# Patient Record
Sex: MALE | Race: WHITE | NOT HISPANIC OR LATINO | Employment: FULL TIME | URBAN - METROPOLITAN AREA
[De-identification: names, ages, dates, MRNs, and addresses within clinical notes are randomized per-mention and may not be internally consistent; named-entity substitution may affect disease eponyms.]

---

## 2018-02-23 ENCOUNTER — OFFICE VISIT (OUTPATIENT)
Dept: URGENT CARE | Facility: PHYSICIAN GROUP | Age: 63
End: 2018-02-23
Payer: COMMERCIAL

## 2018-02-23 VITALS
HEIGHT: 72 IN | RESPIRATION RATE: 12 BRPM | BODY MASS INDEX: 26.03 KG/M2 | WEIGHT: 192.2 LBS | DIASTOLIC BLOOD PRESSURE: 68 MMHG | OXYGEN SATURATION: 96 % | TEMPERATURE: 98 F | HEART RATE: 76 BPM | SYSTOLIC BLOOD PRESSURE: 122 MMHG

## 2018-02-23 DIAGNOSIS — I10 ESSENTIAL HYPERTENSION: ICD-10-CM

## 2018-02-23 DIAGNOSIS — J45.20 MILD INTERMITTENT ASTHMA WITHOUT COMPLICATION: ICD-10-CM

## 2018-02-23 DIAGNOSIS — J20.9 ACUTE BRONCHITIS, UNSPECIFIED ORGANISM: ICD-10-CM

## 2018-02-23 DIAGNOSIS — E10.49 TYPE 1 DIABETES MELLITUS WITH OTHER NEUROLOGIC COMPLICATION (HCC): ICD-10-CM

## 2018-02-23 PROCEDURE — 99214 OFFICE O/P EST MOD 30 MIN: CPT | Performed by: FAMILY MEDICINE

## 2018-02-23 RX ORDER — ALBUTEROL SULFATE 0.63 MG/3ML
0.63 SOLUTION RESPIRATORY (INHALATION) EVERY 6 HOURS PRN
Qty: 3 ML | Refills: 0 | Status: SHIPPED | OUTPATIENT
Start: 2018-02-23 | End: 2022-03-03

## 2018-02-23 RX ORDER — DOXYCYCLINE HYCLATE 100 MG
100 TABLET ORAL 2 TIMES DAILY
Qty: 20 TAB | Refills: 0 | Status: SHIPPED | OUTPATIENT
Start: 2018-02-23 | End: 2018-03-05

## 2018-02-23 ASSESSMENT — ENCOUNTER SYMPTOMS
WEAKNESS: 0
DIARRHEA: 0
DIZZINESS: 0
EYE REDNESS: 0
PSYCHIATRIC NEGATIVE: 1
DIAPHORESIS: 0
COUGH: 1
VOMITING: 0
HEADACHES: 1
EYE DISCHARGE: 0
CHILLS: 0
SPUTUM PRODUCTION: 1
MYALGIAS: 1
NAUSEA: 0
WEIGHT LOSS: 0
SHORTNESS OF BREATH: 0
WHEEZING: 1
SORE THROAT: 1
CARDIOVASCULAR NEGATIVE: 1
FEVER: 0

## 2018-02-23 NOTE — PROGRESS NOTES
Subjective:      Keaton Cervantes is a 62 y.o. male who presents with Cough (Productive cough, sinus pressure, headaches, congestion  x1week)    Patient presents to urgent care with one week of cough and chest congestion and he's had some mild sinus pressure and headaches as well. Patient states that he has a history of bronchogenic asthma and also pneumonia. Has had some malaise and fatigue no obvious fevers or chills. No nausea vomiting or diarrhea. He is a type I diabetic who states that his blood sugars have been running well 120s to 130s yesterday and today.      HPI  Review of Systems   Constitutional: Positive for malaise/fatigue. Negative for chills, diaphoresis, fever and weight loss.   HENT: Positive for sore throat. Negative for ear pain.    Eyes: Negative for discharge and redness.   Respiratory: Positive for cough, sputum production and wheezing. Negative for shortness of breath.    Cardiovascular: Negative.    Gastrointestinal: Negative for diarrhea, nausea and vomiting.   Genitourinary: Negative.    Musculoskeletal: Positive for myalgias.   Skin: Negative.    Neurological: Positive for headaches. Negative for dizziness and weakness.   Endo/Heme/Allergies: Negative.    Psychiatric/Behavioral: Negative.      PMH:  has a past medical history of Allergy; ASTHMA; Back pain; Bronchitis; CATARACT; Dental disorder; Diabetes; Hypertension; MEDICAL HOME; Neck pain; ELLEN (obstructive sleep apnea); and Sleep apnea.  MEDS:   Current Outpatient Prescriptions:   •  doxycycline (VIBRAMYCIN) 100 MG Tab, Take 1 Tab by mouth 2 times a day for 10 days. With food, Disp: 20 Tab, Rfl: 0  •  albuterol (ACCUNEB) 0.63 MG/3ML nebulizer solution, 3 mL by Nebulization route every 6 hours as needed for Shortness of Breath for up to 30 doses., Disp: 3 mL, Rfl: 0  •  Hydrocod Polst-CPM Polst ER (TUSSIONEX) 10-8 MG/5ML Suspension Extended Release, Take 5 mL by mouth 2 times a day as needed for Cough or Rhinitis for up to 6  days. Will cause sedation, avoid driving, operating heavy machinery, and drinking alcohol, Disp: 60 mL, Rfl: 0  •  metformin (GLUCOPHAGE) 500 MG TABS, TAKE 1 TABLET BY MOUTH TWICE DAILY, Disp: 60 Tab, Rfl: 2  •  nabumetone (RELAFEN) 750 MG Tab, Take 1 Tab by mouth 2 times a day., Disp: 60 Tab, Rfl: 3  •  ibuprofen (MOTRIN) 400 MG Tab, Take 400 mg by mouth every 6 hours as needed., Disp: , Rfl:   •  hydrocodone-acetaminophen (NORCO) 5-325 MG Tab per tablet, Take 1-2 Tabs by mouth every 6 hours as needed. No driving while taking, do not take while at work. No heavy machinery with use., Disp: 15 Tab, Rfl: 0  •  ONE TOUCH ULTRA TEST strip, USE TO TEST BLOOD 6 TIMES A DAY, Disp: 150 Strip, Rfl: 2  •  insulin glargine (LANTUS) 100 UNIT/ML SOLN, Inject 15 Units as instructed every bedtime. AS DIRECTED, Disp: 100 mL, Rfl: 6  •  albuterol-ipratropium (DUO-NEB) 0.5-2.5 (3) MG/3ML nebulizer solution, 3 mL by Nebulization route 4 times a day., Disp: 100 mL, Rfl: 3  •  FLUTICASONE-SALMETEROL 115-21 MCG/ACT INH AERO, Inhale 1 Puff by mouth 2 times a day., Disp: , Rfl:   ALLERGIES:   Allergies   Allergen Reactions   • Sulfa Drugs Unspecified     Pt hasn't had meds in so long he doesn't remember what his reaction is, he just remembers there is a reaction and that he shouldn't have them    • Flu Virus Vaccine Shortness of Breath   • Other Food Anaphylaxis     coconut   • Pneumococcal Vaccines Shortness of Breath   • Nutrasweet Aspartame [Aspartame]      Causes migraines   SURGHX:   Past Surgical History:   Procedure Laterality Date   • LUMBAR FUSION ANTERIOR  8/4/2009    Performed by JOSEE BLANTON at SURGERY [facility] ORS   • LUMBAR FUSION POSTERIOR  8/4/2009    Performed by JOSEE BLANTON at SURGERY [facility] ORS   • LUMBAR LAMINECTOMY DISKECTOMY  8/4/2009    Performed by JOSEE BLANTON at SURGERY [facility] ORS   • LAMINOTOMY  8/4/2009    Performed by JOSEE BLANTON at SURGERY [facility]   • SOMNOPLASTY   6/10/2009    Performed by ELÍAS RILEY at SURGERY [facility]   • SEPTOPLASTY  6/10/2009    Performed by ELÍAS RILEY at SURGERY [facility]   • ANTROSTOMY  6/10/2009    Performed by ELÍAS RILEY at SURGERY [facility]   • ETHMOIDECTOMY  6/10/2009    Performed by ELÍAS RILEY at SURGERY [facility]   • CATARACT PHACO WITH IOL  7/7/08    Performed by OCTAVIO HARDWICK at SURGERY SAME DAY [facility]   • CATARACT PHACO WITH IOL  6/16/08    Performed by OCTAVIO HARDWICK at SURGERY SAME DAY [facility]   • ANKLE ORIF     • APPENDECTOMY     • VASECTOMY     SOCHX:  reports that he has quit smoking. He has quit using smokeless tobacco. His smokeless tobacco use included Chew. He reports that he does not drink alcohol or use drugs.  FH: Family history was reviewed, no pertinent findings to report   Objective:     /68   Pulse 76   Temp 36.7 °C (98 °F)   Resp 12   Ht 1.829 m (6')   Wt 87.2 kg (192 lb 3.2 oz)   SpO2 96%   BMI 26.07 kg/m²      Physical Exam   Constitutional: He is oriented to person, place, and time. He appears well-developed and well-nourished. No distress.   HENT:   Mild pharyngeal erythema   Eyes: Conjunctivae are normal.   Neck: Normal range of motion. Neck supple.   Cardiovascular: Normal rate, regular rhythm, normal heart sounds and intact distal pulses.  Exam reveals no gallop and no friction rub.    No murmur heard.  Pulmonary/Chest: Effort normal. He has no wheezes.   Mild ronchi , loose deep cough on exam   Abdominal: Soft.   Musculoskeletal: Normal range of motion. He exhibits no edema or tenderness.   Lymphadenopathy:     He has no cervical adenopathy.   Neurological: He is alert and oriented to person, place, and time.   Skin: Skin is warm and dry. He is not diaphoretic. No erythema.   Psychiatric: He has a normal mood and affect. His behavior is normal.              Assessment/Plan:     1. 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 Essential hypertension stable on meds cont as directed      3. Acute bronchitis, unspecified organism    - doxycycline (VIBRAMYCIN) 100 MG Tab; Take 1 Tab by mouth 2 times a day for 10 days. With food  Dispense: 20 Tab; Refill: 0  - albuterol (ACCUNEB) 0.63 MG/3ML nebulizer solution; 3 mL by Nebulization route every 6 hours as needed for Shortness of Breath for up to 30 doses.  Dispense: 3 mL; Refill: 0  - Hydrocod Polst-CPM Polst ER (TUSSIONEX) 10-8 MG/5ML Suspension Extended Release; Take 5 mL by mouth 2 times a day as needed for Cough or Rhinitis for up to 6 days. Will cause sedation, avoid driving, operating heavy machinery, and drinking alcohol  Dispense: 60 mL; Refill: 0    4. Mild intermittent asthma without complication    - doxycycline (VIBRAMYCIN) 100 MG Tab; Take 1 Tab by mouth 2 times a day for 10 days. With food  Dispense: 20 Tab; Refill: 0  - albuterol (ACCUNEB) 0.63 MG/3ML nebulizer solution; 3 mL by Nebulization route every 6 hours as needed for Shortness of Breath for up to 30 doses.  Dispense: 3 mL; Refill: 0  - Hydrocod Polst-CPM Polst ER (TUSSIONEX) 10-8 MG/5ML Suspension Extended Release; Take 5 mL by mouth 2 times a day as needed for Cough or Rhinitis for up to 6 days. Will cause sedation, avoid driving, operating heavy machinery, and drinking alcohol  Dispense: 60 mL; Refill: 0    Chronic conditions of diabetes and hypertension that may potentially impact the patient's ability to recover from this infection appear stable. The patient will f/u with their pcp and continue with current chronic medications as directed.

## 2019-02-16 ENCOUNTER — OFFICE VISIT (OUTPATIENT)
Dept: URGENT CARE | Facility: PHYSICIAN GROUP | Age: 64
End: 2019-02-16
Payer: COMMERCIAL

## 2019-02-16 VITALS
WEIGHT: 195 LBS | SYSTOLIC BLOOD PRESSURE: 124 MMHG | BODY MASS INDEX: 26.41 KG/M2 | HEART RATE: 88 BPM | TEMPERATURE: 97.6 F | RESPIRATION RATE: 18 BRPM | HEIGHT: 72 IN | DIASTOLIC BLOOD PRESSURE: 72 MMHG | OXYGEN SATURATION: 97 %

## 2019-02-16 DIAGNOSIS — J45.31 ASTHMA IN ADULT, MILD PERSISTENT, WITH ACUTE EXACERBATION: ICD-10-CM

## 2019-02-16 DIAGNOSIS — J01.90 ACUTE NON-RECURRENT SINUSITIS, UNSPECIFIED LOCATION: ICD-10-CM

## 2019-02-16 DIAGNOSIS — R05.9 COUGH: Primary | ICD-10-CM

## 2019-02-16 PROCEDURE — 99214 OFFICE O/P EST MOD 30 MIN: CPT | Performed by: PHYSICIAN ASSISTANT

## 2019-02-16 RX ORDER — AZITHROMYCIN 250 MG/1
TABLET, FILM COATED ORAL
COMMUNITY
Start: 2019-02-09 | End: 2022-03-03

## 2019-02-16 RX ORDER — DOXYCYCLINE HYCLATE 100 MG
100 TABLET ORAL 2 TIMES DAILY
Qty: 20 TAB | Refills: 0 | Status: SHIPPED | OUTPATIENT
Start: 2019-02-16 | End: 2019-02-26

## 2019-02-16 RX ORDER — PREDNISONE 20 MG/1
20 TABLET ORAL DAILY
Qty: 7 TAB | Refills: 0 | Status: SHIPPED | OUTPATIENT
Start: 2019-02-16 | End: 2019-02-23

## 2019-02-16 RX ORDER — CODEINE PHOSPHATE/GUAIFENESIN 10-100MG/5
10 LIQUID (ML) ORAL 4 TIMES DAILY PRN
Qty: 200 ML | Refills: 0 | Status: SHIPPED | OUTPATIENT
Start: 2019-02-16 | End: 2019-02-21

## 2019-02-16 RX ORDER — GLIPIZIDE 5 MG/1
TABLET, EXTENDED RELEASE ORAL 2 TIMES DAILY
COMMUNITY
Start: 2019-01-10 | End: 2022-03-03

## 2019-02-16 NOTE — PROGRESS NOTES
Subjective:      Keaton Cervantes is a 63 y.o. male who presents with Cough (with congestion x 1 week, Zpak was finished today )    PMH:  has a past medical history of Allergy; ASTHMA; Back pain; Bronchitis; CATARACT; Dental disorder; Diabetes; Hypertension; MEDICAL HOME; Neck pain; ELLEN (obstructive sleep apnea); and Sleep apnea.  MEDS:   Current Outpatient Prescriptions:   •  azithromycin (ZITHROMAX) 250 MG Tab, , Disp: , Rfl:   •  GLIPIZIDE XL 5 MG TABLET SR 24 HR, , Disp: , Rfl:   •  doxycycline (VIBRAMYCIN) 100 MG Tab, Take 1 Tab by mouth 2 times a day for 10 days., Disp: 20 Tab, Rfl: 0  •  predniSONE (DELTASONE) 20 MG Tab, Take 1 Tab by mouth every day for 7 days., Disp: 7 Tab, Rfl: 0  •  guaifenesin-codeine (TUSSI-ORGANIDIN NR) 100-10 MG/5ML syrup, Take 10 mL by mouth 4 times a day as needed for up to 5 days., Disp: 200 mL, Rfl: 0  •  albuterol (ACCUNEB) 0.63 MG/3ML nebulizer solution, 3 mL by Nebulization route every 6 hours as needed for Shortness of Breath for up to 30 doses., Disp: 3 mL, Rfl: 0  •  metformin (GLUCOPHAGE) 500 MG TABS, TAKE 1 TABLET BY MOUTH TWICE DAILY, Disp: 60 Tab, Rfl: 2  •  insulin glargine (LANTUS) 100 UNIT/ML SOLN, Inject 15 Units as instructed every bedtime. AS DIRECTED, Disp: 100 mL, Rfl: 6  •  albuterol-ipratropium (DUO-NEB) 0.5-2.5 (3) MG/3ML nebulizer solution, 3 mL by Nebulization route 4 times a day., Disp: 100 mL, Rfl: 3  •  nabumetone (RELAFEN) 750 MG Tab, Take 1 Tab by mouth 2 times a day. (Patient not taking: Reported on 2/16/2019), Disp: 60 Tab, Rfl: 3  •  ibuprofen (MOTRIN) 400 MG Tab, Take 400 mg by mouth every 6 hours as needed., Disp: , Rfl:   •  hydrocodone-acetaminophen (NORCO) 5-325 MG Tab per tablet, Take 1-2 Tabs by mouth every 6 hours as needed. No driving while taking, do not take while at work. No heavy machinery with use., Disp: 15 Tab, Rfl: 0  •  ONE TOUCH ULTRA TEST strip, USE TO TEST BLOOD 6 TIMES A DAY, Disp: 150 Strip, Rfl: 2  •   FLUTICASONE-SALMETEROL 115-21 MCG/ACT INH AERO, Inhale 1 Puff by mouth 2 times a day., Disp: , Rfl:   ALLERGIES:   Allergies   Allergen Reactions   • Sulfa Drugs Unspecified     Pt hasn't had meds in so long he doesn't remember what his reaction is, he just remembers there is a reaction and that he shouldn't have them    • Flu Virus Vaccine Shortness of Breath   • Other Food Anaphylaxis     coconut   • Pneumococcal Vaccines Shortness of Breath   • Nutrasweet Aspartame [Aspartame]      Causes migraines     SURGHX:   Past Surgical History:   Procedure Laterality Date   • LUMBAR FUSION ANTERIOR  8/4/2009    Performed by JOSEE BLANTON at SURGERY [facility] ORS   • LUMBAR FUSION POSTERIOR  8/4/2009    Performed by JOSEE BLANTON at SURGERY [facility] ORS   • LUMBAR LAMINECTOMY DISKECTOMY  8/4/2009    Performed by JOSEE BLANTON at SURGERY [facility] ORS   • LAMINOTOMY  8/4/2009    Performed by JOSEE BLANTON at SURGERY [facility] ORS   • SOMNOPLASTY  6/10/2009    Performed by ELÍAS RILEY at SURGERY [facility] ORS   • SEPTOPLASTY  6/10/2009    Performed by ELÍAS RILEY at SURGERY [facility] ORS   • ANTROSTOMY  6/10/2009    Performed by ELÍAS RILEY at SURGERY [facility] ORS   • ETHMOIDECTOMY  6/10/2009    Performed by ELÍAS RILEY at SURGERY [facility] ORS   • CATARACT PHACO WITH IOL  7/7/08    Performed by OCTAVIO HARDWICK at SURGERY SAME DAY [facility] ORS   • CATARACT PHACO WITH IOL  6/16/08    Performed by OCTAVIO HARDWICK at SURGERY SAME DAY [facility] ORS   • ANKLE ORIF     • APPENDECTOMY     • VASECTOMY       SOCHX:  reports that he has quit smoking. He has quit using smokeless tobacco. His smokeless tobacco use included Chew. He reports that he does not drink alcohol or use drugs.  FH: Reviewed with patient, not pertinent to this visit.           Patient presents with:  Cough: with congestion x 1 week, Zpak was finished today           Sinusitis   This is a new problem. The  current episode started 1 to 4 weeks ago. The problem is unchanged. There has been no fever. His pain is at a severity of 7/10. The pain is moderate. Associated symptoms include congestion, coughing, headaches and sinus pressure. Pertinent negatives include no shortness of breath, sore throat or swollen glands. Past treatments include antibiotics. The treatment provided mild relief.       Review of Systems   Constitutional: Negative for fever.   HENT: Positive for congestion, sinus pain and sinus pressure. Negative for sore throat.    Respiratory: Positive for cough and sputum production. Negative for shortness of breath, wheezing and stridor.    Neurological: Positive for headaches.   All other systems reviewed and are negative.         Objective:     /72 (BP Location: Left arm, Patient Position: Sitting, BP Cuff Size: Adult)   Pulse 88   Temp 36.4 °C (97.6 °F) (Temporal)   Resp 18   Ht 1.829 m (6')   Wt 88.5 kg (195 lb)   SpO2 97%   BMI 26.45 kg/m²      Physical Exam   Constitutional: He is oriented to person, place, and time. He appears well-developed and well-nourished. No distress.   HENT:   Head: Normocephalic and atraumatic.   Right Ear: Tympanic membrane normal.   Left Ear: Tympanic membrane normal.   Nose: Right sinus exhibits maxillary sinus tenderness. Left sinus exhibits maxillary sinus tenderness.   Mouth/Throat: Uvula is midline, oropharynx is clear and moist and mucous membranes are normal.   Eyes: Pupils are equal, round, and reactive to light. EOM are normal.   Neck: Normal range of motion. Neck supple.   Cardiovascular: Normal rate, regular rhythm and normal heart sounds.    Pulmonary/Chest: Effort normal and breath sounds normal. He has no wheezes. He has no rales.   Abdominal: Soft.   Musculoskeletal: Normal range of motion.   Neurological: He is alert and oriented to person, place, and time. Gait normal.   Skin: Skin is warm. Capillary refill takes less than 2 seconds.    Psychiatric: He has a normal mood and affect.   Nursing note and vitals reviewed.       Assessment/Plan:     1. Cough  doxycycline (VIBRAMYCIN) 100 MG Tab    predniSONE (DELTASONE) 20 MG Tab    guaifenesin-codeine (TUSSI-ORGANIDIN NR) 100-10 MG/5ML syrup   2. Acute non-recurrent sinusitis, unspecified location  doxycycline (VIBRAMYCIN) 100 MG Tab    predniSONE (DELTASONE) 20 MG Tab    guaifenesin-codeine (TUSSI-ORGANIDIN NR) 100-10 MG/5ML syrup   3. Asthma in adult, mild persistent, with acute exacerbation  doxycycline (VIBRAMYCIN) 100 MG Tab    predniSONE (DELTASONE) 20 MG Tab    guaifenesin-codeine (TUSSI-ORGANIDIN NR) 100-10 MG/5ML syrup       PT can continue OTC medications, increase fluids and rest until symptoms improve.     PT instructed not to drive or operate heavy machinery or drink alcohol while taking this medication because it contains either a narcotic or benzodiazepines which causes drowsiness. PT verbalized understanding of these instructions.     [facility] Aware web site evaluation: I have obtained and reviewed patient utilization report from [facility] pharmacy database prior to writing prescription for controlled substance.  No history of abuse.    PT should follow up with PCP in 1-2 days for re-evaluation if symptoms have not improved.  Discussed red flags and reasons to return to UC or ED.  Pt and/or family verbalized understanding of diagnosis and follow up instructions and was offered informational handout on diagnosis.  PT discharged.

## 2019-02-17 ASSESSMENT — ENCOUNTER SYMPTOMS
SINUS PRESSURE: 1
COUGH: 1
STRIDOR: 0
SPUTUM PRODUCTION: 1
FEVER: 0
WHEEZING: 0
SWOLLEN GLANDS: 0
SHORTNESS OF BREATH: 0
SORE THROAT: 0
SINUS PAIN: 1
HEADACHES: 1

## 2021-04-02 ENCOUNTER — APPOINTMENT (OUTPATIENT)
Dept: URGENT CARE | Facility: PHYSICIAN GROUP | Age: 66
End: 2021-04-02

## 2021-05-27 ENCOUNTER — HOSPITAL ENCOUNTER (EMERGENCY)
Facility: MEDICAL CENTER | Age: 66
End: 2021-05-27
Attending: EMERGENCY MEDICINE
Payer: COMMERCIAL

## 2021-05-27 ENCOUNTER — APPOINTMENT (OUTPATIENT)
Dept: RADIOLOGY | Facility: MEDICAL CENTER | Age: 66
End: 2021-05-27
Attending: EMERGENCY MEDICINE
Payer: COMMERCIAL

## 2021-05-27 VITALS
BODY MASS INDEX: 26.41 KG/M2 | OXYGEN SATURATION: 95 % | DIASTOLIC BLOOD PRESSURE: 79 MMHG | HEIGHT: 72 IN | TEMPERATURE: 96.6 F | RESPIRATION RATE: 14 BRPM | HEART RATE: 96 BPM | SYSTOLIC BLOOD PRESSURE: 146 MMHG | WEIGHT: 195 LBS
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
## 2021-05-27 DIAGNOSIS — M25.512 ACUTE PAIN OF LEFT SHOULDER: ICD-10-CM

## 2021-05-27 DIAGNOSIS — S22.42XA CLOSED FRACTURE OF MULTIPLE RIBS OF LEFT SIDE, INITIAL ENCOUNTER: ICD-10-CM

## 2021-05-27 PROCEDURE — 71101 X-RAY EXAM UNILAT RIBS/CHEST: CPT | Mod: LT

## 2021-05-27 PROCEDURE — 99284 EMERGENCY DEPT VISIT MOD MDM: CPT

## 2021-05-27 PROCEDURE — A9270 NON-COVERED ITEM OR SERVICE: HCPCS | Performed by: EMERGENCY MEDICINE

## 2021-05-27 PROCEDURE — 73030 X-RAY EXAM OF SHOULDER: CPT | Mod: LT

## 2021-05-27 PROCEDURE — 700102 HCHG RX REV CODE 250 W/ 637 OVERRIDE(OP): Performed by: EMERGENCY MEDICINE

## 2021-05-27 RX ORDER — IBUPROFEN 600 MG/1
600 TABLET ORAL ONCE
Status: COMPLETED | OUTPATIENT
Start: 2021-05-27 | End: 2021-05-27

## 2021-05-27 RX ORDER — HYDROCODONE BITARTRATE AND ACETAMINOPHEN 5; 325 MG/1; MG/1
1 TABLET ORAL ONCE
Status: COMPLETED | OUTPATIENT
Start: 2021-05-27 | End: 2021-05-27

## 2021-05-27 RX ORDER — HYDROCODONE BITARTRATE AND ACETAMINOPHEN 5; 325 MG/1; MG/1
1-2 TABLET ORAL EVERY 4 HOURS PRN
Qty: 15 TABLET | Refills: 0 | Status: SHIPPED | OUTPATIENT
Start: 2021-05-27 | End: 2021-06-01

## 2021-05-27 RX ADMIN — HYDROCODONE BITARTRATE AND ACETAMINOPHEN 1 TABLET: 5; 325 TABLET ORAL at 07:00

## 2021-05-27 RX ADMIN — IBUPROFEN 600 MG: 600 TABLET, FILM COATED ORAL at 05:26

## 2021-05-27 ASSESSMENT — PAIN DESCRIPTION - PAIN TYPE: TYPE: ACUTE PAIN

## 2021-05-27 NOTE — LETTER
FORM C-4:  EMPLOYEE’S CLAIM FOR COMPENSATION/ REPORT OF INITIAL TREATMENT  EMPLOYEE’S CLAIM - PROVIDE ALL INFORMATION REQUESTED   First Name Keaton Last Name Helen Birthdate 1955  Sex male Claim Number   Home Address [address]             [place] [address]                                   Age  66 y.o. Height  1.829 m (6') Weight  88.5 kg (195 lb) [id number]   Mailing Address [address]              Zip [address] Telephone  [phone] (home)  Primary Language Spoken English   Insurer Third Party   TONY CLAIMS [employer] Employee's Occupation (Job Title) When Injury or Occupational Disease Occurred  [place]   Employer's Name [employer] [address] Telephone [phone]    Employer Address [address] [address] [facility] [[address]] Zip [address]   Date of Injury  5/23/2021       Hour of Injury  6:55 AM Date Employer Notified  6/23/2021 Last Day of Work after Injury or Occupational Disease  5/27/2021 Supervisor to Whom Injury Reported  [place]   Address or Location of Accident (if applicable) Work [1]   What were you doing at the time of accident? (if applicable) [employer]    How did this injury or occupational disease occur? Be specific and answer in detail. 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  Witnesses to the Accident  video   Nature of Injury or Occupational Disease  Workers' Compensation Part(s) of Body Injured or Affected  Chest, Shoulder (L),     I CERTIFY THAT THE ABOVE IS TRUE AND CORRECT TO THE BEST OF MY KNOWLEDGE AND THAT I HAVE PROVIDED THIS  INFORMATION IN ORDER TO OBTAIN THE BENEFITS OF [place]).  I HEREBY AUTHORIZE ANY PHYSICIAN, CHIROPRACTOR, SURGEON, PRACTITIONER, OR OTHER PERSON, ANY HOSPITAL, INCLUDING [facility] OR [facility], ANY MEDICAL SERVICE ORGANIZATION, ANY INSURANCE COMPANY, OR OTHER INSTITUTION OR ORGANIZATION TO RELEASE TO EACH OTHER, ANY MEDICAL OR OTHER INFORMATION, INCLUDING BENEFITS PAID OR PAYABLE, PERTINENT TO THIS INJURY OR DISEASE, EXCEPT INFORMATION RELATIVE TO DIAGNOSIS, TREATMENT AND/OR COUNSELING FOR AIDS, PSYCHOLOGICAL CONDITIONS, ALCOHOL OR CONTROLLED SUBSTANCES, FOR WHICH I MUST GIVE SPECIFIC AUTHORIZATION.  A PHOTOSTAT OF THIS AUTHORIZATION SHALL BE AS VALID AS THE ORIGINAL.  Date   05/27/2021                    Place    [facility]                                                                        Employee’s Signature   THIS REPORT MUST BE COMPLETED AND MAILED WITHIN 3 WORKING DAYS OF TREATMENT   Place [facility], EMERGENCY DEPT                       Name of Facility [facility]   Date  5/27/2021 Diagnosis  (W19.XXXA) Fall, initial encounter  (M25.512) Acute pain of left shoulder  (S22.42XA) Closed fracture of multiple ribs of left side, initial encounter Is there evidence the injured employee was under the influence of alcohol and/or another controlled substance at the time of accident?   Hour  7:01 AM Description of Injury or Disease  Fall, initial encounter  Acute pain of left shoulder  Closed fracture of multiple ribs of left side, initial encounter No   Treatment  Exam, sling, pain control  Have you advised the patient to remain off work five days or more?         No   X-Ray Findings  Positive If Yes   From Date    To Date      From information given by the employee, together with medical evidence, can you directly connect this injury or occupational  "disease as job incurred? Yes If No, is employee capable of: Full Duty  No Modified Duty  No   Is additional medical care by a physician indicated? Yes If Modified Duty, Specify any Limitations / Restrictions   Light duty, no lifting, wear sling   Do you know of any previous injury or disease contributing to this condition or occupational disease? No    Date 5/27/2021 Print Doctor’s Name Cornelio Hinkle certify the employer’s copy of this form was mailed on:   Address [address] [place] [id number] [phone] INSURER’S USE ONLY   Provider’s Tax ID Number   Telephone Dept: [phone]    Doctor’s Signature e-CORNELIO Mathis M.D. Degree  MD      Form C-4 (rev.10/07)                                                                         BRIEF DESCRIPTION OF RIGHTS AND BENEFITS  (Pursuant to [place] 616C.050)    Notice of Injury or Occupational Disease (Incident Report Form C-1): If an injury or occupational disease (OD) arises out of and in the course of employment, you must provide written notice to your employer as soon as practicable, but no later than 7 days after the accident or OD. Your employer shall maintain a sufficient supply of the required forms.    Claim for Compensation (Form C-4): If medical treatment is sought, the form C-4 is available at the place of initial treatment. A completed \"Claim for Compensation\" (Form C-4) must be filed within 90 days after an accident or OD. The treating physician or chiropractor must, within 3 working days after treatment, complete and mail to the employer, the employer's insurer and third-party , the Claim for Compensation.    Medical Treatment: If you require medical treatment for your on-the-job injury or OD, you may be required to select a physician or chiropractor from a list provided by your workers’ compensation insurer, if it has contracted with an Organization for Managed Care (MCO) or Preferred Provider Organization (PPO) or providers " of health care. If your employer has not entered into a contract with an MCO or PPO, you may select a physician or chiropractor from the Panel of Physicians and Chiropractors. Any medical costs related to your industrial injury or OD will be paid by your insurer.    Temporary Total Disability (TTD): If your doctor has certified that you are unable to work for a period of at least 5 consecutive days, or 5 cumulative days in a 20-day period, or places restrictions on you that your employer does not accommodate, you may be entitled to TTD compensation.    Temporary Partial Disability (TPD): If the wage you receive upon reemployment is less than the compensation for TTD to which you are entitled, the insurer may be required to pay you TPD compensation to make up the difference. TPD can only be paid for a maximum of 24 months.    Permanent Partial Disability (PPD): When your medical condition is stable and there is an indication of a PPD as a result of your injury or OD, within 30 days, your insurer must arrange for an evaluation by a rating physician or chiropractor to determine the degree of your PPD. The amount of your PPD award depends on the date of injury, the results of the PPD evaluation, your age and wage.    Permanent Total Disability (PTD): If you are medically certified by a treating physician or chiropractor as permanently and totally disabled and have been granted a PTD status by your insurer, you are entitled to receive monthly benefits not to exceed 66 2/3% of your average monthly wage. The amount of your PTD payments is subject to reduction if you previously received a lump-sum PPD award.    Vocational Rehabilitation Services: You may be eligible for vocational rehabilitation services if you are unable to return to the job due to a permanent physical impairment or permanent restrictions as a result of your injury or occupational disease.    Transportation and Per Mattie Reimbursement: You may be eligible  for travel expenses and per karis associated with medical treatment.    Reopening: You may be able to reopen your claim if your condition worsens after claim closure.     Appeal Process: If you disagree with a written determination issued by the insurer or the insurer does not respond to your request, you may appeal to the Department of Administration, , by following the instructions contained in your determination letter. You must appeal the determination within 70 days from the date of the determination letter at [address], [address], [place], [place] [address], or [address], [place], [place] [address]. If you disagree with the  decision, you may appeal to the Department of Administration, . You must file your appeal within 30 days from the date of the  decision letter at [address], [address], [place], [place] [address], or [address], [place], [place] [address]. If you disagree with a decision of an , you may file a petition for judicial review with the District Court. You must do so within 30 days of the Appeal Officer’s decision. You may be represented by an  at your own expense or you may contact the [facility] for possible representation.    [place]  for Injured Workers (NAIW): If you disagree with a  decision, you may request that NAIW represent you without charge at an  Hearing. For information regarding denial of benefits, you may contact the [facility] at: [address], [address] [address], [phone], or [address] [facility] [address], [place] [address], [phone]    To File a Complaint with the Division: If you wish to file a complaint with the  of the Division of Industrial Relations (DIR),  please contact the Workers’ Compensation Section, [address], [place],  [place] [address], telephone [phone], or [address], [address], [place], [place] [address], telephone [phone].    For assistance with Workers’ Compensation Issues: You may contact the [facility] Office for Consumer Health Assistance, [address], [address], [place], [place] [address], Toll Free 1-237.613.5755, Web site: http://[facility].[place].gov/Programs/INNA E-mail: inna@[facility].nv.gov  D-2 (rev. 10/20)              __________________________________________________________________                                    ____05/27/2021_____________            Employee Name / Signature                                                                                                                            Date

## 2021-05-27 NOTE — ED TRIAGE NOTES
Chief Complaint   Patient presents with   • T-5000 FALL   • Rib Pain   • Shoulder Pain     Pt walked into triage with a steady gait c/o increase L side rib pain and L shoulder pain after falling off a step ladder on Sunday, pt able to raise and extend arm, resp even and unlabored, cms intact, denies hitting his head or LOC    Pt & staff masked and in appropriate PPE during encounter.  Pt denies fever/travel or being in contact with anyone testing positive for Covid.  Explained pt the triage process, made pt aware to tell the RN/staff of any changes/concerns, pt verbalized understanding of process and instructions given.  Pt to ER lobby.

## 2021-05-27 NOTE — ED NOTES
Discharge instructions discussed with pt, verbalizes understanding. Pt placed in sling, educated. Pt supplied IS and education provided. Pt amb with steady gait to lobby. All belongings with pt upon discharge.

## 2021-05-27 NOTE — ED PROVIDER NOTES
ED Provider Note    Scribed for Cornelio Hinkle M.D. by Roseanne Torres. 5/27/2021  5:10 AM    Primary care provider: DAVID Dinero  Means of arrival: Walk in  History obtained from: patient  History limited by: none    CHIEF COMPLAINT  Chief Complaint   Patient presents with    T-5000 FALL    Rib Pain    Shoulder Pain       HPI  Keaton Cervantes is a 66 y.o. male who presents to the Emergency Department for evaluation after a fall onset 4-5 days ago. Patient describes he fell off a small step stool and dell onto his left side. He has associated left chest wall pain and left shoulder pain. Patient's chest wall pain is exacerbated with a deep breath and he endorses popping and clicking. Additionally he states he only has pain in his shoulder when he lifts it over his head. Denies shortness of breath.    REVIEW OF SYSTEMS  Pertinent positives include fall, left sided chest wall pain and left shoulder pain.   Pertinent negatives include no shortness of breath.    See HPI for further details.     PAST MEDICAL HISTORY   has a past medical history of Allergy, ASTHMA, Back pain, Bronchitis, CATARACT, Dental disorder, Diabetes, Hypertension, MEDICAL HOME, Neck pain, ELLEN (obstructive sleep apnea), and Sleep apnea.    SURGICAL HISTORY   has a past surgical history that includes cataract phaco with iol (6/16/08); cataract phaco with iol (7/7/08); vasectomy; somnoplasty (6/10/2009); septoplasty (6/10/2009); antrostomy (6/10/2009); ethmoidectomy (6/10/2009); ankle orif; appendectomy; lumbar fusion anterior (8/4/2009); lumbar fusion posterior (8/4/2009); lumbar laminectomy diskectomy (8/4/2009); and laminotomy (8/4/2009).    SOCIAL HISTORY  Social History     Tobacco Use    Smoking status: Former Smoker    Smokeless tobacco: Former User     Types: Chew    Tobacco comment: 1992   Substance Use Topics    Alcohol use: No    Drug use: No      Social History     Substance and Sexual Activity   Drug Use No       FAMILY  HISTORY  Family History   Problem Relation Age of Onset    Hypertension Father     Diabetes Father        CURRENT MEDICATIONS  Home Medications    **Home medications have not yet been reviewed for this encounter**         ALLERGIES  Allergies   Allergen Reactions    Sulfa Drugs Unspecified     Pt hasn't had meds in so long he doesn't remember what his reaction is, he just remembers there is a reaction and that he shouldn't have them     Flu Virus Vaccine Shortness of Breath    Other Food Anaphylaxis     coconut    Pneumococcal Vaccines Shortness of Breath    Nutrasweet Aspartame [Aspartame]      Causes migraines       PHYSICAL EXAM  VITAL SIGNS: /79   Pulse 96   Temp 35.9 °C (96.6 °F) (Temporal)   Resp 14   Ht 1.829 m (6')   Wt 88.5 kg (195 lb)   SpO2 95%   BMI 26.45 kg/m²     Nursing note and vitals reviewed.  Constitutional: Well-developed and well-nourished. No distress.   HENT: Head is normocephalic and atraumatic. Oropharynx is clear and moist without exudate or erythema.   Eyes: Pupils are equal, round, and reactive to light. Conjunctiva are normal.   Cardiovascular: Normal rate and regular rhythm. No murmur heard. Normal radial pulses.  Pulmonary/Chest: Breath sounds normal. No wheezes or rales.   Abdominal: Soft and non-tender. No distention    Musculoskeletal: No bony tenderness to palpation of the left shoulder, pain at the extremes of range of motion.  Neurological: Awake, alert and oriented to person, place, and time. No focal deficits noted.  Skin: Skin is warm and dry. No rash.   Psychiatric: Normal mood and affect. Appropriate for clinical situation.    DIAGNOSTIC STUDIES / PROCEDURES    RADIOLOGY  DX-SHOULDER 2+ LEFT   Final Result      No acute osseous abnormality.      [id number] (WITH 1-VIEW CXR) LEFT   Final Result      Left lateral seventh and ninth acute rib fractures.      No pneumothorax or acute cardiopulmonary abnormality.        The radiologist's interpretation of all  radiological studies have been reviewed by me.    COURSE & MEDICAL DECISION MAKING  Nursing notes, VS, PMSFHx reviewed in chart.     5:10 AM - Patient seen and examined at bedside. I discussed that we will obtain imaging of his ribs and shoulder. Patient verbalizes understanding and agreement to this plan of care. Patient will be treated with hou 600 mg.  Ordered left shoulder x-ray and left rib x-ray to evaluate his symptoms. The differential diagnoses include but are not limited to: patient will undergo trauma evaluation    6:32 AM - Patient was reevaluated at bedside. Discussed lab and radiology results with the patient and informed them that he has fractures of his left 7th and 9th ribs. Advised him to follow up with occupational health. Prescribed Norco and given an incentive spirometer.  Discussed return precautions. Patient will be discharged at this time. He verbalizes agreement with discharge and plan of care.    I reviewed prescription monitoring program for patient's narcotic use before prescribing a scheduled drug.The patient will not drink alcohol nor drive with prescribed medications. The patient will return for new or worsening symptoms and is stable at the time of discharge.    In prescribing controlled substances to this patient, I certify that I have obtained and reviewed the medical history of Keaton Cervantes. I have also made a good haider effort to obtain applicable records from other providers who have treated the patient and records did not demonstrate any increased risk of substance abuse that would prevent me from prescribing controlled substances.     I have conducted a physical exam and documented it. I have reviewed Mr. Cervantes’s prescription history as maintained by the [place] Prescription Monitoring Program.     I have assessed the patient’s risk for abuse, dependency, and addiction using the validated Opioid Risk Tool available at  https://www.mdcalc.com/nekevb-ukwu-fmdq-ort-narcotic-abuse.     Given the above, I believe the benefits of controlled substance therapy outweigh the risks. The reasons for prescribing controlled substances include non-narcotic, oral analgesic alternatives have been inadequate for pain control. Accordingly, I have discussed the risk and benefits, treatment plan, and alternative therapies with the patient.     DISPOSITION:  Patient will be discharged home in stable condition.    FOLLOW UP:  [facility], Emergency Dept  [address] [id number] [phone]    If symptoms worsen    [facility]  [address]  [address]  [place] [id number] [phone]  Schedule an appointment as soon as possible for a visit         OUTPATIENT MEDICATIONS:  New Prescriptions    HYDROCODONE-ACETAMINOPHEN (NORCO) 5-325 MG TAB PER TABLET    Take 1-2 Tablets by mouth every four hours as needed for up to 5 days.       FINAL IMPRESSION  1. Fall, initial encounter    2. Acute pain of left shoulder    3. Closed fracture of multiple ribs of left side, initial encounter          Roseanne LAY (Gustavo), am scribing for, and in the presence of, Cornelio Hinkle M.D..    Electronically signed by: Roseanne Torres (Gustavo), 5/27/2021    ICornelio M.D. personally performed the services described in this documentation, as scribed by Roseanne Torres in my presence, and it is both accurate and complete. E    The note accurately reflects work and decisions made by me.  Cornelio Hinkle M.D.  5/27/2021  11:12 AM

## 2021-06-03 ENCOUNTER — OCCUPATIONAL MEDICINE (OUTPATIENT)
Dept: OCCUPATIONAL MEDICINE | Facility: CLINIC | Age: 66
End: 2021-06-03
Payer: COMMERCIAL

## 2021-06-03 VITALS
TEMPERATURE: 97.8 F | BODY MASS INDEX: 26.58 KG/M2 | OXYGEN SATURATION: 97 % | HEART RATE: 72 BPM | DIASTOLIC BLOOD PRESSURE: 64 MMHG | WEIGHT: 196 LBS | SYSTOLIC BLOOD PRESSURE: 126 MMHG

## 2021-06-03 DIAGNOSIS — S46.912D STRAIN OF LEFT SHOULDER, SUBSEQUENT ENCOUNTER: ICD-10-CM

## 2021-06-03 DIAGNOSIS — S22.42XD CLOSED FRACTURE OF MULTIPLE RIBS OF LEFT SIDE WITH ROUTINE HEALING, SUBSEQUENT ENCOUNTER: ICD-10-CM

## 2021-06-03 PROCEDURE — 99203 OFFICE O/P NEW LOW 30 MIN: CPT | Performed by: PREVENTIVE MEDICINE

## 2021-06-03 RX ORDER — CYCLOBENZAPRINE HCL 10 MG
10 TABLET ORAL PRN
COMMUNITY

## 2021-06-03 RX ORDER — CEFDINIR 300 MG/1
CAPSULE ORAL
COMMUNITY
Start: 2021-05-07 | End: 2022-03-03

## 2021-06-03 RX ORDER — DOXYCYCLINE 100 MG/1
CAPSULE ORAL
COMMUNITY
Start: 2021-04-28 | End: 2022-03-03

## 2021-06-03 RX ORDER — DULOXETIN HYDROCHLORIDE 60 MG/1
60 CAPSULE, DELAYED RELEASE ORAL
COMMUNITY
Start: 2021-05-24 | End: 2022-03-03

## 2021-06-03 RX ORDER — GLIPIZIDE 10 MG/1
10 TABLET, FILM COATED, EXTENDED RELEASE ORAL
COMMUNITY
Start: 2021-05-15 | End: 2022-03-03

## 2021-06-03 ASSESSMENT — PAIN SCALES - GENERAL: PAINLEVEL: 9=SEVERE PAIN

## 2021-06-03 NOTE — PROGRESS NOTES
Subjective:     Keaton Cervantes is a 66 y.o. 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  ROLANDO: He fell off a small step stool and fell onto his left side. He noted immediate left chest wall pain and left shoulder pain.  He was seen in the ER, x-rays of left shoulder were negative.  X-rays of the left ribs showed fractures of the seventh and ninth ribs.  He was given work restrictions, Norco and incentive spirometry.  Patient states that he continues a significant left rib/chest wall pain.  The pain extends to the thoracic spine.  Pain is worse with deep breathing, coughing or laughing.  He has not filled the [place] prescription from the ER but for chronic musculoskeletal pain he is on Norco twice daily currently.  He states when he does take his regular Norco prescription pain is improved from a 8 to about a 4.  He notes continued left shoulder pain but that has improved somewhat since the injury.  This morning top of the shoulder.  Pain is worse with movements above shoulder level.  Denies numbness or tingling.  Denies prior left shoulder injuries.    ROS: All systems were reviewed on intake form, form was reviewed and signed. See scanned documents in media. Pertinent positives and negatives included in HPI.    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 Appears well-developed and well-nourished.   HENT: Normocephalic and atraumatic. EOM are normal. No scleral icterus.   Cardiovascular: Normal rate.    Pulmonary/Chest: Effort normal. No respiratory distress.   Neurological: Patient is alert and oriented to person, place, and time.   Skin: Skin is warm and dry.   Psychiatric: Normal mood and affect. Behavior is normal.     Chest wall: Good chest wall movement.  Area of pain indicated over the left lateral ribs 7 through 9, extending along the ribs to the thoracic area.    Left shoulder: No gross deformity range of motion somewhat diminished with flexion to about 120 degrees and abduction to about 90 degrees.  Strength grossly intact.  Empty can test negative.  Speeds test negative.  Silverio test negative.    Assessment/Plan:       1. Closed fracture of multiple ribs of left side with routine healing, subsequent encounter    2. Strain of left shoulder, subsequent encounter    Other orders  - cyclobenzaprine (FLEXERIL) 10 mg Tab; Take 10 mg by mouth 3 times a day as needed.  - doxycycline (MONODOX) 100 MG capsule  - DULoxetine (CYMBALTA) 60 MG Cap DR Particles delayed-release capsule; Take 60 mg by mouth.  - glipiZIDE SR (GLUCOTROL) 10 MG TABLET SR 24 HR; Take 10 mg by mouth.  - cefdinir (OMNICEF) 300 MG Cap    Released to Restricted Duty FROM 6/3/2021 TO 6/18/2021  Limit to less than 5 pounds lift/push/pull.  Continue regular Norco prescription, recommend [place] prescription for ER for breakthrough pain only  Gentle range of motion exercises for the left shoulder as demonstrated  Okay to use OTC muscle cream/ointments as needed  Okay to use heat/ice as nee  ded  Restricted duty  Follow-up 2 weeks    Differential diagnosis, natural history, supportive care, and indications  for immediate follow-up discussed.    Approximately 35 minutes were spent in reviewing notes, preparing for visit, obtaining history, exam and evaluation, patient counseling/education and post visit documentation/orders.

## 2021-06-03 NOTE — LETTER
[address],   [address] [place] [address]  Phone:  [phone] - Fax:  [phone]   Occupational Health [facility] Progress Report and Disability Certification  Date of Service: 6/3/2021   No Show:  No  Date / Time of Next Visit: 6/18/2021 @ 8:45 AM    Claim Information   Patient Name: Keaton Cervantes  Claim Number:     Employer: [employer] [id number]  Date of Injury: 5/23/2021     Insurer / TPA: [employer]  ID / SSN:     Occupation: Mook  Diagnosis: The encounter diagnosis was Closed fracture of multiple ribs of left side with routine healing, subsequent encounter.    Medical Information   Related to Industrial Injury? 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  ROLANDO: He fell off a small step stool and fell onto his left side. He noted immediate left chest wall pain and left shoulder pain.  He was seen in the ER, x-rays of left shoulder were negative.  X-rays of the left ribs showed fractures of the seventh and ninth ribs.  He was given work restrictions, Norco and incentive spirometry.  Patient states that he continues a significant left rib/chest wall pain.  The pain extends to the thoracic spine.  Pain is worse with deep breathing, coughing or laughing.  He has not filled the [place] prescription from the ER but for chronic musculoskeletal pain he is on Norco twice daily currently.  He states when he does take his regular Norco prescription pain is improved from a 8 to about a 4.  He notes continued left shoulder pain but that has improved somewhat since the injury.  This morning top of the shoulder.  Pain is worse with movements above shoulder level.  Denies numbness or tingling.  Denies prior left shoulder injuries.   Objective Findings: Chest wall: Good chest wall movement.  Area of pain indicated over the left lateral ribs 7 through 9,  extending along the ribs to the thoracic area.    Left shoulder: No gross deformity range of motion somewhat diminished with flexion to about 120 degrees and abduction to about 90 degrees.  Strength grossly intact.  Empty can test negative.  Speeds test negative.  Silverio test negative.   Pre-Existing Condition(s):     Assessment:   Condition Same    Status: Additional Care Required  Permanent Disability:No    Plan:      Diagnostics:      Comments:  Continue regular Norco prescription, recommend [place] prescription for ER for breakthrough pain only  Gentle range of motion exercises for the left shoulder as demonstrated  Okay to use OTC muscle cream/ointments as needed  Okay to use heat/ice as nee  ded  Restricted duty  Follow-up 2 weeks    Disability Information   Status: Released to Restricted Duty    From:  6/3/2021  Through: 2021 Restrictions are: Temporary   Physical Restrictions   Sitting:    Standing:    Stooping:  < or = to 1 hr/day Bending:  < or = to 1 hr/day   Squatting:    Walking:    Climbin hrs/day Pushing:  < or = to 1 hr/day   Pulling:  < or = to 1 hr/day Other:    Reaching Above Shoulder (L): 0 hrs/day Reaching Above Shoulder (R):       Reaching Below Shoulder (L):    Reaching Below Shoulder (R):      Not to exceed Weight Limits   Carrying(hrs):   Weight Limit(lb):   Lifting(hrs):   Weight  Limit(lb):     Comments: Limit to less than 5 pounds lift/push/pull.    Repetitive Actions   Hands: i.e. Fine Manipulations from Grasping:     Feet: i.e. Operating Foot Controls:     Driving / Operate Machinery:     Provider Name:   Omar Gaston D.O. Physician Signature:  Physician Name:     Clinic Name / Location: [address],   [address] [place] [address] Clinic Phone Number: Dept: [phone]   Appointment Time: 11:00 Am Visit Start Time: 10:13 AM   Check-In Time:  10:07 Am Visit Discharge Time: 11 AM    Original-Treating Physician or Chiropractor    Page  2-Insurer/TPA    Page 3-Employer    Page 4-Employee

## 2021-06-23 ENCOUNTER — OCCUPATIONAL MEDICINE (OUTPATIENT)
Dept: OCCUPATIONAL MEDICINE | Facility: CLINIC | Age: 66
End: 2021-06-23
Payer: COMMERCIAL

## 2021-06-23 VITALS
TEMPERATURE: 98.6 F | HEART RATE: 87 BPM | WEIGHT: 196 LBS | HEIGHT: 72 IN | SYSTOLIC BLOOD PRESSURE: 122 MMHG | OXYGEN SATURATION: 96 % | RESPIRATION RATE: 14 BRPM | BODY MASS INDEX: 26.55 KG/M2 | DIASTOLIC BLOOD PRESSURE: 78 MMHG

## 2021-06-23 DIAGNOSIS — S46.912D STRAIN OF LEFT SHOULDER, SUBSEQUENT ENCOUNTER: ICD-10-CM

## 2021-06-23 DIAGNOSIS — S22.42XD CLOSED FRACTURE OF MULTIPLE RIBS OF LEFT SIDE WITH ROUTINE HEALING, SUBSEQUENT ENCOUNTER: ICD-10-CM

## 2021-06-23 PROCEDURE — 99213 OFFICE O/P EST LOW 20 MIN: CPT | Performed by: PREVENTIVE MEDICINE

## 2021-06-23 ASSESSMENT — ENCOUNTER SYMPTOMS
SENSORY CHANGE: 0
TINGLING: 0

## 2021-06-23 ASSESSMENT — PAIN SCALES - GENERAL: PAINLEVEL: 4=SLIGHT-MODERATE PAIN

## 2021-06-23 NOTE — LETTER
[address],   [address] [place] [address]  Phone:  [phone] - Fax:  [phone]   Occupational Health [facility] Progress Report and Disability Certification  Date of Service: 6/23/2021   No Show:  No  Date / Time of Next Visit: 7/7/2021 8:30 AM   Claim Information   Patient Name: Keaton Cervantes  Claim Number:     Employer: [employer] [address] Date of Injury: 5/23/2021     Insurer / TPA: [employer]  ID / SSN:     Occupation: Mook  Diagnosis: Diagnoses of Closed fracture of multiple ribs of left side with routine healing, subsequent encounter and Strain of left shoulder, subsequent encounter were pertinent to this visit.    Medical Information   Related to Industrial Injury? 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  ROLANDO: He fell off a small step stool and fell onto his left side. He noted immediate left chest wall pain and left shoulder pain.  X-rays of the left ribs showed fractures of the seventh and ninth ribs.  Patient states he had some very minimal improvement from before.  He still has significant pain in the ribs mostly but does have continued pain in his left shoulder as well has a little bit better range of motion the shoulder but gets pain in the ribs and in the shoulder with movements above shoulder level.  Taking his twice daily Norco as prescribed.  He is also using muscle relaxer at night.   Objective Findings: Chest wall: Good chest wall movement.  Area of tenderness over the left lateral ribs 7 through 9, extending along the ribs to the thoracic area.    Left shoulder: No gross deformity.  Range of motion somewhat diminished with flexion to about 120 degrees and abduction to about 90 degrees.    Pre-Existing Condition(s):     Assessment:   Condition Same    Status: Additional Care Required  Permanent Disability:No    Plan:       Diagnostics:      Comments:  Continue regular Norco prescription  Continue cyclobenzaprine at night as needed  Given continued shoulder symptoms will refer to physical therapy  Gentle range of motion exercises for the left shoulder as demonstrated  Okay to use OTC muscle cream/o  intments as needed  Okay to use heat/ice as needed  Restricted duty  Follow-up 2 weeks    Disability Information   Status: Released to Restricted Duty    From:  6/23/2021  Through: 7/7/2021 Restrictions are: Temporary   Physical Restrictions   Sitting:    Standing:    Stooping:  < or = to 1 hr/day Bending:  < or = to 1 hr/day   Squatting:    Walking:    Climbing:    Pushing:  < or = to 1 hr/day   Pulling:  < or = to 1 hr/day Other:    Reaching Above Shoulder (L): < or = to 1 hr/day Reaching Above Shoulder (R):       Reaching Below Shoulder (L):    Reaching Below Shoulder (R):      Not to exceed Weight Limits   Carrying(hrs):   Weight Limit(lb):   Lifting(hrs):   Weight  Limit(lb):     Comments: Limit to less than 5 pounds lift/push/pull.     Repetitive Actions   Hands: i.e. Fine Manipulations from Grasping:     Feet: i.e. Operating Foot Controls:     Driving / Operate Machinery:     Provider Name:   Omar Gaston D.O. Physician Signature:  Physician Name:     Clinic Name / Location: [address] [address] Clinic Phone Number: Dept: [phone]   Appointment Time: 1:00 Pm Visit Start Time: 12:27 PM   Check-In Time:  12:17 Pm Visit Discharge Time:  12:46 PM   Original-Treating Physician or Chiropractor    Page 2-Insurer/TPA    Page 3-Employer    Page 4-Employee

## 2021-06-23 NOTE — PROGRESS NOTES
Subjective:     Keaton Cervantes is a 66 y.o. 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  ROLANDO: He fell off a small step stool and fell onto his left side. He noted immediate left chest wall pain and left shoulder pain.  X-rays of the left ribs showed fractures of the seventh and ninth ribs.  Patient states he had some very minimal improvement from before.  He still has significant pain in the ribs mostly but does have continued pain in his left shoulder as well has a little bit better range of motion the shoulder but gets pain in the ribs and in the shoulder with movements above shoulder level.  Taking his twice daily Norco as prescribed.  He is also using muscle relaxer at night.    Review of Systems   Neurological: Negative for tingling and sensory change.       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       Objective:     /78   Pulse 87   Temp 37 °C (98.6 °F) (Temporal)   Resp 14   Ht 1.829 m (6')   Wt 88.9 kg (196 lb)   SpO2 96%   BMI 26.58 kg/m²     Constitutional: Patient is in no acute distress. Appears well-developed and well-nourished.   Cardiovascular: Normal rate.    Pulmonary/Chest: Effort normal. No respiratory distress.   Neurological: Patient is alert and oriented to person, place, and time.   Skin: Skin is warm and dry.   Psychiatric: Normal mood and affect. Behavior is normal.     Chest wall: Good chest wall movement.  Area of tenderness over the left lateral ribs 7 through 9, extending along the ribs to the thoracic area.    Left shoulder: No gross deformity.  Range of motion somewhat diminished with flexion to about 120 degrees and abduction to about 90 degrees.     Assessment/Plan:       1. Closed fracture of multiple ribs of left side with routine healing, subsequent encounter    2. Strain of  left shoulder, subsequent encounter  - REFERRAL TO PHYSICAL THERAPY    Released to Restricted Duty FROM 6/23/2021 TO 7/7/2021  Limit to less than 5 pounds lift/push/pull.     Continue regular Norco prescription  Continue cyclobenzaprine at night as needed  Given continued shoulder symptoms will refer to physical therapy  Gentle range of motion exercises for the left shoulder as demonstrated  Okay to use OTC muscle cream/o  intments as needed  Okay to use heat/ice as needed  Restricted duty  Follow-up 2 weeks    Differential diagnosis, natural history, supportive care, and indications for immediate follow-up discussed.    Approximately 25 minutes was spent in preparing for visit, obtaining history, exam and evaluation, patient counseling/education and post visit documentation/orders.

## 2021-07-07 ENCOUNTER — OCCUPATIONAL MEDICINE (OUTPATIENT)
Dept: OCCUPATIONAL MEDICINE | Facility: CLINIC | Age: 66
End: 2021-07-07
Payer: COMMERCIAL

## 2021-07-07 VITALS
SYSTOLIC BLOOD PRESSURE: 120 MMHG | WEIGHT: 195 LBS | BODY MASS INDEX: 26.41 KG/M2 | DIASTOLIC BLOOD PRESSURE: 76 MMHG | RESPIRATION RATE: 14 BRPM | TEMPERATURE: 97.8 F | HEIGHT: 72 IN | OXYGEN SATURATION: 97 % | HEART RATE: 90 BPM

## 2021-07-07 DIAGNOSIS — S22.42XD CLOSED FRACTURE OF MULTIPLE RIBS OF LEFT SIDE WITH ROUTINE HEALING, SUBSEQUENT ENCOUNTER: ICD-10-CM

## 2021-07-07 DIAGNOSIS — S46.912D STRAIN OF LEFT SHOULDER, SUBSEQUENT ENCOUNTER: ICD-10-CM

## 2021-07-07 PROCEDURE — 99213 OFFICE O/P EST LOW 20 MIN: CPT | Performed by: PREVENTIVE MEDICINE

## 2021-07-07 ASSESSMENT — ENCOUNTER SYMPTOMS
TINGLING: 0
SENSORY CHANGE: 0

## 2021-07-07 NOTE — LETTER
[address],   [address] [place] [address]  Phone:  [phone] - Fax:  [phone]   Occupational Health [facility] Progress Report and Disability Certification  Date of Service: 7/7/2021   No Show:  No  Date / Time of Next Visit: 8/4/2021 @ 8:30am   Claim Information   Patient Name: Keaton Cervantes  Claim Number:     Employer: [employer] [address]  Date of Injury: 5/23/2021     Insurer / TPA: [employer]  ID / SSN:     Occupation: Mook  Diagnosis: Diagnoses of Closed fracture of multiple ribs of left side with routine healing, subsequent encounter and Strain of left shoulder, subsequent encounter were pertinent to this visit.    Medical Information   Related to Industrial Injury? 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  ROLANDO: He fell off a small step stool and fell onto his left side. He noted immediate left chest wall pain and left shoulder pain.  X-rays of the left ribs showed fractures of the seventh and ninth ribs.  Patient states overall symptoms are the same.  Continues to have lateral to posterior chest wall pain at the site of the rib fractures.  Continues to have left shoulder pain not much tenderness but pain with any movement above shoulder level.  Denies numbness or tingling.   Objective Findings: Chest wall: Good chest wall movement.  Tenderness the left posterior lateral ribs 7 through 9, extending along the ribs to the thoracic area.    Left shoulder: No gross deformity.  Minimal anterior GH tenderness.  Range of motion somewhat diminished with flexion to about 120 degrees and abduction to about 90 degrees.    Pre-Existing Condition(s):     Assessment:   Condition Same    Status: Additional Care Required  Permanent Disability:No    Plan:      Diagnostics:      Comments:  Continue regular Norco prescription  Continue  cyclobenzaprine at night as needed  Referral to physical therapy pending  Given duration of symptoms will refer for MRI left shoulder  Okay to use OTC muscle cream/ointments as needed  Okay to use heat/ic  e as needed  Restricted duty  Follow-up 4 weeks    Disability Information   Status: Released to Restricted Duty    From:  7/7/2021  Through: 8/4/2021 Restrictions are: Temporary   Physical Restrictions   Sitting:    Standing:    Stooping:  < or = to 1 hr/day Bending:  < or = to 1 hr/day   Squatting:    Walking:    Climbing:    Pushing:  < or = to 1 hr/day   Pulling:  < or = to 1 hr/day Other:    Reaching Above Shoulder (L): < or = to 1 hr/day Reaching Above Shoulder (R):       Reaching Below Shoulder (L):    Reaching Below Shoulder (R):      Not to exceed Weight Limits   Carrying(hrs):   Weight Limit(lb):   Lifting(hrs):   Weight  Limit(lb):     Comments:      Repetitive Actions   Hands: i.e. Fine Manipulations from Grasping:     Feet: i.e. Operating Foot Controls:     Driving / Operate Machinery:     Provider Name:   Omar Gaston D.O. Physician Signature:  Physician Name:     Clinic Name / Location: [address],   [address] [place] [address] Clinic Phone Number: Dept: [phone]   Appointment Time: 8:30 Am Visit Start Time: 8:23 AM   Check-In Time:  8:05 Am Visit Discharge Time:  8:57am   Original-Treating Physician or Chiropractor    Page 2-Insurer/TPA    Page 3-Employer    Page 4-Employee

## 2021-07-07 NOTE — PROGRESS NOTES
Subjective:     Keaton Cervantes is a 66 y.o. 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  ROLANDO: He fell off a small step stool and fell onto his left side. He noted immediate left chest wall pain and left shoulder pain.  X-rays of the left ribs showed fractures of the seventh and ninth ribs.  Patient states overall symptoms are the same.  Continues to have lateral to posterior chest wall pain at the site of the rib fractures.  Continues to have left shoulder pain not much tenderness but pain with any movement above shoulder level.  Denies numbness or tingling.    Review of Systems   Neurological: Negative for tingling and sensory change.       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       Objective:     /76   Pulse 90   Temp 36.6 °C (97.8 °F)   Resp 14   Ht 1.829 m (6')   Wt 88.5 kg (195 lb)   SpO2 97%   BMI 26.45 kg/m²     Constitutional: Patient is in no acute distress. Appears well-developed and well-nourished.   Cardiovascular: Normal rate.    Pulmonary/Chest: Effort normal. No respiratory distress.   Neurological: Patient is alert and oriented to person, place, and time.   Skin: Skin is warm and dry.   Psychiatric: Normal mood and affect. Behavior is normal.     Chest wall: Good chest wall movement.  Tenderness the left posterior lateral ribs 7 through 9, extending along the ribs to the thoracic area.    Left shoulder: No gross deformity.  Minimal anterior GH tenderness.  Range of motion somewhat diminished with flexion to about 120 degrees and abduction to about 90 degrees.     Assessment/Plan:       1. Closed fracture of multiple ribs of left side with routine healing, subsequent encounter    2. Strain of left shoulder, subsequent encounter  - REFERRAL TO RADIOLOGY  - MR-SHOULDER-W/O LEFT;  Future    Released to Restricted Duty FROM 7/7/2021 TO 8/4/2021       Continue regular Norco prescription  Continue cyclobenzaprine at night as needed  Referral to physical therapy pending  Given duration of symptoms will refer for MRI left shoulder  Okay to use OTC muscle cream/ointments as needed  Okay to use heat/ic  e as needed  Restricted duty  Follow-up 4 weeks    Differential diagnosis, natural history, supportive care, and indications for immediate follow-up discussed.    Approximately 25 minutes was spent in preparing for visit, obtaining history, exam and evaluation, patient counseling/education and post visit documentation/orders.

## 2021-08-04 ENCOUNTER — OCCUPATIONAL MEDICINE (OUTPATIENT)
Dept: OCCUPATIONAL MEDICINE | Facility: CLINIC | Age: 66
End: 2021-08-04
Payer: COMMERCIAL

## 2021-08-04 VITALS
RESPIRATION RATE: 16 BRPM | OXYGEN SATURATION: 97 % | SYSTOLIC BLOOD PRESSURE: 122 MMHG | HEIGHT: 72 IN | DIASTOLIC BLOOD PRESSURE: 70 MMHG | BODY MASS INDEX: 26.41 KG/M2 | HEART RATE: 76 BPM | WEIGHT: 195 LBS

## 2021-08-04 DIAGNOSIS — S22.42XD CLOSED FRACTURE OF MULTIPLE RIBS OF LEFT SIDE WITH ROUTINE HEALING, SUBSEQUENT ENCOUNTER: ICD-10-CM

## 2021-08-04 DIAGNOSIS — S46.912D STRAIN OF LEFT SHOULDER, SUBSEQUENT ENCOUNTER: ICD-10-CM

## 2021-08-04 PROCEDURE — 99213 OFFICE O/P EST LOW 20 MIN: CPT | Performed by: PREVENTIVE MEDICINE

## 2021-08-04 ASSESSMENT — ENCOUNTER SYMPTOMS
TINGLING: 0
SENSORY CHANGE: 0

## 2021-08-04 NOTE — LETTER
[address],   [address] [place] [address]  Phone:  [phone] - Fax:  [phone]   Occupational Health [facility] Progress Report and Disability Certification  Date of Service: 8/4/2021   No Show:  No  Date / Time of Next Visit: 9/1/2021 @ 7:45 AM    Claim Information   Patient Name: Keaton Cervantes  Claim Number:     Employer: [employer] [address]  Date of Injury: 5/23/2021     Insurer / TPA: [employer]  ID / SSN:     Occupation: Mook  Diagnosis: Diagnoses of Closed fracture of multiple ribs of left side with routine healing, subsequent encounter and Strain of left shoulder, subsequent encounter were pertinent to this visit.    Medical Information   Related to Industrial Injury? 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  ROLANDO: He fell off a small step stool and fell onto his left side. He noted immediate left chest wall pain and left shoulder pain.  X-rays of the left ribs showed fractures of the seventh and ninth ribs.  Patient states he has had some improvement in the rib pain.  Is not quite as painful as was before.  He also notes a little bit better range of motion in the left shoulder but that remains painful at the anterior shoulder as well.  He states he believes he received a claim number and will provide us that later today.   Objective Findings: Chest wall: Good chest wall movement.  Area of pain over the left posterior lateral mid ribs.  Left shoulder: No gross deformity.  Minimal anterior GH tenderness.  Range of motion diminished about 120 degrees flexion/abduction   Pre-Existing Condition(s):     Assessment:   Condition Same    Status: Additional Care Required  Permanent Disability:No    Plan:      Diagnostics:      Comments:  Referral for MRI Left Shoulder, pending approval  Referral to physical therapy, pending  approval  Continue regular Norco prescription  Continue cyclobenzaprine at night as needed  Okay to use OTC muscle cream/ointments as needed  Okay to use heat/ice   as needed  Restricted duty  Follow-up 4 weeks    Disability Information   Status: Released to Restricted Duty    From:  8/4/2021  Through: 9/1/2021 Restrictions are: Temporary   Physical Restrictions   Sitting:    Standing:    Stooping:  < or = to 1 hr/day Bending:  < or = to 1 hr/day   Squatting:    Walking:    Climbing:    Pushing:  < or = to 1 hr/day   Pulling:  < or = to 1 hr/day Other:    Reaching Above Shoulder (L): < or = to 1 hr/day Reaching Above Shoulder (R):       Reaching Below Shoulder (L):    Reaching Below Shoulder (R):      Not to exceed Weight Limits   Carrying(hrs):   Weight Limit(lb):   Lifting(hrs):   Weight  Limit(lb):     Comments: Limit to less than 10 pounds lift/push/pull.     Repetitive Actions   Hands: i.e. Fine Manipulations from Grasping:     Feet: i.e. Operating Foot Controls:     Driving / Operate Machinery:     Provider Name:   Omar Gaston D.O. Physician Signature:  Physician Name:     Clinic Name / Location: [address],   [address] [place] [address] [place] [address] Clinic Phone Number: Dept: [phone]   Appointment Time: 8:30 Am Visit Start Time: 8:35 AM   Check-In Time:  8:00 Am Visit Discharge Time: 9:05 AM    Original-Treating Physician or Chiropractor    Page 2-Insurer/TPA    Page 3-Employer    Page 4-Employee

## 2021-08-04 NOTE — PROGRESS NOTES
Subjective:     Keaton Cervantes is a 66 y.o. 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  ROLANDO: He fell off a small step stool and fell onto his left side. He noted immediate left chest wall pain and left shoulder pain.  X-rays of the left ribs showed fractures of the seventh and ninth ribs.  Patient states he has had some improvement in the rib pain.  Is not quite as painful as was before.  He also notes a little bit better range of motion in the left shoulder but that remains painful at the anterior shoulder as well.  He states he believes he received a claim number and will provide us that later today.    Review of Systems   Neurological: Negative for tingling and sensory change.       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       Objective:     /70   Pulse 76   Resp 16   Ht 1.829 m (6')   Wt 88.5 kg (195 lb)   SpO2 97%   BMI 26.45 kg/m²     Constitutional: Patient is in no acute distress. Appears well-developed and well-nourished.   Cardiovascular: Normal rate.    Pulmonary/Chest: Effort normal. No respiratory distress.   Neurological: Patient is alert and oriented to person, place, and time.   Skin: Skin is warm and dry.   Psychiatric: Normal mood and affect. Behavior is normal.     Chest wall: Good chest wall movement.  Area of pain over the left posterior lateral mid ribs.  Left shoulder: No gross deformity.  Minimal anterior GH tenderness.  Range of motion diminished about 120 degrees flexion/abduction    Assessment/Plan:       1. Closed fracture of multiple ribs of left side with routine healing, subsequent encounter    2. Strain of left shoulder, subsequent encounter    Released to Restricted Duty FROM 8/4/2021 TO 9/1/2021  Limit to less than 10 pounds lift/push/pull.     Referral for MRI Left  Shoulder, pending approval  Referral to physical therapy, pending approval  Continue regular Norco prescription  Continue cyclobenzaprine at night as needed  Okay to use OTC muscle cream/ointments as needed  Okay to use heat/ice   as needed  Restricted duty  Follow-up 4 weeks    Differential diagnosis, natural history, supportive care, and indications for immediate follow-up discussed.    Approximately 25 minutes was spent in preparing for visit, obtaining history, exam and evaluation, patient counseling/education and post visit documentation/orders.

## 2021-09-01 ENCOUNTER — OCCUPATIONAL MEDICINE (OUTPATIENT)
Dept: OCCUPATIONAL MEDICINE | Facility: CLINIC | Age: 66
End: 2021-09-01
Payer: COMMERCIAL

## 2021-09-01 VITALS
RESPIRATION RATE: 14 BRPM | DIASTOLIC BLOOD PRESSURE: 82 MMHG | HEART RATE: 88 BPM | TEMPERATURE: 97.8 F | OXYGEN SATURATION: 98 % | WEIGHT: 195 LBS | SYSTOLIC BLOOD PRESSURE: 122 MMHG | BODY MASS INDEX: 26.41 KG/M2 | HEIGHT: 72 IN

## 2021-09-01 DIAGNOSIS — S22.42XD CLOSED FRACTURE OF MULTIPLE RIBS OF LEFT SIDE WITH ROUTINE HEALING, SUBSEQUENT ENCOUNTER: ICD-10-CM

## 2021-09-01 DIAGNOSIS — S46.912D STRAIN OF LEFT SHOULDER, SUBSEQUENT ENCOUNTER: ICD-10-CM

## 2021-09-01 PROCEDURE — 99213 OFFICE O/P EST LOW 20 MIN: CPT | Performed by: PREVENTIVE MEDICINE

## 2021-09-01 NOTE — LETTER
[address],   [address] [place] [address]  Phone:  [phone] - Fax:  [phone]   Occupational Health [facility] Progress Report and Disability Certification  Date of Service: 9/1/2021   No Show:  No  Date / Time of Next Visit: 9/21/2021 @ 7:30 AM    Claim Information   Patient Name: Keaton Cervantes  Claim Number:     Employer: [employer] [id number]  Date of Injury:      Insurer / TPA: [employer]  ID / SSN:     Occupation:   Diagnosis: Diagnoses of Closed fracture of multiple ribs of left side with routine healing, subsequent encounter and Strain of left shoulder, subsequent encounter were pertinent to this visit.    Medical Information   Related to Industrial Injury? 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  ROLANDO: He fell off a small step stool and fell onto his left side. He noted immediate left chest wall pain and left shoulder pain.  X-rays of the left ribs showed fractures of the seventh and ninth ribs.  Patient states overall symptoms about the same.  He states he is still does have some pain on the left lateral mid ribs especially with lifting too much with his left arm.  He states his left shoulder continues to be bothersome with difficulty with range of motion above shoulder level.  He states he just got approved for physical therapy which is scheduled for Friday.  Was not aware that MRI had also been approved.   Objective Findings: Chest wall: Good chest wall movement.  Area of pain over the left posterior lateral mid ribs.,  Pain with left arm movement above shoulder level  Left shoulder: No gross deformity.  Area of tenderness over the anterior GH.  Range of motion diminished about 120 degrees flexion/abduction   Pre-Existing Condition(s):     Assessment:   Condition Same    Status: Additional Care Required  Permanent Disability:No     Plan:      Diagnostics:      Comments:  Provide information to schedule MRI  Begin physical therapy as scheduled  Continue regular Norco prescription  Continue cyclobenzaprine at night as needed  Okay to use OTC muscle cream/ointments as needed  Okay to use heat/ice as needed  Restricted d  uty  Follow-up 3 weeks     Disability Information   Status: Released to Restricted Duty    From:  9/1/2021  Through: 9/21/2021 Restrictions are: Temporary   Physical Restrictions   Sitting:    Standing:    Stooping:  < or = to 1 hr/day Bending:  < or = to 1 hr/day   Squatting:    Walking:    Climbing:    Pushing:  < or = to 1 hr/day   Pulling:  < or = to 1 hr/day Other:    Reaching Above Shoulder (L):   Reaching Above Shoulder (R):       Reaching Below Shoulder (L):    Reaching Below Shoulder (R):      Not to exceed Weight Limits   Carrying(hrs):   Weight Limit(lb): < or = to 10 pounds Lifting(hrs):   Weight  Limit(lb): < or = to 10 pounds   Comments: Limit to less than 10 pounds lift/push/pull    Repetitive Actions   Hands: i.e. Fine Manipulations from Grasping:     Feet: i.e. Operating Foot Controls:     Driving / Operate Machinery:     Health Care Provider’s Original or Electronic Signature  Omar Gaston D.O. Health Care Provider’s Original or Electronic Signature    Sal Narvaez MD       Clinic Name / Location: [address]   [address] [place] [address] Clinic Phone Number: Dept: [phone]   Appointment Time: 7:45 Am Visit Start Time: 7:36 AM   Check-In Time:  7:30 Am Visit Discharge Time:  7:55 AM    Original-Treating Physician or Chiropractor    Page 2-Insurer/TPA    Page 3-Employer    Page 4-Employee

## 2021-09-01 NOTE — PROGRESS NOTES
Subjective:     Keaton Cervantes is a 66 y.o. 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  ROLANDO: He fell off a small step stool and fell onto his left side. He noted immediate left chest wall pain and left shoulder pain.  X-rays of the left ribs showed fractures of the seventh and ninth ribs.  Patient states overall symptoms about the same.  He states he is still does have some pain on the left lateral mid ribs especially with lifting too much with his left arm.  He states his left shoulder continues to be bothersome with difficulty with range of motion above shoulder level.  He states he just got approved for physical therapy which is scheduled for Friday.  Was not aware that MRI had also been approved.    ROS         Objective:     /82   Pulse 88   Temp 36.6 °C (97.8 °F) (Temporal)   Resp 14   Ht 1.829 m (6')   Wt 88.5 kg (195 lb)   SpO2 98%   BMI 26.45 kg/m²     Constitutional: Patient is in no acute distress. Appears well-developed and well-nourished.   Cardiovascular: Normal rate.    Pulmonary/Chest: Effort normal. No respiratory distress.   Neurological: Patient is alert and oriented to person, place, and time.   Skin: Skin is warm and dry.   Psychiatric: Normal mood and affect. Behavior is normal.     Chest wall: Good chest wall movement.  Area of pain over the left posterior lateral mid ribs.,  Pain with left arm movement above shoulder level  Left shoulder: No gross deformity.  Area of tenderness over the anterior GH.  Range of motion diminished about 120 degrees flexion/abduction    Assessment/Plan:       1. Closed fracture of multiple ribs of left side with routine healing, subsequent encounter    2. Strain of left shoulder, subsequent encounter    Released to Restricted Duty FROM 9/1/2021 TO 9/21/2021  Limit to less than 10 pounds  lift/push/pull    Provide information to schedule MRI  Begin physical therapy as scheduled  Continue regular Norco prescription  Continue cyclobenzaprine at night as needed  Okay to use OTC muscle cream/ointments as needed  Okay to use heat/ice as needed  Restricted d  uty  Follow-up 3 weeks     Differential diagnosis, natural history, supportive care, and indications for immediate follow-up discussed.    Approximately 25 minutes was spent in preparing for visit, obtaining history, exam and evaluation, patient counseling/education and post visit documentation/orders.

## 2021-09-03 ENCOUNTER — PHYSICAL THERAPY (OUTPATIENT)
Dept: PHYSICAL THERAPY | Facility: REHABILITATION | Age: 66
End: 2021-09-03
Attending: PREVENTIVE MEDICINE
Payer: COMMERCIAL

## 2021-09-03 DIAGNOSIS — S46.912D STRAIN OF SHOULDER, LEFT, SUBSEQUENT ENCOUNTER: ICD-10-CM

## 2021-09-03 PROCEDURE — 97014 ELECTRIC STIMULATION THERAPY: CPT

## 2021-09-03 PROCEDURE — 97161 PT EVAL LOW COMPLEX 20 MIN: CPT

## 2021-09-03 PROCEDURE — 97140 MANUAL THERAPY 1/> REGIONS: CPT

## 2021-09-03 ASSESSMENT — ENCOUNTER SYMPTOMS
PAIN SCALE AT HIGHEST: 5
PAIN SCALE AT LOWEST: 2
PAIN SCALE: 3

## 2021-09-03 NOTE — OP THERAPY EVALUATION
"  Outpatient Physical Therapy  INITIAL EVALUATION    [facility] Physical Therapy [address].  [address]  [place] [address]  Phone:  [phone]  Fax:  [phone]    Date of Evaluation: 2021    Patient: Keaton Cervantes  YOB: 1955  MRN: [id number]     Referring Provider: Omar Gaston D.O.  [address] [place] [address] [address]  [place] [address]   Referring Diagnosis Strain of left shoulder, subsequent encounter [S46.912D]     Time Calculation   072  900           Chief Complaint: No chief complaint on file.    Visit Diagnoses     ICD-10-CM   1. Strain of shoulder, left, subsequent encounter  S46.912D       Date of onset of impairment: 2021    Subjective   History of Present Illness:     History of chief complaint:  Fell off a step ladder about 2 months ago and landed on his L side with L arm above his head shoulder and suffered 3 rib fractures.  Ribs are still painful but healing and his left shoulder is unchanged over the past month.    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    Aggravating factors:  Place any stock item on to shelf w/o pain  Don shirt w/o pain  Lift > 10 lbs above head w/o pain  Sleep: up 2-3 / night due to shoulder pain  Push a cart full of go-backs w/o pain  DASH 50%           Past Medical History:   Diagnosis Date   • Allergy    • ASTHMA    • Back pain     Chronic   • Bronchitis    • CATARACT    • Dental disorder    • Diabetes     oral rx & insulin   • Hypertension    • MEDICAL HOME    • Neck pain     Chronic   • ELLEN (obstructive sleep apnea)     Bipap   • Sleep apnea      Past Surgical History:   Procedure Laterality Date   • LUMBAR FUSION ANTERIOR  2009    Performed by JOSEE BLANTON at " [facility]   • LUMBAR FUSION POSTERIOR  8/4/2009    Performed by JOSEE BLANTON at [facility]   • LUMBAR LAMINECTOMY DISKECTOMY  8/4/2009    Performed by JOSEE BLANTON at [facility]   • LAMINOTOMY  8/4/2009    Performed by JOSEE BLANTON at [facility]   • SOMNOPLASTY  6/10/2009    Performed by ELÍAS RILEY at [facility]   • SEPTOPLASTY  6/10/2009    Performed by ELÍAS RILEY at [facility]   • ANTROSTOMY  6/10/2009    Performed by ELÍAS RILEY at [facility]   • ETHMOIDECTOMY  6/10/2009    Performed by ELÍAS RILEY at [facility]   • CATARACT PHACO WITH IOL  7/7/08    Performed by OCTAVIO HARDWICK at [facility]   • CATARACT PHACO WITH IOL  6/16/08    Performed by OCTAVIO HARDWICK at [facility]   • ANKLE ORIF     • APPENDECTOMY     • VASECTOMY         Precautions:       Objective   Observation and functional movement:  Bilateral scap tipping L>R    Range of motion and strength:    AROM: L fle 120  abd 100 er 35     Resisted cuff tests:  IR:                 L: strong, moderate  ER:                L:strong, moderate   empty can:                  L:-mild   Full can   L:painful strong  drop arm:             L: no pain    (+) infraspinatus lag test and end range with 90 abd    Palpation and joint mobility:     Severe TTP subscapularis, infraspinatus  (+) apprehension sign            Therapeutic Treatments and Modalities:     Therapeutic Treatment and Modalities Summary: IAStm/stm : L infraspinatus  [place] #1--hep  Russian 5/5 ball roll to infra w/ mh last 5'    Time-based treatments/modalities:           Assessment, Response and Plan:   Assessment details:  Patient presents with RTC strain and L GHJ sprain due to fall injury with out-stretched arm.--resisted ER/IR  and full  can were most painful but strong.  (+) impingement sign and (+) apprehension  /relocation test test with noted anterior capsule laxity.  Patient should progress well for goals if consistent with HEP/POC  Prognosis: good    Goals:   Short Term Goals:   Place any stock item on to shelf w/o pain  Don shirt w/o pain  Lift > 10 lbs above head w/o pain  Sleep through ngiht  Push a cart full of go-backs w/o pain  DASH <50%     Short term goal time span:  2-4 weeks      Long Term Goals:      Don shirt w/o pain  Lift > 20 lbs above head w/o pain  Push a cart full of go-backs w/o pain  DASH <20%     Long term goal time span:  6-8 weeks    Plan:   Therapy options:  Physical therapy treatment to continue  Planned therapy interventions:  Therapeutic Exercise (CPT 41853), E Stim Unattended (CPT 20314) and Manual Therapy (CPT 77938)  Other planned therapy interventions:  Dry needle  Frequency:  2x week  Duration in weeks:  8  Duration in visits:  16  Plan details:  Scap stab, IASTM DN, angels progression, cupping, mobs and e-stim      Functional Assessment Used        Referring provider co-signature:  I have reviewed this plan of care and my co-signature certifies the need for services.    Certification Period: 09/03/2021 to  11/11/21    Physician Signature: ________________________________ Date: ______________

## 2021-09-10 ENCOUNTER — PHYSICAL THERAPY (OUTPATIENT)
Dept: PHYSICAL THERAPY | Facility: REHABILITATION | Age: 66
End: 2021-09-10
Attending: PREVENTIVE MEDICINE
Payer: COMMERCIAL

## 2021-09-10 DIAGNOSIS — S46.912D STRAIN OF SHOULDER, LEFT, SUBSEQUENT ENCOUNTER: ICD-10-CM

## 2021-09-10 PROCEDURE — 97140 MANUAL THERAPY 1/> REGIONS: CPT

## 2021-09-10 PROCEDURE — 97014 ELECTRIC STIMULATION THERAPY: CPT

## 2021-09-10 PROCEDURE — 97110 THERAPEUTIC EXERCISES: CPT

## 2021-09-10 NOTE — OP THERAPY DAILY TREATMENT
Outpatient Physical Therapy  DAILY TREATMENT     [facility] Physical Therapy [address].  [address]  [place] [address]  Phone:  [phone]  Fax:  [phone]    Date: 09/10/2021    Patient: Keaton Cervantes  YOB: 1955  MRN: [id number]     Time Calculation    Start time: 0802  Stop time: 0900 Time Calculation (min): 58 minutes         Chief Complaint: No chief complaint on file.    Visit #: 2    SUBJECTIVE:  About the same, trying to do ex. Regularly--7 day    OBJECTIVE:  Fle: 110  abd 115  Er 55--erp          Therapeutic Treatments and Modalities:     Therapeutic Treatment and Modalities Summary: cuadal mobs and end range fle/ext// increased to 135 deg fle     [place] #1   Box #1  DN: Patient signed informed written release and verbally agreed with informed consent to procedure of dry needling   skin prep with isopropyl  Alcohol  -infraspinatus  -TENS w/ FDN x 10  -sub scap, lats, t jovanny  w/ TENS probe  -MHP x 10'  -No adverse reactions observed post treatment    Belizean 5/5 infra and deltoid mhp  Pulley progression  Supine gh flexion slef assist  Box #1 flasher --hep reviewed    Time-based treatments/modalities:    Physical Therapy Timed Treatment Charges  Manual therapy minutes (CPT 91182): 10 minutes  Therapeutic exercise minutes (CPT 58718): 30 minutes      Pain rating (1-10) before treatment:  3  Pain rating (1-10) after treatment:  3  arom FLE: 130  aarom 160--noted ERP  ASSESSMENT:   Good increased ROM w/ cont. Pain--poor scap stab and limited GH rhythm--reproduced ant. Shoulder complain with dry needle to infra      PLAN/RECOMMENDATIONS:   DN, scap stab, mobs, burke progression Sierra Leonean to infra

## 2021-09-17 ENCOUNTER — PHYSICAL THERAPY (OUTPATIENT)
Dept: PHYSICAL THERAPY | Facility: REHABILITATION | Age: 66
End: 2021-09-17
Attending: PREVENTIVE MEDICINE
Payer: COMMERCIAL

## 2021-09-17 DIAGNOSIS — S46.912D STRAIN OF SHOULDER, LEFT, SUBSEQUENT ENCOUNTER: ICD-10-CM

## 2021-09-17 PROCEDURE — 97140 MANUAL THERAPY 1/> REGIONS: CPT

## 2021-09-17 PROCEDURE — 97110 THERAPEUTIC EXERCISES: CPT

## 2021-09-17 PROCEDURE — 97014 ELECTRIC STIMULATION THERAPY: CPT

## 2021-09-17 NOTE — OP THERAPY DAILY TREATMENT
"  Outpatient Physical Therapy  DAILY TREATMENT     [facility] Physical [address].  [address]  [place] [address]  Phone:  [phone]  Fax:  [phone]    Date: 09/17/2021    Patient: Keaton Cervantes  YOB: 1955  MRN: [id number]     Time Calculation    Start time: 0804  Stop time: 0900 Time Calculation (min): 56 minutes         Chief Complaint: No chief complaint on file.    Visit #: 3    SUBJECTIVE:  About the same, trying to do ex.5/day--patient has yet to get a   OBJECTIVE:  Fle: 115  abd 115  Er 60--erp  Cont. Observed slight ant resting positional fault as compare to R with slight sulcus  Test resisted ir slight,, ER:--most painful empty can          Therapeutic Treatments and Modalities:     Therapeutic Treatment and Modalities Summary: cuadal mobs and end range fle/ext// increased to 155 deg fle  MRE with distraction  deg with focus on end range strength  Reviewed anatomy and importance of psoture   [place] #1   Box #1  DN: Patient signed informed written release and verbally agreed with informed consent to procedure of dry needling   skin prep with chlora prep  -infraspinatus, supra spinatus, lateral deltoid  -TENS w/ FDN x 10  -sub scap, lats, t jovanny  w/ TENS probe  -MHP x 10'  -No adverse reactions observed post treatment    Greek 5/5 infra and deltoid ball roll x 10'    Supine gh flexion self assist  Box #1 flasher --hep   reviewed and instructed patient to build pulley and focus on end  Range AROM     Time-based treatments/modalities:    Physical Therapy Timed Treatment Charges  Manual therapy minutes (CPT 37449): 15 minutes  Therapeutic exercise minutes (CPT 81475): 30 minutes      Pain rating (1-10) before treatment:  3  Pain rating (1-10) after treatment:  3  arom FLE: 130  aarom 160--noted ERP  ASSESSMENT:   Cotn. To present with limited  AROM, patient stating, \" gets stuck\"--patient able to tolerate resisted ex > 120 with manual " distraction--reproduced ant. shoulder complaint with DN.    MRI revealed partial thickness tear of supraspintus and progressive OA of GHJ  Suspect glenoid instability w/ patient possibly reaching a plateau with conservative treatment      PLAN/RECOMMENDATIONS:   drew IBARRA mobs, angel progression Austrian to infra--d/c 2 visits if plateau remains and recommend patient to follow up with an orthopedic surgeon

## 2021-09-21 ENCOUNTER — OCCUPATIONAL MEDICINE (OUTPATIENT)
Dept: OCCUPATIONAL MEDICINE | Facility: CLINIC | Age: 66
End: 2021-09-21
Payer: COMMERCIAL

## 2021-09-21 ENCOUNTER — PHYSICAL THERAPY (OUTPATIENT)
Dept: PHYSICAL THERAPY | Facility: REHABILITATION | Age: 66
End: 2021-09-21
Attending: PREVENTIVE MEDICINE
Payer: COMMERCIAL

## 2021-09-21 VITALS
RESPIRATION RATE: 16 BRPM | TEMPERATURE: 97.7 F | HEART RATE: 71 BPM | SYSTOLIC BLOOD PRESSURE: 130 MMHG | DIASTOLIC BLOOD PRESSURE: 84 MMHG | WEIGHT: 195 LBS | OXYGEN SATURATION: 97 % | BODY MASS INDEX: 26.41 KG/M2 | HEIGHT: 72 IN

## 2021-09-21 DIAGNOSIS — S46.912D STRAIN OF LEFT SHOULDER, SUBSEQUENT ENCOUNTER: ICD-10-CM

## 2021-09-21 DIAGNOSIS — S46.912D STRAIN OF SHOULDER, LEFT, SUBSEQUENT ENCOUNTER: ICD-10-CM

## 2021-09-21 DIAGNOSIS — S22.42XD CLOSED FRACTURE OF MULTIPLE RIBS OF LEFT SIDE WITH ROUTINE HEALING, SUBSEQUENT ENCOUNTER: ICD-10-CM

## 2021-09-21 PROCEDURE — 99213 OFFICE O/P EST LOW 20 MIN: CPT | Performed by: PREVENTIVE MEDICINE

## 2021-09-21 PROCEDURE — 97014 ELECTRIC STIMULATION THERAPY: CPT

## 2021-09-21 PROCEDURE — 97110 THERAPEUTIC EXERCISES: CPT

## 2021-09-21 PROCEDURE — 97140 MANUAL THERAPY 1/> REGIONS: CPT

## 2021-09-21 ASSESSMENT — ENCOUNTER SYMPTOMS
TINGLING: 0
SENSORY CHANGE: 0

## 2021-09-21 NOTE — LETTER
[address],   [address] [place] [address]  Phone:  [phone] - Fax:  [phone]   Occupational Health [facility] Progress Report and Disability Certification  Date of Service: 9/21/2021   No Show:  No  Date / Time of Next Visit: 10/19/2021 @ 7:30 AM    Claim Information   Patient Name: Keaton Cervantes  Claim Number:     Employer: [employer] [address]  Date of Injury: 5/23/2021     Insurer / TPA: [employer]  ID / SSN:     Occupation: Mook  Diagnosis: Diagnoses of Closed fracture of multiple ribs of left side with routine healing, subsequent encounter and Strain of left shoulder, subsequent encounter were pertinent to this visit.    Medical Information   Related to Industrial Injury? 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  ROLANDO: He fell off a small step stool and fell onto his left side. He noted immediate left chest wall pain and left shoulder pain.  X-rays of the left ribs showed fractures of the seventh and ninth ribs.  Patient states overall symptoms about the same as last visit.  He states the rib pain is mild to moderate depending on activity.  However the most painful aspect is the shoulder.  His pain with movements above shoulder level and does not have full range of motion.  He did start physical therapy and has attended 2 sessions thus far.  He also had the MRI of the shoulder performed last week.   Objective Findings: Chest wall: Good chest wall movement.  Area of pain over the left posterior lateral mid ribs. Pain with left arm movement above shoulder level  Left shoulder: No gross deformity.  Mild tenderness over the anterior GH.  Range of motion diminished about 150 degrees flexion and 120 degrees abduction    MRI left shoulder: Partial-thickness articular sided tear of the supraspinatus tendon involving 60% of the width of the  fibers.  Moderate glenohumeral joint degenerative changes.  Labral degeneration without definitive tear.   Pre-Existing Condition(s):     Assessment:   Condition Same    Status: Additional Care Required  Permanent Disability:No    Plan:      Diagnostics:      Comments:  Given prolonged symptoms and MRI findings will refer to orthopedics  Continue [place] prescription as prescribed by PCP  Continue physical therapy  Restricted duty  Follow-up 4 weeks    Disability Information   Status: Released to Restricted Duty    From:  9/21/2021  Through: 10/19/2021 Restrictions are: Temporary   Physical Restrictions   Sitting:    Standing:    Stooping:  < or = to 1 hr/day Bending:  < or = to 1 hr/day   Squatting:    Walking:    Climbing:    Pushing:  < or = to 1 hr/day   Pulling:  < or = to 1 hr/day Other:    Reaching Above Shoulder (L): 0 hrs/day Reaching Above Shoulder (R):       Reaching Below Shoulder (L):    Reaching Below Shoulder (R):      Not to exceed Weight Limits   Carrying(hrs):   Weight Limit(lb): < or = to 10 pounds Lifting(hrs):   Weight  Limit(lb): < or = to 10 pounds   Comments:      Repetitive Actions   Hands: i.e. Fine Manipulations from Grasping:     Feet: i.e. Operating Foot Controls:     Driving / Operate Machinery:     Health Care Provider’s Original or Electronic Signature  Omar Gaston D.O. Health Care Provider’s Original or Electronic Signature    Sal Narvaez MD         Clinic Name / Location: [address],   [address]  [place] [address] Clinic Phone Number: Dept: [phone]   Appointment Time: 7:30 Am Visit Start Time: 7:33 AM   Check-In Time:  7:32 Am Visit Discharge Time:  8 am    Original-Treating Physician or Chiropractor    Page 2-Insurer/TPA    Page 3-Employer    Page 4-Employee

## 2021-09-21 NOTE — OP THERAPY DAILY TREATMENT
"  Outpatient Physical Therapy  DAILY TREATMENT     [facility] Physical Therapy [address].  [address]  [place] [address]  Phone:  [phone]  Fax:  [phone]    Date: 09/21/2021    Patient: Keaton Cervantes  YOB: 1955  MRN: [id number]     Time Calculation    Start time: 1015  Stop time: 1115 Time Calculation (min): 60 minutes         Chief Complaint: No chief complaint on file.    Visit #: 4    SUBJECTIVE:  About the same, good days and bad depending how heavy work is.  Patient reported that his hand started to hurting two days ago while at work.  Patient reports that his pain is worse today after work.  OBJECTIVE:  Fle: 115\" n just stops\"  abd 115 \" just stops\"  Er 65--erp stops due pain  HBB: t-9  Cont. Observed slight ant resting positional fault as compare to R with slight sulcus  Test resisted ir slight,, ER:--most painful empty can          Therapeutic Treatments and Modalities:     Therapeutic Treatment and Modalities Summary: Pulleys--patient is planning to make a pulley today.//  165  aarom--less pain  MRE with distraction  deg with focus on end range strength  Reviewed anatomy and importance of posture  STM to pecs with PROM into ER/abd  [place] #1 reviewed  Box #1-less pain but still \" just stops\"  Rocking: pain at end range when performing ex, not worse when completed  reviewed and instructed patient to build pulley and focus on end  Range AROM     Time-based treatments/modalities:    Physical Therapy Timed Treatment Charges  Manual therapy minutes (CPT 00641): 13 minutes  Therapeutic exercise minutes (CPT 03633): 25 minutes      Pain rating (1-10) before treatment:  3--shoulder t top of L hand  Pain rating (1-10) after treatment:  3  arom FLE: 130  aarom 160--noted ERP  ASSESSMENT:   Cont. To present with limited  AROM, patient stating, \" gets stuck\" around 110 degrees w/ patient reporting lots of popping  As he lowers his arm    PLAN/RECOMMENDATIONS: -d/c " 2 visits if plateau remains and recommend patient to follow up with an orthopedic surgeon

## 2021-09-21 NOTE — PROGRESS NOTES
Subjective:     Keaton Cervantes is a 66 y.o. 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  ROLANDO: He fell off a small step stool and fell onto his left side. He noted immediate left chest wall pain and left shoulder pain.  X-rays of the left ribs showed fractures of the seventh and ninth ribs.  Patient states overall symptoms about the same as last visit.  He states the rib pain is mild to moderate depending on activity.  However the most painful aspect is the shoulder.  His pain with movements above shoulder level and does not have full range of motion.  He did start physical therapy and has attended 2 sessions thus far.  He also had the MRI of the shoulder performed last week.    Review of Systems   Neurological: Negative for tingling and sensory change.       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       Objective:     /84   Pulse 71   Temp 36.5 °C (97.7 °F) (Temporal)   Resp 16   Ht 1.829 m (6')   Wt 88.5 kg (195 lb)   SpO2 97%   BMI 26.45 kg/m²     Constitutional: Patient is in no acute distress. Appears well-developed and well-nourished.   Cardiovascular: Normal rate.    Pulmonary/Chest: Effort normal. No respiratory distress.   Neurological: Patient is alert and oriented to person, place, and time.   Skin: Skin is warm and dry.   Psychiatric: Normal mood and affect. Behavior is normal.     Chest wall: Good chest wall movement.  Area of pain over the left posterior lateral mid ribs. Pain with left arm movement above shoulder level  Left shoulder: No gross deformity.  Mild tenderness over the anterior GH.  Range of motion diminished about 150 degrees flexion and 120 degrees abduction    MRI left shoulder: Partial-thickness articular sided tear of the supraspinatus tendon involving 60% of the width of  the fibers.  Moderate glenohumeral joint degenerative changes.  Labral degeneration without definitive tear.    Assessment/Plan:       1. Closed fracture of multiple ribs of left side with routine healing, subsequent encounter  - REFERRAL TO ORTHOPEDICS    2. Strain of left shoulder, subsequent encounter  - REFERRAL TO ORTHOPEDICS    Released to Restricted Duty FROM 9/21/2021 TO 10/19/2021       Given prolonged symptoms and MRI findings will refer to orthopedics  Continue [place] prescription as prescribed by PCP  Continue physical therapy  Restricted duty  Follow-up 4 weeks    Differential diagnosis, natural history, supportive care, and indications for immediate follow-up discussed.    Approximately 25 minutes was spent in preparing for visit, obtaining history, exam and evaluation, patient counseling/education and post visit documentation/orders.

## 2021-09-24 ENCOUNTER — PHYSICAL THERAPY (OUTPATIENT)
Dept: PHYSICAL THERAPY | Facility: REHABILITATION | Age: 66
End: 2021-09-24
Attending: PREVENTIVE MEDICINE
Payer: COMMERCIAL

## 2021-09-24 DIAGNOSIS — S46.912D STRAIN OF SHOULDER, LEFT, SUBSEQUENT ENCOUNTER: ICD-10-CM

## 2021-09-24 PROCEDURE — 97014 ELECTRIC STIMULATION THERAPY: CPT

## 2021-09-24 PROCEDURE — 97140 MANUAL THERAPY 1/> REGIONS: CPT

## 2021-09-24 PROCEDURE — 97110 THERAPEUTIC EXERCISES: CPT

## 2021-09-24 NOTE — OP THERAPY DAILY TREATMENT
"  Outpatient Physical Therapy  DAILY TREATMENT     [facility] [address].  [address]  [place] [address]  Phone:  [phone]  Fax:  [phone]    Date: 09/24/2021    Patient: Keaton Cervantes  YOB: 1955  MRN: [id number]     Time Calculation    Start time: 0812  Stop time: 0905 Time Calculation (min): 53 minutes         Chief Complaint: No chief complaint on file.    Visit #: 5    SUBJECTIVE:  Working using the pulley more at home  OBJECTIVE:  Fle: 120\" n just stops\"  abd 125 \" just stops\"  Er 65--erp stops due pain  HBB: t-9            Therapeutic Treatments and Modalities:     Therapeutic Treatment and Modalities Summary: Caudal mobs at end range flexion  arom and stm to L sub scap// gh abd 130  Supine scap stab, abd, then gh flex  As possible--add and return #1 band  Reviewed anatomy and importance of posture  Box modified  #1// focus on scap position with motion  aarom with abd challenge assist with active gh flexion > 100 with paitient working through sticking point.--hep  IASTM to infra    Micronesian 5/5/ deltoid and infra with GH abd/flexion band #1 x 10' ;then, 5' mhp  kinesio tape for ant ghj stability      Time-based treatments/modalities:    Physical Therapy Timed Treatment Charges  Manual therapy minutes (CPT 91448): 15 minutes  Therapeutic exercise minutes (CPT 77813): 20 minutes      Pain rating (1-10) before treatment:  3--shoulder t top of L hand  Pain rating (1-10) after treatment:  1-2 \" better\"  arom FLE: 130  Aarom: with min band assist  170--noted ERP  ASSESSMENT:   Tolerated treatment with decreased report of pain and increased arom--patient deomostated improved Arom and AAROM with patient able to move through \"sticking\" point with challenge resistance for abd/er that facilitated patient to progress AROm w/o increasing pain.  Improved posture  Awareness but still struggles with scap stability and gh rhythm and gets \"stuck\" around 130 deg but " needs less assistance to move through that point     PLAN/RECOMMENDATIONS: cont. IASMTM scap stab with progressive strengthening to faciltate increased AROM

## 2021-10-01 ENCOUNTER — PHYSICAL THERAPY (OUTPATIENT)
Dept: PHYSICAL THERAPY | Facility: REHABILITATION | Age: 66
End: 2021-10-01
Attending: PREVENTIVE MEDICINE
Payer: COMMERCIAL

## 2021-10-01 DIAGNOSIS — S46.912D STRAIN OF SHOULDER, LEFT, SUBSEQUENT ENCOUNTER: ICD-10-CM

## 2021-10-01 PROCEDURE — 97014 ELECTRIC STIMULATION THERAPY: CPT

## 2021-10-01 PROCEDURE — 97140 MANUAL THERAPY 1/> REGIONS: CPT

## 2021-10-01 PROCEDURE — 97110 THERAPEUTIC EXERCISES: CPT

## 2021-10-01 NOTE — OP THERAPY DAILY TREATMENT
"  Outpatient Physical Therapy  DAILY TREATMENT     [facility] [address].  [address]  [place] [address]  Phone:  [phone]  Fax:  [phone]    Date: 10/01/2021    Patient: Keaton Cervantes  YOB: 1955  MRN: [id number]     Time Calculation    Start time: 0800  Stop time: 0855 Time Calculation (min): 55 minutes         Chief Complaint: No chief complaint on file.    Visit #: 6    SUBJECTIVE:  Noticeable increased AROM with patient still reporting that he has increased pain at end range and with any weight in his hand... tape seemed to help  OBJECTIVE:  Fle: 160\" n just stops\"  abd 150 pain aarom to 180 \" no worse\"  Er 66  Resisted cuff tests:  Empty can mild, strong  ER: strong no pain  IR strong mild  Full can strong , no pain            Therapeutic Treatments and Modalities:     Therapeutic Treatment and Modalities Summary: Caudal mobs at end range flexion w/ PNF style MRE end range D1 pattern// patient denies pain with resistance but reports pain and end range flexion with PROM or AROM  S/L gh flexion arm in air with cups to infra --to fatigue--added #2 band to fatigue--no increase in pain, just fatigue        Georgian 5/5/ deltoid and infra with box ex.  #1 x 10' ;then, 5' mhp      Time-based treatments/modalities:    Physical Therapy Timed Treatment Charges  Manual therapy minutes (CPT 56801): 15 minutes  Therapeutic exercise minutes (CPT 99575): 25 minutes      Pain rating (1-10) before treatment:  3--shoulder t top of L hand  Pain rating (1-10) after treatment:  1-2 \" better\"  arom FLE: 160    ASSESSMENT:   Increased AROM with increased strength mid-range --resisted Cuff tests were strong and mildly painful--cont to suspect GHJ instability but as patient increases strength, pain is lessnening and AROM is improving  PLAN/RECOMMENDATIONS: cont. IASMTM scap stab with progressive strengthening to faciltate increased AROM     "

## 2021-10-01 NOTE — OP THERAPY DAILY TREATMENT
"  Outpatient Physical Therapy  DAILY TREATMENT     [facility] [address].  [address]  [place] [address]  Phone:  [phone]  Fax:  [phone]    Date: 10/01/2021    Patient: Keaton Cervantes  YOB: 1955  MRN: [id number]     Time Calculation                   Chief Complaint: No chief complaint on file.    Visit #: 6    SUBJECTIVE:  ***    OBJECTIVE:  Current objective measures: ***        Exercises/Treatment  Time-based treatments/modalities:           Pain rating (1-10) before treatment:  {PAIN NUMBERS_1-10:54926}  Pain rating (1-10) after treatment:  {PAIN NUMBERS_1-10:81638}    ASSESSMENT:   Response to treatment: ***    PLAN/RECOMMENDATIONS:   Plan for treatment: {AMB OP THERAPY - THERAPY PLAN:980360119::\"therapy treatment to continue next visit\"}.  Planned interventions for next visit: {PT PLANNED THERAPY INTERVENTIONS:969261524::\"continue with current treatment\"}.       "

## 2021-10-19 ENCOUNTER — OCCUPATIONAL MEDICINE (OUTPATIENT)
Dept: OCCUPATIONAL MEDICINE | Facility: CLINIC | Age: 66
End: 2021-10-19
Payer: COMMERCIAL

## 2021-10-19 VITALS
SYSTOLIC BLOOD PRESSURE: 160 MMHG | HEART RATE: 68 BPM | WEIGHT: 195 LBS | DIASTOLIC BLOOD PRESSURE: 80 MMHG | OXYGEN SATURATION: 96 % | BODY MASS INDEX: 26.41 KG/M2 | HEIGHT: 72 IN

## 2021-10-19 DIAGNOSIS — S46.912D STRAIN OF LEFT SHOULDER, SUBSEQUENT ENCOUNTER: ICD-10-CM

## 2021-10-19 DIAGNOSIS — S22.42XD CLOSED FRACTURE OF MULTIPLE RIBS OF LEFT SIDE WITH ROUTINE HEALING, SUBSEQUENT ENCOUNTER: ICD-10-CM

## 2021-10-19 PROCEDURE — 99212 OFFICE O/P EST SF 10 MIN: CPT | Performed by: PREVENTIVE MEDICINE

## 2021-10-19 NOTE — PROGRESS NOTES
Subjective:     Keaton Cervantes is a 66 y.o. 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  ROLANDO: He fell off a small step stool and fell onto his left side. He noted immediate left chest wall pain and left shoulder pain.  X-rays of the left ribs showed fractures of the seventh and ninth ribs.  Patient states overall pain continues about the same.  He states that the chest wall pain is still there most days but does not really interfere with him doing things.  He states the more concerning pain continues to be the left shoulder.  He does state he has some better range of motion but still painful with activity.  He has an appoint with orthopedics November 18.    ROS       Objective:     /80   Pulse 68   Ht 1.829 m (6')   Wt 88.5 kg (195 lb)   SpO2 96%   BMI 26.45 kg/m²     Constitutional: Patient is in no acute distress. Appears well-developed and well-nourished.   Cardiovascular: Normal rate.    Pulmonary/Chest: Effort normal. No respiratory distress.   Neurological: Patient is alert and oriented to person, place, and time.   Skin: Skin is warm and dry.   Psychiatric: Normal mood and affect. Behavior is normal.     Chest wall: Good chest wall movement.  Area of pain over the left posterior lateral mid ribs. Pain with left arm movement above shoulder level  Left shoulder: No gross deformity.  Mild tenderness over the anterior GH.  Range of motion diminished about 150 degrees flexion and abduction     MRI left shoulder: Partial-thickness articular sided tear of the supraspinatus tendon involving 60% of the width of the fibers.  Moderate glenohumeral joint degenerative changes.  Labral degeneration without definitive tear.    Assessment/Plan:       1. Closed fracture of multiple ribs of left side with routine healing, subsequent encounter    2. Strain of left shoulder,  subsequent encounter    Released to Restricted Duty FROM 10/19/2021 TO    Restrictions in place until cleared by orthopedics  At this point there is not much to do for the ribs.  Pain should gradually resolve on its own over the next few months  Recommend keeping appointment with orthopedics  Restricted duty, restrictions in place until cleared by orthopedics  Follow-up as   needed    Differential diagnosis, natural history, supportive care, and indications for immediate follow-up discussed.    Approximately 15 minutes was spent in preparing for visit, obtaining history, exam and evaluation, patient counseling/education and post visit documentation/orders.

## 2021-10-19 NOTE — LETTER
[address],   [address] [place] [address]  Phone:  [phone] - Fax:  [phone]   [facility] Progress Report and Disability Certification  Date of Service: 10/19/2021   No Show:  No  Date / Time of Next Visit:  SCOTT   Claim Information   Patient Name: Keaton Cervantes  Claim Number:     Employer: [employer] [address]  Date of Injury: 5/23/2021     Insurer / TPA: [employer]  ID / SSN:     Occupation: Mook  Diagnosis: Diagnoses of Closed fracture of multiple ribs of left side with routine healing, subsequent encounter and Strain of left shoulder, subsequent encounter were pertinent to this visit.    Medical Information   Related to Industrial Injury? 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  ROLANDO: He fell off a small step stool and fell onto his left side. He noted immediate left chest wall pain and left shoulder pain.  X-rays of the left ribs showed fractures of the seventh and ninth ribs.  Patient states overall pain continues about the same.  He states that the chest wall pain is still there most days but does not really interfere with him doing things.  He states the more concerning pain continues to be the left shoulder.  He does state he has some better range of motion but still painful with activity.  He has an appoint with orthopedics November 18.   Objective Findings: Chest wall: Good chest wall movement.  Area of pain over the left posterior lateral mid ribs. Pain with left arm movement above shoulder level  Left shoulder: No gross deformity.  Mild tenderness over the anterior GH.  Range of motion diminished about 150 degrees flexion and abduction     MRI left shoulder: Partial-thickness articular sided tear of the supraspinatus tendon involving 60% of the width of the fibers.  Moderate glenohumeral joint degenerative  changes.  Labral degeneration without definitive tear.   Pre-Existing Condition(s):     Assessment:   Condition Same    Status: Discharged / Care Transfer  Permanent Disability:No    Plan:      Diagnostics:      Comments:  At this point there is not much to do for the ribs.  Pain should gradually resolve on its own over the next few months  Recommend keeping appointment with orthopedics  Restricted duty, restrictions in place until cleared by orthopedics  Follow-up as   needed    Disability Information   Status: Released to Restricted Duty    From:  10/19/2021  Through:   Restrictions are: Temporary   Physical Restrictions   Sitting:    Standing:    Stooping:    Bending:      Squatting:    Walking:    Climbing:    Pushing:  < or = to 1 hr/day   Pulling:  < or = to 1 hr/day Other:    Reaching Above Shoulder (L): < or = to 1 hr/day Reaching Above Shoulder (R):       Reaching Below Shoulder (L):    Reaching Below Shoulder (R):      Not to exceed Weight Limits   Carrying(hrs):   Weight Limit(lb): < or = to 10 pounds Lifting(hrs):   Weight  Limit(lb): < or = to 10 pounds   Comments: Restrictions in place until cleared by orthopedics    Repetitive Actions   Hands: i.e. Fine Manipulations from Grasping:     Feet: i.e. Operating Foot Controls:     Driving / Operate Machinery:     Health Care Provider’s Original or Electronic Signature  Omar Gaston D.O. Health Care Provider’s Original or Electronic Signature    Sal Narvaez MD         Clinic Name / Location: [address],   [address]  [place] [address] Clinic Phone Number: Dept: [phone]   Appointment Time: 10:00 Am Visit Start Time: 9:39 AM   Check-In Time:  9:39 Am Visit Discharge Time:  10:07 am    Original-Treating Physician or Chiropractor    Page 2-Insurer/TPA    Page 3-Employer    Page 4-Employee

## 2022-03-03 ENCOUNTER — APPOINTMENT (OUTPATIENT)
Dept: RADIOLOGY | Facility: MEDICAL CENTER | Age: 67
DRG: 854 | End: 2022-03-03
Attending: EMERGENCY MEDICINE
Payer: COMMERCIAL

## 2022-03-03 ENCOUNTER — APPOINTMENT (OUTPATIENT)
Dept: RADIOLOGY | Facility: MEDICAL CENTER | Age: 67
DRG: 854 | End: 2022-03-03
Attending: PHYSICIAN ASSISTANT
Payer: COMMERCIAL

## 2022-03-03 ENCOUNTER — HOSPITAL ENCOUNTER (INPATIENT)
Facility: MEDICAL CENTER | Age: 67
LOS: 4 days | DRG: 854 | End: 2022-03-07
Attending: EMERGENCY MEDICINE | Admitting: INTERNAL MEDICINE
Payer: COMMERCIAL

## 2022-03-03 DIAGNOSIS — L08.9 DIABETIC FOOT INFECTION (HCC): ICD-10-CM

## 2022-03-03 DIAGNOSIS — E87.1 HYPONATREMIA: ICD-10-CM

## 2022-03-03 DIAGNOSIS — E11.628 DIABETIC FOOT INFECTION (HCC): ICD-10-CM

## 2022-03-03 PROBLEM — D72.829 LEUKOCYTOSIS: Status: ACTIVE | Noted: 2022-03-03

## 2022-03-03 PROBLEM — T14.8XXA WOUND INFECTION: Status: ACTIVE | Noted: 2022-03-03

## 2022-03-03 PROBLEM — A41.9 SEPSIS (HCC): Status: ACTIVE | Noted: 2022-03-03

## 2022-03-03 LAB
ALBUMIN SERPL BCP-MCNC: 3.9 G/DL (ref 3.2–4.9)
ALBUMIN/GLOB SERPL: 1 G/DL
ALP SERPL-CCNC: 62 U/L (ref 30–99)
ALT SERPL-CCNC: 38 U/L (ref 2–50)
ANION GAP SERPL CALC-SCNC: 16 MMOL/L (ref 7–16)
AST SERPL-CCNC: 45 U/L (ref 12–45)
BASOPHILS # BLD AUTO: 0.7 % (ref 0–1.8)
BASOPHILS # BLD: 0.09 K/UL (ref 0–0.12)
BILIRUB SERPL-MCNC: 0.6 MG/DL (ref 0.1–1.5)
BUN SERPL-MCNC: 15 MG/DL (ref 8–22)
CALCIUM SERPL-MCNC: 9.1 MG/DL (ref 8.5–10.5)
CHLORIDE SERPL-SCNC: 91 MMOL/L (ref 96–112)
CO2 SERPL-SCNC: 20 MMOL/L (ref 20–33)
CREAT SERPL-MCNC: 0.64 MG/DL (ref 0.5–1.4)
CRP SERPL HS-MCNC: 24.97 MG/DL (ref 0–0.75)
EOSINOPHIL # BLD AUTO: 0.04 K/UL (ref 0–0.51)
EOSINOPHIL NFR BLD: 0.3 % (ref 0–6.9)
ERYTHROCYTE [DISTWIDTH] IN BLOOD BY AUTOMATED COUNT: 40.3 FL (ref 35.9–50)
EST. AVERAGE GLUCOSE BLD GHB EST-MCNC: 229 MG/DL
GLOBULIN SER CALC-MCNC: 4.1 G/DL (ref 1.9–3.5)
GLUCOSE BLD STRIP.AUTO-MCNC: 168 MG/DL (ref 65–99)
GLUCOSE BLD STRIP.AUTO-MCNC: 222 MG/DL (ref 65–99)
GLUCOSE SERPL-MCNC: 255 MG/DL (ref 65–99)
HBA1C MFR BLD: 9.6 % (ref 4–5.6)
HCT VFR BLD AUTO: 39 % (ref 42–52)
HGB BLD-MCNC: 12.7 G/DL (ref 14–18)
IMM GRANULOCYTES # BLD AUTO: 0.07 K/UL (ref 0–0.11)
IMM GRANULOCYTES NFR BLD AUTO: 0.5 % (ref 0–0.9)
LACTATE BLD-SCNC: 1.3 MMOL/L (ref 0.5–2)
LYMPHOCYTES # BLD AUTO: 1.52 K/UL (ref 1–4.8)
LYMPHOCYTES NFR BLD: 11.6 % (ref 22–41)
MCH RBC QN AUTO: 29.6 PG (ref 27–33)
MCHC RBC AUTO-ENTMCNC: 32.6 G/DL (ref 33.7–35.3)
MCV RBC AUTO: 90.9 FL (ref 81.4–97.8)
MONOCYTES # BLD AUTO: 0.86 K/UL (ref 0–0.85)
MONOCYTES NFR BLD AUTO: 6.5 % (ref 0–13.4)
NEUTROPHILS # BLD AUTO: 10.58 K/UL (ref 1.82–7.42)
NEUTROPHILS NFR BLD: 80.4 % (ref 44–72)
NRBC # BLD AUTO: 0 K/UL
NRBC BLD-RTO: 0 /100 WBC
PLATELET # BLD AUTO: 353 K/UL (ref 164–446)
PMV BLD AUTO: 9.2 FL (ref 9–12.9)
POTASSIUM SERPL-SCNC: 4.5 MMOL/L (ref 3.6–5.5)
PROCALCITONIN SERPL-MCNC: 0.26 NG/ML
PROT SERPL-MCNC: 8 G/DL (ref 6–8.2)
RBC # BLD AUTO: 4.29 M/UL (ref 4.7–6.1)
SCCMEC + MECA PNL NOSE NAA+PROBE: NEGATIVE
SODIUM SERPL-SCNC: 127 MMOL/L (ref 135–145)
WBC # BLD AUTO: 13.2 K/UL (ref 4.8–10.8)

## 2022-03-03 PROCEDURE — 87205 SMEAR GRAM STAIN: CPT

## 2022-03-03 PROCEDURE — 99285 EMERGENCY DEPT VISIT HI MDM: CPT

## 2022-03-03 PROCEDURE — 36415 COLL VENOUS BLD VENIPUNCTURE: CPT

## 2022-03-03 PROCEDURE — 73720 MRI LWR EXTREMITY W/O&W/DYE: CPT | Mod: RT

## 2022-03-03 PROCEDURE — 87186 SC STD MICRODIL/AGAR DIL: CPT

## 2022-03-03 PROCEDURE — 96365 THER/PROPH/DIAG IV INF INIT: CPT

## 2022-03-03 PROCEDURE — 73630 X-RAY EXAM OF FOOT: CPT | Mod: RT

## 2022-03-03 PROCEDURE — 86140 C-REACTIVE PROTEIN: CPT

## 2022-03-03 PROCEDURE — A9270 NON-COVERED ITEM OR SERVICE: HCPCS | Performed by: INTERNAL MEDICINE

## 2022-03-03 PROCEDURE — 83036 HEMOGLOBIN GLYCOSYLATED A1C: CPT

## 2022-03-03 PROCEDURE — 82962 GLUCOSE BLOOD TEST: CPT

## 2022-03-03 PROCEDURE — A9576 INJ PROHANCE MULTIPACK: HCPCS | Performed by: PHYSICIAN ASSISTANT

## 2022-03-03 PROCEDURE — 700111 HCHG RX REV CODE 636 W/ 250 OVERRIDE (IP): Performed by: INTERNAL MEDICINE

## 2022-03-03 PROCEDURE — 99223 1ST HOSP IP/OBS HIGH 75: CPT | Performed by: INTERNAL MEDICINE

## 2022-03-03 PROCEDURE — 87070 CULTURE OTHR SPECIMN AEROBIC: CPT

## 2022-03-03 PROCEDURE — 71045 X-RAY EXAM CHEST 1 VIEW: CPT

## 2022-03-03 PROCEDURE — 87147 CULTURE TYPE IMMUNOLOGIC: CPT

## 2022-03-03 PROCEDURE — 85025 COMPLETE CBC W/AUTO DIFF WBC: CPT

## 2022-03-03 PROCEDURE — 93971 EXTREMITY STUDY: CPT | Mod: 26,RT | Performed by: INTERNAL MEDICINE

## 2022-03-03 PROCEDURE — 96366 THER/PROPH/DIAG IV INF ADDON: CPT

## 2022-03-03 PROCEDURE — 84145 PROCALCITONIN (PCT): CPT

## 2022-03-03 PROCEDURE — 93971 EXTREMITY STUDY: CPT | Mod: RT

## 2022-03-03 PROCEDURE — 700105 HCHG RX REV CODE 258: Performed by: INTERNAL MEDICINE

## 2022-03-03 PROCEDURE — 700105 HCHG RX REV CODE 258: Performed by: EMERGENCY MEDICINE

## 2022-03-03 PROCEDURE — 700111 HCHG RX REV CODE 636 W/ 250 OVERRIDE (IP): Performed by: EMERGENCY MEDICINE

## 2022-03-03 PROCEDURE — 96367 TX/PROPH/DG ADDL SEQ IV INF: CPT

## 2022-03-03 PROCEDURE — 96375 TX/PRO/DX INJ NEW DRUG ADDON: CPT

## 2022-03-03 PROCEDURE — 87641 MR-STAPH DNA AMP PROBE: CPT

## 2022-03-03 PROCEDURE — 770001 HCHG ROOM/CARE - MED/SURG/GYN PRIV*

## 2022-03-03 PROCEDURE — 700102 HCHG RX REV CODE 250 W/ 637 OVERRIDE(OP): Performed by: INTERNAL MEDICINE

## 2022-03-03 PROCEDURE — 96372 THER/PROPH/DIAG INJ SC/IM: CPT

## 2022-03-03 PROCEDURE — 87077 CULTURE AEROBIC IDENTIFY: CPT

## 2022-03-03 PROCEDURE — 700117 HCHG RX CONTRAST REV CODE 255: Performed by: PHYSICIAN ASSISTANT

## 2022-03-03 PROCEDURE — 83605 ASSAY OF LACTIC ACID: CPT

## 2022-03-03 PROCEDURE — 80053 COMPREHEN METABOLIC PANEL: CPT

## 2022-03-03 PROCEDURE — 87040 BLOOD CULTURE FOR BACTERIA: CPT | Mod: 91

## 2022-03-03 RX ORDER — MORPHINE SULFATE 4 MG/ML
4 INJECTION INTRAVENOUS ONCE
Status: COMPLETED | OUTPATIENT
Start: 2022-03-03 | End: 2022-03-03

## 2022-03-03 RX ORDER — DULOXETIN HYDROCHLORIDE 60 MG/1
60 CAPSULE, DELAYED RELEASE ORAL DAILY
Status: DISCONTINUED | OUTPATIENT
Start: 2022-03-03 | End: 2022-03-03

## 2022-03-03 RX ORDER — IPRATROPIUM BROMIDE AND ALBUTEROL SULFATE 2.5; .5 MG/3ML; MG/3ML
3 SOLUTION RESPIRATORY (INHALATION)
Status: DISCONTINUED | OUTPATIENT
Start: 2022-03-03 | End: 2022-03-04

## 2022-03-03 RX ORDER — FLUTICASONE PROPIONATE AND SALMETEROL XINAFOATE 115; 21 UG/1; UG/1
1 AEROSOL, METERED RESPIRATORY (INHALATION) 2 TIMES DAILY
Status: DISCONTINUED | OUTPATIENT
Start: 2022-03-03 | End: 2022-03-03

## 2022-03-03 RX ORDER — AMOXICILLIN 250 MG
2 CAPSULE ORAL 2 TIMES DAILY
Status: DISCONTINUED | OUTPATIENT
Start: 2022-03-03 | End: 2022-03-07 | Stop reason: HOSPADM

## 2022-03-03 RX ORDER — DEXTROSE MONOHYDRATE 25 G/50ML
25 INJECTION, SOLUTION INTRAVENOUS
Status: DISCONTINUED | OUTPATIENT
Start: 2022-03-03 | End: 2022-03-07 | Stop reason: HOSPADM

## 2022-03-03 RX ORDER — AMOXICILLIN AND CLAVULANATE POTASSIUM 875; 125 MG/1; MG/1
1 TABLET, FILM COATED ORAL EVERY 12 HOURS
Status: ON HOLD | COMMUNITY
Start: 2022-02-17 | End: 2022-03-07

## 2022-03-03 RX ORDER — NABUMETONE 750 MG/1
750 TABLET, FILM COATED ORAL 2 TIMES DAILY
Status: DISCONTINUED | OUTPATIENT
Start: 2022-03-03 | End: 2022-03-03

## 2022-03-03 RX ORDER — ONDANSETRON 4 MG/1
4 TABLET, ORALLY DISINTEGRATING ORAL EVERY 4 HOURS PRN
Status: DISCONTINUED | OUTPATIENT
Start: 2022-03-03 | End: 2022-03-07 | Stop reason: HOSPADM

## 2022-03-03 RX ORDER — GLIPIZIDE 10 MG/1
10 TABLET, FILM COATED, EXTENDED RELEASE ORAL 2 TIMES DAILY
COMMUNITY

## 2022-03-03 RX ORDER — HYDROCODONE BITARTRATE AND ACETAMINOPHEN 5; 325 MG/1; MG/1
1-2 TABLET ORAL EVERY 6 HOURS PRN
Status: DISCONTINUED | OUTPATIENT
Start: 2022-03-03 | End: 2022-03-07 | Stop reason: HOSPADM

## 2022-03-03 RX ORDER — HYDROCODONE BITARTRATE AND ACETAMINOPHEN 7.5; 325 MG/1; MG/1
1 TABLET ORAL 3 TIMES DAILY PRN
COMMUNITY

## 2022-03-03 RX ORDER — ACETAMINOPHEN 325 MG/1
650 TABLET ORAL EVERY 6 HOURS PRN
Status: DISCONTINUED | OUTPATIENT
Start: 2022-03-03 | End: 2022-03-07 | Stop reason: HOSPADM

## 2022-03-03 RX ORDER — BISACODYL 10 MG
10 SUPPOSITORY, RECTAL RECTAL
Status: DISCONTINUED | OUTPATIENT
Start: 2022-03-03 | End: 2022-03-07 | Stop reason: HOSPADM

## 2022-03-03 RX ORDER — DOXYCYCLINE HYCLATE 100 MG
100 TABLET ORAL 2 TIMES DAILY
Status: ON HOLD | COMMUNITY
Start: 2022-03-02 | End: 2022-03-07

## 2022-03-03 RX ORDER — POLYETHYLENE GLYCOL 3350 17 G/17G
1 POWDER, FOR SOLUTION ORAL
Status: DISCONTINUED | OUTPATIENT
Start: 2022-03-03 | End: 2022-03-07 | Stop reason: HOSPADM

## 2022-03-03 RX ORDER — ONDANSETRON 2 MG/ML
4 INJECTION INTRAMUSCULAR; INTRAVENOUS EVERY 4 HOURS PRN
Status: DISCONTINUED | OUTPATIENT
Start: 2022-03-03 | End: 2022-03-07 | Stop reason: HOSPADM

## 2022-03-03 RX ORDER — SODIUM CHLORIDE 9 MG/ML
INJECTION, SOLUTION INTRAVENOUS CONTINUOUS
Status: DISCONTINUED | OUTPATIENT
Start: 2022-03-03 | End: 2022-03-05

## 2022-03-03 RX ORDER — GABAPENTIN 300 MG/1
300 CAPSULE ORAL 3 TIMES DAILY PRN
COMMUNITY

## 2022-03-03 RX ORDER — ONDANSETRON 2 MG/ML
4 INJECTION INTRAMUSCULAR; INTRAVENOUS ONCE
Status: COMPLETED | OUTPATIENT
Start: 2022-03-03 | End: 2022-03-03

## 2022-03-03 RX ADMIN — ONDANSETRON 4 MG: 2 INJECTION INTRAMUSCULAR; INTRAVENOUS at 15:23

## 2022-03-03 RX ADMIN — MORPHINE SULFATE 4 MG: 4 INJECTION INTRAVENOUS at 15:23

## 2022-03-03 RX ADMIN — INSULIN HUMAN 2 UNITS: 100 INJECTION, SOLUTION PARENTERAL at 21:36

## 2022-03-03 RX ADMIN — GADOTERIDOL 20 ML: 279.3 INJECTION, SOLUTION INTRAVENOUS at 20:38

## 2022-03-03 RX ADMIN — SODIUM CHLORIDE 3 G: 900 INJECTION INTRAVENOUS at 23:57

## 2022-03-03 RX ADMIN — INSULIN HUMAN 3 UNITS: 100 INJECTION, SOLUTION PARENTERAL at 18:53

## 2022-03-03 RX ADMIN — SODIUM CHLORIDE 3 G: 900 INJECTION INTRAVENOUS at 15:33

## 2022-03-03 RX ADMIN — HYDROCODONE BITARTRATE AND ACETAMINOPHEN 2 TABLET: 5; 325 TABLET ORAL at 19:49

## 2022-03-03 RX ADMIN — SODIUM CHLORIDE: 9 INJECTION, SOLUTION INTRAVENOUS at 21:39

## 2022-03-03 RX ADMIN — VANCOMYCIN HYDROCHLORIDE 2250 MG: 500 INJECTION, POWDER, LYOPHILIZED, FOR SOLUTION INTRAVENOUS at 16:04

## 2022-03-03 RX ADMIN — ENOXAPARIN SODIUM 40 MG: 40 INJECTION SUBCUTANEOUS at 17:58

## 2022-03-03 ASSESSMENT — ENCOUNTER SYMPTOMS
EYES NEGATIVE: 1
CONSTITUTIONAL NEGATIVE: 1
RESPIRATORY NEGATIVE: 1
GASTROINTESTINAL NEGATIVE: 1
NEUROLOGICAL NEGATIVE: 1
PSYCHIATRIC NEGATIVE: 1
MUSCULOSKELETAL NEGATIVE: 1

## 2022-03-03 ASSESSMENT — LIFESTYLE VARIABLES: DO YOU DRINK ALCOHOL: NO

## 2022-03-03 NOTE — ED TRIAGE NOTES
Chief Complaint   Patient presents with   • Wound Infection     R foot diabetic ulcer X 1 year, worsening infection X 2 weeks, redness now going up to R calf, open wound only on foot, denies fevers, currently on abx regimen, saw wound care this AM and they told him to come to ER.      Pt ambulatory to triage for above complaint, VSS on RA, GCS 15, NAD.    Denies all s/sx of covid, denies recent travel, denies fevers.    Pt returned to [facility]. Educated on triage process and to inform staff of any changes.     /77   Pulse 99   Temp 37 °C (98.6 °F) (Temporal)   Resp 16   Ht 1.829 m (6')   Wt 90.2 kg (198 lb 13.7 oz)   SpO2 97%   BMI 26.97 kg/m²

## 2022-03-03 NOTE — H&P
Hospital Medicine History & Physical Note    Date of Service  3/3/2022    Primary Care Physician  Pcp Not In Computer    Consultants  orthopedics    Code Status  Full Code    Chief Complaint  Chief Complaint   Patient presents with   • Wound Infection     R foot diabetic ulcer X 1 year, worsening infection X 2 weeks, redness now going up to R calf, open wound only on foot, denies fevers, currently on abx regimen, saw wound care this AM and they told him to come to ER.        History of Presenting Illness    66-year-old male with history of uncontrolled diabetes, hypertension and sleep apnea presented 3/3 with right foot pain, patient has had wound infection and has right foot for more than a year however recently getting worse redness swelling and more pain with fluid coming out, denied any significant fever no chills or shortness of breath, no nausea or vomiting and no urinary or bowel symptoms, patient was evaluated by his doctor who gave him Bactrim however not improving and they sent him to the emergency for IV antibiotics, on admission patient was found to have mild leukocytosis 13 with a blood sugar 255, CRP was elevated 24, simple x-ray did not show significant osteomyelitis however showed possible abscess, ED physician discussed the case with Ortho who recommended MRI, patient will be admitted for sepsis due to wound infection, IV Unasyn and vancomycin were started.    I discussed the plan of care with patient, family and ED physician.    Review of Systems  Review of Systems   Constitutional: Negative.    HENT: Negative.    Eyes: Negative.    Respiratory: Negative.    Gastrointestinal: Negative.    Genitourinary: Negative.    Musculoskeletal: Negative.    Skin: Positive for rash.   Neurological: Negative.    Psychiatric/Behavioral: Negative.        Past Medical History   has a past medical history of Allergy, ASTHMA, Back pain, Bronchitis, CATARACT, Dental disorder, Diabetes, Hypertension, MEDICAL HOME, Neck  pain, ELLEN (obstructive sleep apnea), and Sleep apnea.    Surgical History   has a past surgical history that includes cataract phaco with iol (08); cataract phaco with iol (08); vasectomy; somnoplasty (6/10/2009); septoplasty (6/10/2009); antrostomy (6/10/2009); ethmoidectomy (6/10/2009); orif, ankle; appendectomy; lumbar fusion anterior (2009); fusion, spine, lumbar, plif (2009); lumbar laminectomy diskectomy (2009); and laminotomy (2009).     Family History  family history includes Diabetes in his father; Hypertension in his father.   Family history reviewed with patient. There is no family history that is pertinent to the chief complaint.     Social History   reports that he has quit smoking. He has quit using smokeless tobacco.  His smokeless tobacco use included chew. He reports that he does not drink alcohol and does not use drugs.    Allergies  Allergies   Allergen Reactions   • Sulfa Drugs Unspecified     Pt hasn't had meds in so long he doesn't remember what his reaction is, he just remembers there is a reaction and that he shouldn't have them    • Flu Virus Vaccine Shortness of Breath   • Other Food Anaphylaxis     coconut   • Pneumococcal Vaccines Shortness of Breath   • Nutrasweet Aspartame [Aspartame]      Causes migraines       Medications  Prior to Admission Medications   Prescriptions Last Dose Informant Patient Reported? Taking?   DULoxetine (CYMBALTA) 60 MG Cap DR Particles delayed-release capsule   Yes No   Sig: Take 60 mg by mouth.   FLUTICASONE-SALMETEROL 115-21 MCG/ACT INH AERO   Yes No   Sig: Inhale 1 Puff by mouth 2 times a day.   GLIPIZIDE XL 5 MG TABLET SR 24 HR   Yes No   Si times a day.   ONE TOUCH ULTRA TEST strip   No No   Sig: USE TO TEST BLOOD 6 TIMES A DAY   Patient not taking: Reported on 6/3/2021   albuterol (ACCUNEB) 0.63 MG/3ML nebulizer solution   No No   Sig: 3 mL by Nebulization route every 6 hours as needed for Shortness of Breath for up to 30  doses.   albuterol-ipratropium (DUO-NEB) 0.5-2.5 (3) MG/3ML nebulizer solution   No No   Sig: 3 mL by Nebulization route 4 times a day.   azithromycin (ZITHROMAX) 250 MG Tab   Yes No   Patient not taking: Reported on 6/3/2021   cefdinir (OMNICEF) 300 MG Cap 3/3/2022 at Unknown time  Yes No   cyclobenzaprine (FLEXERIL) 10 mg Tab   Yes No   Sig: Take 10 mg by mouth 3 times a day as needed.   doxycycline (MONODOX) 100 MG capsule   Yes No   glipiZIDE SR (GLUCOTROL) 10 MG TABLET SR 24 HR   Yes No   Sig: Take 10 mg by mouth.   hydrocodone-acetaminophen (NORCO) 5-325 MG Tab per tablet   No No   Sig: Take 1-2 Tabs by mouth every 6 hours as needed. No driving while taking, do not take while at work. No heavy machinery with use.   ibuprofen (MOTRIN) 400 MG Tab   Yes No   Sig: Take 400 mg by mouth every 6 hours as needed.   Patient not taking: Reported on 6/3/2021   insulin glargine (LANTUS) 100 UNIT/ML SOLN   No No   Sig: Inject 15 Units as instructed every bedtime. AS DIRECTED   Patient taking differently: Inject  under the skin at bedtime. AS DIRECTED   metformin (GLUCOPHAGE) 500 MG TABS   No No   Sig: TAKE 1 TABLET BY MOUTH TWICE DAILY   nabumetone (RELAFEN) 750 MG Tab   No No   Sig: Take 1 Tab by mouth 2 times a day.   Patient not taking: Reported on 2/16/2019      Facility-Administered Medications: None       Physical Exam  Temp:  [36.7 °C (98.1 °F)-37 °C (98.6 °F)] 36.7 °C (98.1 °F)  Pulse:  [85-99] 85  Resp:  [16-18] 18  BP: (133-174)/(64-77) 133/64  SpO2:  [94 %-98 %] 98 %  Blood Pressure : (!) 174/77   Temperature: 36.7 °C (98.1 °F)   Pulse: 98   Respiration: 16   Pulse Oximetry: 96 %       Physical Exam  Constitutional:       General: He is not in acute distress.     Appearance: He is not ill-appearing.   HENT:      Head: Normocephalic and atraumatic.   Eyes:      General: No scleral icterus.  Cardiovascular:      Rate and Rhythm: Normal rate.   Pulmonary:      Effort: No respiratory distress.      Breath sounds: No  wheezing or rales.   Abdominal:      General: There is no distension.      Tenderness: There is no abdominal tenderness. There is no guarding.   Musculoskeletal:         General: Swelling and signs of injury present.      Right lower leg: Edema present.      Left lower leg: No edema.   Skin:     Coloration: Skin is pale.      Findings: Bruising, erythema, lesion and rash present.      Comments: Also on his left foot with a drain, redness and swelling   Neurological:      General: No focal deficit present.      Mental Status: He is alert and oriented to person, place, and time. Mental status is at baseline.      Cranial Nerves: No cranial nerve deficit.      Motor: No weakness.         Laboratory:  Recent Labs     03/03/22  1327   WBC 13.2*   RBC 4.29*   HEMOGLOBIN 12.7*   HEMATOCRIT 39.0*   MCV 90.9   MCH 29.6   MCHC 32.6*   RDW 40.3   PLATELETCT 353   MPV 9.2     Recent Labs     03/03/22  1327   SODIUM 127*   POTASSIUM 4.5   CHLORIDE 91*   CO2 20   GLUCOSE 255*   BUN 15   CREATININE 0.64   CALCIUM 9.1     Recent Labs     03/03/22  1327   ALTSGPT 38   ASTSGOT 45   ALKPHOSPHAT 62   TBILIRUBIN 0.6   GLUCOSE 255*         No results for input(s): NTPROBNP in the last 72 hours.      No results for input(s): TROPONINT in the last 72 hours.    Imaging:  US-EXTREMITY VENOUS LOWER UNILAT RIGHT   Final Result      DX-FOOT-COMPLETE 3+ RIGHT   Final Result      1.  Soft tissue edema and gas of the medial right forefoot projecting over the great toe metatarsophalangeal joint. This is concerning for abscess versus necrotic infection.   2.  No radiographic evidence of acute osteomyelitis.. If there is clinical concern for radiographically occult osteomyelitis, MRI can be obtained.      DX-CHEST-PORTABLE (1 VIEW)   Final Result      1.  No acute cardiac or pulmonary abnormalities are identified.      MR-FOOT-WITH & W/O RIGHT    (Results Pending)       X-Ray:  I have personally reviewed the images and compared with prior  images.  EKG:  I have personally reviewed the images and compared with prior images.    Assessment/Plan:  I anticipate this patient will require at least two midnights for appropriate medical management, necessitating inpatient admission.    * Wound infection- (present on admission)  Assessment & Plan  diabetic chronic wound infection  Significant swelling redness and a drain  Came with sepsis  IV Unasyn and vancomycin with IV fluid  MRI to rule out osteomyelitis  Ortho was consulted and LPS  Wound care  Controlling his blood sugar  Patient has a good pulses and no need for CYNTHIA at this time      Sepsis (HCC)  Assessment & Plan  This is Sepsis Present on admission  SIRS criteria identified on my evaluation include: Tachycardia, with heart rate greater than 90 BPM and Leukocytosis, with WBC greater than 12,000  Source is wound infection  Sepsis protocol initiated  Fluid resuscitation ordered per protocol  IV antibiotics as appropriate for source of sepsis  While organ dysfunction may be noted elsewhere in this problem list or in the chart, degree of organ dysfunction does not meet CMS criteria for severe sepsis  At this time continue IV Unasyn and vancomycin  Wound culture and blood culture are pending  MRI to rule out osteo-  Ortho was consulted  Patient might need ID consult          Leukocytosis  Assessment & Plan  13  Due to wound infection  IV antibiotics and labs daily    DM (diabetes mellitus), type 1 with neurological complications (HCC)- (present on admission)  Assessment & Plan  A1c 9.6, uncontrolled  Blood sugar more than 200  Continue glargine 8 units daily with a sliding scales  Holding glipizide and Metformin due to infection at this time  Diabetes education encouraged the patient to monitor his blood sugar for better control  Discussed the complication of diabetes including not limited to worsening wound infection, amputation, kidney and heart failure and death, he understood    ELLEN (obstructive sleep  apnea)- (present on admission)  Assessment & Plan  Continue CPAP at night      Hypertension- (present on admission)  Assessment & Plan  Patient is not on any medications  Close monitor, start with lisinopril if needed      VTE prophylaxis: SCDs/TEDs and enoxaparin ppx

## 2022-03-03 NOTE — ED PROVIDER NOTES
"ED Provider Note    Scribed for Liana Love M.D. by Claudio Dueañs. 3/3/2022  3:02 PM    Primary care provider: Pcp Not Noted.  Means of arrival: Walk-in  History obtained from: Patient  History limited by: None    CHIEF COMPLAINT  Chief Complaint   Patient presents with   • Wound Infection     R foot diabetic ulcer X 1 year, worsening infection X 2 weeks, redness now going up to R calf, open wound only on foot, denies fevers, currently on abx regimen, saw wound care this AM and they told him to come to ER.      HPI  Keaton Cervantes is a 66 y.o. male who presents to the ED for a worsening open right diabetic foot plantar surface wound onset 2 weeks ago. The patient's wound is on the of his right foot and is about the size of a quarter.  This is larger than the wound was a couple weeks ago.  He has been dealing with this wound for a while now and was in wound care.  He thought he was getting a lot better until 2 weeks ago when \"everything just exploded.\"  Per the patient, he is currently being seen by a podiatrist and [facility] wound care for diabetic ulcers on his right foot over the last year. He states his last visit was earlier today and was told to come to the ER for diabetic foot infection with cellulitis of the lower extremity. He reports associated redness, pain, and swelling in his right foot radiating up his right calf. He has neuropathy in his right foot  At baseline.  He denies associated fever or shortness of breath. He denies any history of blood clots.    REVIEW OF SYSTEMS  Pertinent positives include right food wound, redness, pain, swelling, and neuropathy.   Pertinent negatives include no fever or shortness of breath..   See HPI for further details. All other systems are negative.    PAST MEDICAL HISTORY  Past Medical History:   Diagnosis Date   • Allergy    • ASTHMA    • Back pain     Chronic   • Bronchitis    • CATARACT    • Dental disorder    • Diabetes     oral rx & insulin   • " Hypertension    • MEDICAL HOME    • Neck pain     Chronic   • ELLEN (obstructive sleep apnea)     Bipap   • Sleep apnea      FAMILY HISTORY  Family History   Problem Relation Age of Onset   • Hypertension Father    • Diabetes Father      SOCIAL HISTORY  Social History     Tobacco Use   • Smoking status: Former Smoker   • Smokeless tobacco: Former User     Types: Chew   • Tobacco comment: 1992   Substance Use Topics   • Alcohol use: No   • Drug use: No      Social History     Substance and Sexual Activity   Drug Use No     SURGICAL HISTORY  Past Surgical History:   Procedure Laterality Date   • LUMBAR FUSION ANTERIOR  8/4/2009    Performed by JOSEE BLANTON at SURGERY [facility] ORS   • FUSION, SPINE, LUMBAR, PLIF  8/4/2009    Performed by JOSEE BLANTON at SURGERY [facility] ORS   • LUMBAR LAMINECTOMY DISKECTOMY  8/4/2009    Performed by JOSEE BLANTON at SURGERY [facility] ORS   • LAMINOTOMY  8/4/2009    Performed by JOSEE BLANTON at SURGERY [facility] ORS   • SOMNOPLASTY  6/10/2009    Performed by ELÍAS RILEY at SURGERY [facility] ORS   • SEPTOPLASTY  6/10/2009    Performed by ELÍAS RILEY at SURGERY [facility] ORS   • ANTROSTOMY  6/10/2009    Performed by ELÍAS RILEY at SURGERY [facility] ORS   • ETHMOIDECTOMY  6/10/2009    Performed by ELÍAS RILEY at SURGERY [facility] ORS   • CATARACT PHACO WITH IOL  7/7/08    Performed by OCTAVIO HARDWICK at SURGERY SAME DAY [facility] ORS   • CATARACT PHACO WITH IOL  6/16/08    Performed by OCTAVIO HARDWICK at SURGERY SAME DAY [facility] ORS   • APPENDECTOMY     • ORIF, ANKLE     • VASECTOMY         CURRENT MEDICATIONS  Home Medications     Reviewed by Bruna Nix, Pharmacy Intern (Pharmacy Intern) on 03/03/22 at 1603  Med List Status: Complete   Medication Last Dose Status   amoxicillin-clavulanate (AUGMENTIN) 875-125 MG Tab 2/24/2022 Active   cyclobenzaprine (FLEXERIL) 10 mg Tab 3/1/2022 Active   doxycycline (VIBRAMYCIN) 100 MG Tab  3/3/2022 Active   gabapentin (NEURONTIN) 300 MG Cap 3/3/2022 Active   glipiZIDE SR (GLUCOTROL) 10 MG TABLET SR 24 HR 3/3/2022 Active   HYDROcodone-acetaminophen (NORCO) 7.5-325 MG per tablet 3/3/2022 Active   insulin glargine (LANTUS) 100 UNIT/ML SOLN ~3 WEEKS AGO Active   metformin (GLUCOPHAGE) 1000 MG tablet 3/3/2022 Active   ONE TOUCH ULTRA TEST strip  Active              ALLERGIES  Allergies   Allergen Reactions   • Sulfa Drugs Unspecified     Pt hasn't had meds in so long he doesn't remember what his reaction is, he just remembers there is a reaction and that he shouldn't have them    • Flu Virus Vaccine Shortness of Breath   • Other Food Anaphylaxis     coconut   • Pneumococcal Vaccines Shortness of Breath   • Nutrasweet Aspartame [Aspartame]      Causes migraines     PHYSICAL EXAM  VITAL SIGNS: BP (!) 174/77   Pulse 98   Temp 36.7 °C (98.1 °F) (Temporal)   Resp 16   Ht 1.829 m (6')   Wt 90.2 kg (198 lb 13.7 oz)   SpO2 96%   BMI 26.97 kg/m²    Constitutional: Alert in no apparent distress.  HENT: Normocephalic, Bilateral external ears normal. Nose normal.   Eyes: Pupils are equal and reactive. Conjunctiva normal, non-icteric.   Thorax & Lungs: Easy unlabored respirations  Skin: Visualized skin is  Dry, No erythema, No rash.   Extremities: 2 cm diameter ulceration to the first MTP area of the plantar aspect of the right foot with significant swelling to the right foot and right lower extremity.  There is 2+ pitting edema to the right lower extremity.  There is warmth and erythema to the right foot as well as patchy warmth and erythema to the distal aspect of the right leg.  Good pedal pulses.  Decreased sensation to light touch due to chronic peripheral neuropathy.  There is right inguinal adenopathy.  Neurologic: Alert, clear speech  Psychiatric: Affect and Mood normal    LABS/RADIOLOGY/PROCEDURES  Results for orders placed or performed during the hospital encounter of 03/03/22   Lactic acid (lactate)    Result Value Ref Range    Lactic Acid 1.3 0.5 - 2.0 mmol/L   CBC WITH DIFFERENTIAL   Result Value Ref Range    WBC 13.2 (H) 4.8 - 10.8 K/uL    RBC 4.29 (L) 4.70 - 6.10 M/uL    Hemoglobin 12.7 (L) 14.0 - 18.0 g/dL    Hematocrit 39.0 (L) 42.0 - 52.0 %    MCV 90.9 81.4 - 97.8 fL    MCH 29.6 27.0 - 33.0 pg    MCHC 32.6 (L) 33.7 - 35.3 g/dL    RDW 40.3 35.9 - 50.0 fL    Platelet Count 353 164 - 446 K/uL    MPV 9.2 9.0 - 12.9 fL    Neutrophils-Polys 80.40 (H) 44.00 - 72.00 %    Lymphocytes 11.60 (L) 22.00 - 41.00 %    Monocytes 6.50 0.00 - 13.40 %    Eosinophils 0.30 0.00 - 6.90 %    Basophils 0.70 0.00 - 1.80 %    Immature Granulocytes 0.50 0.00 - 0.90 %    Nucleated RBC 0.00 /100 WBC    Neutrophils (Absolute) 10.58 (H) 1.82 - 7.42 K/uL    Lymphs (Absolute) 1.52 1.00 - 4.80 K/uL    Monos (Absolute) 0.86 (H) 0.00 - 0.85 K/uL    Eos (Absolute) 0.04 0.00 - 0.51 K/uL    Baso (Absolute) 0.09 0.00 - 0.12 K/uL    Immature Granulocytes (abs) 0.07 0.00 - 0.11 K/uL    NRBC (Absolute) 0.00 K/uL   COMP METABOLIC PANEL   Result Value Ref Range    Sodium 127 (L) 135 - 145 mmol/L    Potassium 4.5 3.6 - 5.5 mmol/L    Chloride 91 (L) 96 - 112 mmol/L    Co2 20 20 - 33 mmol/L    Anion Gap 16.0 7.0 - 16.0    Glucose 255 (H) 65 - 99 mg/dL    Bun 15 8 - 22 mg/dL    Creatinine 0.64 0.50 - 1.40 mg/dL    Calcium 9.1 8.5 - 10.5 mg/dL    AST(SGOT) 45 12 - 45 U/L    ALT(SGPT) 38 2 - 50 U/L    Alkaline Phosphatase 62 30 - 99 U/L    Total Bilirubin 0.6 0.1 - 1.5 mg/dL    Albumin 3.9 3.2 - 4.9 g/dL    Total Protein 8.0 6.0 - 8.2 g/dL    Globulin 4.1 (H) 1.9 - 3.5 g/dL    A-G Ratio 1.0 g/dL   ESTIMATED GFR   Result Value Ref Range    GFR If African American >60 >60 mL/min/1.73 m 2    GFR If Non African American >60 >60 mL/min/1.73 m 2   PROCALCITONIN   Result Value Ref Range    Procalcitonin 0.26 (H) <0.25 ng/mL   CRP QUANTITIVE (NON-CARDIAC)   Result Value Ref Range    Stat C-Reactive Protein 24.97 (H) 0.00 - 0.75 mg/dL   Hemoglobin A1C   Result  Value Ref Range    Glycohemoglobin 9.6 (H) 4.0 - 5.6 %    Est Avg Glucose 229 mg/dL   MRSA By PCR (Amp)    Specimen: Nares; Respirate   Result Value Ref Range    MRSA by PCR Negative Negative   POCT glucose device results   Result Value Ref Range    POC Glucose, Blood 222 (H) 65 - 99 mg/dL     US-EXTREMITY VENOUS LOWER UNILAT RIGHT   Final Result      DX-FOOT-COMPLETE 3+ RIGHT   Final Result      1.  Soft tissue edema and gas of the medial right forefoot projecting over the great toe metatarsophalangeal joint. This is concerning for abscess versus necrotic infection.   2.  No radiographic evidence of acute osteomyelitis.. If there is clinical concern for radiographically occult osteomyelitis, MRI can be obtained.      DX-CHEST-PORTABLE (1 VIEW)   Final Result      1.  No acute cardiac or pulmonary abnormalities are identified.      MR-FOOT-WITH & W/O RIGHT    (Results Pending)     COURSE & MEDICAL DECISION MAKING  Pertinent Labs & Imaging studies reviewed. (See chart for details)        3:02 PM - Patient seen and examined at bedside. Discussed plan of care. Patient agrees to the plan of care. Ordered for labs and imaging to evaluate his symptoms. I informed the patient of my plan to admit today given the patient's current presentation and diagnostic study results. Patient verbalizes understanding and support with my plan for admission.     3:16 PM - Paged hospitalist.    3:21 PM - I discussed the patient's case and the above findings with Dr. Wright (Hospitalist) who agreed to evaluate the patient for admission.    4:15 PM - Paged ortho.    4:37 PM I discussed the patient's case and the above findings with Dr. Madera (Ortho) who recommends an MRI scan and admission to the hospitalist.  He thinks the gas seen on the x-ray is more likely related to abscess and less likely related to necrotizing fasciitis.      Patient presents to the ER with complaint of redness, swelling, pain and expanding diabetic foot ulcer.   Patient has a significant cellulitis to the right foot and lower leg secondary to a diabetic foot wound.  White count is elevated as is the CRP.  X-ray reveals some soft tissue gas which may be secondary to abscess versus necrotic infection.  I spoke with Dr. Madera, orthopedic surgeon on-call.  He thinks the findings are more likely related to abscess versus necrotizing fasciitis.  Patient was given Unasyn and vancomycin here in the ER.  Blood cultures and wound cultures were ordered.  Patient will need to be hospitalized for diabetic foot infection with cellulitis.  We will need to get MRI scan to rule out osteomyelitis and to further evaluate this area of soft tissue gas on the x-ray per Dr. Madera.  I spoke with Dr. Wright, hospitalist on-call, and he will kindly evaluate the patient hospitalization.    DISPOSITION:  Patient will be hospitalized by Dr. Wright in guarded condition.    FINAL IMPRESSION  1. Diabetic foot infection (HCC) Acute   2. Hyponatremia          Claudio LAY (Scribe), am scribing for, and in the presence of, Liana Love M.D..    Electronically signed by: Claudio Dueñas (Scribe), 3/3/2022    Liana LAY M.D. personally performed the services described in this documentation, as scribed by Claudio Dueñas in my presence, and it is both accurate and complete. C.    This dictation has been created using voice recognition software. The accuracy of the dictation is limited by the abilities of the software. I expect there may be some errors of grammar and possibly content. I made every attempt to manually correct the errors within my dictation. However, errors related to voice recognition software may still exist and should be interpreted within the appropriate context.    The note accurately reflects work and decisions made by me.  Liana Love M.D.  3/3/2022  9:42 PM

## 2022-03-03 NOTE — ED NOTES
Pt ambulated to T1, agree with triage note.  Blood drawn in triage along with 2 sets of blood cultures.  Pt changed into gown and  Placed on monitors.  ERP to see.

## 2022-03-04 ENCOUNTER — ANESTHESIA EVENT (OUTPATIENT)
Dept: SURGERY | Facility: MEDICAL CENTER | Age: 67
DRG: 854 | End: 2022-03-04
Payer: COMMERCIAL

## 2022-03-04 ENCOUNTER — ANESTHESIA (OUTPATIENT)
Dept: SURGERY | Facility: MEDICAL CENTER | Age: 67
DRG: 854 | End: 2022-03-04
Payer: COMMERCIAL

## 2022-03-04 ENCOUNTER — APPOINTMENT (OUTPATIENT)
Dept: RADIOLOGY | Facility: MEDICAL CENTER | Age: 67
DRG: 854 | End: 2022-03-04
Payer: COMMERCIAL

## 2022-03-04 LAB
ANION GAP SERPL CALC-SCNC: 11 MMOL/L (ref 7–16)
BASOPHILS # BLD AUTO: 1 % (ref 0–1.8)
BASOPHILS # BLD: 0.1 K/UL (ref 0–0.12)
BUN SERPL-MCNC: 13 MG/DL (ref 8–22)
CALCIUM SERPL-MCNC: 8.7 MG/DL (ref 8.5–10.5)
CHLORIDE SERPL-SCNC: 93 MMOL/L (ref 96–112)
CO2 SERPL-SCNC: 26 MMOL/L (ref 20–33)
CREAT SERPL-MCNC: 0.7 MG/DL (ref 0.5–1.4)
EOSINOPHIL # BLD AUTO: 0.14 K/UL (ref 0–0.51)
EOSINOPHIL NFR BLD: 1.4 % (ref 0–6.9)
ERYTHROCYTE [DISTWIDTH] IN BLOOD BY AUTOMATED COUNT: 40.7 FL (ref 35.9–50)
EXTERNAL QUALITY CONTROL: NORMAL
FUNGUS SPEC FUNGUS STN: NORMAL
GLUCOSE BLD STRIP.AUTO-MCNC: 135 MG/DL (ref 65–99)
GLUCOSE BLD STRIP.AUTO-MCNC: 151 MG/DL (ref 65–99)
GLUCOSE BLD STRIP.AUTO-MCNC: 173 MG/DL (ref 65–99)
GLUCOSE BLD STRIP.AUTO-MCNC: 184 MG/DL (ref 65–99)
GLUCOSE BLD STRIP.AUTO-MCNC: 240 MG/DL (ref 65–99)
GLUCOSE SERPL-MCNC: 224 MG/DL (ref 65–99)
GRAM STN SPEC: NORMAL
GRAM STN SPEC: NORMAL
HCT VFR BLD AUTO: 38.1 % (ref 42–52)
HGB BLD-MCNC: 12.2 G/DL (ref 14–18)
IMM GRANULOCYTES # BLD AUTO: 0.04 K/UL (ref 0–0.11)
IMM GRANULOCYTES NFR BLD AUTO: 0.4 % (ref 0–0.9)
LYMPHOCYTES # BLD AUTO: 2.12 K/UL (ref 1–4.8)
LYMPHOCYTES NFR BLD: 21.8 % (ref 22–41)
MAGNESIUM SERPL-MCNC: 2.1 MG/DL (ref 1.5–2.5)
MCH RBC QN AUTO: 29.2 PG (ref 27–33)
MCHC RBC AUTO-ENTMCNC: 32 G/DL (ref 33.7–35.3)
MCV RBC AUTO: 91.1 FL (ref 81.4–97.8)
MONOCYTES # BLD AUTO: 0.79 K/UL (ref 0–0.85)
MONOCYTES NFR BLD AUTO: 8.1 % (ref 0–13.4)
NEUTROPHILS # BLD AUTO: 6.55 K/UL (ref 1.82–7.42)
NEUTROPHILS NFR BLD: 67.3 % (ref 44–72)
NRBC # BLD AUTO: 0 K/UL
NRBC BLD-RTO: 0 /100 WBC
PLATELET # BLD AUTO: 324 K/UL (ref 164–446)
PMV BLD AUTO: 9.1 FL (ref 9–12.9)
POTASSIUM SERPL-SCNC: 4 MMOL/L (ref 3.6–5.5)
RBC # BLD AUTO: 4.18 M/UL (ref 4.7–6.1)
SARS-COV+SARS-COV-2 AG RESP QL IA.RAPID: NEGATIVE
SIGNIFICANT IND 70042: NORMAL
SITE SITE: NORMAL
SODIUM SERPL-SCNC: 130 MMOL/L (ref 135–145)
SOURCE SOURCE: NORMAL
WBC # BLD AUTO: 9.7 K/UL (ref 4.8–10.8)

## 2022-03-04 PROCEDURE — 700102 HCHG RX REV CODE 250 W/ 637 OVERRIDE(OP): Performed by: INTERNAL MEDICINE

## 2022-03-04 PROCEDURE — 160048 HCHG OR STATISTICAL LEVEL 1-5: Performed by: ORTHOPAEDIC SURGERY

## 2022-03-04 PROCEDURE — 83735 ASSAY OF MAGNESIUM: CPT

## 2022-03-04 PROCEDURE — 700111 HCHG RX REV CODE 636 W/ 250 OVERRIDE (IP): Performed by: ANESTHESIOLOGY

## 2022-03-04 PROCEDURE — 82962 GLUCOSE BLOOD TEST: CPT | Mod: 91

## 2022-03-04 PROCEDURE — 87075 CULTR BACTERIA EXCEPT BLOOD: CPT

## 2022-03-04 PROCEDURE — 160009 HCHG ANES TIME/MIN: Performed by: ORTHOPAEDIC SURGERY

## 2022-03-04 PROCEDURE — 500881 HCHG PACK, EXTREMITY: Performed by: ORTHOPAEDIC SURGERY

## 2022-03-04 PROCEDURE — 700111 HCHG RX REV CODE 636 W/ 250 OVERRIDE (IP): Performed by: INTERNAL MEDICINE

## 2022-03-04 PROCEDURE — 700105 HCHG RX REV CODE 258: Performed by: ANESTHESIOLOGY

## 2022-03-04 PROCEDURE — 160002 HCHG RECOVERY MINUTES (STAT): Performed by: ORTHOPAEDIC SURGERY

## 2022-03-04 PROCEDURE — 770001 HCHG ROOM/CARE - MED/SURG/GYN PRIV*

## 2022-03-04 PROCEDURE — A9270 NON-COVERED ITEM OR SERVICE: HCPCS | Performed by: INTERNAL MEDICINE

## 2022-03-04 PROCEDURE — 160027 HCHG SURGERY MINUTES - 1ST 30 MINS LEVEL 2: Performed by: ORTHOPAEDIC SURGERY

## 2022-03-04 PROCEDURE — 87102 FUNGUS ISOLATION CULTURE: CPT

## 2022-03-04 PROCEDURE — 85025 COMPLETE CBC W/AUTO DIFF WBC: CPT

## 2022-03-04 PROCEDURE — 28810 AMPUTATION TOE & METATARSAL: CPT | Mod: ASROC,T5 | Performed by: PHYSICIAN ASSISTANT

## 2022-03-04 PROCEDURE — 93922 UPR/L XTREMITY ART 2 LEVELS: CPT | Mod: 26,GZ | Performed by: INTERNAL MEDICINE

## 2022-03-04 PROCEDURE — 700105 HCHG RX REV CODE 258: Performed by: INTERNAL MEDICINE

## 2022-03-04 PROCEDURE — 28810 AMPUTATION TOE & METATARSAL: CPT | Mod: T5 | Performed by: ORTHOPAEDIC SURGERY

## 2022-03-04 PROCEDURE — 96366 THER/PROPH/DIAG IV INF ADDON: CPT

## 2022-03-04 PROCEDURE — 87076 CULTURE ANAEROBE IDENT EACH: CPT | Mod: 91

## 2022-03-04 PROCEDURE — 160038 HCHG SURGERY MINUTES - EA ADDL 1 MIN LEVEL 2: Performed by: ORTHOPAEDIC SURGERY

## 2022-03-04 PROCEDURE — 88311 DECALCIFY TISSUE: CPT

## 2022-03-04 PROCEDURE — 160036 HCHG PACU - EA ADDL 30 MINS PHASE I: Performed by: ORTHOPAEDIC SURGERY

## 2022-03-04 PROCEDURE — 0Y6M0Z9 DETACHMENT AT RIGHT FOOT, PARTIAL 1ST RAY, OPEN APPROACH: ICD-10-PCS | Performed by: ORTHOPAEDIC SURGERY

## 2022-03-04 PROCEDURE — 700102 HCHG RX REV CODE 250 W/ 637 OVERRIDE(OP): Performed by: ANESTHESIOLOGY

## 2022-03-04 PROCEDURE — 99232 SBSQ HOSP IP/OBS MODERATE 35: CPT | Performed by: HOSPITALIST

## 2022-03-04 PROCEDURE — 88305 TISSUE EXAM BY PATHOLOGIST: CPT

## 2022-03-04 PROCEDURE — 99232 SBSQ HOSP IP/OBS MODERATE 35: CPT | Mod: 57 | Performed by: ORTHOPAEDIC SURGERY

## 2022-03-04 PROCEDURE — A9270 NON-COVERED ITEM OR SERVICE: HCPCS | Performed by: ANESTHESIOLOGY

## 2022-03-04 PROCEDURE — 87205 SMEAR GRAM STAIN: CPT | Mod: 91

## 2022-03-04 PROCEDURE — 700101 HCHG RX REV CODE 250: Performed by: ANESTHESIOLOGY

## 2022-03-04 PROCEDURE — 99222 1ST HOSP IP/OBS MODERATE 55: CPT

## 2022-03-04 PROCEDURE — 87070 CULTURE OTHR SPECIMN AEROBIC: CPT

## 2022-03-04 PROCEDURE — 501838 HCHG SUTURE GENERAL: Performed by: ORTHOPAEDIC SURGERY

## 2022-03-04 PROCEDURE — 700111 HCHG RX REV CODE 636 W/ 250 OVERRIDE (IP): Performed by: NURSE PRACTITIONER

## 2022-03-04 PROCEDURE — 87426 SARSCOV CORONAVIRUS AG IA: CPT | Performed by: ORTHOPAEDIC SURGERY

## 2022-03-04 PROCEDURE — 160035 HCHG PACU - 1ST 60 MINS PHASE I: Performed by: ORTHOPAEDIC SURGERY

## 2022-03-04 PROCEDURE — 93922 UPR/L XTREMITY ART 2 LEVELS: CPT

## 2022-03-04 PROCEDURE — 80048 BASIC METABOLIC PNL TOTAL CA: CPT

## 2022-03-04 RX ORDER — ONDANSETRON 2 MG/ML
INJECTION INTRAMUSCULAR; INTRAVENOUS PRN
Status: DISCONTINUED | OUTPATIENT
Start: 2022-03-04 | End: 2022-03-04 | Stop reason: SURG

## 2022-03-04 RX ORDER — LIDOCAINE HYDROCHLORIDE 20 MG/ML
INJECTION, SOLUTION EPIDURAL; INFILTRATION; INTRACAUDAL; PERINEURAL PRN
Status: DISCONTINUED | OUTPATIENT
Start: 2022-03-04 | End: 2022-03-04 | Stop reason: SURG

## 2022-03-04 RX ORDER — DEXTROSE MONOHYDRATE 25 G/50ML
25 INJECTION, SOLUTION INTRAVENOUS
Status: DISCONTINUED | OUTPATIENT
Start: 2022-03-04 | End: 2022-03-04 | Stop reason: HOSPADM

## 2022-03-04 RX ORDER — HALOPERIDOL 5 MG/ML
1 INJECTION INTRAMUSCULAR
Status: DISCONTINUED | OUTPATIENT
Start: 2022-03-04 | End: 2022-03-04 | Stop reason: HOSPADM

## 2022-03-04 RX ORDER — SODIUM CHLORIDE, SODIUM LACTATE, POTASSIUM CHLORIDE, CALCIUM CHLORIDE 600; 310; 30; 20 MG/100ML; MG/100ML; MG/100ML; MG/100ML
INJECTION, SOLUTION INTRAVENOUS
Status: DISCONTINUED | OUTPATIENT
Start: 2022-03-04 | End: 2022-03-04 | Stop reason: SURG

## 2022-03-04 RX ORDER — OXYCODONE HCL 5 MG/5 ML
5 SOLUTION, ORAL ORAL
Status: DISCONTINUED | OUTPATIENT
Start: 2022-03-04 | End: 2022-03-04 | Stop reason: HOSPADM

## 2022-03-04 RX ORDER — ACETAMINOPHEN 500 MG
1000 TABLET ORAL ONCE
Status: COMPLETED | OUTPATIENT
Start: 2022-03-04 | End: 2022-03-04

## 2022-03-04 RX ORDER — MIDAZOLAM HYDROCHLORIDE 1 MG/ML
INJECTION INTRAMUSCULAR; INTRAVENOUS PRN
Status: DISCONTINUED | OUTPATIENT
Start: 2022-03-04 | End: 2022-03-04 | Stop reason: SURG

## 2022-03-04 RX ORDER — OXYCODONE HCL 5 MG/5 ML
10 SOLUTION, ORAL ORAL
Status: DISCONTINUED | OUTPATIENT
Start: 2022-03-04 | End: 2022-03-04 | Stop reason: HOSPADM

## 2022-03-04 RX ORDER — GABAPENTIN 300 MG/1
300 CAPSULE ORAL ONCE
Status: COMPLETED | OUTPATIENT
Start: 2022-03-04 | End: 2022-03-04

## 2022-03-04 RX ORDER — PROPOFOL 10 MG/ML
INJECTION, EMULSION INTRAVENOUS PRN
Status: DISCONTINUED | OUTPATIENT
Start: 2022-03-04 | End: 2022-03-04 | Stop reason: SURG

## 2022-03-04 RX ORDER — MEPERIDINE HYDROCHLORIDE 25 MG/ML
12.5 INJECTION INTRAMUSCULAR; INTRAVENOUS; SUBCUTANEOUS
Status: DISCONTINUED | OUTPATIENT
Start: 2022-03-04 | End: 2022-03-04 | Stop reason: HOSPADM

## 2022-03-04 RX ORDER — ONDANSETRON 2 MG/ML
4 INJECTION INTRAMUSCULAR; INTRAVENOUS
Status: DISCONTINUED | OUTPATIENT
Start: 2022-03-04 | End: 2022-03-04 | Stop reason: HOSPADM

## 2022-03-04 RX ORDER — HYDROMORPHONE HYDROCHLORIDE 1 MG/ML
0.5 INJECTION, SOLUTION INTRAMUSCULAR; INTRAVENOUS; SUBCUTANEOUS ONCE
Status: COMPLETED | OUTPATIENT
Start: 2022-03-04 | End: 2022-03-04

## 2022-03-04 RX ORDER — SODIUM CHLORIDE, SODIUM LACTATE, POTASSIUM CHLORIDE, CALCIUM CHLORIDE 600; 310; 30; 20 MG/100ML; MG/100ML; MG/100ML; MG/100ML
INJECTION, SOLUTION INTRAVENOUS CONTINUOUS
Status: DISCONTINUED | OUTPATIENT
Start: 2022-03-04 | End: 2022-03-04 | Stop reason: HOSPADM

## 2022-03-04 RX ADMIN — ACETAMINOPHEN 1000 MG: 500 TABLET ORAL at 15:36

## 2022-03-04 RX ADMIN — INSULIN HUMAN 3 UNITS: 100 INJECTION, SOLUTION PARENTERAL at 20:32

## 2022-03-04 RX ADMIN — FENTANYL CITRATE 25 MCG: 50 INJECTION, SOLUTION INTRAMUSCULAR; INTRAVENOUS at 16:08

## 2022-03-04 RX ADMIN — SODIUM CHLORIDE: 9 INJECTION, SOLUTION INTRAVENOUS at 07:39

## 2022-03-04 RX ADMIN — ONDANSETRON 4 MG: 2 INJECTION INTRAMUSCULAR; INTRAVENOUS at 16:10

## 2022-03-04 RX ADMIN — HYDROCODONE BITARTRATE AND ACETAMINOPHEN 1 TABLET: 5; 325 TABLET ORAL at 11:59

## 2022-03-04 RX ADMIN — PROPOFOL 160 MG: 10 INJECTION, EMULSION INTRAVENOUS at 15:47

## 2022-03-04 RX ADMIN — HYDROMORPHONE HYDROCHLORIDE 0.5 MG: 1 INJECTION, SOLUTION INTRAMUSCULAR; INTRAVENOUS; SUBCUTANEOUS at 23:20

## 2022-03-04 RX ADMIN — SODIUM CHLORIDE, POTASSIUM CHLORIDE, SODIUM LACTATE AND CALCIUM CHLORIDE: 600; 310; 30; 20 INJECTION, SOLUTION INTRAVENOUS at 15:41

## 2022-03-04 RX ADMIN — FENTANYL CITRATE 50 MCG: 50 INJECTION, SOLUTION INTRAMUSCULAR; INTRAVENOUS at 15:47

## 2022-03-04 RX ADMIN — MIDAZOLAM HYDROCHLORIDE 2 MG: 1 INJECTION, SOLUTION INTRAMUSCULAR; INTRAVENOUS at 15:41

## 2022-03-04 RX ADMIN — SODIUM CHLORIDE 3 G: 900 INJECTION INTRAVENOUS at 11:11

## 2022-03-04 RX ADMIN — INSULIN HUMAN 2 UNITS: 100 INJECTION, SOLUTION PARENTERAL at 16:35

## 2022-03-04 RX ADMIN — LIDOCAINE HYDROCHLORIDE 100 MG: 20 INJECTION, SOLUTION EPIDURAL; INFILTRATION; INTRACAUDAL at 15:47

## 2022-03-04 RX ADMIN — HYDROCODONE BITARTRATE AND ACETAMINOPHEN 1 TABLET: 5; 325 TABLET ORAL at 18:54

## 2022-03-04 RX ADMIN — VANCOMYCIN HYDROCHLORIDE 1500 MG: 500 INJECTION, POWDER, LYOPHILIZED, FOR SOLUTION INTRAVENOUS at 18:45

## 2022-03-04 RX ADMIN — HYDROCODONE BITARTRATE AND ACETAMINOPHEN 2 TABLET: 5; 325 TABLET ORAL at 04:39

## 2022-03-04 RX ADMIN — VANCOMYCIN HYDROCHLORIDE 1500 MG: 500 INJECTION, POWDER, LYOPHILIZED, FOR SOLUTION INTRAVENOUS at 04:23

## 2022-03-04 RX ADMIN — GABAPENTIN 300 MG: 300 CAPSULE ORAL at 15:36

## 2022-03-04 RX ADMIN — SODIUM CHLORIDE 3 G: 900 INJECTION INTRAVENOUS at 17:57

## 2022-03-04 RX ADMIN — INSULIN HUMAN 2 UNITS: 100 INJECTION, SOLUTION PARENTERAL at 11:19

## 2022-03-04 RX ADMIN — FENTANYL CITRATE 25 MCG: 50 INJECTION, SOLUTION INTRAMUSCULAR; INTRAVENOUS at 16:15

## 2022-03-04 RX ADMIN — SODIUM CHLORIDE 3 G: 900 INJECTION INTRAVENOUS at 07:38

## 2022-03-04 RX ADMIN — SODIUM CHLORIDE 3 G: 900 INJECTION INTRAVENOUS at 23:27

## 2022-03-04 ASSESSMENT — COGNITIVE AND FUNCTIONAL STATUS - GENERAL
SUGGESTED CMS G CODE MODIFIER MOBILITY: CH
SUGGESTED CMS G CODE MODIFIER DAILY ACTIVITY: CH
DAILY ACTIVITIY SCORE: 24
MOBILITY SCORE: 24

## 2022-03-04 ASSESSMENT — PAIN SCALES - GENERAL: PAIN_LEVEL: 0

## 2022-03-04 ASSESSMENT — LIFESTYLE VARIABLES
ALCOHOL_USE: NO
EVER HAD A DRINK FIRST THING IN THE MORNING TO STEADY YOUR NERVES TO GET RID OF A HANGOVER: NO
HAVE PEOPLE ANNOYED YOU BY CRITICIZING YOUR DRINKING: NO
TOTAL SCORE: 0
HAVE YOU EVER FELT YOU SHOULD CUT DOWN ON YOUR DRINKING: NO
EVER FELT BAD OR GUILTY ABOUT YOUR DRINKING: NO
TOTAL SCORE: 0
HOW MANY TIMES IN THE PAST YEAR HAVE YOU HAD 5 OR MORE DRINKS IN A DAY: 0
DOES PATIENT WANT TO STOP DRINKING: NO
ON A TYPICAL DAY WHEN YOU DRINK ALCOHOL HOW MANY DRINKS DO YOU HAVE: 0
TOTAL SCORE: 0
AVERAGE NUMBER OF DAYS PER WEEK YOU HAVE A DRINK CONTAINING ALCOHOL: 0
CONSUMPTION TOTAL: NEGATIVE
DOES PATIENT WANT TO TALK TO SOMEONE ABOUT QUITTING: NO

## 2022-03-04 ASSESSMENT — PATIENT HEALTH QUESTIONNAIRE - PHQ9
1. LITTLE INTEREST OR PLEASURE IN DOING THINGS: NOT AT ALL
SUM OF ALL RESPONSES TO PHQ9 QUESTIONS 1 AND 2: 0
2. FEELING DOWN, DEPRESSED, IRRITABLE, OR HOPELESS: NOT AT ALL

## 2022-03-04 ASSESSMENT — PAIN DESCRIPTION - PAIN TYPE
TYPE: ACUTE PAIN
TYPE: ACUTE PAIN

## 2022-03-04 NOTE — PROGRESS NOTES
Pharmacy Vancomycin Kinetics Note for 3/3/2022     66 y.o. male on Vancomycin day # 1     Vancomycin Indication (AUC Dosing): Skin/skin structure infection  Vancomycin Indication (Two level/Trough based Dosing):      Provider specified end date: 03/10/22    Active Antibiotics (From admission, onward)    Ordered     Ordering Provider       Thu Mar 3, 2022  4:56 PM    03/03/22 1656  vancomycin (VANCOCIN) 1,500 mg in  mL IVPB  (vancomycin (VANCOCIN) IV (LD + Maintenance))  EVERY 12 HOURS         Chivo Wright M.D.       Thu Mar 3, 2022  3:27 PM    03/03/22 1527  vancomycin (VANCOCIN) 2,250 mg in  mL IVPB  (vancomycin (VANCOCIN) IV (LD + Maintenance))  ONCE         Chivo Wright M.D.       Thu Mar 3, 2022  3:25 PM    03/03/22 1525  ampicillin/sulbactam (UNASYN) 3 g in  mL IVPB  (Abscess/Cellulitis )  EVERY 6 HOURS         Chivo Wright M.D.    03/03/22 1525  MD Alert...Vancomycin per Pharmacy  (Abscess/Cellulitis )  PHARMACY TO DOSE        Question:  Indication(s) for vancomycin?  Answer:  Skin and soft tissue infection    Chivo Wright M.D.          Dosing Weight: 90.2 kg (198 lb 13.7 oz)      Admission History: Admitted on 3/3/2022 for Wound infection [T14.8XXA, L08.9]  Pertinent history: Presents to the ED on 3/3/22 with a history of a DM foot ulcer for the past 2 weeks and open foot wound on the right foot that has increasing redness and swelling up his right calf. Was referred to the ED by wound care. Right foot xray shows soft tissue edema and gas of the medial right forefoot projecting over the great toe metatarsophalgeal joint. Concerning for abscess vs necrotic infection. No acute osteomyelitis identified.    Allergies:     Sulfa drugs, Flu virus vaccine, Other food, Pneumococcal vaccines, and Nutrasweet aspartame [aspartame]     Pertinent cultures to date:     Results     Procedure Component Value Units Date/Time    MRSA By PCR (Amp) [[id number]] Collected: 03/03/22 1610     "Order Status: Sent Specimen: Respirate from Nares Updated: 22    Culture Wound W/ Gram Stain [[id number]] Collected: 22 1610    Order Status: Sent Specimen: Wound from Left Foot Updated: 22    Blood Culture [[id number]]     Order Status: Canceled Specimen: Blood from Peripheral     Blood Culture [[id number]]     Order Status: Canceled Specimen: Blood from Peripheral     BLOOD CULTURE [[id number]] Collected: 22    Order Status: Sent Specimen: Blood from Peripheral Updated: 22    Narrative:      Per Hospital Policy: Only change Specimen Src: to \"Line\" if  specified by physician order.    BLOOD CULTURE [[id number]] Collected: 22    Order Status: Sent Specimen: Blood from Peripheral Updated: 22    Narrative:      Per Hospital Policy: Only change Specimen Src: to \"Line\" if  specified by physician order.          Labs:     Estimated Creatinine Clearance: 126.3 mL/min (by C-G formula based on SCr of 0.64 mg/dL).  Recent Labs     22  132   WBC 13.2*   NEUTSPOLYS 80.40*     Recent Labs     22   BUN 15   CREATININE 0.64   ALBUMIN 3.9     No intake or output data in the 24 hours ending 22 1709   /64   Pulse 85   Temp 36.7 °C (98.1 °F) (Temporal)   Resp 18   Ht 1.829 m (6')   Wt 90.2 kg (198 lb 13.7 oz)   SpO2 98%  Temp (24hrs), Av.9 °C (98.4 °F), Min:36.7 °C (98.1 °F), Max:37 °C (98.6 °F)      List concerns for Vancomycin clearance:     Age;Other (T1DM w/ proteinuria)    Pharmacokinetics:     AUC kinetics:   Ke (hr ^-1): 0.109 hr^-1  Half life: 6.36 hr  Clearance: 6.391  Estimated TDD: 3195.5  Estimated Dose: 1118  Estimated interval: 8.4    A/P:     -  Vancomycin dose: Vancomycin 2250mg IV loading dose given at 1604 on 3/3/22, will then start Vancomycin 1500 mg IV q12 hours.    -  Next vancomycin level(s): To be ordered by clinical pharmacist once at steady state after 4-5 doses    -  Predicted vancomycin AUC from " initial AUC test calculator: 469 mg·hr/L    -  Comments: Limb preservation service and orthopedic surgery have been consulted. Likely to have surgery tomorrow and MRI tomorrow to further evaluate for osteomyelitis. Follow up for intraoperative cultures.    Saida Cade, JerryD

## 2022-03-04 NOTE — DOCUMENTATION QUERY
[facility]                                                                       Query Response Note      PATIENT:               LUCHO PETERSON  ACCT #:                  [id number]  MRN:                     [id number]  :                      1955  ADMIT DATE:       3/3/2022 2:25 PM  DISCH DATE:          RESPONDING  PROVIDER #:        [id number]           QUERY TEXT:    Cellulitis of right foot and lower leg is documented in the ED Provider Note.   Can you clarify if this is a relevant diagnosis?     NOTE: if an appropriate response is not listed below, please respond with a new note.    The patient's clinical indicators include:  Per ED Provider Note: pt has significant cellulitis to the right foot and lower leg secondary to diabetic foot wound  Per H&P: wound infection,  diabetic chronic wound infection   Risk Factors: diabetes uncontrolled  Treatment: IV unasyn and vancomycin, IVF, MRI , wound care, control blood sugar    Thank you,  Ramiro Bennett RN, BSN  Clinical    Connect via [employer]  Options provided:   -- Cellulitis of RLE ruled in   -- Findings of no clinical significance   -- Other type of infection, -please specify   -- Unable to determine      Query created by: Ramiro Bennett on 3/4/2022 7:50 AM    RESPONSE TEXT:    other cellulitis and ostemyelitis          Electronically signed by:  CYNDIE PILLAI MD 3/4/2022 3:06 PM

## 2022-03-04 NOTE — ASSESSMENT & PLAN NOTE
A1c 9.6, uncontrolled.  Continue glargine 8 units daily with a sliding scales. Hold home glipizide and Metformin due to infection at this time  Diabetes education encouraged the patient to monitor his blood sugar for better control  Discussed the complication of diabetes including not limited to worsening wound infection, amputation, kidney and heart failure and death, he understood

## 2022-03-04 NOTE — ASSESSMENT & PLAN NOTE
This is Sepsis Present on admission  SIRS criteria identified on my evaluation include: Tachycardia, with heart rate greater than 90 BPM and Leukocytosis, with WBC greater than 12,000  Source is wound infection  Sepsis protocol initiated  Fluid resuscitation ordered per protocol  IV antibiotics as appropriate for source of sepsis  While organ dysfunction may be noted elsewhere in this problem list or in the chart, degree of organ dysfunction does not meet CMS criteria for severe sepsis

## 2022-03-04 NOTE — ED NOTES
Med rec complete per pt at bedside.  Allergies reviewed and updated.  Confirmed patient's home pharmacy preference.

## 2022-03-04 NOTE — ANESTHESIA PREPROCEDURE EVALUATION
Case: [id number] Date/Time: 03/04/22 1515    Procedure: AMPUTATION, TOE 1ST RAY (Right )    [place] [address] [place] [facility]    Surgeons: Wilman Madera M.D.        Poorly controlled DM type 2, right diabetic foot infection with osteomyelitis and ELLEN    Denies: MI/CHF/smoking/CVA/CKD        Physical Exam    Airway   Mallampati: II  TM distance: >3 FB  Neck ROM: full       Cardiovascular - normal exam  Rhythm: regular  Rate: normal  (-) murmur     Dental - normal exam           Pulmonary - normal exam  Breath sounds clear to auscultation     Abdominal    Neurological - normal exam                 Anesthesia Plan    ASA 3   ASA physical status 3 criteria: diabetes - poorly controlled    Plan - general       Airway plan will be LMA          Induction: intravenous    Postoperative Plan: Postoperative administration of opioids is intended.    Pertinent diagnostic labs and testing reviewed    Informed Consent:    Anesthetic plan and risks discussed with patient.    Use of blood products discussed with: patient whom consented to blood products.

## 2022-03-04 NOTE — ASSESSMENT & PLAN NOTE
diabetic chronic wound infection  Significant swelling redness and a drain  Came with sepsis  IV Unasyn and vancomycin with IV fluid  MRI to rule out osteomyelitis --positive for osteo first MTP  Ortho was consulted and LPS  Wound care  Controlling his blood sugar  Patient has a good pulses and no need for CYNTHIA at this time

## 2022-03-04 NOTE — CONSULTS
"LIMB PRESERVATION SERVICE CONSULT      REFERRED BY: Dr Wright    DATE OF CONSULTATION: 3/4/2022    REASON FOR CONSULT:  Nonhealing diabetic foot wound to the plantar surface of the right first MTH     HISTORY OF PRESENT ILLNESS: Keaton Cervantes is a 66 y.o.  with a past medical history that includes type 2 diabetes, HTN, ELLEN. Admitted 3/3/2022 for Wound infection [T14.8XXA, L08.9].     LPS has been consulted for non healing diabetic foot wound to the plantar surface of the right first MTH  with infection.  The ulcer started approximately one year ago. He states that it would begin to heal and then open up again repeatedly. He has been seeing the Wound Care service at [facility]. He states that it again appeared to be healing and then approximately two weeks ago it \"erupted\" on the medial side of the firstTHH.   He states that he has had consecutive 10-14 day courses of Bactrim and Augmentin for the past several weeks. He states that he was at his wound care clinic visit and was told to present to the ED for IV abx.  Patient denied fevers, chills, nausea, vomiting.     IV antibiotics were started on this admission.  Infectious diseases has not been consulted yet.  Xray completed and is negative for osteomyelitis but wound probes to bone on the medial and plantar aspect and MRI showed positive for ostemylitis. Ortho has been consulted.    Diagnosed with diabetes \"years ago\". Does have numbness to feet.  Checks feet routinely. Usually wears OTC shoes . Does not have diabetic shoes and inserts but states that he was waiting to have these made after the Ulcer healed. Has not had previous foot surgeries.     Smoking:   reports that he has quit smoking. He has quit using smokeless tobacco.  His smokeless tobacco use included chew.    Alcohol:   reports no history of alcohol use.    Drug:   reports no history of drug use.      PAST MEDICAL HISTORY:   Past Medical History:   Diagnosis Date   • Allergy    • ASTHMA  "   • Back pain     Chronic   • Bronchitis    • CATARACT    • Dental disorder    • Diabetes     oral rx & insulin   • Hypertension    • MEDICAL HOME    • Neck pain     Chronic   • ELLEN (obstructive sleep apnea)     Bipap   • Sleep apnea         PAST SURGICAL HISTORY:   Past Surgical History:   Procedure Laterality Date   • LUMBAR FUSION ANTERIOR  8/4/2009    Performed by JOSEE BLANTON at SURGERY [facility]   • FUSION, SPINE, LUMBAR, PLIF  8/4/2009    Performed by JOSEE BLANTON at SURGERY [facility] ORS   • LUMBAR LAMINECTOMY DISKECTOMY  8/4/2009    Performed by JOSEE BLANTON at SURGERY [facility]   • LAMINOTOMY  8/4/2009    Performed by JOSEE BLANTON at SURGERY [facility]   • SOMNOPLASTY  6/10/2009    Performed by ELÍAS RILEY at SURGERY [facility]   • SEPTOPLASTY  6/10/2009    Performed by ELÍAS RILEY at SURGERY [facility]   • ANTROSTOMY  6/10/2009    Performed by ELÍAS RILEY at SURGERY [facility] ORS   • ETHMOIDECTOMY  6/10/2009    Performed by ELÍAS RILEY at SURGERY [facility]   • CATARACT PHACO WITH IOL  7/7/08    Performed by OCTAVIO HARDWICK at SURGERY SAME DAY [facility]   • CATARACT PHACO WITH IOL  6/16/08    Performed by OCTAVIO HARDWICK at SURGERY SAME DAY [facility] ORS   • APPENDECTOMY     • ORIF, ANKLE     • VASECTOMY         MEDICATIONS:   Current Facility-Administered Medications   Medication Dose   • [MAR Hold] albuterol (PROVENTIL) 2.5mg/0.5ml nebulizer solution 1.25 mg  1.25 mg   • [MAR Hold] HYDROcodone-acetaminophen (NORCO) 5-325 MG per tablet 1-2 Tablet  1-2 Tablet   • [MAR Hold] insulin GLARGINE (Lantus,Semglee) injection  8 Units   • [MAR Hold] senna-docusate (PERICOLACE or SENOKOT S) 8.6-50 MG per tablet 2 Tablet  2 Tablet    And   • [MAR Hold] polyethylene glycol/lytes (MIRALAX) PACKET 1 Packet  1 Packet    And   • [MAR Hold] magnesium hydroxide (MILK OF MAGNESIA) suspension 30 mL  30 mL    And   • [MAR Hold] bisacodyl  (DULCOLAX) suppository 10 mg  10 mg   • NS infusion     • [MAR Hold] enoxaparin (LOVENOX) inj 40 mg  40 mg   • [MAR Hold] acetaminophen (Tylenol) tablet 650 mg  650 mg   • [MAR Hold] ampicillin/sulbactam (UNASYN) 3 g in  mL IVPB  3 g   • [MAR Hold] MD Alert...Vancomycin per Pharmacy     • [MAR Hold] ondansetron (ZOFRAN) syringe/vial injection 4 mg  4 mg   • [MAR Hold] ondansetron (ZOFRAN ODT) dispertab 4 mg  4 mg   • [MAR Hold] insulin regular (HumuLIN R,NovoLIN R) injection  2-9 Units    And   • [MAR Hold] dextrose 50% (D50W) injection 25 g  25 g   • [MAR Hold] vancomycin (VANCOCIN) 1,500 mg in  mL IVPB  1,500 mg       ALLERGIES:    Allergies   Allergen Reactions   • Sulfa Drugs Unspecified     Pt hasn't had meds in so long he doesn't remember what his reaction is, he just remembers there is a reaction and that he shouldn't have them    • Flu Virus Vaccine Shortness of Breath   • Other Food Anaphylaxis     coconut   • Pneumococcal Vaccines Shortness of Breath   • Nutrasweet Aspartame [Aspartame]      Causes migraines        FAMILY HISTORY:   Family History   Problem Relation Age of Onset   • Hypertension Father    • Diabetes Father          REVIEW OF SYSTEMS:   Constitutional: Negative for chills, fever   Respiratory: Negative for cough and shortness of breath.    Cardiovascular:Negative for chest pain, and claudication.   Gastrointestinal: Negative for constipation, diarrhea, nausea and vomiting.   Lower extremities: positive for swelling and redness to right LE  Neurological: positive for numbness to feet and lower legs  All other systems reviewed and are negative     RESULTS:     Recent Labs     03/03/22  1327 03/04/22  0130   WBC 13.2* 9.7   RBC 4.29* 4.18*   HEMOGLOBIN 12.7* 12.2*   HEMATOCRIT 39.0* 38.1*   MCV 90.9 91.1   MCH 29.6 29.2   MCHC 32.6* 32.0*   RDW 40.3 40.7   PLATELETCT 353 324   MPV 9.2 9.1     Recent Labs     03/03/22  1327 03/04/22  0130   SODIUM 127* 130*   POTASSIUM 4.5 4.0    CHLORIDE 91* 93*   CO2 20 26   GLUCOSE 255* 224*   BUN 15 13         ESR:     none    CRP:       Results from last 7 days   Lab Units 03/03/22  1327   C REACTIVE PROTEIN 4596 mg/dL 24.97*         COVID-19: Negative 3/4/22      Imaging:  US-CYNTHIA SINGLE LEVEL BILAT   Final Result      MR-FOOT-WITH & W/O RIGHT   Final Result      1.  Marrow edema and enhancement of the sesamoids of the first MTP joint likely representing osteomyelitis.      2.  First MTP joint effusion possibly representing septic arthritis.      3.  Large soft tissue ulceration involving the medial/plantar aspect of the forefoot in the area of the first MTP joint with surrounding cellulitis and some rim-enhancing subcutaneous fluid consistent with abscess which communicates with that ulceration.      4.  Tenosynovitis of the flexor hallucis longus tendon and the abductor pollicis tendon likely infectious in nature.      US-EXTREMITY VENOUS LOWER UNILAT RIGHT   Final Result      DX-FOOT-COMPLETE 3+ RIGHT   Final Result      1.  Soft tissue edema and gas of the medial right forefoot projecting over the great toe metatarsophalangeal joint. This is concerning for abscess versus necrotic infection.   2.  No radiographic evidence of acute osteomyelitis.. If there is clinical concern for radiographically occult osteomyelitis, MRI can be obtained.      DX-CHEST-PORTABLE (1 VIEW)   Final Result      1.  No acute cardiac or pulmonary abnormalities are identified.            Xray: 3-view right foot 3/3/22    FINDINGS: Decreased osseous mineralization. No acute fracture is noted. There is no dislocation.  No bone erosion is noted.     Soft tissue edema and gas of the medial right foot projecting over the great toe metatarsophalangeal joint.     IMPRESSION:     1.  Soft tissue edema and gas of the medial right forefoot projecting over the great toe metatarsophalangeal joint. This is concerning for abscess versus necrotic infection.  2.  No radiographic evidence  of acute osteomyelitis.. If there is clinical concern for radiographically occult osteomyelitis, MRI can be obtained.    Arterial studies: 3/4/22             RIGHT      Waveform            Systolic BPs (mmHg)                              138           Brachial   Triphasic                                Common Femoral   Triphasic                                Popliteal   Hyperemic                  174           Posterior Tibial   Biphasic                   157           Dorsalis Pedis   Normal                     74            Digit                              1.26          CYNTHIA                              0.54          TBI                           LEFT   Waveform        Systolic BPs (mmHg)                              127           Brachial   Triphasic                                Common Femoral   Triphasic                                Popliteal   Triphasic                  177           Posterior Tibial   Triphasic                  162           Dorsalis Pedis   Normal                     115           Digit                              1.28          CYNTHIA                              0.83          TBI         Findings   Right.    The ankle-brachial index is normal.    Toe-brachial indices are reduced, suggesting microvascular disease.    PPG waveforms of the great toe are normal.    Doppler waveforms of the common femoral and popliteal arteries are of high    amplitude and triphasic.    Doppler waveform of the posterior tibial is hyperemic.   Doppler waveform of the dorsalis pedis is of high amplitude and biphasic.       Left.    The ankle-brachial index is normal.    Toe-brachial index is normal.    Doppler waveforms of the common femoral, popliteal, posterior tibial, and    dorsalis pedis arteries are of high amplitude and triphasic.    PPG waveforms of the great toe are normal.     A1c:  Lab Results   Component Value Date/Time    HBA1C 9.6 (H) 03/03/2022 01:27 PM        Microbiology:  Results     Procedure  "Component Value Units Date/Time    Culture Wound W/ Gram Stain [[id number]]  (Abnormal) Collected: 03/03/22 1610    Order Status: Completed Specimen: Wound from Left Foot Updated: 03/04/22 1438     Significant Indicator POS     Source WND     Site Right Foot     Culture Result -     Gram Stain Result Rare WBCs.  Rare Gram positive cocci.       Culture Result Streptococcus pyogenes (Group A)  Moderate growth      Narrative:      CALL  Wright  MS5 tel. [phone],  CALLED  MS5 tel. [phone] 03/04/2022, 14:37, RB PERF. RESULTS CALLED TO: RN  [id number]    GRAM STAIN [[id number]] Collected: 03/03/22 1610    Order Status: Completed Specimen: Wound Updated: 03/04/22 0903     Significant Indicator .     Source WND     Site Right Foot     Gram Stain Result Rare WBCs.  Rare Gram positive cocci.      BLOOD CULTURE [[id number]] Collected: 03/03/22 1327    Order Status: Completed Specimen: Blood from Peripheral Updated: 03/04/22 0735     Significant Indicator NEG     Source BLD     Site PERIPHERAL     Culture Result No Growth  Note: Blood cultures are incubated for 5 days and  are monitored continuously.Positive blood cultures  are called to the RN and reported as soon as  they are identified.      Narrative:      Per Hospital Policy: Only change Specimen Src: to \"Line\" if  specified by physician order.  Right AC    BLOOD CULTURE [[id number]] Collected: 03/03/22 1327    Order Status: Completed Specimen: Blood from Peripheral Updated: 03/04/22 0735     Significant Indicator NEG     Source BLD     Site PERIPHERAL     Culture Result No Growth  Note: Blood cultures are incubated for 5 days and  are monitored continuously.Positive blood cultures  are called to the RN and reported as soon as  they are identified.      Narrative:      Per Hospital Policy: Only change Specimen Src: to \"Line\" if  specified by physician order.  Left AC    MRSA By PCR (Amp) [[id number]] Collected: 03/03/22 1610    Order Status: Completed Specimen: Respirate from " Lamar Updated: 03/03/22 1741     MRSA by PCR Negative    Blood Culture [[id number]]     Order Status: Canceled Specimen: Blood from Peripheral     Blood Culture [[id number]]     Order Status: Canceled Specimen: Blood from Peripheral            PHYSICAL EXAMINATION:     VITAL SIGNS: /67   Pulse 91   Temp 37.3 °C (99.2 °F) (Temporal)   Resp 16   Ht 1.829 m (6')   Wt 90.2 kg (198 lb 13.7 oz)   SpO2 95%   BMI 26.97 kg/m²       General Appearance:  Well developed, well nourished, in no acute distress    Lower Extremity Assessment:    Edema:   Mild edema  Moderate erythema    ROM dorsi/plantarflexion:   Dorsiflexion limited due to pain     Structural /mechanical changes:   mycotic toenails    Sensory Assessment:  Monofilament testing with a 10 gram force: sensation intact: decreased bilaterally  Visual Inspection: Left foot without maceration, ulcers, fissures. Right foot with infected ulcer to plantar first MTH with devitalized tissue to medial MTH  Pedal pulses: decreased on right    Feet Insensate to light touch    R foot: sensed 2/10 points  L foot: sensed 3/10 points    Pulses:  R foot: unable to palpate PT/DP. With doppler brisk tones noted to PT/DP  L foot: palpable DP, palpable PT      Wound Assessment:    Wound   location: right first MTH plantar and medial aspect   Full thickness, does  probe to bone  Wound characteristics: non viable necrotic tissue with slough  Erythema: moderate  Drainage: moderate purulent  Callus: to plantar periwound   Odor: none       Wound care completed by LPS APRN. [employer]. Secured with 4x4 and roll gauze.     Wound photo:               ASSESSMENT AND PLAN:   66 y.o. admitted for Wound infection [T14.8XXA, L08.9]. Presents with infected wound to right first MTH plantar and medial aspect.   Plantar wound open with slough and non viable tissue, probes 1.2 cm to bone. Medial aspect of wound with black necrotic tissue, purulent drainage, probes 1.2 cm to bone. MRI  positive for osteomyelitis. Discussed need for surgical intervention with likely need for first ray amputation. Patient expressed being unaware that his infection was to the bone. Discussed with Dr Madera, Plan for surgery today, likely first ray amputation with Dr Madera. Patient placed NPO.   Per pt he has not eaten since 20:00 last night.     Wound care:   -Wound care orders to be placed for nursing by LPS after POD#2  -Right first ray amputation       Imaging/Labs:  -COVID-19:  not detected this admission 3/4/22     Vascular status:   - Palpable pedal pulses to left foot  - pedal pulses not palpable to right foot. With doppler, pedal pulses are brisk   - arterial studies ordered with TBI and toe pressures    Surgery:   - consulted Ortho Dr. Kelly. Plan for right first ray amputation     Antibiotics:   -currently on antibiotics managed by hospitalist   - recommend ID consult     Weight Bearing Status:   -Right foot: Heel weight bearing    Offloading:   -Offloading boot  when ambulating; ordered  -Orthotic company: Patient states he is working with [facility] wound care for this     PT/OT:   -PT not involved       Diabetes Education:   - consult placed for CDE and RD 3/4  -   Lab Results   Component Value Date/Time    HBA1C 9.6 (H) 03/03/2022 01:27 PM        - Implications of loss of protective sensation (LOPS) discussed with patient- including increased risk for amputation.  Advised to check feet at least daily, moisturize feet, and to always wear protective foot wear.   -avoid trimming own nails. See podiatrist or certified foot and nail RN  -keep blood sugars <150 for improved wound healing      Smoking Cessation:   Patient quit       Discharge Plan:  TBD, patient utilizes [facility] wound care clinic.     Follow up with Dr Madera   Follow up: no wound care clinic referral placed   Follow up: No LPS rounds at [facility] wound care clinic scheduled       D/W: pt, RN, Dr. Madera, Dr Cleveland     Please  note that this dictation was created using voice recognition software. I have  worked with technical experts from [facility] to optimize the interface.  I have made every reasonable attempt to correct obvious errors, but there may be errors of grammar and possibly content that I did not discover before finalizing the note.    Please contact LPS through [employer].

## 2022-03-04 NOTE — WOUND TEAM
Wound consult received regarding patient leg, per LPS APRN Yaa patient likely going to OR for wound, and no need for wound team follow up at this time. LPS to update and reconsult wound team if our interventions are needed. Thank you.

## 2022-03-04 NOTE — PROGRESS NOTES
"Hospital Medicine Daily Progress Note    Date of Service  3/4/2022    Chief Complaint  Keaton Cervantes is a 66 y.o. male admitted 3/3/2022 with worsening foot wound right side    Hospital Course  Per HPI:  \"66-year-old male with history of uncontrolled diabetes, hypertension and sleep apnea presented 3/3 with right foot pain, patient has had wound infection and has right foot for more than a year however recently getting worse redness swelling and more pain with fluid coming out, denied any significant fever no chills or shortness of breath, no nausea or vomiting and no urinary or bowel symptoms, patient was evaluated by his doctor who gave him Bactrim however not improving and they sent him to the emergency for IV antibiotics, on admission patient was found to have mild leukocytosis 13 with a blood sugar 255, CRP was elevated 24, simple x-ray did not show significant osteomyelitis however showed possible abscess, ED physician discussed the case with Ortho who recommended MRI, patient will be admitted for sepsis due to wound infection, IV Unasyn and vancomycin were started.\"    Interval Problem Update  Seen and examined. Chart reviewed, including labs and any pertinent imaging.  LPS also at bedside  Patient still does have a sensation in feet but acknowledges diminished due to neuropathy  Denies issues overnight no fevers or chills    I have personally seen and examined the patient at bedside. I discussed the plan of care with patient and LPS.    Consultants/Specialty  Orthopedics, LPS    Code Status  Full Code    Disposition  Patient is not medically cleared for discharge.   Anticipate discharge to to skilled nursing facility.  I have placed the appropriate orders for post-discharge needs.    Review of Systems  ROS     Physical Exam  Temp:  [36.2 °C (97.2 °F)-37 °C (98.6 °F)] 36.2 °C (97.2 °F)  Pulse:  [79-99] 82  Resp:  [16-18] 18  BP: (133-174)/(64-77) 143/67  SpO2:  [94 %-98 %] 95 %    Physical " Exam    Fluids    Intake/Output Summary (Last 24 hours) at 3/4/2022 1024  Last data filed at 3/4/2022 0721  Gross per 24 hour   Intake 250 ml   Output --   Net 250 ml       Laboratory  Recent Labs     03/03/22  1327 03/04/22  0130   WBC 13.2* 9.7   RBC 4.29* 4.18*   HEMOGLOBIN 12.7* 12.2*   HEMATOCRIT 39.0* 38.1*   MCV 90.9 91.1   MCH 29.6 29.2   MCHC 32.6* 32.0*   RDW 40.3 40.7   PLATELETCT 353 324   MPV 9.2 9.1     Recent Labs     03/03/22  1327 03/04/22  0130   SODIUM 127* 130*   POTASSIUM 4.5 4.0   CHLORIDE 91* 93*   CO2 20 26   GLUCOSE 255* 224*   BUN 15 13   CREATININE 0.64 0.70   CALCIUM 9.1 8.7                   Imaging  MR-FOOT-WITH & W/O RIGHT   Final Result      1.  Marrow edema and enhancement of the sesamoids of the first MTP joint likely representing osteomyelitis.      2.  First MTP joint effusion possibly representing septic arthritis.      3.  Large soft tissue ulceration involving the medial/plantar aspect of the forefoot in the area of the first MTP joint with surrounding cellulitis and some rim-enhancing subcutaneous fluid consistent with abscess which communicates with that ulceration.      4.  Tenosynovitis of the flexor hallucis longus tendon and the abductor pollicis tendon likely infectious in nature.      US-EXTREMITY VENOUS LOWER UNILAT RIGHT   Final Result      DX-FOOT-COMPLETE 3+ RIGHT   Final Result      1.  Soft tissue edema and gas of the medial right forefoot projecting over the great toe metatarsophalangeal joint. This is concerning for abscess versus necrotic infection.   2.  No radiographic evidence of acute osteomyelitis.. If there is clinical concern for radiographically occult osteomyelitis, MRI can be obtained.      DX-CHEST-PORTABLE (1 VIEW)   Final Result      1.  No acute cardiac or pulmonary abnormalities are identified.           Assessment/Plan  * Wound infection- (present on admission)  Assessment & Plan  diabetic chronic wound infection  Significant swelling redness  and a drain  Came with sepsis  IV Unasyn and vancomycin with IV fluid  MRI to rule out osteomyelitis --positive for osteo first MTP  Ortho was consulted and LPS  Wound care  Controlling his blood sugar  Patient has a good pulses and no need for CYNTHIA at this time      Leukocytosis  Assessment & Plan  13  Due to wound infection  IV antibiotics and labs daily    Sepsis ([facility])  Assessment & Plan  This is Sepsis Present on admission  SIRS criteria identified on my evaluation include: Tachycardia, with heart rate greater than 90 BPM and Leukocytosis, with WBC greater than 12,000  Source is wound infection  Sepsis protocol initiated  Fluid resuscitation ordered per protocol  IV antibiotics as appropriate for source of sepsis  While organ dysfunction may be noted elsewhere in this problem list or in the chart, degree of organ dysfunction does not meet CMS criteria for severe sepsis  At this time continue IV Unasyn and vancomycin  Wound culture and blood culture are pending  MRI to rule out osteo-  Ortho was consulted  Patient might need ID consult          DM (diabetes mellitus), type 1 with neurological complications ([facility])- (present on admission)  Assessment & Plan  A1c 9.6, uncontrolled  Blood sugar more than 200  Continue glargine 8 units daily with a sliding scales  Holding glipizide and Metformin due to infection at this time  Diabetes education encouraged the patient to monitor his blood sugar for better control  Discussed the complication of diabetes including not limited to worsening wound infection, amputation, kidney and heart failure and death, he understood    ELLEN (obstructive sleep apnea)- (present on admission)  Assessment & Plan  Continue CPAP at night      Hypertension- (present on admission)  Assessment & Plan  Patient is not on any medications  Close monitor, start with lisinopril if needed       VTE prophylaxis: enoxaparin ppx    I have performed a physical exam and reviewed and updated ROS and Plan today  (3/4/2022). In review of yesterday's note (3/3/2022), there are no changes except as documented above.

## 2022-03-04 NOTE — PROGRESS NOTES
LPS Service:  Met with pt and wife in ED. MRI results show +ostemylitis. Discussed possible need for right 1st ray amputation. Discussed with Dr Madera and Dr Cleveland. Plan for surgery later today, NPO Orders placed. Pt stated that he has not eaten since 20:00 last night 3/3.   Full consult note to follow.

## 2022-03-04 NOTE — ANESTHESIA PROCEDURE NOTES
Airway    Date/Time: 3/4/2022 3:48 PM  Performed by: Yara Chavira M.D.  Authorized by: Yara Chavira M.D.     Location:  OR  Urgency:  Elective  Indications for Airway Management:  Anesthesia      Spontaneous Ventilation: absent    Sedation Level:  Deep  Preoxygenated: Yes    Mask Difficulty Assessment:  0 - not attempted  Final Airway Type:  Supraglottic airway  Final Supraglottic Airway:  Standard LMA    SGA Size:  5  Number of Attempts at Approach:  1

## 2022-03-04 NOTE — CONSULTS
3/4/2022    The patient was seen at the request of Dr Cleveland    HPI: Keaton Cervantes is a 66 y.o. male who presents with complaints of pain to right foot.  This started weeks ago after unknown trauma.  The pain is 3/10 and is described as sharp.  The pain is made worse by palpation of the area and made better by rest and immobilization.    Past Medical History:   Diagnosis Date   • Allergy    • ASTHMA    • Back pain     Chronic   • Bronchitis    • CATARACT    • Dental disorder    • Diabetes     oral rx & insulin   • Hypertension    • MEDICAL HOME    • Neck pain     Chronic   • ELLEN (obstructive sleep apnea)     Bipap   • Sleep apnea        Past Surgical History:   Procedure Laterality Date   • LUMBAR FUSION ANTERIOR  8/4/2009    Performed by JOSEE BLANTON at SURGERY [facility]   • FUSION, SPINE, LUMBAR, PLIF  8/4/2009    Performed by JOSEE BLANTON at SURGERY [facility]   • LUMBAR LAMINECTOMY DISKECTOMY  8/4/2009    Performed by JOSEE BLANTON at SURGERY [facility]   • LAMINOTOMY  8/4/2009    Performed by JOSEE BLANTON at SURGERY [facility]   • SOMNOPLASTY  6/10/2009    Performed by ELÍAS RILEY at SURGERY [facility]   • SEPTOPLASTY  6/10/2009    Performed by ELÍAS RILEY at SURGERY [facility]   • ANTROSTOMY  6/10/2009    Performed by ELÍAS RILEY at SURGERY [facility]   • ETHMOIDECTOMY  6/10/2009    Performed by ELÍAS RILEY at SURGERY [facility]   • CATARACT PHACO WITH IOL  7/7/08    Performed by OCTAVIO HARDWICK at SURGERY SAME DAY [facility] ORS   • CATARACT PHACO WITH IOL  6/16/08    Performed by OCTAVIO HARDWICK at SURGERY SAME DAY [facility] ORS   • APPENDECTOMY     • ORIF, ANKLE     • VASECTOMY         Medications  No current facility-administered medications on file prior to encounter.     Current Outpatient Medications on File Prior to Encounter   Medication Sig Dispense Refill   • doxycycline (VIBRAMYCIN) 100 MG Tab Take 100 mg by  mouth 2 times a day. 10 DAY COURSE     • amoxicillin-clavulanate (AUGMENTIN) 875-125 MG Tab Take 1 Tablet by mouth every 12 hours. 7 DAY COURSE     • HYDROcodone-acetaminophen (NORCO) 7.5-325 MG per tablet Take 1 Tablet by mouth 3 times a day as needed for Severe Pain.     • gabapentin (NEURONTIN) 300 MG Cap Take 300 mg by mouth 3 times a day.     • glipiZIDE SR (GLUCOTROL) 10 MG TABLET SR 24 HR Take 10 mg by mouth 2 times a day.     • metformin (GLUCOPHAGE) 1000 MG tablet Take 1,000 mg by mouth 2 times a day with meals.     • cyclobenzaprine (FLEXERIL) 10 mg Tab Take 10 mg by mouth at bedtime.     • ONE TOUCH ULTRA TEST strip USE TO TEST BLOOD 6 TIMES A DAY (Patient not taking: Reported on 6/3/2021) 150 Strip 2   • insulin glargine (LANTUS) 100 UNIT/ML SOLN Inject 15 Units as instructed every bedtime. AS DIRECTED (Patient taking differently: Inject 8 Units under the skin every morning. AS DIRECTED) 100 mL 6       Allergies  Sulfa drugs, Flu virus vaccine, Other food, Pneumococcal vaccines, and Nutrasweet aspartame [aspartame]    ROS  Right foot infection. All other systems were reviewed and found to be negative    Family History   Problem Relation Age of Onset   • Hypertension Father    • Diabetes Father        Social History     Socioeconomic History   • Marital status:    Tobacco Use   • Smoking status: Former Smoker   • Smokeless tobacco: Former User     Types: Chew   • Tobacco comment: 1992   Substance and Sexual Activity   • Alcohol use: No   • Drug use: No   • Sexual activity: Yes     Partners: Female       Physical Exam  Vitals  /58   Pulse 89   Temp 36.9 °C (98.4 °F) (Temporal)   Resp 18   Ht 1.829 m (6')   Wt 90.2 kg (198 lb 13.7 oz)   SpO2 95%   General: Well Developed, Well Nourished, Age appropriate appearance  HEENT: Normocephalic, atraumatic  Psych: Normal mood and affect  Neck: Supple, nontender, no masses  Lungs: Breathing unlabored, No audible wheezing  Heart: Regular heart rate  and rhythm  Abdomen: Soft, NT, ND  Neuro: Sensation grossly intact to BUE and BLE, moving all four extremities  Skin: Intact, no open wounds  Vascular: 1+DP/PT, Capillary refill <2 seconds  MSK: Right great toe infection      Radiographs:  US-CYNTHIA SINGLE LEVEL BILAT         MR-FOOT-WITH & W/O RIGHT   Final Result      1.  Marrow edema and enhancement of the sesamoids of the first MTP joint likely representing osteomyelitis.      2.  First MTP joint effusion possibly representing septic arthritis.      3.  Large soft tissue ulceration involving the medial/plantar aspect of the forefoot in the area of the first MTP joint with surrounding cellulitis and some rim-enhancing subcutaneous fluid consistent with abscess which communicates with that ulceration.      4.  Tenosynovitis of the flexor hallucis longus tendon and the abductor pollicis tendon likely infectious in nature.      US-EXTREMITY VENOUS LOWER UNILAT RIGHT   Final Result      DX-FOOT-COMPLETE 3+ RIGHT   Final Result      1.  Soft tissue edema and gas of the medial right forefoot projecting over the great toe metatarsophalangeal joint. This is concerning for abscess versus necrotic infection.   2.  No radiographic evidence of acute osteomyelitis.. If there is clinical concern for radiographically occult osteomyelitis, MRI can be obtained.      DX-CHEST-PORTABLE (1 VIEW)   Final Result      1.  No acute cardiac or pulmonary abnormalities are identified.          Laboratory Values  Recent Labs     03/03/22  1327 03/04/22  0130   WBC 13.2* 9.7   RBC 4.29* 4.18*   HEMOGLOBIN 12.7* 12.2*   HEMATOCRIT 39.0* 38.1*   MCV 90.9 91.1   MCH 29.6 29.2   MCHC 32.6* 32.0*   RDW 40.3 40.7   PLATELETCT 353 324   MPV 9.2 9.1     Recent Labs     03/03/22  1327 03/04/22  0130   SODIUM 127* 130*   POTASSIUM 4.5 4.0   CHLORIDE 91* 93*   CO2 20 26   GLUCOSE 255* 224*   BUN 15 13             Impression: Right great toe abscess and septic arthritis    Plan:We discussed the diagnosis  and findings with the patient at length.  We reviewed possible non operative and operative interventions and the risks and benefits of each of these.  he had a chance to ask questions and all of these were answered to his satisfaction. The patient chose to proceed with  operative intervention. Risks and benefits of surgery were discussed which include but are not limited to bleeding, infection, neurovascular damage, malunion, nonunion, instability, limb length discrepancy, DVT, PE, MI, Stroke and death. They understand these risks and wish to proceed.

## 2022-03-05 PROBLEM — J96.00 ACUTE RESPIRATORY FAILURE (HCC): Status: ACTIVE | Noted: 2022-03-05

## 2022-03-05 PROBLEM — M86.9 OSTEOMYELITIS OF RIGHT FOOT (HCC): Status: ACTIVE | Noted: 2022-03-05

## 2022-03-05 PROBLEM — D72.829 LEUKOCYTOSIS: Status: RESOLVED | Noted: 2022-03-03 | Resolved: 2022-03-05

## 2022-03-05 LAB
GLUCOSE BLD STRIP.AUTO-MCNC: 196 MG/DL (ref 65–99)
GLUCOSE BLD STRIP.AUTO-MCNC: 196 MG/DL (ref 65–99)
GLUCOSE BLD STRIP.AUTO-MCNC: 223 MG/DL (ref 65–99)
GLUCOSE BLD STRIP.AUTO-MCNC: 242 MG/DL (ref 65–99)
PATHOLOGY CONSULT NOTE: NORMAL

## 2022-03-05 PROCEDURE — A9270 NON-COVERED ITEM OR SERVICE: HCPCS | Performed by: INTERNAL MEDICINE

## 2022-03-05 PROCEDURE — 770001 HCHG ROOM/CARE - MED/SURG/GYN PRIV*

## 2022-03-05 PROCEDURE — 82962 GLUCOSE BLOOD TEST: CPT

## 2022-03-05 PROCEDURE — 700105 HCHG RX REV CODE 258: Performed by: INTERNAL MEDICINE

## 2022-03-05 PROCEDURE — 36415 COLL VENOUS BLD VENIPUNCTURE: CPT

## 2022-03-05 PROCEDURE — 700111 HCHG RX REV CODE 636 W/ 250 OVERRIDE (IP): Performed by: INTERNAL MEDICINE

## 2022-03-05 PROCEDURE — 87040 BLOOD CULTURE FOR BACTERIA: CPT | Mod: 91

## 2022-03-05 PROCEDURE — 700102 HCHG RX REV CODE 250 W/ 637 OVERRIDE(OP): Performed by: INTERNAL MEDICINE

## 2022-03-05 PROCEDURE — 99223 1ST HOSP IP/OBS HIGH 75: CPT | Performed by: INTERNAL MEDICINE

## 2022-03-05 PROCEDURE — 99233 SBSQ HOSP IP/OBS HIGH 50: CPT | Performed by: STUDENT IN AN ORGANIZED HEALTH CARE EDUCATION/TRAINING PROGRAM

## 2022-03-05 RX ADMIN — SODIUM CHLORIDE 3 G: 900 INJECTION INTRAVENOUS at 23:58

## 2022-03-05 RX ADMIN — INSULIN HUMAN 2 UNITS: 100 INJECTION, SOLUTION PARENTERAL at 06:21

## 2022-03-05 RX ADMIN — VANCOMYCIN HYDROCHLORIDE 1500 MG: 500 INJECTION, POWDER, LYOPHILIZED, FOR SOLUTION INTRAVENOUS at 04:27

## 2022-03-05 RX ADMIN — INSULIN GLARGINE 8 UNITS: 100 INJECTION, SOLUTION SUBCUTANEOUS at 06:22

## 2022-03-05 RX ADMIN — SODIUM CHLORIDE 3 G: 900 INJECTION INTRAVENOUS at 11:46

## 2022-03-05 RX ADMIN — HYDROCODONE BITARTRATE AND ACETAMINOPHEN 2 TABLET: 5; 325 TABLET ORAL at 10:07

## 2022-03-05 RX ADMIN — INSULIN HUMAN 3 UNITS: 100 INJECTION, SOLUTION PARENTERAL at 16:33

## 2022-03-05 RX ADMIN — SODIUM CHLORIDE 3 G: 900 INJECTION INTRAVENOUS at 06:46

## 2022-03-05 RX ADMIN — INSULIN HUMAN 2 UNITS: 100 INJECTION, SOLUTION PARENTERAL at 21:14

## 2022-03-05 RX ADMIN — HYDROCODONE BITARTRATE AND ACETAMINOPHEN 2 TABLET: 5; 325 TABLET ORAL at 04:30

## 2022-03-05 RX ADMIN — SODIUM CHLORIDE: 9 INJECTION, SOLUTION INTRAVENOUS at 07:31

## 2022-03-05 RX ADMIN — ACETAMINOPHEN 650 MG: 325 TABLET, FILM COATED ORAL at 04:25

## 2022-03-05 RX ADMIN — INSULIN HUMAN 3 UNITS: 100 INJECTION, SOLUTION PARENTERAL at 11:54

## 2022-03-05 RX ADMIN — SODIUM CHLORIDE 3 G: 900 INJECTION INTRAVENOUS at 16:45

## 2022-03-05 RX ADMIN — HYDROCODONE BITARTRATE AND ACETAMINOPHEN 1 TABLET: 5; 325 TABLET ORAL at 21:23

## 2022-03-05 ASSESSMENT — ENCOUNTER SYMPTOMS
NAUSEA: 0
VOMITING: 0
FEVER: 0
PALPITATIONS: 0
FLANK PAIN: 0
CHILLS: 0
SENSORY CHANGE: 1

## 2022-03-05 ASSESSMENT — PATIENT HEALTH QUESTIONNAIRE - PHQ9
2. FEELING DOWN, DEPRESSED, IRRITABLE, OR HOPELESS: NOT AT ALL
1. LITTLE INTEREST OR PLEASURE IN DOING THINGS: NOT AT ALL
SUM OF ALL RESPONSES TO PHQ9 QUESTIONS 1 AND 2: 0

## 2022-03-05 ASSESSMENT — PAIN DESCRIPTION - PAIN TYPE: TYPE: ACUTE PAIN

## 2022-03-05 ASSESSMENT — PAIN SCALES - WONG BAKER: WONGBAKER_NUMERICALRESPONSE: HURTS JUST A LITTLE BIT

## 2022-03-05 NOTE — PROGRESS NOTES
Hospital Medicine Daily Progress Note    Date of Service  3/5/2022    Chief Complaint  Keaton Cervantes is a 66 y.o. male admitted 3/3/2022 with worsening foot wound right side    Hospital Course  66-year-old male with history of uncontrolled diabetes, hypertension and sleep apnea, who presented 3/3 with right foot pain, patient has had wound infection and has right foot for more than a year however recently getting worse redness swelling and more pain with fluid coming out. Noted CRP was elevated 24. MRI showed first MTP joint osteomyelitis, septic arthritis and abscess.   Orthopedics consulted, status post R first toe ray amputation on 3/4. Wound culture Strep. OR culture pending  ID consulted.   LPS/wound care following.   DM2, poorly controlled. A1c 9.6. Diabetes education.     Interval Problem Update  Seen and examined. Status post surgery 3/4  Wound culture Strep. OR culture pending. MRSA swab negative. Dc vancomycin, cont unasyn. Consulted ID.   On 3L O2 now, not on home oxygen, weaning off  Dc IVF    I have personally seen and examined the patient at bedside. I discussed the plan of care with patient, bedside RN, charge RN, , pharmacy and LPS.    Consultants/Specialty  Orthopedics, LPS   ID    Code Status  Full Code    Disposition  Patient is not medically cleared for discharge.   Anticipate discharge to to be determined.  I have placed the appropriate orders for post-discharge needs.    Review of Systems  Review of Systems   Constitutional: Negative for chills and fever.   Cardiovascular: Negative for chest pain and palpitations.   Gastrointestinal: Negative for nausea and vomiting.   Genitourinary: Negative for dysuria, flank pain and urgency.   Neurological: Positive for sensory change.   All other systems reviewed and are negative.       Physical Exam  Temp:  [36.2 °C (97.2 °F)-38.4 °C (101.1 °F)] 36.4 °C (97.5 °F)  Pulse:  [66-95] 66  Resp:  [14-20] 17  BP: (117-176)/(48-92)  122/64  SpO2:  [91 %-98 %] 96 %    Physical Exam  Vitals and nursing note reviewed.   Constitutional:       General: He is not in acute distress.     Appearance: Normal appearance.   HENT:      Head: Normocephalic and atraumatic.      Mouth/Throat:      Mouth: Mucous membranes are dry.      Pharynx: Oropharynx is clear.   Eyes:      Pupils: Pupils are equal, round, and reactive to light.   Neck:      Vascular: No carotid bruit.   Cardiovascular:      Rate and Rhythm: Normal rate and regular rhythm.   Pulmonary:      Effort: Pulmonary effort is normal. No respiratory distress.      Breath sounds: Normal breath sounds. No wheezing.   Abdominal:      General: Abdomen is flat. Bowel sounds are normal. There is no distension.      Palpations: Abdomen is soft. There is no mass.      Tenderness: There is no abdominal tenderness.   Musculoskeletal:      Cervical back: Neck supple.      Comments: Status post R great toe ray amputation, dressing noted  L foot, no ulcers, dorsalis pedis appreciated   Skin:     General: Skin is warm and dry.      Coloration: Skin is not jaundiced.   Neurological:      General: No focal deficit present.      Mental Status: He is alert and oriented to person, place, and time.   Psychiatric:         Mood and Affect: Mood normal.         Behavior: Behavior normal.         Fluids    Intake/Output Summary (Last 24 hours) at 3/5/2022 0949  Last data filed at 3/5/2022 0929  Gross per 24 hour   Intake 500 ml   Output 1630 ml   Net -1130 ml       Laboratory  Recent Labs     03/03/22  1327 03/04/22  0130   WBC 13.2* 9.7   RBC 4.29* 4.18*   HEMOGLOBIN 12.7* 12.2*   HEMATOCRIT 39.0* 38.1*   MCV 90.9 91.1   MCH 29.6 29.2   MCHC 32.6* 32.0*   RDW 40.3 40.7   PLATELETCT 353 324   MPV 9.2 9.1     Recent Labs     03/03/22  1327 03/04/22  0130   SODIUM 127* 130*   POTASSIUM 4.5 4.0   CHLORIDE 91* 93*   CO2 20 26   GLUCOSE 255* 224*   BUN 15 13   CREATININE 0.64 0.70   CALCIUM 9.1 8.7                    Imaging  US-CYNTHIA SINGLE LEVEL BILAT   Final Result      MR-FOOT-WITH & W/O RIGHT   Final Result      1.  Marrow edema and enhancement of the sesamoids of the first MTP joint likely representing osteomyelitis.      2.  First MTP joint effusion possibly representing septic arthritis.      3.  Large soft tissue ulceration involving the medial/plantar aspect of the forefoot in the area of the first MTP joint with surrounding cellulitis and some rim-enhancing subcutaneous fluid consistent with abscess which communicates with that ulceration.      4.  Tenosynovitis of the flexor hallucis longus tendon and the abductor pollicis tendon likely infectious in nature.      US-EXTREMITY VENOUS LOWER UNILAT RIGHT   Final Result      DX-FOOT-COMPLETE 3+ RIGHT   Final Result      1.  Soft tissue edema and gas of the medial right forefoot projecting over the great toe metatarsophalangeal joint. This is concerning for abscess versus necrotic infection.   2.  No radiographic evidence of acute osteomyelitis.. If there is clinical concern for radiographically occult osteomyelitis, MRI can be obtained.      DX-CHEST-PORTABLE (1 VIEW)   Final Result      1.  No acute cardiac or pulmonary abnormalities are identified.           Assessment/Plan  * Osteomyelitis of right foot (HCC)  Assessment & Plan  MRI revealed R MTP joint osteomyelitis, septic arthritis and abscess  S/p R great toe ray amputation 3/4/22  CYNTHIA normal BLE  ID consulted, LPS following  Wound culture pos strep. OR culture pending  On vancomycin and unasyn. Vanco dc'ed due to neg MRSA swab    Acute respiratory failure (HCC)  Assessment & Plan  On 3L O2 currently, not on home oxygen  IS, OOB  Wean O2 as tolerated    Sepsis (HCC)  Assessment & Plan  This is Sepsis Present on admission  SIRS criteria identified on my evaluation include: Tachycardia, with heart rate greater than 90 BPM and Leukocytosis, with WBC greater than 12,000  Source is wound infection  Sepsis protocol  initiated  Fluid resuscitation ordered per protocol  IV antibiotics as appropriate for source of sepsis  While organ dysfunction may be noted elsewhere in this problem list or in the chart, degree of organ dysfunction does not meet CMS criteria for severe sepsis        Diabetes (HCC)  Assessment & Plan  A1c 9.6, uncontrolled.  Continue glargine 8 units daily with a sliding scales. Hold home glipizide and Metformin due to infection at this time  Diabetes education encouraged the patient to monitor his blood sugar for better control  Discussed the complication of diabetes including not limited to worsening wound infection, amputation, kidney and heart failure and death, he understood    ELLEN (obstructive sleep apnea)- (present on admission)  Assessment & Plan  Continue CPAP at night      Hypertension- (present on admission)  Assessment & Plan  Patient is not on any medications  Close monitor, start with lisinopril if needed       VTE prophylaxis: enoxaparin ppx    I have performed a physical exam and reviewed and updated ROS and Plan today (3/5/2022). In review of yesterday's note (3/4/2022), there are no changes except as documented above.

## 2022-03-05 NOTE — PROGRESS NOTES
Assume care of pt at 1900. Report received from day RN. Pt is A/O x4. Pain is 7/10. Pt is restijg in bed. Bed in lowest and locked position, call light in reach, hourly rounding in place. Labs reviewed. Communication board updated. Will continue to monitor.

## 2022-03-05 NOTE — ANESTHESIA TIME REPORT
Anesthesia Start and Stop Event Times     Date Time Event    3/4/2022 1516 Ready for Procedure     1541 Anesthesia Start     1620 Anesthesia Stop        Responsible Staff  03/04/22    Name Role Begin End    Yara Chavira M.D. Anesth 1541 1620        Preop Diagnosis (Free Text):  Pre-op Diagnosis     osteomyleitis of right great toe        Preop Diagnosis (Codes):    Premium Reason  A. 3PM - 7AM    Comments:

## 2022-03-05 NOTE — OP REPORT
DATE: 3/4/2022    PREOPERATIVE DIAGNOSIS: Right great toe abscess and osteomyelitis    POSTOPERATIVE DIAGNOSIS: Same    PROCEDURE PERFORMED: Right great toe ray amputation                                                      SURGEON: Wilman Madera M.D.     ASSISTANT: Shen Washington PA-C    ANESTHESIA: General    ESTIMATED BLOOD LOSS: 0 mL    INDICATIONS: The patient is a 66 y.o. male with a right foot abscess and osteomyelitis after 1 year duration.  I discussed the risks and benefits of the procedure, including the risks of infection, wound healing complication, neurovascular injury, need for repeat irrigation and debridement or more proximal amputation and the medical risks of anesthesia including DVT, PE, MI, stroke, and death.  Benefits include eradication of infection and resolution of sepsis.  Alternatives to surgery were also discussed, including non-operative management.  The patient signed the informed consent and the operative extremity was marked.      PROCEDURE:  The patient underwent anesthesia, and was positioned supine on the operating room table and all bony prominences were well padded.  Preoperative antibiotics were held until cultures could be obtained. Sequential compression devices were employed. The correct operative site was prepped and draped into a sterile field. A procedural pause was conducted to verify correct patient, correct extremity, presence of the surgeons initials on the operative extremity.    The wound was explored and found to have necrotic tissue and bone until the metatarsal head.  As a result a great toe ray amputation was performed through the metatarsal neck.  All necrotic tissue was removed.  The  wound was irrigated with 3 L of saline solution. The wound was closed with 2-0 Nylon in the skin.  Sterile dressings were applied.     The patient tolerated the procedure well. There were no apparent complications. All sponge, needle, and instrument counts were correct on  two separate occasions. She was awakened, extubated, and transferred to the recovery room in satisfactory condition.     The use of Shen Washington PA-C as a surgical assistant was necessary for assistance with exposure, retraction, and closure.    Post-Operative Plan:    1.  The patient should remain full weightbearing through heel  on their operative extremity.  Gait aids (crutch or crutches, cane, walker) may be used as needed, and may be discontinued when no longer required.  2.  IV antibiotics - will be given postoperatively and adjusted by the Infectious Disease service as dictated by culture results.  3.  DVT prophylaxis - SCD's and Lovenox 40 mg SQ daily while inpatient.  The patient may transition to Aspirin 325 mg PO BID as an outpatient  4.  Discharge planning   ____________________________________   Wilman Madera M.D.   DD: 3/4/2022  4:11 PM

## 2022-03-05 NOTE — ANESTHESIA POSTPROCEDURE EVALUATION
Patient: Keaton Cervantes    Procedure Summary     Date: 03/04/22 Room / Location: [place] [address] / SURGERY [facility]    Anesthesia Start: 1541 Anesthesia Stop: 1620    Procedure: AMPUTATION, TOE 1ST RAY (Right Foot) Diagnosis: (osteomyleitis of right great toe)    Surgeons: Wilman Madera M.D. Responsible Provider: Yara Chavira M.D.    Anesthesia Type: general ASA Status: 3          Final Anesthesia Type: general  Last vitals  BP   Blood Pressure : (!) 176/92    Temp   37.2 °C (98.9 °F)    Pulse   85   Resp   14    SpO2   98 %      Anesthesia Post Evaluation    Patient location during evaluation: PACU  Patient participation: complete - patient participated  Level of consciousness: awake  Pain score: 0    Airway patency: patent  Anesthetic complications: no  Cardiovascular status: adequate  Respiratory status: acceptable  Hydration status: acceptable  Comments: Patient slightly confused in PACU    PONV: none          No complications documented.     Nurse Pain Score: 7 (NPRS)

## 2022-03-05 NOTE — PROGRESS NOTES
4 Eyes Skin Assessment Completed by LUCIAN Jean and LUCIAN Soliman.    Head WDL  Ears WDL  Nose WDL  Mouth WDL  Neck WDL  Breast/Chest WDL  Shoulder Blades WDL  Spine surgical scar  (R) Arm/Elbow/Hand Redness  (L) Arm/Elbow/Hand Redness  Abdomen Scar  Groin WDL  Scrotum/Coccyx/Buttocks redness  (R) Leg WDL  (L) Leg WDL  (R) Heel/Foot/Toe Surgical Wound  (L) Heel/Foot/Toe WDL          Devices In Places Pulse Ox      Interventions In Place N/A    Possible Skin Injury No    Pictures Uploaded Into Epic Yes  Wound Consult Placed Yes  RN Wound Prevention Protocol Ordered No

## 2022-03-05 NOTE — CARE PLAN
The patient is Stable - Low risk of patient condition declining or worsening    Shift Goals  Clinical Goals: wound healing  Patient Goals: pain control    Progress made toward(s) clinical / shift goals:  Pt had surgery yesterday. No active drainage noted. Dressing CDI. Pt complaints of pain on his right foot, medicated pt with PRN Norco and one time dose of Dilaudid.     Patient is not progressing towards the following goals:

## 2022-03-05 NOTE — CONSULTS
INFECTIOUS DISEASES INPATIENT CONSULT NOTE     Date of Service: 3/5/2022    Consult Requested By: Alan Ibarra M.D.    Reason for Consultation: Right foot osteomyelitis    History of Present Illness:   Keaton Cervantes is a 66 y.o. man with a history of uncontrolled type 2 diabetes mellitus complicated by a longstanding right diabetic foot ulcer, hypertension and obstructive sleep apnea admitted 3/3/2022 secondary to worsening infection of his right great toe.  Patient has had a diabetic foot ulcer for approximately 1 year but states that he has noted worsening erythema, drainage over the past 2 weeks prior to admission.  Patient follows up at [place] [facility] and was on Bactrim Augmentin prior to admission. He has been on three courses of oral abx without improvement. He had subjective fevers or chills.  Despite antibiotics, he continued to have increasing erythema, swelling, pain and purulent drainage.  Given persistent symptoms, he presented to the ED for further evaluation and management.  On presentation, he was afebrile with a leukocytosis of 13.2.  X-ray revealed soft tissue edema and gas of the medial right forefoot projecting over the great metatarsophalangeal joint concerning for an abscess versus necrotic infection.  MRI of the right foot revealed osteomyelitis of the first MTP joint, septic arthritis, a large soft tissue ulceration involving the medial/plantar aspect of the forefoot in the area of the first MTP joint with surrounding cellulitis and some rim-enhancing subcutaneous fluid consistent with an abscess that communicates with the ulceration, and tenosynovitis of the flexor hallux longus tendon.  He was evaluated by orthopedic surgery and is now status post right great toe ray amputation on 3/4/2022 by Dr. Madera.  Per the operative note, the great toe ray amputation was performed through the metatarsal neck and all necrotic tissue was removed.  Cultures are currently pending.   Gram stain is positive for many gram-positive cocci.  Wound culture on 3/3 is growing group A streptococcus.  Blood cultures on 3/3 are negative to date.  Patient is currently on Unasyn.  Infectious disease service consulted for antibiotic recommendations.      All other review of systems reviewed and negative except those documented above in the HPI.     PMH:   Past Medical History:   Diagnosis Date   • Allergy    • ASTHMA    • Back pain     Chronic   • Bronchitis    • CATARACT    • Dental disorder    • Diabetes     oral rx & insulin   • Hypertension    • MEDICAL HOME    • Neck pain     Chronic   • ELLEN (obstructive sleep apnea)     Bipap   • Sleep apnea        PSH:  Past Surgical History:   Procedure Laterality Date   • TOE AMPUTATION Right 3/4/2022    Procedure: AMPUTATION, TOE 1ST RAY;  Surgeon: Wilman Madera M.D.;  Location: [facility];  Service: Orthopedics   • LUMBAR FUSION ANTERIOR  8/4/2009    Performed by JOSEE BLANTON at [facility]   • FUSION, SPINE, LUMBAR, PLIF  8/4/2009    Performed by JOSEE BLANTON at [facility]   • LUMBAR LAMINECTOMY DISKECTOMY  8/4/2009    Performed by JOSEE BLANTON at [facility]   • LAMINOTOMY  8/4/2009    Performed by JOSEE BLANTON at SURGERY [facility]   • SOMNOPLASTY  6/10/2009    Performed by ELÍAS RILEY at SURGERY [facility]   • SEPTOPLASTY  6/10/2009    Performed by ELÍAS RILEY at SURGERY [facility]   • ANTROSTOMY  6/10/2009    Performed by ELÍAS RILEY at SURGERY [facility] ORS   • ETHMOIDECTOMY  6/10/2009    Performed by ELÍAS RILEY at SURGERY [facility]   • CATARACT PHACO WITH IOL  7/7/08    Performed by OCTAVIO HARDWICK at SURGERY SAME DAY [facility] ORS   • CATARACT PHACO WITH IOL  6/16/08    Performed by OCTAVIO HARDWICK at SURGERY SAME DAY [facility] ORS   • APPENDECTOMY     • ORIF, ANKLE     • VASECTOMY         FAMILY HX:  Family History   Problem Relation Age  of Onset   • Hypertension Father    • Diabetes Father        SOCIAL HX:  Social History     Socioeconomic History   • Marital status:      Spouse name: Not on file   • Number of children: Not on file   • Years of education: Not on file   • Highest education level: Not on file   Occupational History   • Not on file   Tobacco Use   • Smoking status: Former Smoker   • Smokeless tobacco: Former User     Types: Chew   • Tobacco comment: 1992   Substance and Sexual Activity   • Alcohol use: No   • Drug use: No   • Sexual activity: Yes     Partners: Female   Other Topics Concern   • Not on file   Social History Narrative   • Not on file     Social Determinants of Health     Financial Resource Strain: Not on file   Food Insecurity: Not on file   Transportation Needs: Not on file   Physical Activity: Not on file   Stress: Not on file   Social Connections: Not on file   Intimate Partner Violence: Not on file   Housing Stability: Not on file     Social History     Tobacco Use   Smoking Status Former Smoker   Smokeless Tobacco Former User   • Types: Chew   Tobacco Comment    1992     Social History     Substance and Sexual Activity   Alcohol Use No       Allergies/Intolerances:  Allergies   Allergen Reactions   • Sulfa Drugs Unspecified     Pt hasn't had meds in so long he doesn't remember what his reaction is, he just remembers there is a reaction and that he shouldn't have them    • Flu Virus Vaccine Shortness of Breath   • Other Food Anaphylaxis     coconut   • Pneumococcal Vaccines Shortness of Breath   • Nutrasweet Aspartame [Aspartame]      Causes migraines       History reviewed with the patient    Other Current Medications:    Current Facility-Administered Medications:   •  albuterol (PROVENTIL) 2.5mg/0.5ml nebulizer solution 1.25 mg, 1.25 mg, Nebulization, Q6HRS PRN (RT), Chivo Wright M.D.  •  HYDROcodone-acetaminophen (NORCO) 5-325 MG per tablet 1-2 Tablet, 1-2 Tablet, Oral, Q6HRS PRN, Chivo Wright  M.D., 2 Tablet at 22 1007  •  insulin GLARGINE (Lantus,Semglee) injection, 8 Units, Subcutaneous, QAM, Chivo Wright M.D., 8 Units at 22 0622  •  senna-docusate (PERICOLACE or SENOKOT S) 8.6-50 MG per tablet 2 Tablet, 2 Tablet, Oral, BID **AND** polyethylene glycol/lytes (MIRALAX) PACKET 1 Packet, 1 Packet, Oral, QDAY PRN **AND** magnesium hydroxide (MILK OF MAGNESIA) suspension 30 mL, 30 mL, Oral, QDAY PRN **AND** bisacodyl (DULCOLAX) suppository 10 mg, 10 mg, Rectal, QDAY PRN, Chivo Wright M.D.  •  enoxaparin (LOVENOX) inj 40 mg, 40 mg, Subcutaneous, DAILY, Chivo Wright M.D., 40 mg at 22 1758  •  acetaminophen (Tylenol) tablet 650 mg, 650 mg, Oral, Q6HRS PRN, Chivo Wright M.D., 650 mg at 22 0425  •  ampicillin/sulbactam (UNASYN) 3 g in  mL IVPB, 3 g, Intravenous, Q6HRS, Chivo Wright M.D., Stopped at 22 0716  •  ondansetron (ZOFRAN) syringe/vial injection 4 mg, 4 mg, Intravenous, Q4HRS PRN, Chivo Wright M.D.  •  ondansetron (ZOFRAN ODT) dispertab 4 mg, 4 mg, Oral, Q4HRS PRN, Chivo Wright M.D.  •  insulin regular (HumuLIN R,NovoLIN R) injection, 2-9 Units, Subcutaneous, 4X/DAY ACHS, 2 Units at 22 0621 **AND** POC blood glucose manual result, , , Q AC AND BEDTIME(S) **AND** NOTIFY MD and PharmD, , , Once **AND** Administer 20 grams of glucose (approximately 8 ounces of fruit juice) every 15 minutes PRN FSBG less than 70 mg/dL, , , PRN **AND** dextrose 50% (D50W) injection 25 g, 25 g, Intravenous, Q15 MIN PRN, Chivo Wright M.D.  [unfilled]    Most Recent Vital Signs:  /64   Pulse 66   Temp 36.4 °C (97.5 °F) (Temporal)   Resp 17   Ht 1.829 m (6')   Wt 90.2 kg (198 lb 13.7 oz)   SpO2 96%   BMI 26.97 kg/m²   Temp  Av.9 °C (98.4 °F)  Min: 36.2 °C (97.2 °F)  Max: 38.4 °C (101.1 °F)    Physical Exam:  General: well nourished, no diaphoresis, well-appearing, no acute distress  HEENT: sclera anicteric, PERRL,  extraocular muscles intact, mucous membranes moist, oropharynx clear and moist, no oral lesions or exudate  Neck: supple, no lymphadenopathy  Chest: CTAB, no rales, rhonchi or wheezes, normal work of breathing.  Cardiac: regular rate and rhythm, normal S1 S2, no murmurs, rubs or gallops  Abdomen: + bowel sounds, soft, non-tender, non-distended, no hepatosplenomegaly  Extremities: WWP, no edema, 2+ pedal pulses. Evidence of right great toe am. Right foot in surgical dressing. Some blood on dressing  Skin: warm and dry, no rashes or worrisome lesions  Neuro: Alert and oriented times 3, non-focal exam, speech fluent, full range of motion to bilateral upper and lower extremities  Psych: normal mood and behavior, pleasant; memory intact, normal judgement    Pertinent Lab Results:  Recent Labs     03/03/22  1327 03/04/22  0130   WBC 13.2* 9.7      Recent Labs     03/03/22  1327 03/04/22  0130   HEMOGLOBIN 12.7* 12.2*   HEMATOCRIT 39.0* 38.1*   MCV 90.9 91.1   MCH 29.6 29.2   PLATELETCT 353 324         Recent Labs     03/03/22  1327 03/04/22  0130   SODIUM 127* 130*   POTASSIUM 4.5 4.0   CHLORIDE 91* 93*   CO2 20 26   CREATININE 0.64 0.70        Recent Labs     03/03/22  1327   ALBUMIN 3.9        Pertinent Micro:  Results     Procedure Component Value Units Date/Time    CULTURE WOUND W/ GRAM STAIN [[id number]] Collected: 03/04/22 1605    Order Status: Completed Specimen: Wound Updated: 03/05/22 0824     Significant Indicator NEG     Source WND     Site Right Great Toe     Culture Result Culture in progress.     Gram Stain Result Moderate WBCs.  Many Gram positive cocci.      Narrative:      Surgery - swabs received    Anaerobic Culture [[id number]] Collected: 03/04/22 1605    Order Status: No result Specimen: Wound Updated: 03/05/22 0824     Significant Indicator NEG     Source WND     Site Right Great Toe     Culture Result -    Narrative:      Surgery - swabs received    Fungal Culture [[id number]] Collected: 03/04/22 1605     Order Status: No result Specimen: Wound Updated: 03/05/22 0824     Significant Indicator NEG     Source WND     Site Right Great Toe     Culture Result -     Fungal Smear Results No fungal elements seen.    Narrative:      Surgery - swabs received    Fungal Smear [[id number]] Collected: 03/04/22 1605    Order Status: Completed Specimen: Wound Updated: 03/04/22 2204     Significant Indicator NEG     Source WND     Site Right Great Toe     Fungal Smear Results No fungal elements seen.    Narrative:      Surgery - swabs received    GRAM STAIN [[id number]] Collected: 03/04/22 1605    Order Status: Completed Specimen: Wound Updated: 03/04/22 2204     Significant Indicator .     Source WND     Site Right Great Toe     Gram Stain Result Moderate WBCs.  Many Gram positive cocci.      Narrative:      Surgery - swabs received    Culture Wound W/ Gram Stain [[id number]]  (Abnormal) Collected: 03/03/22 1610    Order Status: Completed Specimen: Wound from Left Foot Updated: 03/04/22 1438     Significant Indicator POS     Source WND     Site Right Foot     Culture Result -     Gram Stain Result Rare WBCs.  Rare Gram positive cocci.       Culture Result Streptococcus pyogenes (Group A)  Moderate growth      Narrative:      CALL  Wright  MS5 tel. [phone],  CALLED  MS5 tel. [phone] 03/04/2022, 14:37, RB PERF. RESULTS CALLED TO: RN  [id number]    GRAM STAIN [[id number]] Collected: 03/03/22 1610    Order Status: Completed Specimen: Wound Updated: 03/04/22 0903     Significant Indicator .     Source WND     Site Right Foot     Gram Stain Result Rare WBCs.  Rare Gram positive cocci.      BLOOD CULTURE [[id number]] Collected: 03/03/22 1327    Order Status: Completed Specimen: Blood from Peripheral Updated: 03/04/22 0735     Significant Indicator NEG     Source BLD     Site PERIPHERAL     Culture Result No Growth  Note: Blood cultures are incubated for 5 days and  are monitored continuously.Positive blood cultures  are called to the RN and  "reported as soon as  they are identified.      Narrative:      Per Hospital Policy: Only change Specimen Src: to \"Line\" if  specified by physician order.  Right AC    BLOOD CULTURE [[id number]] Collected: 03/03/22 1327    Order Status: Completed Specimen: Blood from Peripheral Updated: 03/04/22 0735     Significant Indicator NEG     Source BLD     Site PERIPHERAL     Culture Result No Growth  Note: Blood cultures are incubated for 5 days and  are monitored continuously.Positive blood cultures  are called to the RN and reported as soon as  they are identified.      Narrative:      Per Hospital Policy: Only change Specimen Src: to \"Line\" if  specified by physician order.  Left AC    MRSA By PCR (Amp) [[id number]] Collected: 03/03/22 1610    Order Status: Completed Specimen: Respirate from Nares Updated: 03/03/22 1741     MRSA by PCR Negative    Blood Culture [[id number]]     Order Status: Canceled Specimen: Blood from Peripheral     Blood Culture [[id number]]     Order Status: Canceled Specimen: Blood from Peripheral         No results found for: BLOODCULTU, BLDCULT, BCHOLD     Studies:  DX-CHEST-PORTABLE (1 VIEW)    Result Date: 3/3/2022  3/3/2022 2:57 PM HISTORY/REASON FOR EXAM:  possible sepsis. TECHNIQUE/EXAM DESCRIPTION AND NUMBER OF VIEWS: Single portable view of the chest. COMPARISON: 7/28/2009 FINDINGS: HEART: Not enlarged. LUNGS: No areas of air space disease are demonstrated. PLEURA: No effusion or pneumothorax. Left costophrenic angle is not entirely included in the field-of-view.     1.  No acute cardiac or pulmonary abnormalities are identified.    DX-FOOT-COMPLETE 3+ RIGHT    Result Date: 3/3/2022  3/3/2022 3:27 PM HISTORY/REASON FOR EXAM:  r/o osteo TECHNIQUE/EXAM DESCRIPTION AND NUMBER OF VIEWS: 3 views of the RIGHT foot. COMPARISON:  None. FINDINGS: Decreased osseous mineralization. No acute fracture is noted. There is no dislocation.  No bone erosion is noted. Soft tissue edema and gas of the medial " right foot projecting over the great toe metatarsophalangeal joint.     1.  Soft tissue edema and gas of the medial right forefoot projecting over the great toe metatarsophalangeal joint. This is concerning for abscess versus necrotic infection. 2.  No radiographic evidence of acute osteomyelitis.. If there is clinical concern for radiographically occult osteomyelitis, MRI can be obtained.    MR-FOOT-WITH & W/O RIGHT    Result Date: 3/4/2022  3/3/2022 8:04 PM HISTORY/REASON FOR EXAM: Right foot pain and infection. Diabetic foot ulcer. TECHNIQUE/EXAM DESCRIPTION: MRI of the RIGHT foot with contrast. Using a [employer] Signa 1.5 Елена MRI scanner, T1 axial, sagittal, and coronal, fast spin-echo T2 fat-suppressed axial and coronal, fast inversion recovery sagittal, and T1 fat suppressed axial and sagittal post intravenous contrast images were obtained. A total of 20 mL ProHance given. COMPARISON: X-ray 3/3/2022 FINDINGS: Osseous structures/Cartilaginous surfaces: There is marrow edema and enhancement involving the medial and lateral sesamoids. There is a first MTP joint effusion with synovial enhancement. Ligaments/Tendons: There is tenosynovitis involving the flexor hallucis longus and abductor hallucis tendons. Miscellaneous: There is extensive cellulitis involving the forefoot most prominently medially. There is soft tissue ulceration involving the plantar aspect of the forefoot medially adjacent to the first MTP joint. There is subcutaneous fluid in that area  with rim enhancement which appears to communicate with the area of ulceration.     1.  Marrow edema and enhancement of the sesamoids of the first MTP joint likely representing osteomyelitis. 2.  First MTP joint effusion possibly representing septic arthritis. 3.  Large soft tissue ulceration involving the medial/plantar aspect of the forefoot in the area of the first MTP joint with surrounding cellulitis and some rim-enhancing subcutaneous fluid consistent with  abscess which communicates with that ulceration. 4.  Tenosynovitis of the flexor hallucis longus tendon and the abductor pollicis tendon likely infectious in nature.    US-CYNTHIA SINGLE LEVEL BILAT    Result Date: 3/4/2022   Vascular Laboratory  Conclusions  Right.  The ankle-brachial index is normal.  Left.  The ankle-brachial index is normal.  LUCHO PETERSON  Age:    66    Gender:     M  MRN:    [id number]  :    1955      BSA:  Exam Date:     2022 14:00  Room #:     Inpatient  Priority:     Routine  Ht (in):             Wt (lb):  Ordering Physician:        ANTWAN BOWER  Referring Physician:       [id number] Partida  Sonographer:               Rosmery Campuzano RVT  Study Type:                Complete Bilateral  Technical Quality:         Adequate  Indications:     Type 2 diabetes mellitus with foot ulcer  CPT Codes:       23852  ICD Codes:       E11.621  History:         Ulceration of right mid foot and plantar area. No prior exam.  Limitations:                 RIGHT  Waveform            Systolic BPs (mmHg)                             138           Brachial  Triphasic                                Common Femoral  Triphasic                                Popliteal  Hyperemic                  174           Posterior Tibial  Biphasic                   157           Dorsalis Pedis  Normal                     74            Digit                             1.26          CYNTHIA                             0.54          TBI                       LEFT  Waveform        Systolic BPs (mmHg)                             127           Brachial  Triphasic                                Common Femoral  Triphasic                                Popliteal  Triphasic                  177           Posterior Tibial  Triphasic                  162           Dorsalis Pedis  Normal                     115           Digit                             1.28           CYNTHIA                             0.83          TBI  Findings  Right.  The ankle-brachial index is normal.  Toe-brachial indices are reduced, suggesting microvascular disease.  PPG waveforms of the great toe are normal.  Doppler waveforms of the common femoral and popliteal arteries are of high  amplitude and triphasic.  Doppler waveform of the posterior tibial is hyperemic.  Doppler waveform of the dorsalis pedis is of high amplitude and biphasic.  Left.  The ankle-brachial index is normal.  Toe-brachial index is normal.  Doppler waveforms of the common femoral, popliteal, posterior tibial, and  dorsalis pedis arteries are of high amplitude and triphasic.  PPG waveforms of the great toe are normal.  Additional testing was not performed in accordance with lower extremity  arterial evaluation protocol.  Manjit Dykes MD  (Electronically Signed)  Final Date:      2022                   15:00    US-EXTREMITY VENOUS LOWER UNILAT RIGHT    Result Date: 3/3/2022   Vascular Laboratory  CONCLUSIONS  Right lower extremity venous duplex imaging.  No deep venous thrombosis.  LUCHO PETERSON  Exam Date:     2022 15:49  Room #:     Inpatient  Priority:     Stat  Ht (in):             Wt (lb):  Ordering Physician:        JOSE LAFLEUR  Referring Physician:       [id number] Guo  Sonographer:               Jj Sue RVISSAC  Study Type:                Complete Unilateral  Technical Quality:         Adequate  Age:    66    Gender:     M  MRN:    [id number]  :    1955      BSA:  Indications:     Pain in right lower leg, Edema, Localized swelling, mass and                    lump, right lower limb  CPT Codes:       61853  ICD Codes:       M79.661  782.3  R22.41  History:         right foot diabetic ulcer for one year now worse & spreading                    up calf for 2 weeks with erythema & edema; no prior exam  Limitations:  PROCEDURES:  Right lower extremity venous duplex imaging.  Serial compression,  color, and spectral Doppler flow evaluations were  performed.  FINDINGS:  RIGHT LEG:  No deep venous thrombosis.  All veins demonstrate complete color filling and compressibility with  normal venous flow dynamics including spontaneous flow and respiratory  phasicity.  Nandiniabebatristan Lagos  (Electronically Signed)  Final Date:      03 March 2022                   16:38      IMPRESSION:   1.  Right foot osteomyelitis   2.  Right foot abscess with cellulitis  3.  Tenosynovitis  4.  Poorly controlled type 2 diabetes mellitus, hemoglobin A1c 9.6    PLAN:   Keaton Cervantes is a 66 y.o. man with a history of uncontrolled type 2 diabetes mellitus complicated by chronic right diabetic foot ulcer for 1 year, now worsening over the last 2 weeks prior to admission despite oral antibiotics admitted for worsening infection on 3/3/2022.  MRI of the right foot showed osteomyelitis of the first MTP joint, abscess and tenosynovitis.  He is now status post right great toe ray amputation through the metatarsal neck on 3/4/2022 by Dr. Madera.  Wound culture on 3/3 is positive for group A streptococcus.  Operative cultures are currently pending.  He is currently on Unasyn.    -Continue IV Unasyn  -Follow operative cultures-pending  -Follow wound cultures - group A strep, staph aureus. Follow staph aureus susceptibilities  -Continue wound care  -Will need to examine surgical site for any signs of surgical site infection to determine antibiotic course. However, review of wound care photos of right foot on 3/4 reveals extensive plantar ulcer of MTH with areas of necrosis surrounded by edema and erythema. At the very least, he will require a minimum of 2 weeks of abx for treatment of soft tissue infection.   -Diabetes education and blood sugar control    Plan of care discussed with BA Ibarra M.D.. Will continue to follow    Katherine Cook M.D.      Please note that this dictation was created using voice recognition  software. I have worked with technical experts from [facility] to optimize the interface.  I have made every reasonable attempt to correct obvious errors, but there may be errors of grammar and possibly content that I did not discover before finalizing the note.

## 2022-03-05 NOTE — PROGRESS NOTES
This charge RN was notified by CNA of pt sliding out of bed and landing on the floor. Primary RN notified of issue. This RN and primary RN evaluated pt at bedside. No injuries were assessed from evaluation. When asked about event pt stated that he was trying to reach for his water from his bedside table and overextended and slipped out of bed and consequently landed on the floor. Pt denies pain at this time. Pt is currently AOx4 and is able to recall all events of unwitnessed fall. Pt vitals uploaded into flowsheets. MD notified of unwitnessed fall. Pharmacy notified. Post fall huddle initiated.

## 2022-03-05 NOTE — CARE PLAN
The patient is Stable - Low risk of patient condition declining or worsening    Shift Goals  Clinical Goals: pain mgmt  Patient Goals: pain mgmt    Progress made toward(s) clinical / shift goals:    Problem: Pain - Standard  Goal: Alleviation of pain or a reduction in pain to the patient’s comfort goal  Outcome: Progressing     Problem: Knowledge Deficit - Standard  Goal: Patient and family/care givers will demonstrate understanding of plan of care, disease process/condition, diagnostic tests and medications  Outcome: Progressing     Problem: Hemodynamics  Goal: Patient's hemodynamics, fluid balance and neurologic status will be stable or improve  Outcome: Progressing     Problem: Fluid Volume  Goal: Fluid volume balance will be maintained  Outcome: Progressing       Pt is a/o X 4, pain is being controlled, no major signs of distress. Will continue to monitor.

## 2022-03-05 NOTE — ASSESSMENT & PLAN NOTE
MRI revealed R MTP joint osteomyelitis, septic arthritis and abscess  S/p R great toe ray amputation 3/4/22  CYNTHIA normal BLE  Operative cultures-group A streptococcus  Wound cultures on 3/3-MRSA, group A streptococcus, peptoniphilus  ID consulted, change abx to Vancomycin.   LPS following

## 2022-03-05 NOTE — CONSULTS
Diabetes education: Pt has a hx of diabetes on orals and Lantus  prior to admit, per med rec. Pt was admitted with blood sugar of 265 and Hg a1c of 9.6%. Pt is currently on Glargine 8 units in AM ( held for surgery today), and regular insulin per sliding scale coverage every six hours ( please change to ac and hs when pt is eating). Blood sugar was 151 ( 2 units).  Plan: Pt is currently in surgery. CDE to follow up with pt on Monday.

## 2022-03-05 NOTE — PROGRESS NOTES
RN was notified by charge RN that the pt slid down from his bed. This RN went to assess the patient, no signs of injury. Family member was notified. Post fall check list was completed. Post fall huddle was completed.  Pt is a/o X 4.  MD was notified. VS taken, pt educated on fall precaution. Bed alarm and non-skid socks implemented. Will continue to monitor.

## 2022-03-05 NOTE — DIETARY
Nutrition Services: Update   Day 2 of admit.  Keaton Cervantes is a 66 y.o. male with admitting DX of Wound infection     RD received consult for diabetes diet education. Pt declined type 1 diabetes diagnosis, states was not told which type he has. States has gone through diabetes class before and did not need additional education at this time. RD to remain available PRN.     Consult RD as needed.

## 2022-03-05 NOTE — PROGRESS NOTES
Assume care of pt at 0700. Report received from NOC RN. Pt is A/O x4. Pain is being controlled with PRN pain meds. Pt is resting in bed. Bed in lowest and locked position, call light within reach, hourly rounding in place. Labs reviewed. Communication board updated. Will continue to monitor.

## 2022-03-06 LAB
ANION GAP SERPL CALC-SCNC: 10 MMOL/L (ref 7–16)
BACTERIA WND AEROBE CULT: ABNORMAL
BASOPHILS # BLD AUTO: 0.9 % (ref 0–1.8)
BASOPHILS # BLD: 0.08 K/UL (ref 0–0.12)
BUN SERPL-MCNC: 6 MG/DL (ref 8–22)
CALCIUM SERPL-MCNC: 8.4 MG/DL (ref 8.5–10.5)
CHLORIDE SERPL-SCNC: 96 MMOL/L (ref 96–112)
CO2 SERPL-SCNC: 28 MMOL/L (ref 20–33)
CREAT SERPL-MCNC: 0.61 MG/DL (ref 0.5–1.4)
EOSINOPHIL # BLD AUTO: 0.17 K/UL (ref 0–0.51)
EOSINOPHIL NFR BLD: 2 % (ref 0–6.9)
ERYTHROCYTE [DISTWIDTH] IN BLOOD BY AUTOMATED COUNT: 39.2 FL (ref 35.9–50)
GLUCOSE BLD STRIP.AUTO-MCNC: 172 MG/DL (ref 65–99)
GLUCOSE BLD STRIP.AUTO-MCNC: 228 MG/DL (ref 65–99)
GLUCOSE BLD STRIP.AUTO-MCNC: 255 MG/DL (ref 65–99)
GLUCOSE BLD STRIP.AUTO-MCNC: 297 MG/DL (ref 65–99)
GLUCOSE SERPL-MCNC: 177 MG/DL (ref 65–99)
GRAM STN SPEC: ABNORMAL
GRAM STN SPEC: ABNORMAL
HCT VFR BLD AUTO: 33.8 % (ref 42–52)
HGB BLD-MCNC: 11.1 G/DL (ref 14–18)
IMM GRANULOCYTES # BLD AUTO: 0.03 K/UL (ref 0–0.11)
IMM GRANULOCYTES NFR BLD AUTO: 0.3 % (ref 0–0.9)
LYMPHOCYTES # BLD AUTO: 2.13 K/UL (ref 1–4.8)
LYMPHOCYTES NFR BLD: 24.6 % (ref 22–41)
MCH RBC QN AUTO: 29.4 PG (ref 27–33)
MCHC RBC AUTO-ENTMCNC: 32.8 G/DL (ref 33.7–35.3)
MCV RBC AUTO: 89.4 FL (ref 81.4–97.8)
MONOCYTES # BLD AUTO: 0.87 K/UL (ref 0–0.85)
MONOCYTES NFR BLD AUTO: 10 % (ref 0–13.4)
NEUTROPHILS # BLD AUTO: 5.38 K/UL (ref 1.82–7.42)
NEUTROPHILS NFR BLD: 62.2 % (ref 44–72)
NRBC # BLD AUTO: 0 K/UL
NRBC BLD-RTO: 0 /100 WBC
PLATELET # BLD AUTO: 343 K/UL (ref 164–446)
PMV BLD AUTO: 8.8 FL (ref 9–12.9)
POTASSIUM SERPL-SCNC: 4 MMOL/L (ref 3.6–5.5)
RBC # BLD AUTO: 3.78 M/UL (ref 4.7–6.1)
SIGNIFICANT IND 70042: ABNORMAL
SIGNIFICANT IND 70042: ABNORMAL
SITE SITE: ABNORMAL
SITE SITE: ABNORMAL
SODIUM SERPL-SCNC: 134 MMOL/L (ref 135–145)
SOURCE SOURCE: ABNORMAL
SOURCE SOURCE: ABNORMAL
WBC # BLD AUTO: 8.7 K/UL (ref 4.8–10.8)

## 2022-03-06 PROCEDURE — 85025 COMPLETE CBC W/AUTO DIFF WBC: CPT

## 2022-03-06 PROCEDURE — 700105 HCHG RX REV CODE 258: Performed by: STUDENT IN AN ORGANIZED HEALTH CARE EDUCATION/TRAINING PROGRAM

## 2022-03-06 PROCEDURE — 36415 COLL VENOUS BLD VENIPUNCTURE: CPT

## 2022-03-06 PROCEDURE — 99233 SBSQ HOSP IP/OBS HIGH 50: CPT | Performed by: INTERNAL MEDICINE

## 2022-03-06 PROCEDURE — 700105 HCHG RX REV CODE 258: Performed by: INTERNAL MEDICINE

## 2022-03-06 PROCEDURE — 99232 SBSQ HOSP IP/OBS MODERATE 35: CPT | Performed by: NURSE PRACTITIONER

## 2022-03-06 PROCEDURE — 80048 BASIC METABOLIC PNL TOTAL CA: CPT

## 2022-03-06 PROCEDURE — 700111 HCHG RX REV CODE 636 W/ 250 OVERRIDE (IP): Performed by: STUDENT IN AN ORGANIZED HEALTH CARE EDUCATION/TRAINING PROGRAM

## 2022-03-06 PROCEDURE — 99233 SBSQ HOSP IP/OBS HIGH 50: CPT | Performed by: STUDENT IN AN ORGANIZED HEALTH CARE EDUCATION/TRAINING PROGRAM

## 2022-03-06 PROCEDURE — 700102 HCHG RX REV CODE 250 W/ 637 OVERRIDE(OP): Performed by: INTERNAL MEDICINE

## 2022-03-06 PROCEDURE — 770001 HCHG ROOM/CARE - MED/SURG/GYN PRIV*

## 2022-03-06 PROCEDURE — 82962 GLUCOSE BLOOD TEST: CPT | Mod: 91

## 2022-03-06 PROCEDURE — 700111 HCHG RX REV CODE 636 W/ 250 OVERRIDE (IP): Performed by: INTERNAL MEDICINE

## 2022-03-06 PROCEDURE — A9270 NON-COVERED ITEM OR SERVICE: HCPCS | Performed by: INTERNAL MEDICINE

## 2022-03-06 RX ADMIN — VANCOMYCIN HYDROCHLORIDE 2250 MG: 500 INJECTION, POWDER, LYOPHILIZED, FOR SOLUTION INTRAVENOUS at 09:34

## 2022-03-06 RX ADMIN — INSULIN HUMAN 3 UNITS: 100 INJECTION, SOLUTION PARENTERAL at 16:55

## 2022-03-06 RX ADMIN — HYDROCODONE BITARTRATE AND ACETAMINOPHEN 1 TABLET: 5; 325 TABLET ORAL at 10:35

## 2022-03-06 RX ADMIN — ENOXAPARIN SODIUM 40 MG: 40 INJECTION SUBCUTANEOUS at 05:48

## 2022-03-06 RX ADMIN — INSULIN GLARGINE 8 UNITS: 100 INJECTION, SOLUTION SUBCUTANEOUS at 05:42

## 2022-03-06 RX ADMIN — SODIUM CHLORIDE 3 G: 900 INJECTION INTRAVENOUS at 05:39

## 2022-03-06 RX ADMIN — INSULIN HUMAN 2 UNITS: 100 INJECTION, SOLUTION PARENTERAL at 05:44

## 2022-03-06 RX ADMIN — VANCOMYCIN HYDROCHLORIDE 1250 MG: 500 INJECTION, POWDER, LYOPHILIZED, FOR SOLUTION INTRAVENOUS at 16:55

## 2022-03-06 RX ADMIN — HYDROCODONE BITARTRATE AND ACETAMINOPHEN 1 TABLET: 5; 325 TABLET ORAL at 16:55

## 2022-03-06 RX ADMIN — INSULIN HUMAN 5 UNITS: 100 INJECTION, SOLUTION PARENTERAL at 20:20

## 2022-03-06 RX ADMIN — INSULIN HUMAN 5 UNITS: 100 INJECTION, SOLUTION PARENTERAL at 12:00

## 2022-03-06 ASSESSMENT — COGNITIVE AND FUNCTIONAL STATUS - GENERAL
MOBILITY SCORE: 24
SUGGESTED CMS G CODE MODIFIER DAILY ACTIVITY: CH
SUGGESTED CMS G CODE MODIFIER MOBILITY: CH
DAILY ACTIVITIY SCORE: 24

## 2022-03-06 ASSESSMENT — ENCOUNTER SYMPTOMS
DIARRHEA: 0
ABDOMINAL PAIN: 0
PALPITATIONS: 0
COUGH: 0
SHORTNESS OF BREATH: 0
FLANK PAIN: 0
VOMITING: 0
SENSORY CHANGE: 0
FEVER: 0
NAUSEA: 0
SENSORY CHANGE: 1
CHILLS: 0

## 2022-03-06 ASSESSMENT — PAIN DESCRIPTION - PAIN TYPE
TYPE: ACUTE PAIN
TYPE: ACUTE PAIN

## 2022-03-06 NOTE — PROGRESS NOTES
Assume care of pt at 1900. Report received from day RN. Pt is A/O x4. Pain is 4/10. Pt is resting in bed. Bed in lowest and locked position, call light in reach, hourly rounding in place. Bed alarm in place. Labs reviewed. Communication board updated. Will continue to monitor.

## 2022-03-06 NOTE — CARE PLAN
Problem: Pain - Standard  Goal: Alleviation of pain or a reduction in pain to the patient’s comfort goal  Outcome: Progressing     Problem: Knowledge Deficit - Standard  Goal: Patient and family/care givers will demonstrate understanding of plan of care, disease process/condition, diagnostic tests and medications  Outcome: Progressing   The patient is Stable - Low risk of patient condition declining or worsening    Shift Goals  Clinical Goals: Increased ambulation, wound care  Patient Goals: Pain management  Family Goals: N/A    Progress made toward(s) clinical / shift goals:  Pt reporting 5/10 pain but 5 being their comfort level. Pt understands to alert this RN to when they require pain medication.     Patient is not progressing towards the following goals:

## 2022-03-06 NOTE — PROGRESS NOTES
Assumed care at 0700. Received report from NOC shift RN. Pt is awake and alert in bed, A&Ox 4, on RA, reports a pain level of 5/10, declines intervention at this time. Fall precautions and appropriate signs in place. Call light and belongings within reach. Bed alarm and hourly rounding in place. Communication board updated. Pt denies any additional needs at this time.

## 2022-03-06 NOTE — PROGRESS NOTES
Infectious Disease Progress Note    Author: Katherine Cook M.D. Date & Time of service: 3/6/2022  8:23 AM    Chief Complaint:  Follow-up for right foot osteomyelitis, abscess and cellulitis    Interval History:  3/6 afebrile, WBC 8.7 wound culture on 3/3 growing MRSA, peptoniphilus  and group B strep.  Patient tolerating IV antibiotics without any issues.    Labs Reviewed, Medications Reviewed and Wound Reviewed.    Review of Systems:  Review of Systems   Constitutional: Negative for fever.   Respiratory: Negative for cough and shortness of breath.    Gastrointestinal: Negative for abdominal pain, diarrhea, nausea and vomiting.   Neurological: Negative for sensory change.       Hemodynamics:  Temp (24hrs), Av.9 °C (98.4 °F), Min:36.3 °C (97.3 °F), Max:37.3 °C (99.2 °F)  Temperature: 37.3 °C (99.2 °F)  Pulse  Av.9  Min: 66  Max: 99   Blood Pressure : 133/77       Physical Exam:  Physical Exam  Vitals and nursing note reviewed.   HENT:      Mouth/Throat:      Mouth: Mucous membranes are moist.   Eyes:      Extraocular Movements: Extraocular movements intact.      Conjunctiva/sclera: Conjunctivae normal.      Pupils: Pupils are equal, round, and reactive to light.   Cardiovascular:      Rate and Rhythm: Normal rate.   Pulmonary:      Effort: Pulmonary effort is normal. No respiratory distress.      Breath sounds: No wheezing.   Abdominal:      Palpations: Abdomen is soft.   Musculoskeletal:      Right lower leg: Edema present.      Comments: Evidence of right great toe through metatarsal head.  Surgical sites well approximated with sutures in place.  There is significant surrounding edema but improving erythema   Skin:     General: Skin is warm.   Neurological:      General: No focal deficit present.      Mental Status: He is alert and oriented to person, place, and time.   Psychiatric:         Mood and Affect: Mood normal.         Behavior: Behavior normal.         Meds:    Current Facility-Administered  Medications:   •  albuterol  •  HYDROcodone-acetaminophen  •  insulin GLARGINE  •  senna-docusate **AND** polyethylene glycol/lytes **AND** magnesium hydroxide **AND** bisacodyl  •  enoxaparin  •  acetaminophen  •  ampicillin-sulbactam (UNASYN) IV  •  ondansetron  •  ondansetron  •  insulin regular **AND** POC blood glucose manual result **AND** NOTIFY MD and PharmD **AND** Administer 20 grams of glucose (approximately 8 ounces of fruit juice) every 15 minutes PRN FSBG less than 70 mg/dL **AND** dextrose 50%    Labs:  Recent Labs     03/03/22  1327 03/04/22  0130 03/06/22  0127   WBC 13.2* 9.7 8.7   RBC 4.29* 4.18* 3.78*   HEMOGLOBIN 12.7* 12.2* 11.1*   HEMATOCRIT 39.0* 38.1* 33.8*   MCV 90.9 91.1 89.4   MCH 29.6 29.2 29.4   RDW 40.3 40.7 39.2   PLATELETCT 353 324 343   MPV 9.2 9.1 8.8*   NEUTSPOLYS 80.40* 67.30 62.20   LYMPHOCYTES 11.60* 21.80* 24.60   MONOCYTES 6.50 8.10 10.00   EOSINOPHILS 0.30 1.40 2.00   BASOPHILS 0.70 1.00 0.90     Recent Labs     03/03/22  1327 03/04/22  0130 03/06/22  0127   SODIUM 127* 130* 134*   POTASSIUM 4.5 4.0 4.0   CHLORIDE 91* 93* 96   CO2 20 26 28   GLUCOSE 255* 224* 177*   BUN 15 13 6*     Recent Labs     03/03/22  1327 03/04/22  0130 03/06/22  0127   ALBUMIN 3.9  --   --    TBILIRUBIN 0.6  --   --    ALKPHOSPHAT 62  --   --    TOTPROTEIN 8.0  --   --    ALTSGPT 38  --   --    ASTSGOT 45  --   --    CREATININE 0.64 0.70 0.61       Imaging:  DX-CHEST-PORTABLE (1 VIEW)    Result Date: 3/3/2022  3/3/2022 2:57 PM HISTORY/REASON FOR EXAM:  possible sepsis. TECHNIQUE/EXAM DESCRIPTION AND NUMBER OF VIEWS: Single portable view of the chest. COMPARISON: 7/28/2009 FINDINGS: HEART: Not enlarged. LUNGS: No areas of air space disease are demonstrated. PLEURA: No effusion or pneumothorax. Left costophrenic angle is not entirely included in the field-of-view.     1.  No acute cardiac or pulmonary abnormalities are identified.    DX-FOOT-COMPLETE 3+ RIGHT    Result Date: 3/3/2022  3/3/2022 3:27 PM  HISTORY/REASON FOR EXAM:  r/o osteo TECHNIQUE/EXAM DESCRIPTION AND NUMBER OF VIEWS: 3 views of the RIGHT foot. COMPARISON:  None. FINDINGS: Decreased osseous mineralization. No acute fracture is noted. There is no dislocation.  No bone erosion is noted. Soft tissue edema and gas of the medial right foot projecting over the great toe metatarsophalangeal joint.     1.  Soft tissue edema and gas of the medial right forefoot projecting over the great toe metatarsophalangeal joint. This is concerning for abscess versus necrotic infection. 2.  No radiographic evidence of acute osteomyelitis.. If there is clinical concern for radiographically occult osteomyelitis, MRI can be obtained.    MR-FOOT-WITH & W/O RIGHT    Result Date: 3/4/2022  3/3/2022 8:04 PM HISTORY/REASON FOR EXAM: Right foot pain and infection. Diabetic foot ulcer. TECHNIQUE/EXAM DESCRIPTION: MRI of the RIGHT foot with contrast. Using a [employer] Signa 1.5 Елена MRI scanner, T1 axial, sagittal, and coronal, fast spin-echo T2 fat-suppressed axial and coronal, fast inversion recovery sagittal, and T1 fat suppressed axial and sagittal post intravenous contrast images were obtained. A total of 20 mL ProHance given. COMPARISON: X-ray 3/3/2022 FINDINGS: Osseous structures/Cartilaginous surfaces: There is marrow edema and enhancement involving the medial and lateral sesamoids. There is a first MTP joint effusion with synovial enhancement. Ligaments/Tendons: There is tenosynovitis involving the flexor hallucis longus and abductor hallucis tendons. Miscellaneous: There is extensive cellulitis involving the forefoot most prominently medially. There is soft tissue ulceration involving the plantar aspect of the forefoot medially adjacent to the first MTP joint. There is subcutaneous fluid in that area  with rim enhancement which appears to communicate with the area of ulceration.     1.  Marrow edema and enhancement of the sesamoids of the first MTP joint likely representing  osteomyelitis. 2.  First MTP joint effusion possibly representing septic arthritis. 3.  Large soft tissue ulceration involving the medial/plantar aspect of the forefoot in the area of the first MTP joint with surrounding cellulitis and some rim-enhancing subcutaneous fluid consistent with abscess which communicates with that ulceration. 4.  Tenosynovitis of the flexor hallucis longus tendon and the abductor pollicis tendon likely infectious in nature.    US-CYNTHIA SINGLE LEVEL BILAT    Result Date: 3/4/2022   Vascular Laboratory  Conclusions  Right.  The ankle-brachial index is normal.  Left.  The ankle-brachial index is normal.  LUCHO PETERSON  Age:    66    Gender:     M  MRN:    [id number]  :    1955      BSA:  Exam Date:     2022 14:00  Room #:     Inpatient  Priority:     Routine  Ht (in):             Wt (lb):  Ordering Physician:        ANTWAN BOWER  Referring Physician:       [id number] Partida  Sonographer:               Rosmery Campuzano RVT  Study Type:                Complete Bilateral  Technical Quality:         Adequate  Indications:     Type 2 diabetes mellitus with foot ulcer  CPT Codes:       42236  ICD Codes:       E11.621  History:         Ulceration of right mid foot and plantar area. No prior exam.  Limitations:                 RIGHT  Waveform            Systolic BPs (mmHg)                             138           Brachial  Triphasic                                Common Femoral  Triphasic                                Popliteal  Hyperemic                  174           Posterior Tibial  Biphasic                   157           Dorsalis Pedis  Normal                     74            Digit                             1.26          CYNTHIA                             0.54          TBI                       LEFT  Waveform        Systolic BPs (mmHg)                             127           Brachial  Triphasic                                 Common Femoral  Triphasic                                Popliteal  Triphasic                  177           Posterior Tibial  Triphasic                  162           Dorsalis Pedis  Normal                     115           Digit                             1.28          CYNTHIA                             0.83          TBI  Findings  Right.  The ankle-brachial index is normal.  Toe-brachial indices are reduced, suggesting microvascular disease.  PPG waveforms of the great toe are normal.  Doppler waveforms of the common femoral and popliteal arteries are of high  amplitude and triphasic.  Doppler waveform of the posterior tibial is hyperemic.  Doppler waveform of the dorsalis pedis is of high amplitude and biphasic.  Left.  The ankle-brachial index is normal.  Toe-brachial index is normal.  Doppler waveforms of the common femoral, popliteal, posterior tibial, and  dorsalis pedis arteries are of high amplitude and triphasic.  PPG waveforms of the great toe are normal.  Additional testing was not performed in accordance with lower extremity  arterial evaluation protocol.  Manjit Dykes MD  (Electronically Signed)  Final Date:      2022                   15:00    US-EXTREMITY VENOUS LOWER UNILAT RIGHT    Result Date: 3/3/2022   Vascular Laboratory  CONCLUSIONS  Right lower extremity venous duplex imaging.  No deep venous thrombosis.  LUCHO PETERSON  Exam Date:     2022 15:49  Room #:     Inpatient  Priority:     Stat  Ht (in):             Wt (lb):  Ordering Physician:        JOSE LAFLEUR  Referring Physician:       [id number] Guo  Sonographer:               Jj Sue RVISSAC  Study Type:                Complete Unilateral  Technical Quality:         Adequate  Age:    66    Gender:     M  MRN:    [id number]  :    1955      BSA:  Indications:     Pain in right lower leg, Edema, Localized swelling, mass and                    lump, right lower limb  CPT Codes:       48226   ICD Codes:       M79.661  782.3  R22.41  History:         right foot diabetic ulcer for one year now worse & spreading                    up calf for 2 weeks with erythema & edema; no prior exam  Limitations:  PROCEDURES:  Right lower extremity venous duplex imaging.  Serial compression, color, and spectral Doppler flow evaluations were  performed.  FINDINGS:  RIGHT LEG:  No deep venous thrombosis.  All veins demonstrate complete color filling and compressibility with  normal venous flow dynamics including spontaneous flow and respiratory  phasicity.  Steph SANCHEZ To  (Electronically Signed)  Final Date:      03 March 2022                   16:38      Micro:  Results     Procedure Component Value Units Date/Time    Culture Wound W/ Gram Stain [[id number]]  (Abnormal)  (Susceptibility) Collected: 03/03/22 1610    Order Status: Completed Specimen: Wound from Left Foot Updated: 03/06/22 0812     Significant Indicator POS     Source WND     Site Right Foot     Culture Result -     Gram Stain Result Rare WBCs.  Rare Gram positive cocci.       Culture Result Streptococcus pyogenes (Group A)  Moderate growth        Methicillin Resistant Staphylococcus aureus  Light growth        Anaerobic Gram positive cocci  Moderate growth  Peptoniphilus gorbachii  Organism identified by MALDI-TOF research use only library.      Narrative:      CALL  Wright  MS5 tel. [phone],  CALLED  MS5 tel. [phone] 03/04/2022, 14:37, RB PERF. RESULTS CALLED TO: RN  [phone]    Susceptibility     Methicillin resistant staphylococcus aureus (2)     Antibiotic Interpretation Microscan   Method Status    Azithromycin Sensitive <=2 mcg/mL MARCIAL Final    Clindamycin Sensitive <=0.25 mcg/mL MARCIAL Final    Cefazolin Resistant 16 mcg/mL MARCIAL Final    Cefepime Resistant >16 mcg/mL MARCIAL Final    Ceftaroline Sensitive <=0.5 mcg/mL MARCIAL Final    Daptomycin Sensitive 1 mcg/mL MARCIAL Final    Ampicillin/sulbactam Resistant <=8/4 mcg/mL MARCIAL Final    Erythromycin Sensitive  "<=0.25 mcg/mL MARCIAL Final    Vancomycin Sensitive 1 mcg/mL MARCIAL Final    Oxacillin Resistant >2 mcg/mL MARCIAL Final    Trimeth/Sulfa Sensitive <=0.5/9.5 mcg/mL MARCIAL Final    Tetracycline Sensitive <=4 mcg/mL MARCIAL Final                   Blood Culture [[id number]] Collected: 03/05/22 2002    Order Status: Sent Specimen: Blood from Peripheral Updated: 03/05/22 2041    Narrative:      Per Hospital Policy: Only change Specimen Src: to \"Line\" if  specified by physician order.    Blood Culture [[id number]] Collected: 03/05/22 2002    Order Status: Sent Specimen: Blood from Peripheral Updated: 03/05/22 2041    Narrative:      Per Hospital Policy: Only change Specimen Src: to \"Line\" if  specified by physician order.    CULTURE WOUND W/ GRAM STAIN [[id number]]  (Abnormal) Collected: 03/04/22 1605    Order Status: Completed Specimen: Wound Updated: 03/05/22 1908     Significant Indicator POS     Source WND     Site Right Great Toe     Culture Result Growth noted after further incubation, see below for  organism identification.       Gram Stain Result Moderate WBCs.  Many Gram positive cocci.       Culture Result Streptococcus pyogenes (Group A)  Heavy growth      Narrative:      CALL  Wright  MS5 tel. [phone],  CALLED  MS5 tel. [phone] 03/05/2022, 17:35, RB PERF. RESULTS CALLED TO: RN  [id number]  Surgery - swabs received    Anaerobic Culture [[id number]] Collected: 03/04/22 1605    Order Status: Completed Specimen: Wound Updated: 03/05/22 1908     Significant Indicator NEG     Source WND     Site Right Great Toe     Culture Result Culture in progress.    Narrative:      CALL  Wright  MS5 tel. [phone],  CALLED  MS5 tel. [phone] 03/05/2022, 17:35, RB PERF. RESULTS CALLED TO: RN  [id number]  Surgery - swabs received    Fungal Culture [[id number]] Collected: 03/04/22 1605    Order Status: Completed Specimen: Wound Updated: 03/05/22 1908     Significant Indicator NEG     Source WND     Site Right Great Toe     Culture Result Culture in " "progress.     Fungal Smear Results No fungal elements seen.    Narrative:      CALL  Wright  MS5 tel. [phone],  CALLED  MS5 tel. [phone] 03/05/2022, 17:35, RB PERF. RESULTS CALLED TO: RN  [id number]  Surgery - swabs received    Fungal Smear [[id number]] Collected: 03/04/22 1605    Order Status: Completed Specimen: Wound Updated: 03/04/22 2204     Significant Indicator NEG     Source WND     Site Right Great Toe     Fungal Smear Results No fungal elements seen.    Narrative:      Surgery - swabs received    GRAM STAIN [[id number]] Collected: 03/04/22 1605    Order Status: Completed Specimen: Wound Updated: 03/04/22 2204     Significant Indicator .     Source WND     Site Right Great Toe     Gram Stain Result Moderate WBCs.  Many Gram positive cocci.      Narrative:      Surgery - swabs received    GRAM STAIN [[id number]] Collected: 03/03/22 1610    Order Status: Completed Specimen: Wound Updated: 03/04/22 0903     Significant Indicator .     Source WND     Site Right Foot     Gram Stain Result Rare WBCs.  Rare Gram positive cocci.      BLOOD CULTURE [[id number]] Collected: 03/03/22 1327    Order Status: Completed Specimen: Blood from Peripheral Updated: 03/04/22 0735     Significant Indicator NEG     Source BLD     Site PERIPHERAL     Culture Result No Growth  Note: Blood cultures are incubated for 5 days and  are monitored continuously.Positive blood cultures  are called to the RN and reported as soon as  they are identified.      Narrative:      Per Hospital Policy: Only change Specimen Src: to \"Line\" if  specified by physician order.  Right AC    BLOOD CULTURE [[id number]] Collected: 03/03/22 1327    Order Status: Completed Specimen: Blood from Peripheral Updated: 03/04/22 0735     Significant Indicator NEG     Source BLD     Site PERIPHERAL     Culture Result No Growth  Note: Blood cultures are incubated for 5 days and  are monitored continuously.Positive blood cultures  are called to the RN and reported as soon " "as  they are identified.      Narrative:      Per Hospital Policy: Only change Specimen Src: to \"Line\" if  specified by physician order.  Left AC    MRSA By PCR (Amp) [[id number]] Collected: 03/03/22 1610    Order Status: Completed Specimen: Respirate from Nares Updated: 03/03/22 1741     MRSA by PCR Negative    Blood Culture [[id number]]     Order Status: Canceled Specimen: Blood from Peripheral     Blood Culture [[id number]]     Order Status: Canceled Specimen: Blood from Peripheral           Assessment:  Active Hospital Problems    Diagnosis    • *Osteomyelitis of right foot (HCC) [M86.9]    • Acute respiratory failure (HCC) [J96.00]    • Sepsis (HCC) [A41.9]    • Diabetes (HCC) [E11.9]    • Hypertension [I10]    • ELLEN (obstructive sleep apnea) [G47.33]      66 y.o. man with a history of uncontrolled type 2 diabetes mellitus complicated by chronic right diabetic foot ulcer for 1 year, now worsening over the last 2 weeks prior to admission despite oral antibiotics admitted for worsening infection on 3/3/2022.  MRI of the right foot showed osteomyelitis of the first MTP joint, abscess and tenosynovitis.  He is now status post right great toe ray amputation through the metatarsal neck on 3/4/2022 by Dr. Madera.  Wound culture on 3/3 is positive for group A streptococcus and MRSA.  Operative cultures are currently growing group A streptococcus.     Pertinent diagnosis:  Right foot osteomyelitis  Right foot abscess with cellulitis  Tenosynovitis  MRSA infection  Group A streptococcal infection  Poorly controlled type 2 diabetes mellitus, hemoglobin A1c 9.6    Plan:  -Discontinue IV Unasyn  -Start IV vancomycin  -Monitor renal function and Vanco trough  -Follow operative cultures-group A streptococcus  -Follow wound cultures on 3/3-MRSA, group A streptococcus, peptoniphilus  -Follow blood cultures on 3/5-NGTD  -Continue wound care  -Will plan a 6-week course of antibiotics given extent of infection and concern for " residual osteomyelitis. Stop date 04/15/2022  -Diabetes education and blood sugar control  -At discharge, can transition to Augmentin 875 mg twice daily and doxycycline 100 mg twice daily to complete antibiotic course  -Patient would like to follow-up at the [facility]-please provide referral     Follow-up in the ID clinic    Discussed with internal medicine/Dr Ibarra.  ID signing off

## 2022-03-06 NOTE — CARE PLAN
The patient is Stable - Low risk of patient condition declining or worsening    Shift Goals  Clinical Goals: wound healing  Patient Goals: pain management    Progress made toward(s) clinical / shift goals:  Dressing on pt's right foot with small amount of dry blood. No active bleeding noted. Awaiting for LPS/wound consult for dressing orders. Medicated pt with 4/10 pain, pt able to sleep comfortably in bed.    Patient is not progressing towards the following goals:

## 2022-03-06 NOTE — PROGRESS NOTES
Pharmacy Vancomycin Kinetics Note for 3/6/2022     66 y.o. male on Vancomycin day # 1     Vancomycin Indication (AUC Dosing): Osteomyelitis     Provider specified end date: 03/13/22 (7 days ordered by ID)    Active Antibiotics (From admission, onward)    Ordered     Ordering Provider       Sun Mar 6, 2022 11:32 AM    03/06/22 1132  vancomycin (VANCOCIN) 1,250 mg in  mL IVPB  (vancomycin (VANCOCIN) IV (LD + Maintenance))  EVERY 8 HOURS         Alan Ibarra M.D.       Sun Mar 6, 2022  8:38 AM    03/06/22 0838  vancomycin (VANCOCIN) 2,250 mg in  mL IVPB  (vancomycin (VANCOCIN) IV (LD + Maintenance))  ONCE         Katherine Cook M.D.       Sun Mar 6, 2022  8:29 AM    03/06/22 0829  MD Alert...Vancomycin per Pharmacy  PHARMACY TO DOSE        Question:  Indication(s) for vancomycin?  Answer:  Osteomyelitis    Katherine Cook M.D.          Dosing Weight: 90.2 kg (198 lb 13.7 oz)      Admission History: Admitted on 3/3/2022 for Wound infection [T14.8XXA, L08.9]  Pertinent history: MRI of the right foot showed osteomyelitis of the first MTP joint, abscess and tenosynovitis.  He is now s/p right great toe amputation with positive wound cultures and concern for residual osteomyelitis. ID consulted and recommending 6 weeks of abx.    Allergies:     Sulfa drugs, Flu virus vaccine, Other food, Pneumococcal vaccines, and Nutrasweet aspartame [aspartame]     Pertinent cultures to date:     Results     Procedure Component Value Units Date/Time    CULTURE WOUND W/ GRAM STAIN [[id number]]  (Abnormal) Collected: 03/04/22 1605    Order Status: Completed Specimen: Wound Updated: 03/06/22 0920     Significant Indicator POS     Source WND     Site Right Great Toe     Culture Result Growth noted after further incubation, see below for  organism identification.       Gram Stain Result Moderate WBCs.  Many Gram positive cocci.       Culture Result Streptococcus pyogenes (Group A)  Heavy growth        Methicillin Resistant  "Staphylococcus aureus  Moderate growth  See previous culture for sensitivity report.      Narrative:      CALL  Wright  [facility] tel. [phone],  CALLED  [facility] tel. [phone] 03/05/2022, 17:35, RB PERF. RESULTS CALLED TO: RN  [id number]  Surgery - swabs received    Anaerobic Culture [[id number]] Collected: 03/04/22 1605    Order Status: Completed Specimen: Wound Updated: 03/06/22 0920     Significant Indicator NEG     Source WND     Site Right Great Toe     Culture Result Culture in progress.    Narrative:      CALL  Wright  [facility] tel. [phone],  CALLED  [facility] tel. [phone] 03/05/2022, 17:35, RB PERF. RESULTS CALLED TO: RN  [id number]  Surgery - swabs received    Fungal Culture [[id number]] Collected: 03/04/22 1605    Order Status: Completed Specimen: Wound Updated: 03/06/22 0920     Significant Indicator NEG     Source WND     Site Right Great Toe     Culture Result Culture in progress.     Fungal Smear Results No fungal elements seen.    Narrative:      CALL  Wright  [facility] [place] tel. [phone],  CALLED  [facility] [place] tel. [phone] 03/05/2022, 17:35, RB PERF. RESULTS CALLED TO: RN  [id number]  Surgery - swabs received    Blood Culture [[id number]] Collected: 03/05/22 2002    Order Status: Completed Specimen: Blood from Peripheral Updated: 03/06/22 0843     Significant Indicator NEG     Source BLD     Site PERIPHERAL     Culture Result No Growth  Note: Blood cultures are incubated for 5 days and  are monitored continuously.Positive blood cultures  are called to the RN and reported as soon as  they are identified.      Narrative:      Per Hospital Policy: Only change Specimen Src: to \"Line\" if  specified by physician order.  Right AC    Blood Culture [[id number]] Collected: 03/05/22 2002    Order Status: Completed Specimen: Blood from Peripheral Updated: 03/06/22 0843     Significant Indicator NEG     Source BLD     Site PERIPHERAL     Culture Result No Growth  Note: Blood cultures are incubated for 5 days and  are monitored continuously.Positive blood " "cultures  are called to the RN and reported as soon as  they are identified.      Narrative:      Per Hospital Policy: Only change Specimen Src: to \"Line\" if  specified by physician order.  Left AC    Culture Wound W/ Gram Stain [[id number]]  (Abnormal)  (Susceptibility) Collected: 03/03/22 1610    Order Status: Completed Specimen: Wound from Left Foot Updated: 03/06/22 0812     Significant Indicator POS     Source WND     Site Right Foot     Culture Result -     Gram Stain Result Rare WBCs.  Rare Gram positive cocci.       Culture Result Streptococcus pyogenes (Group A)  Moderate growth        Methicillin Resistant Staphylococcus aureus  Light growth        Anaerobic Gram positive cocci  Moderate growth  Peptoniphilus gorbachii  Organism identified by MALDI-TOF research use only library.      Narrative:      CALL  Wright  [facility] tel. [phone],  CALLED  [facility] tel. [phone] 03/04/2022, 14:37, RB PERF. RESULTS CALLED TO: RN  [id number]    Fungal Smear [[id number]] Collected: 03/04/22 1605    Order Status: Completed Specimen: Wound Updated: 03/04/22 2204     Significant Indicator NEG     Source WND     Site Right Great Toe     Fungal Smear Results No fungal elements seen.    Narrative:      Surgery - swabs received    GRAM STAIN [[id number]] Collected: 03/04/22 1605    Order Status: Completed Specimen: Wound Updated: 03/04/22 2204     Significant Indicator .     Source WND     Site Right Great Toe     Gram Stain Result Moderate WBCs.  Many Gram positive cocci.      Narrative:      Surgery - swabs received    GRAM STAIN [[id number]] Collected: 03/03/22 1610    Order Status: Completed Specimen: Wound Updated: 03/04/22 0903     Significant Indicator .     Source WND     Site Right Foot     Gram Stain Result Rare WBCs.  Rare Gram positive cocci.      BLOOD CULTURE [[id number]] Collected: 03/03/22 1327    Order Status: Completed Specimen: Blood from Peripheral Updated: 03/04/22 0735     Significant Indicator NEG     Source BLD     Site " "PERIPHERAL     Culture Result No Growth  Note: Blood cultures are incubated for 5 days and  are monitored continuously.Positive blood cultures  are called to the RN and reported as soon as  they are identified.      Narrative:      Per Hospital Policy: Only change Specimen Src: to \"Line\" if  specified by physician order.  Right AC    BLOOD CULTURE [[id number]] Collected: 22 1327    Order Status: Completed Specimen: Blood from Peripheral Updated: 22 0735     Significant Indicator NEG     Source BLD     Site PERIPHERAL     Culture Result No Growth  Note: Blood cultures are incubated for 5 days and  are monitored continuously.Positive blood cultures  are called to the RN and reported as soon as  they are identified.      Narrative:      Per Hospital Policy: Only change Specimen Src: to \"Line\" if  specified by physician order.  Left AC    MRSA By PCR (Amp) [[id number]] Collected: 22 1610    Order Status: Completed Specimen: Respirate from Nares Updated: 22 1741     MRSA by PCR Negative    Blood Culture [[id number]]     Order Status: Canceled Specimen: Blood from Peripheral     Blood Culture [[id number]]     Order Status: Canceled Specimen: Blood from Peripheral           Labs:     Estimated Creatinine Clearance: 132.5 mL/min (by C-G formula based on SCr of 0.61 mg/dL).  Recent Labs     22  13222  0130 22  012   WBC 13.2* 9.7 8.7   NEUTSPOLYS 80.40* 67.30 62.20     Recent Labs     22  1327 22  0130 22  012   BUN 15 13 6*   CREATININE 0.64 0.70 0.61   ALBUMIN 3.9  --   --        Intake/Output Summary (Last 24 hours) at 3/6/2022 1133  Last data filed at 3/5/2022 1300  Gross per 24 hour   Intake 240 ml   Output --   Net 240 ml      /77   Pulse 88   Temp 37.3 °C (99.2 °F) (Temporal)   Resp 16   Ht 1.829 m (6')   Wt 90.2 kg (198 lb 13.7 oz)   SpO2 98%  Temp (24hrs), Av.9 °C (98.4 °F), Min:36.3 °C (97.3 °F), Max:37.3 °C (99.2 °F)      List concerns for " Vancomycin clearance:     Age;Receipt of contrast dye    Pharmacokinetics:     AUC kinetics:   Ke (hr ^-1): 0.1156 hr^-1  Half life: 6 hr  Clearance: 6.778  Estimated TDD: 3389  Estimated Dose: 1130  Estimated interval: 8    A/P:     -  Vancomycin dose: 2250 mg IV x 1, then 1250 mg IV q8h    -  Next vancomycin level(s):    -3/7  @ 1900 (after 4th maintenance dose)   -3/8 Vt @ 0030     -  Predicted vancomycin AUC from initial AUC test calculator: 553 mg·hr/L    -  Comments: ID consulting for positive wound cultures and osteomyelitis. Unasyn was d/c'd today, pt on vancomycin only. Plan to continue 6 weeks abx per ID note, ok to d/c on Augmentin and doxycycline. Pharmacy will monitor and follow while on vancomycin.    Kimberly Fair, PharmD, BCOP

## 2022-03-06 NOTE — PROGRESS NOTES
" [facility] SERVICE   POST SURGICAL PROGRESS NOTE      HPI:  66 y.o.  with a past medical history that includes type 2 diabetes, HTN, ELLEN. Admitted 3/3/2022 for Wound infection [T14.8XXA, L08.9].     LPS has been consulted for non healing diabetic foot wound to the plantar surface of the right first MTH  with infection.  The ulcer started approximately one year ago. He states that it would begin to heal and then open up again repeatedly. He has been seeing the Wound Care service at [facility]. He states that it again appeared to be healing and then approximately two weeks ago it \"erupted\" on the medial side of the first MTH.   He states that he has had consecutive 10-14 day courses of Bactrim and Augmentin for the past several weeks. He states that he was at his wound care clinic visit and was told to present to the ED for IV abx.  Patient denied fevers, chills, nausea, vomiting.      IV antibiotics were started on this admission.  Infectious diseases has not been consulted yet.  Xray completed and is negative for osteomyelitis but wound probes to bone on the medial and plantar aspect and MRI showed positive for ostemylitis. Ortho has been consulted.     Diagnosed with diabetes \"years ago\". Does have numbness to feet.  Checks feet routinely. Usually wears OTC shoes . Does not have diabetic shoes and inserts but states that he was waiting to have these made after the Ulcer healed. Has not had previous foot surgeries.        SURGERY DATE: 3/4/22 by Dr. Madera  PROCEDURE: Right great toe ray amputation      INTERVAL HISTORY:  3/6/2022: POD #2.  Patient denies fevers, chills, nausea, vomiting.  Pain well controlled.       PERTINENT LPS RESULTS:   Pathology: pending  OR culture: Wound culture positive for strep A and MRSA    SURGICAL SITE EXAM:      /77   Pulse 88   Temp 37.3 °C (99.2 °F) (Temporal)   Resp 16   Ht 1.829 m (6')   Wt 90.2 kg (198 lb 13.7 oz)   SpO2 98%   BMI 26.97 kg/m²     Sensation: " insensate  Surgical foot warm     Pedal Pulses:   R foot: palpable DP, palpable PT  L foot: palpable DP, palpable PT                Incision   location: Right first ray amputation   Incision well approximated, sutures intact.   Moderate erythema  Severe edema  Minimal moderate serosanguineous drainage to medial aspect          dressing care completed by MARCEL APRN. Applied Aquacel Ag. Secured with nonadhesive foam, Hypafix tape, Tubigrip D.      Patient shown how to apply dressings.  Discussed dressing care, showering        Photo(s):   Medial side    Plantar side                      DIABETES MANAGEMENT:    Blood glucose:     A1c:   Lab Results   Component Value Date/Time    HBA1C 9.6 (H) 03/03/2022 01:27 PM            INFECTION MANAGEMENT:    Results from last 7 days   Lab Units 03/06/22  0127 03/04/22  0130 03/03/22  1327   WBC 1501 K/uL 8.7 9.7 13.2*   PLATELET COUNT 1518 K/uL 343 324 353     Wound culture results:   Results     Procedure Component Value Units Date/Time    CULTURE WOUND W/ GRAM STAIN [[id number]]  (Abnormal) Collected: 03/04/22 1605    Order Status: Completed Specimen: Wound Updated: 03/06/22 0920     Significant Indicator POS     Source WND     Site Right Great Toe     Culture Result Growth noted after further incubation, see below for  organism identification.       Gram Stain Result Moderate WBCs.  Many Gram positive cocci.       Culture Result Streptococcus pyogenes (Group A)  Heavy growth        Methicillin Resistant Staphylococcus aureus  Moderate growth  See previous culture for sensitivity report.      Narrative:      CALL  Wright  [facility] tel. [phone],  CALLED  [facility] tel. [phone] 03/05/2022, 17:35, RB PERF. RESULTS CALLED TO: RN  [phone]  Surgery - swabs received    Anaerobic Culture [[id number]] Collected: 03/04/22 1605    Order Status: Completed Specimen: Wound Updated: 03/06/22 0920     Significant Indicator NEG     Source WND     Site Right Great Toe     Culture Result Culture in progress.  "   Narrative:      CALL  Wright  [facility] tel. [phone],  CALLED  [facility] tel. [phone] 03/05/2022, 17:35, RB PERF. RESULTS CALLED TO: RN  [phone]  Surgery - swabs received    Fungal Culture [[id number]] Collected: 03/04/22 1605    Order Status: Completed Specimen: Wound Updated: 03/06/22 0920     Significant Indicator NEG     Source WND     Site Right Great Toe     Culture Result Culture in progress.     Fungal Smear Results No fungal elements seen.    Narrative:      CALL  Wright  [facility] tel. [phone],  CALLED  [facility] tel. [phone] 03/05/2022, 17:35, RB PERF. RESULTS CALLED TO: RN  [phone]  Surgery - swabs received    Blood Culture [[id number]] Collected: 03/05/22 2002    Order Status: Completed Specimen: Blood from Peripheral Updated: 03/06/22 0843     Significant Indicator NEG     Source BLD     Site PERIPHERAL     Culture Result No Growth  Note: Blood cultures are incubated for 5 days and  are monitored continuously.Positive blood cultures  are called to the RN and reported as soon as  they are identified.      Narrative:      Per Hospital Policy: Only change Specimen Src: to \"Line\" if  specified by physician order.  Right AC    Blood Culture [[id number]] Collected: 03/05/22 2002    Order Status: Completed Specimen: Blood from Peripheral Updated: 03/06/22 0843     Significant Indicator NEG     Source BLD     Site PERIPHERAL     Culture Result No Growth  Note: Blood cultures are incubated for 5 days and  are monitored continuously.Positive blood cultures  are called to the RN and reported as soon as  they are identified.      Narrative:      Per Hospital Policy: Only change Specimen Src: to \"Line\" if  specified by physician order.  Left AC    Culture Wound W/ Gram Stain [[id number]]  (Abnormal)  (Susceptibility) Collected: 03/03/22 1610    Order Status: Completed Specimen: Wound from Left Foot Updated: 03/06/22 0812     Significant Indicator POS     Source WND     Site Right Foot     Culture Result -     Gram Stain Result Rare " WBCs.  Rare Gram positive cocci.       Culture Result Streptococcus pyogenes (Group A)  Moderate growth        Methicillin Resistant Staphylococcus aureus  Light growth        Anaerobic Gram positive cocci  Moderate growth  Peptoniphilus gorbachii  Organism identified by MALDI-TOF research use only library.      Narrative:      CALL  Wright  [facility] tel. [phone],  CALLED  MS[facility] tel. [phone] 03/04/2022, 14:37, RB PERF. RESULTS CALLED TO: RN  [phone]    Susceptibility     Methicillin resistant staphylococcus aureus (2)     Antibiotic Interpretation Microscan   Method Status    Azithromycin Sensitive <=2 mcg/mL MARCIAL Final    Clindamycin Sensitive <=0.25 mcg/mL MARCIAL Final    Cefazolin Resistant 16 mcg/mL MARCIAL Final    Cefepime Resistant >16 mcg/mL MARCIAL Final    Ceftaroline Sensitive <=0.5 mcg/mL MARCIAL Final    Daptomycin Sensitive 1 mcg/mL MARCIAL Final    Ampicillin/sulbactam Resistant <=8/4 mcg/mL MARCIAL Final    Erythromycin Sensitive <=0.25 mcg/mL MARCIAL Final    Vancomycin Sensitive 1 mcg/mL MARCIAL Final    Oxacillin Resistant >2 mcg/mL MARCIAL Final    Trimeth/Sulfa Sensitive <=0.5/9.5 mcg/mL MARCIAL Final    Tetracycline Sensitive <=4 mcg/mL MARCIAL Final                   Fungal Smear [[id number]] Collected: 03/04/22 1605    Order Status: Completed Specimen: Wound Updated: 03/04/22 2204     Significant Indicator NEG     Source WND     Site Right Great Toe     Fungal Smear Results No fungal elements seen.    Narrative:      Surgery - swabs received    GRAM STAIN [[id number]] Collected: 03/04/22 1605    Order Status: Completed Specimen: Wound Updated: 03/04/22 2204     Significant Indicator .     Source WND     Site Right Great Toe     Gram Stain Result Moderate WBCs.  Many Gram positive cocci.      Narrative:      Surgery - swabs received    GRAM STAIN [[id number]] Collected: 03/03/22 1610    Order Status: Completed Specimen: Wound Updated: 03/04/22 0903     Significant Indicator .     Source WND     Site Right Foot     Gram Stain Result Rare  "WBCs.  Rare Gram positive cocci.      BLOOD CULTURE [[id number]] Collected: 03/03/22 1327    Order Status: Completed Specimen: Blood from Peripheral Updated: 03/04/22 0735     Significant Indicator NEG     Source BLD     Site PERIPHERAL     Culture Result No Growth  Note: Blood cultures are incubated for 5 days and  are monitored continuously.Positive blood cultures  are called to the RN and reported as soon as  they are identified.      Narrative:      Per Hospital Policy: Only change Specimen Src: to \"Line\" if  specified by physician order.  Right AC    BLOOD CULTURE [[id number]] Collected: 03/03/22 1327    Order Status: Completed Specimen: Blood from Peripheral Updated: 03/04/22 0735     Significant Indicator NEG     Source BLD     Site PERIPHERAL     Culture Result No Growth  Note: Blood cultures are incubated for 5 days and  are monitored continuously.Positive blood cultures  are called to the RN and reported as soon as  they are identified.      Narrative:      Per Hospital Policy: Only change Specimen Src: to \"Line\" if  specified by physician order.  Left AC    MRSA By PCR (Amp) [[id number]] Collected: 03/03/22 1610    Order Status: Completed Specimen: Respirate from Nares Updated: 03/03/22 1741     MRSA by PCR Negative    Blood Culture [[id number]]     Order Status: Canceled Specimen: Blood from Peripheral     Blood Culture [[id number]]     Order Status: Canceled Specimen: Blood from Peripheral                ASSESSMENT/PLAN:   POD #2 S/P right first ray amputation by Dr. Madera    -Incision approximated, serosanguineous drainage medial aspect. Continue to monitor the site. WBC trending down.  Cellulitis improving.    Updated surgeon.  -Okay to discharge from LPS/Ortho standpoint once evaluated by PT.              Wound care:   -Wound care orders updated for nursing by LPS   -Right first ray amputation: Aquacel Ag, nonadhesive foam, Hypafix tape, Tubigrip D.  Change daily.  Patient instructed on dressing care. "  RN to provide supplies for home.  Okay to shower with dressing covered.  Patient to change dressing daily and as needed until drainage have subsided then may change dressing every 2 to 3 days.  Patient verbalized understanding.      Imaging/Labs:  -COVID-19: not detected this admission    Vascular status:   - palpable pedal pulses bilaterally     Surgery:   --no further surgeries planned at this time     Antibiotics:   -ID involved   On vanco plans to transition to Augmentin and doxy on discharge  -planning antibiotics for 6 weeks        Weight Bearing Status:   -Heel weight bearing    Offloading:   -Offloading boot  when ambulating;    Offloading device at bedside.    -Orthotic company:    Rx for diabetic shoes and inserts given: yes  Patient to follow-up with Nicole, has [facility].    PT/OT:   Not seen yet  -consult placed post op        Diabetes Education:   -  CDE and RD involved  -      - Implications of loss of protective sensation (LOPS) discussed with patient- including increased risk for amputation.  Advised to check feet at least daily, moisturize feet, and to always wear protective foot wear.   -avoid trimming own nails. See podiatrist or certified foot and nail RN  -keep blood sugars <150 for improved wound healing            DISCHARGE PLAN:    Disposition:     recommend patient discharges to Home with home health    Ok to discharge from LPS/ortho standpoint    Follow-up: Dr. Madera. Sutures to be removed approximately 3 weeks post-op  Does not need wound care clinic or LPS at this time as patient has an incision without open wound.  If patient needs wound care follow-up, patient preferring to follow-up with [facility] wound care clinic as clinic is in network for patient.              Discussed with: pt, RN, Dr. Ibarra, Dr. Madera            Please note that this dictation was created using voice recognition software. I have  worked with technical experts from [employer] to optimize the interface.  I  have made every reasonable attempt to correct obvious errors, but there may be errors of grammar and possibly content that I did not discover before finalizing the note.      Laura Ho, A.P.R.N.    If any questions or concerns, please contact LPS through voalte.

## 2022-03-06 NOTE — PROGRESS NOTES
Hospital Medicine Daily Progress Note    Date of Service  3/6/2022    Chief Complaint  Keaton Cervantes is a 66 y.o. male admitted 3/3/2022 with worsening foot wound right side    Hospital Course  66-year-old male with history of uncontrolled diabetes, hypertension and sleep apnea, who presented 3/3 with right foot pain, patient has had wound infection and has right foot for more than a year however recently getting worse redness swelling and more pain with fluid coming out. Noted CRP was elevated 24. MRI showed first MTP joint osteomyelitis, septic arthritis and abscess.   Orthopedics consulted, status post R first toe ray amputation on 3/4. Operative cultures-group A streptococcus  -Follow wound cultures on 3/3-MRSA, group A streptococcus, peptoniphilus. ID consulted, change abx to vancomycin  LPS/wound care following.   DM2, poorly controlled. A1c 9.6. Diabetes education.     Interval Problem Update  Seen and examined. Status post surgery 3/4  Wound culture reviewed, change abx to vancomycin  Contact precaution  Plan to dc tomorrow    I have personally seen and examined the patient at bedside. I discussed the plan of care with patient, bedside RN, charge RN, , pharmacy and LPS.    Consultants/Specialty  Orthopedics, LPS   ID    Code Status  Full Code    Disposition  Patient is not medically cleared for discharge.   Anticipate discharge to to home with close outpatient follow-up.  I have placed the appropriate orders for post-discharge needs.    Review of Systems  Review of Systems   Constitutional: Negative for chills and fever.   Cardiovascular: Negative for chest pain and palpitations.   Gastrointestinal: Negative for nausea and vomiting.   Genitourinary: Negative for dysuria, flank pain and urgency.   Neurological: Positive for sensory change.   All other systems reviewed and are negative.       Physical Exam  Temp:  [36.3 °C (97.3 °F)-37.3 °C (99.2 °F)] 37.3 °C (99.2 °F)  Pulse:  [78-89]  88  Resp:  [16-18] 16  BP: (127-150)/(67-78) 133/77  SpO2:  [92 %-98 %] 98 %    Physical Exam  Vitals and nursing note reviewed.   Constitutional:       General: He is not in acute distress.     Appearance: Normal appearance.   HENT:      Head: Normocephalic and atraumatic.      Mouth/Throat:      Mouth: Mucous membranes are dry.      Pharynx: Oropharynx is clear.   Eyes:      Pupils: Pupils are equal, round, and reactive to light.   Neck:      Vascular: No carotid bruit.   Cardiovascular:      Rate and Rhythm: Normal rate and regular rhythm.   Pulmonary:      Effort: Pulmonary effort is normal. No respiratory distress.      Breath sounds: Normal breath sounds. No wheezing.   Abdominal:      General: Abdomen is flat. Bowel sounds are normal. There is no distension.      Palpations: Abdomen is soft. There is no mass.      Tenderness: There is no abdominal tenderness.   Musculoskeletal:      Cervical back: Neck supple.      Comments: Status post R great toe ray amputation, dressing noted  L foot, no ulcers, dorsalis pedis appreciated   Skin:     General: Skin is warm and dry.      Coloration: Skin is not jaundiced.   Neurological:      General: No focal deficit present.      Mental Status: He is alert and oriented to person, place, and time.   Psychiatric:         Mood and Affect: Mood normal.         Behavior: Behavior normal.         Fluids    Intake/Output Summary (Last 24 hours) at 3/6/2022 0944  Last data filed at 3/5/2022 1300  Gross per 24 hour   Intake 240 ml   Output --   Net 240 ml       Laboratory  Recent Labs     03/03/22  1327 03/04/22  0130 03/06/22  0127   WBC 13.2* 9.7 8.7   RBC 4.29* 4.18* 3.78*   HEMOGLOBIN 12.7* 12.2* 11.1*   HEMATOCRIT 39.0* 38.1* 33.8*   MCV 90.9 91.1 89.4   MCH 29.6 29.2 29.4   MCHC 32.6* 32.0* 32.8*   RDW 40.3 40.7 39.2   PLATELETCT 353 324 343   MPV 9.2 9.1 8.8*     Recent Labs     03/03/22  1327 03/04/22  0130 03/06/22  0127   SODIUM 127* 130* 134*   POTASSIUM 4.5 4.0 4.0    CHLORIDE 91* 93* 96   CO2 20 26 28   GLUCOSE 255* 224* 177*   BUN 15 13 6*   CREATININE 0.64 0.70 0.61   CALCIUM 9.1 8.7 8.4*                   Imaging  US-CYNTHIA SINGLE LEVEL BILAT   Final Result      MR-FOOT-WITH & W/O RIGHT   Final Result      1.  Marrow edema and enhancement of the sesamoids of the first MTP joint likely representing osteomyelitis.      2.  First MTP joint effusion possibly representing septic arthritis.      3.  Large soft tissue ulceration involving the medial/plantar aspect of the forefoot in the area of the first MTP joint with surrounding cellulitis and some rim-enhancing subcutaneous fluid consistent with abscess which communicates with that ulceration.      4.  Tenosynovitis of the flexor hallucis longus tendon and the abductor pollicis tendon likely infectious in nature.      US-EXTREMITY VENOUS LOWER UNILAT RIGHT   Final Result      DX-FOOT-COMPLETE 3+ RIGHT   Final Result      1.  Soft tissue edema and gas of the medial right forefoot projecting over the great toe metatarsophalangeal joint. This is concerning for abscess versus necrotic infection.   2.  No radiographic evidence of acute osteomyelitis.. If there is clinical concern for radiographically occult osteomyelitis, MRI can be obtained.      DX-CHEST-PORTABLE (1 VIEW)   Final Result      1.  No acute cardiac or pulmonary abnormalities are identified.           Assessment/Plan  * Osteomyelitis of right foot (HCC)  Assessment & Plan  MRI revealed R MTP joint osteomyelitis, septic arthritis and abscess  S/p R great toe ray amputation 3/4/22  CYNTHIA normal BLE  Operative cultures-group A streptococcus  Wound cultures on 3/3-MRSA, group A streptococcus, peptoniphilus  ID consulted, change abx to Vancomycin.   LPS following    Acute respiratory failure ([facility])  Assessment & Plan  On 3L O2 currently, not on home oxygen  IS, OOB  Wean O2 as tolerated  Off O2, on RA    Sepsis ([facility])  Assessment & Plan  This is Sepsis Present on admission  SIRS  criteria identified on my evaluation include: Tachycardia, with heart rate greater than 90 BPM and Leukocytosis, with WBC greater than 12,000  Source is wound infection  Sepsis protocol initiated  Fluid resuscitation ordered per protocol  IV antibiotics as appropriate for source of sepsis  While organ dysfunction may be noted elsewhere in this problem list or in the chart, degree of organ dysfunction does not meet CMS criteria for severe sepsis        Diabetes (HCC)  Assessment & Plan  A1c 9.6, uncontrolled.  Continue glargine 8 units daily with a sliding scales. Hold home glipizide and Metformin due to infection at this time  Diabetes education encouraged the patient to monitor his blood sugar for better control  Discussed the complication of diabetes including not limited to worsening wound infection, amputation, kidney and heart failure and death, he understood    ELLEN (obstructive sleep apnea)- (present on admission)  Assessment & Plan  Continue CPAP at night      Hypertension- (present on admission)  Assessment & Plan  Patient is not on any medications  Close monitor, start with lisinopril if needed       VTE prophylaxis: enoxaparin ppx    I have performed a physical exam and reviewed and updated ROS and Plan today (3/6/2022). In review of yesterday's note (3/5/2022), there are no changes except as documented above.

## 2022-03-07 ENCOUNTER — PATIENT OUTREACH (OUTPATIENT)
Dept: HEALTH INFORMATION MANAGEMENT | Facility: OTHER | Age: 67
End: 2022-03-07
Payer: COMMERCIAL

## 2022-03-07 VITALS
DIASTOLIC BLOOD PRESSURE: 73 MMHG | BODY MASS INDEX: 26.37 KG/M2 | RESPIRATION RATE: 17 BRPM | SYSTOLIC BLOOD PRESSURE: 157 MMHG | TEMPERATURE: 97 F | OXYGEN SATURATION: 99 % | WEIGHT: 194.67 LBS | HEART RATE: 77 BPM | HEIGHT: 72 IN

## 2022-03-07 LAB
BACTERIA SPEC ANAEROBE CULT: ABNORMAL
GLUCOSE BLD STRIP.AUTO-MCNC: 173 MG/DL (ref 65–99)
GLUCOSE BLD STRIP.AUTO-MCNC: 265 MG/DL (ref 65–99)
SIGNIFICANT IND 70042: ABNORMAL
SITE SITE: ABNORMAL
SOURCE SOURCE: ABNORMAL

## 2022-03-07 PROCEDURE — 700102 HCHG RX REV CODE 250 W/ 637 OVERRIDE(OP): Performed by: INTERNAL MEDICINE

## 2022-03-07 PROCEDURE — 97161 PT EVAL LOW COMPLEX 20 MIN: CPT

## 2022-03-07 PROCEDURE — A9270 NON-COVERED ITEM OR SERVICE: HCPCS | Performed by: INTERNAL MEDICINE

## 2022-03-07 PROCEDURE — 700105 HCHG RX REV CODE 258: Performed by: STUDENT IN AN ORGANIZED HEALTH CARE EDUCATION/TRAINING PROGRAM

## 2022-03-07 PROCEDURE — 99024 POSTOP FOLLOW-UP VISIT: CPT | Performed by: ORTHOPAEDIC SURGERY

## 2022-03-07 PROCEDURE — 97165 OT EVAL LOW COMPLEX 30 MIN: CPT

## 2022-03-07 PROCEDURE — 99239 HOSP IP/OBS DSCHRG MGMT >30: CPT | Performed by: STUDENT IN AN ORGANIZED HEALTH CARE EDUCATION/TRAINING PROGRAM

## 2022-03-07 PROCEDURE — 700111 HCHG RX REV CODE 636 W/ 250 OVERRIDE (IP): Performed by: STUDENT IN AN ORGANIZED HEALTH CARE EDUCATION/TRAINING PROGRAM

## 2022-03-07 PROCEDURE — 82962 GLUCOSE BLOOD TEST: CPT

## 2022-03-07 PROCEDURE — 700111 HCHG RX REV CODE 636 W/ 250 OVERRIDE (IP): Performed by: INTERNAL MEDICINE

## 2022-03-07 RX ORDER — DOXYCYCLINE 100 MG/1
100 CAPSULE ORAL 2 TIMES DAILY
Qty: 78 CAPSULE | Refills: 0 | Status: SHIPPED | OUTPATIENT
Start: 2022-03-07 | End: 2022-04-15

## 2022-03-07 RX ORDER — AMOXICILLIN AND CLAVULANATE POTASSIUM 875; 125 MG/1; MG/1
1 TABLET, FILM COATED ORAL 2 TIMES DAILY
Qty: 78 TABLET | Refills: 0 | Status: SHIPPED | OUTPATIENT
Start: 2022-03-07 | End: 2022-04-15

## 2022-03-07 RX ADMIN — VANCOMYCIN HYDROCHLORIDE 1250 MG: 500 INJECTION, POWDER, LYOPHILIZED, FOR SOLUTION INTRAVENOUS at 01:02

## 2022-03-07 RX ADMIN — HYDROCODONE BITARTRATE AND ACETAMINOPHEN 1 TABLET: 5; 325 TABLET ORAL at 10:30

## 2022-03-07 RX ADMIN — SENNOSIDES AND DOCUSATE SODIUM 2 TABLET: 50; 8.6 TABLET ORAL at 04:59

## 2022-03-07 RX ADMIN — INSULIN HUMAN 5 UNITS: 100 INJECTION, SOLUTION PARENTERAL at 11:54

## 2022-03-07 RX ADMIN — INSULIN GLARGINE 8 UNITS: 100 INJECTION, SOLUTION SUBCUTANEOUS at 05:11

## 2022-03-07 RX ADMIN — INSULIN HUMAN 2 UNITS: 100 INJECTION, SOLUTION PARENTERAL at 05:03

## 2022-03-07 RX ADMIN — VANCOMYCIN HYDROCHLORIDE 1250 MG: 500 INJECTION, POWDER, LYOPHILIZED, FOR SOLUTION INTRAVENOUS at 09:18

## 2022-03-07 RX ADMIN — ENOXAPARIN SODIUM 40 MG: 40 INJECTION SUBCUTANEOUS at 05:00

## 2022-03-07 ASSESSMENT — COGNITIVE AND FUNCTIONAL STATUS - GENERAL
DAILY ACTIVITIY SCORE: 20
CLIMB 3 TO 5 STEPS WITH RAILING: A LITTLE
SUGGESTED CMS G CODE MODIFIER DAILY ACTIVITY: CJ
TOILETING: A LITTLE
WALKING IN HOSPITAL ROOM: A LITTLE
STANDING UP FROM CHAIR USING ARMS: A LITTLE
SUGGESTED CMS G CODE MODIFIER DAILY ACTIVITY: CJ
MOBILITY SCORE: 21
SUGGESTED CMS G CODE MODIFIER MOBILITY: CK
TURNING FROM BACK TO SIDE WHILE IN FLAT BAD: A LITTLE
DRESSING REGULAR LOWER BODY CLOTHING: A LOT
DAILY ACTIVITIY SCORE: 21
HELP NEEDED FOR BATHING: A LITTLE
MOVING FROM LYING ON BACK TO SITTING ON SIDE OF FLAT BED: A LITTLE
WALKING IN HOSPITAL ROOM: A LITTLE
MOBILITY SCORE: 17
MOVING TO AND FROM BED TO CHAIR: A LITTLE
STANDING UP FROM CHAIR USING ARMS: A LITTLE
HELP NEEDED FOR BATHING: A LITTLE
DRESSING REGULAR LOWER BODY CLOTHING: A LITTLE
CLIMB 3 TO 5 STEPS WITH RAILING: A LOT
SUGGESTED CMS G CODE MODIFIER MOBILITY: CJ
TOILETING: A LITTLE

## 2022-03-07 ASSESSMENT — FIBROSIS 4 INDEX: FIB4 SCORE: 1.38

## 2022-03-07 ASSESSMENT — PAIN DESCRIPTION - PAIN TYPE
TYPE: ACUTE PAIN
TYPE: ACUTE PAIN

## 2022-03-07 ASSESSMENT — ACTIVITIES OF DAILY LIVING (ADL): TOILETING: INDEPENDENT

## 2022-03-07 ASSESSMENT — GAIT ASSESSMENTS
DISTANCE (FEET): 150
ASSISTIVE DEVICE: FRONT WHEEL WALKER
GAIT LEVEL OF ASSIST: SUPERVISED
DEVIATION: BRADYKINETIC

## 2022-03-07 NOTE — DISCHARGE PLANNING
Agency/Facility Name: [facility]   Spoke To: Maureen  Outcome: DPA  Informed that referral is yet to be received. Will follow up within the next 15 minutes to confirm if referral has been received.     LSW notified         Agency/Facility Name: [facility]   Spoke To: Daniella   Outcome: NADER called facility regarding patients referral. Per Admissions staff member Daniella referral has been received but is still currently being worked on. Will follow up with update on referral once completed     LSW notified

## 2022-03-07 NOTE — PROGRESS NOTES
Patient seen and examined    /57   Pulse 71   Temp 36.4 °C (97.6 °F) (Temporal)   Resp 17   Ht 1.829 m (6')   Wt 88.3 kg (194 lb 10.7 oz)   SpO2 96%     Recent Labs     03/06/22  0127   WBC 8.7   RBC 3.78*   HEMOGLOBIN 11.1*   HEMATOCRIT 33.8*   MCV 89.4   MCH 29.4   MCHC 32.8*   RDW 39.2   PLATELETCT 343   MPV 8.8*       No acute distress  Dressing clean dry and intact  Neurovascularly intact    POD#3  S/P Right great toe ray amputation    Plan:  DVT Prophylaxis- TEDS/SCDs  Weight Bearing Status-WBAT through heel  PT/OT  Antibiotics: per ID  Case Coordination

## 2022-03-07 NOTE — DISCHARGE PLANNING
Care Transition Team Assessment    LSW looked up Pt's covered HH agencies on [facility]'s web site and [facility] and [facility] are covered providers. LSW updated that Pt would like [facility] if possible. LSW called [facility] and was told Pt's insurance isn't contracted with them. LSW met with Pt to update him and get HH choice. Pt agreeable and picked [facility] a first choice and [facility] as second. LSW confirmed facesheet information. Per BS RN, Pt has been up walking with this amputation. Pt did not think he would have any other needs at d/c.     Information Source  Orientation Level: Oriented X4  Information Given By: Patient  Who is responsible for making decisions for patient? : Patient    Readmission Evaluation  Is this a readmission?: No    Elopement Risk  Legal Hold: No  Ambulatory or Self Mobile in Wheelchair: Yes  Disoriented: No  Psychiatric Symptoms: None  History of Wandering: No  Elopement this Admit: No  Vocalizing Wanting to Leave: No  Displays Behaviors, Body Language Wanting to Leave: No-Not at Risk for Elopement  Elopement Risk: Not at Risk for Elopement    Interdisciplinary Discharge Planning  Lives with - Patient's Self Care Capacity: Spouse  Patient or legal guardian wants to designate a caregiver: No  Housing / Facility: [address] [facility]  Prior Services: Home-Independent    Discharge Preparedness  What is your plan after discharge?: Home health care  What are your discharge supports?: Spouse  Prior Functional Level: Independent with Activities of Daily Living,Independent with Medication Management,Ambulatory  Difficulity with ADLs: None  Difficulity with IADLs: None    Functional Assesment  Prior Functional Level: Independent with Activities of Daily Living,Independent with Medication Management,Ambulatory    Finances  Financial Barriers to Discharge: No  Prescription Coverage: Yes    Vision / Hearing Impairment  Vision Impairment : No  Right Eye Vision: Wears Glasses,Impaired  Left Eye Vision:  Wears Glasses,Impaired  Hearing Impairment : Yes  Does Pt Need Special Equipment for the Hearing Impaired?: No         Advance Directive  Advance Directive?: None    Domestic Abuse  Have you ever been the victim of abuse or violence?: No  Physical Abuse or Sexual Abuse: No  Verbal Abuse or Emotional Abuse: No  Possible Abuse/Neglect Reported to:: Not Applicable    Psychological Assessment  History of Substance Abuse: None  History of Psychiatric Problems: No  Non-compliant with Treatment: No  Newly Diagnosed Illness: No    Discharge Risks or Barriers  Discharge risks or barriers?: No PCP,Non-adherence to medication or treatment  Patient risk factors: No PCP,Noncompliance    Anticipated Discharge Information  Discharge Disposition: D/T to home under HHA care in anticipation of covered skilled care (06)  Discharge Address: [address] [place] [address], [place]  Discharge Contact Phone Number: [phone]

## 2022-03-07 NOTE — DOCUMENTATION QUERY
[facility]                                                                       Query Response Note      PATIENT:               LUCHO PETERSON  ACCT #:                  [id number]  MRN:                     [id number]  :                      1955  ADMIT DATE:       3/3/2022 2:25 PM  DISCH DATE:          RESPONDING  PROVIDER #:        [id number]           QUERY TEXT:    Acute respiratory failure is documented beginning with the 3/5 Progress Note.  Pt had surgery on 3/4, was on supplemental O2 of 2-3 L on 3/4-3/5 and weaned to RA by 3/5  Can additional supportive clinical indicators of the diagnosis of acute respiratory failure be provided?     Note: If an appropriate response is not listed below, please respond with a new note.    The patient's Clinical Indicators include:  Per 3/5-3/6 Progress Notes: acute respiratory failure , on 3L O2 currently, not on home oxygen.  (Per 3/6 PN: Off O2, on RA)   Pulmonary effort is normal, no respiratory distress.   Vitals Flowsheet: Admit SpO2 97% on RA, 3/4 SpO2 98% on 3L mask, RR 14; 96% on 2L NC, RR 20; 3/5 @ 1420 95% on RA, RR 16  Risk Factors: post operative status , ELLEN  Treatment: supplemental O2, IS, OOB    Thank you,  Ramiro Bennett RN, BSN  Clinical   Connect via [employer]  Options provided:   -- Acute respiratory failure exists, please document additional clinical indicators   -- Acute respiratory failure ruled out   -- Unable to determine      Query created by: Ramiro Bennett on 3/7/2022 7:28 AM    RESPONSE TEXT:    Acute respiratory failure ruled out          Electronically signed by:  MARILIN BARAHONA MD 3/7/2022 7:30 AM

## 2022-03-07 NOTE — CARE PLAN
The patient is Watcher - Medium risk of patient condition declining or worsening    Shift Goals  Clinical Goals: Increased ambulation, wound care  Patient Goals: to be able to walk easier  Family Goals: N/A    Progress made toward(s) clinical / shift goals:  pt's vitals are stable and pt has not had any falls today.     Patient is not progressing towards the following goals:

## 2022-03-07 NOTE — FACE TO FACE
Face to Face Supporting Documentation - Home Health    The encounter with this patient was in whole or in part the primary reason for home health admission.    Date of encounter:   Patient:                    MRN:                       YOB: 2021  Keaton Cervantes  [id number]  1955     Home health to see patient for:  Skilled Nursing care for assessment, interventions & education    Skilled need for:  Surgical Aftercare R great toe amputation    Skilled nursing interventions to include:  Comment: debility    Homebound status evidenced by:  Need the aid of supportive devices such as crutches, canes, wheelchairs or walkers. Leaving home requires a considerable and taxing effort. There is a normal inability to leave the home.    Community Physician to provide follow up care: Pcp Not In Computer     Optional Interventions? No      I certify the face to face encounter for this home health care referral meets the CMS requirements and the encounter/clinical assessment with the patient was, in whole, or in part, for the medical condition(s) listed above, which is the primary reason for home health care. Based on my clinical findings: the service(s) are medically necessary, support the need for home health care, and the homebound criteria are met.  I certify that this patient has had a face to face encounter by myself.  Alan Ibarra M.D. - NPI: [id number]

## 2022-03-07 NOTE — DOCUMENTATION QUERY
[facility]                                                                       Query Response Note      PATIENT:               LUCHO PETERSON  ACCT #:                  [id number]  MRN:                     [id number]  :                      1955  ADMIT DATE:       3/3/2022 2:25 PM  DISCH DATE:          RESPONDING  PROVIDER #:        [id number]           QUERY TEXT:    Sepsis is documented in the H&P and Progress Notes citing supportive SIRS criteria of tachycardia and leukocytosis.  Supportive SIRS criteria are noted to be resolved within 1 day of admission per the labs and vitals flowsheets.    Please provide additional clinical indicators/SIRS criteria supportive of the documented diagnosis of sepsis.     Note: If an appropriate response is not listed below, please respond with a new note.    Sepsis - real or suspected infection plus 2 or more SIRS criteria  Temp <96.8 or >101  HR >90  RR >20  WBC <4,000 or > 12,000  >10% bandemia         The patient's Clinical Indicators include:  H&P and Progress Notes: Sepsis POA, SIRS include tachycardia w/ HR > 90, and leukocytosis with WBC > 12,000  Admit (3/3) Vitals: 150/77, 99, 16, 98.6F, 97%; 3/4 Vitals: 143/67, 89, 18, 97.2F, 95%  3/3 wbc 13.2, 3/4 wbc 9.7  3/3 procal 0.26  Risk Factors: osteomyelitis, cellulitis, toe abscess, diabetes  Treatment: vanco, unasyn, IVF    Thank you,  Ramiro Bennett RN, BSN  Clinical   Connect via [employer]  Options provided:   -- Sepsis exists with SIRS resolved within 1 day of admission, (please document additional + SIRS criteria and clinical indicators)   -- Sepsis resolved within 1 day of admission   -- Sepsis ruled out- amended documentation in the medical record and updated problem list   -- Other explanation of clinical findings, -please specify   -- Unable to determine      Query created by: Ramiro Bennett on  3/7/2022 7:39 AM    RESPONSE TEXT:    Sepsis resolved within 1 day of admission          Electronically signed by:  MARILIN BARAHONA MD 3/7/2022 7:41 AM

## 2022-03-07 NOTE — THERAPY
Occupational Therapy   Initial Evaluation     Patient Name: Keaton Cervantes  Age:  66 y.o., Sex:  male  Medical Record #: [id number]  Today's Date: 3/7/2022     Precautions  Precautions: (P) Fall Risk,Heel Weight Bearing Right Lower Extremity  Comments: (P) offloading boot    Assessment  Patient is 66 y.o. male admitted following R great toe ray amputation (HWB in offloading shoe). 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 Pt able to complete all functional mobility and ADLs with supervision including donning/doffing offloading boot. Pt likely at his functional baseline with use of FWW, no further needs.    Plan    Recommend Occupational Therapy for Evaluation only.    DC Equipment Recommendations: (P) Tub / Shower Seat  Discharge Recommendations: (P) Recommend home health for continued occupational therapy services     Objective       03/07/22 1141   Prior Living Situation   Prior Services Home-Independent   Housing / Facility 1 Story House   Steps Into Home 2   Steps In Home 0   Bathroom Set up Walk In Shower   Equipment Owned None   Lives with - Patient's Self Care Capacity Spouse   Prior Level of ADL Function   Self Feeding Independent   Grooming / Hygiene Independent   Bathing Independent   Dressing Independent   Toileting Independent   Prior Level of IADL Function   Medication Management Independent   Laundry Independent   Kitchen Mobility Independent   Finances Independent   Home Management Independent   Shopping Independent   Prior Level Of Mobility Independent Without Device in Community   Driving / Transportation Driving Independent   Occupation (Pre-Hospital Vocational) Employed Full Time   History of Falls   History of Falls Yes   Precautions   Precautions Fall Risk;Heel Weight Bearing Right Lower Extremity   Comments offloading boot   Pain 0 - 10 Group   Therapist Pain Assessment Post Activity Pain Same as Prior to Activity;Nurse  Notified   Cognition    Cognition / Consciousness WDL   Level of Consciousness Alert   Comments Pleasant, cooperative, receptive to therapy   Active ROM Upper Body   Active ROM Upper Body  WDL   Dominant Hand Right   Strength Upper Body   Upper Body Strength  WDL   Sensation Upper Body   Upper Extremity Sensation  WDL   Upper Body Muscle Tone   Upper Body Muscle Tone  WDL   Neurological Concerns   Neurological Concerns No   Coordination Upper Body   Coordination WDL   Balance Assessment   Sitting Balance (Static) Good   Sitting Balance (Dynamic) Good   Standing Balance (Static) Fair +   Standing Balance (Dynamic) Fair   Weight Shift Sitting Good   Weight Shift Standing Good   Comments w/ FWW   Bed Mobility    Supine to Sit Supervised   Scooting Supervised   ADL Assessment   Upper Body Dressing Supervision   Lower Body Dressing Supervision   Toileting   (NT-refused)   How much help from another person does the patient currently need...   Putting on and taking off regular lower body clothing? 3   Bathing (including washing, rinsing, and drying)? 3   Toileting, which includes using a toilet, bedpan, or urinal? 3   Putting on and taking off regular upper body clothing? 4   Taking care of personal grooming such as brushing teeth? 4   Eating meals? 4   6 Clicks Daily Activity Score 21   Functional Mobility   Sit to Stand Supervised   Bed, Chair, Wheelchair Transfer Supervised   Transfer Method Stand Step   Mobility bed mobility, hallway mobility, back to bed   Comments w/ FWW   Activity Tolerance   Sitting Edge of Bed 10 min   Standing 10 min   Education Group   Education Provided Role of Occupational Therapist;Weight Bearing Precautions   Role of Occupational Therapist Patient Response Patient;Acceptance;Explanation   Weight Bearing Precautions Patient Response Patient;Acceptance;Explanation   Problem List   Problem List None   Anticipated Discharge Equipment and Recommendations   DC Equipment Recommendations Tub /  Shower Seat   Discharge Recommendations Recommend home health for continued occupational therapy services   Interdisciplinary Plan of Care Collaboration   IDT Collaboration with  Nursing;Physical Therapist   Patient Position at End of Therapy In Bed;Bed Alarm On;Call Light within Reach;Tray Table within Reach;Phone within Reach   Collaboration Comments RN updated

## 2022-03-07 NOTE — DISCHARGE INSTRUCTIONS
Discharge Instructions per Alan Ibarra M.D.    Please continue taking Augmentin and doxycycline as directed till 4/15/2022  Please follow-up with Dr. Madera in 3 weeks for suture removal  Please follow up with PCP as outpatient.    DIET: diabetic diet    ACTIVITY: As tolerated    DIAGNOSIS: osteomyelitis of R great toe status post amputation, DM2  Return to ER in the event of new or worsening symptoms. Please note importance of compliance and the patient has agreed to proceed with all medical recommendations and follow up plan indicated above. All medications come with benefits and risks. Risks may include permanent injury or death and these risks can be minimized with close reassessment and monitoring. Please make it to your scheduled follow ups with PCP and Dr. Madera      Discharge Instructions    Discharged to home by car with relative. Discharged via wheelchair, hospital escort: Refused.  Special equipment needed: Walker    Be sure to schedule a follow-up appointment with your primary care doctor or any specialists as instructed.     Discharge Plan:   Diet Plan: Discussed  Activity Level: Discussed  Confirmed Follow up Appointment: Patient to Call and Schedule Appointment  Confirmed Symptoms Management: Discussed  Medication Reconciliation Updated: Yes  Influenza Vaccine Indication: Patient Refuses    I understand that a diet low in cholesterol, fat, and sodium is recommended for good health. Unless I have been given specific instructions below for another diet, I accept this instruction as my diet prescription.   Other diet: diabetic    Special Instructions: Discharge instructions for the Orthopedic Patient    Follow up with Primary Care Physician within 2 weeks of discharge to home, regarding:  Review of medications and diagnostic testing.  Surveillance for medical complications.  Workup and treatment of osteoporosis, if appropriate.     -Is this a Hip/Knee/Shoulder Joint Replacement patient?  No    -Is this patient being discharged with medication to prevent blood clots?  No    · Is patient discharged on Warfarin / Coumadin?   No     Depression / Suicide Risk    As you are discharged from this [facility] facility, it is important to learn how to keep safe from harming yourself.    Recognize the warning signs:  · Abrupt changes in personality, positive or negative- including increase in energy   · Giving away possessions  · Change in eating patterns- significant weight changes-  positive or negative  · Change in sleeping patterns- unable to sleep or sleeping all the time   · Unwillingness or inability to communicate  · Depression  · Unusual sadness, discouragement and loneliness  · Talk of wanting to die  · Neglect of personal appearance   · Rebelliousness- reckless behavior  · Withdrawal from people/activities they love  · Confusion- inability to concentrate     If you or a loved one observes any of these behaviors or has concerns about self-harm, here's what you can do:  · Talk about it- your feelings and reasons for harming yourself  · Remove any means that you might use to hurt yourself (examples: pills, rope, extension cords, firearm)  · Get professional help from the community (Mental Health, Substance Abuse, psychological counseling)  · Do not be alone:Call your Safe Contact- someone whom you trust who will be there for you.  · Call your local CRISIS HOTLINE 838-7361 or 784-263-5934  · Call your local Children's Mobile Crisis Response Team [place] (288) 729-1740 or www.Skataz  · Call the toll free National Suicide Prevention Hotlines   · National Suicide Prevention Lifeline 933-811-ULGA (9747)  · National Hope Line Network 800-SUICIDE (966-5860)

## 2022-03-07 NOTE — DISCHARGE SUMMARY
Discharge Summary    CHIEF COMPLAINT ON ADMISSION  Chief Complaint   Patient presents with   • Wound Infection     R foot diabetic ulcer X 1 year, worsening infection X 2 weeks, redness now going up to R calf, open wound only on foot, denies fevers, currently on abx regimen, saw wound care this AM and they told him to come to ER.        Reason for Admission  R leg pain     Admission Date  3/3/2022    CODE STATUS  Full Code    HPI & HOSPITAL COURSE  66-year-old male with history of uncontrolled diabetes, hypertension and sleep apnea, who presented 3/3 with right foot pain, patient has had wound infection and has right foot for more than a year however recently getting worse redness swelling and more pain with fluid coming out.     Noted CRP was elevated 24. MRI showed first MTP joint osteomyelitis, septic arthritis and abscess. Orthopedics consulted, status post R first toe ray amputation on 3/4. Operative cultures-group A streptococcus. Wound cultures on 3/3-MRSA, group A streptococcus, peptoniphilus. OR culture positive for MRSA and group A strep. ID consulted, plans 6 week-course of antibiotics given extent of infection and concern for residual osteomyelitis with stop date 04/15/2022. He received Vancomycin while inpatient and will be discharged with Augmentin and doxycycline to complete antibiotics course.   LPS has been followed the patient. His A1c is noted 9.6%. Diabetes education was provided.   HHC has been arranged upon discharge.     Therefore, he is discharged in fair and stable condition to home with organized home healthcare and close outpatient follow-up. She is advised to follow up with ortho Dr. Madera in 3 weeks for suture removal and follow up with wound care clinic and PCP in one week.     The patient met 2-midnight criteria for an inpatient stay at the time of discharge.    Discharge Date  3/7/2022    FOLLOW UP ITEMS POST DISCHARGE  PCP  Orthopedics Dr. Madera     DISCHARGE  DIAGNOSES  Principal Problem:    Osteomyelitis of right foot (HCC) POA: Unknown  Active Problems:    Diabetes (HCC) POA: Unknown    Sepsis (HCC) POA: Unknown    Acute respiratory failure (HCC) POA: Unknown    Hypertension POA: Yes    ELLEN (obstructive sleep apnea) POA: Yes  Resolved Problems:    Leukocytosis POA: Unknown      FOLLOW UP  No future appointments.  Wilman Madera M.D.  [address]  [facility] [address]  [phone]    In 3 weeks  For suture removal    [facility] ([facility]  [address] [place] [id number]  [phone]  In 1 week        MEDICATIONS ON DISCHARGE     Medication List      START taking these medications      Instructions   doxycycline 100 MG capsule  Commonly known as: MONODOX   Take 1 Capsule by mouth 2 times a day for 39 days.  Dose: 100 mg        CHANGE how you take these medications      Instructions   amoxicillin-clavulanate 875-125 MG Tabs  What changed:   · when to take this  · additional instructions  Commonly known as: AUGMENTIN   Take 1 Tablet by mouth 2 times a day for 39 days.  Dose: 1 Tablet     insulin glargine 100 UNIT/ML Soln  What changed:   · how much to take  · when to take this  Commonly known as: Lantus   Inject 15 Units as instructed every bedtime. AS DIRECTED  Dose: 15 Units        CONTINUE taking these medications      Instructions   cyclobenzaprine 10 mg Tabs  Commonly known as: Flexeril   Take 10 mg by mouth at bedtime.  Dose: 10 mg     gabapentin 300 MG Caps  Commonly known as: NEURONTIN   Take 300 mg by mouth 3 times a day.  Dose: 300 mg     glipiZIDE SR 10 MG Tb24  Commonly known as: GLUCOTROL   Take 10 mg by mouth 2 times a day.  Dose: 10 mg     HYDROcodone-acetaminophen 7.5-325 MG per tablet  Commonly known as: NORCO   Take 1 Tablet by mouth 3 times a day as needed for Severe Pain.  Dose: 1 Tablet     metformin 1000 MG tablet  Commonly known as: GLUCOPHAGE   Take 1,000 mg by mouth 2 times a day with meals.  Dose: 1,000  mg     ONE TOUCH ULTRA TEST strip  Generic drug: glucose blood   Doctor's comments: REFILLS OK?  USE TO TEST BLOOD 6 TIMES A DAY        STOP taking these medications    doxycycline 100 MG Tabs  Commonly known as: VIBRAMYCIN            Allergies  Allergies   Allergen Reactions   • Sulfa Drugs Unspecified     Pt hasn't had meds in so long he doesn't remember what his reaction is, he just remembers there is a reaction and that he shouldn't have them    • Flu Virus Vaccine Shortness of Breath   • Other Food Anaphylaxis     coconut   • Pneumococcal Vaccines Shortness of Breath   • Nutrasweet Aspartame [Aspartame]      Causes migraines       DIET  Orders Placed This Encounter   Procedures   • Diet Order Diet: Consistent CHO (Diabetic)     Standing Status:   Standing     Number of Occurrences:   1     Order Specific Question:   Diet:     Answer:   Consistent CHO (Diabetic) [4]       ACTIVITY  As tolerated.  WBAT through heel    CONSULTATIONS  ID  Orthopedics  LPS    PROCEDURES  S/p R great toe ray amputation 3/4/2022    LABORATORY  Lab Results   Component Value Date    SODIUM 134 (L) 03/06/2022    POTASSIUM 4.0 03/06/2022    CHLORIDE 96 03/06/2022    CO2 28 03/06/2022    GLUCOSE 177 (H) 03/06/2022    BUN 6 (L) 03/06/2022    CREATININE 0.61 03/06/2022    CREATININE 0.7 08/10/2006        Lab Results   Component Value Date    WBC 8.7 03/06/2022    HEMOGLOBIN 11.1 (L) 03/06/2022    HEMATOCRIT 33.8 (L) 03/06/2022    PLATELETCT 343 03/06/2022      US-CYNTHIA SINGLE LEVEL BILAT   Final Result      MR-FOOT-WITH & W/O RIGHT   Final Result      1.  Marrow edema and enhancement of the sesamoids of the first MTP joint likely representing osteomyelitis.      2.  First MTP joint effusion possibly representing septic arthritis.      3.  Large soft tissue ulceration involving the medial/plantar aspect of the forefoot in the area of the first MTP joint with surrounding cellulitis and some rim-enhancing subcutaneous fluid consistent with  abscess which communicates with that ulceration.      4.  Tenosynovitis of the flexor hallucis longus tendon and the abductor pollicis tendon likely infectious in nature.      US-EXTREMITY VENOUS LOWER UNILAT RIGHT   Final Result      DX-FOOT-COMPLETE 3+ RIGHT   Final Result      1.  Soft tissue edema and gas of the medial right forefoot projecting over the great toe metatarsophalangeal joint. This is concerning for abscess versus necrotic infection.   2.  No radiographic evidence of acute osteomyelitis.. If there is clinical concern for radiographically occult osteomyelitis, MRI can be obtained.      DX-CHEST-PORTABLE (1 VIEW)   Final Result      1.  No acute cardiac or pulmonary abnormalities are identified.          Total time of the discharge process exceeds 33 minutes.

## 2022-03-07 NOTE — THERAPY
Physical Therapy   Initial Evaluation     Patient Name: Keaton Cervantes  Age:  66 y.o., Sex:  male  Medical Record #: [id number]  Today's Date: 3/7/2022     Precautions  Precautions: Fall Risk;Heel Weight Bearing Right Lower Extremity  Comments: offloading boot when OOB    Assessment  Patient is 66 y.o. male presenting to physical therapy s/p R great toe ray amputation; HWB in offloading boot. Pt able to complete bed mobility and ambulation with FWW at SPV level. Declined need for stair training at this time, felt comfortable with spouse assist and was able to verbalize appropriate sequencing for safety. Pt is functionally capable of DC home when medically cleared, PT to follow to address stairs as needed.     Plan    Recommend Physical Therapy 4 times per week until therapy goals are met for the following treatments:  Self Care/Home Evaluation, Stair Training, Therapeutic Activities, and Therapeutic Exercises    DC Equipment Recommendations: Front-Wheel Walker  Discharge Recommendations: Recommend home health for continued physical therapy services         03/07/22 1137   Precautions   Precautions Fall Risk;Heel Weight Bearing Right Lower Extremity   Comments offloading boot when OOB   Vitals   O2 Delivery Device None - Room Air   Pain 0 - 10 Group   Therapist Pain Assessment During Activity;Nurse Notified  (mild post op pain, not rated)   Prior Living Situation   Prior Services Home-Independent   Housing / Facility 1 Story House   Steps Into Home 2   Steps In Home 0   Equipment Owned None   Lives with - Patient's Self Care Capacity Spouse   Comments spouse is able to assist intermittently upon DC home; however, she is a caregiver for 89 y/o lady. 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 Has been on light duty   Prior Level of Functional Mobility   Bed Mobility Independent   Transfer Status Independent   Ambulation Independent   Distance Ambulation (Feet)   (community)   Assistive Devices Used None   Stairs  Independent   Cognition    Cognition / Consciousness WDL   Level of Consciousness Alert   Comments pleasant and receptive to PT   Active ROM Lower Body    Active ROM Lower Body  WDL   Comments adequate for functional mobility and to don offloading boot   Strength Lower Body   Lower Body Strength  WDL   Comments as above   Balance Assessment   Sitting Balance (Static) Good   Sitting Balance (Dynamic) Good   Standing Balance (Static) Fair +   Standing Balance (Dynamic) Fair   Weight Shift Sitting Good   Weight Shift Standing Good   Comments w/ FWW   Gait Analysis   Gait Level Of Assist Supervised   Assistive Device Front Wheel Walker   Distance (Feet) 150   # of Times Distance was Traveled 1   Deviation Bradykinetic  (decreased step length)   # of Stairs Climbed 0   Weight Bearing Status HWB R LE in offloading boot   Vision Deficits Impacting Mobility slightly antalgic due to boot height difference. verbalized appropriate stair sequecing, declined need to try. Has no concerns with stairs upon DC home with spouse assist   Bed Mobility    Supine to Sit Supervised   Sit to Supine   (left seated EOB with OT at end of session)   Scooting Supervised   Functional Mobility   Sit to Stand Supervised   Bed, Chair, Wheelchair Transfer Supervised   How much difficulty does the patient currently have...   Turning over in bed (including adjusting bedclothes, sheets and blankets)? 4   Sitting down on and standing up from a chair with arms (e.g., wheelchair, bedside commode, etc.) 4   Moving from lying on back to sitting on the side of the bed? 4   How much help from another person does the patient currently need...   Moving to and from a bed to a chair (including a wheelchair)? 3   Need to walk in a hospital room? 3   Climbing 3-5 steps with a railing? 3   6 clicks Mobility Score 21   Activity Tolerance   Sitting Edge of Bed 8 min, don/doff offloading boot   Standing 10 min   Short Term Goals    Short Term Goal # 1 pt will negotiate  2 steps with FWW and SPV to access home environment in 6 visits   Education Group   Education Provided Role of Physical Therapist;Stair Training;Gait Training;Use of Assistive Device;Weight Bearing Status   Role of Physical Therapist Patient Response Patient;Acceptance;Explanation;Verbal Demonstration   Gait Training Patient Response Patient;Acceptance;Explanation;Verbal Demonstration   Stair Training Patient Response Patient;Acceptance;Explanation;Verbal Demonstration   Use of Assistive Device Patient Response Patient;Acceptance;Explanation;Verbal Demonstration   Weight Bearing Status Patient Response Patient;Acceptance;Explanation;Verbal Demonstration   Additional Comments discussed possible benefit of shoe riser as needed   Problem List    Problems Impaired Ambulation;Impaired Balance   Anticipated Discharge Equipment and Recommendations   DC Equipment Recommendations Front-Wheel Walker   Discharge Recommendations Recommend home health for continued physical therapy services

## 2022-03-08 LAB
BACTERIA BLD CULT: NORMAL
BACTERIA BLD CULT: NORMAL
SIGNIFICANT IND 70042: NORMAL
SIGNIFICANT IND 70042: NORMAL
SITE SITE: NORMAL
SITE SITE: NORMAL
SOURCE SOURCE: NORMAL
SOURCE SOURCE: NORMAL

## 2022-03-08 NOTE — CARE PLAN
The patient is Stable - Low risk of patient condition declining or worsening    Shift Goals  Clinical Goals: pain control, increase ambulation  Patient Goals: pain control  Family Goals: N/A    Progress made toward(s) clinical / shift goals:    Problem: Pain - Standard  Goal: Alleviation of pain or a reduction in pain to the patient’s comfort goal  3/7/2022 1723 by Unique Armstrong R.N.  Outcome: Met  3/7/2022 1605 by Unique Armstrong R.N.  Outcome: Progressing  Note: Pain controlled with PRN oxycodone.  Continue to monitor.       Problem: Knowledge Deficit - Standard  Goal: Patient and family/care givers will demonstrate understanding of plan of care, disease process/condition, diagnostic tests and medications  Outcome: Met     Problem: Hemodynamics  Goal: Patient's hemodynamics, fluid balance and neurologic status will be stable or improve  Outcome: Met     Problem: Fluid Volume  Goal: Fluid volume balance will be maintained  Outcome: Met     Problem: Urinary - Renal Perfusion  Goal: Ability to achieve and maintain adequate renal perfusion and functioning will improve  Outcome: Met     Problem: Respiratory  Goal: Patient will achieve/maintain optimum respiratory ventilation and gas exchange  Outcome: Met     Problem: Mechanical Ventilation  Goal: Safe management of artificial airway and ventilation  Outcome: Met  Goal: Successful weaning off mechanical ventilator, spontaneously maintains adequate gas exchange  Outcome: Met  Goal: Patient will be able to express needs and understand communication  Outcome: Met     Problem: Physical Regulation  Goal: Diagnostic test results will improve  Outcome: Met  Goal: Signs and symptoms of infection will decrease  Outcome: Met     Problem: Fall Risk  Goal: Patient will remain free from falls  3/7/2022 1723 by Unique Armstrong R.N.  Outcome: Met  3/7/2022 1605 by Unique Armstrong R.N.  Outcome: Progressing  Note: Free of falls.  Fall precautions in place.  Patient using call light  appropriately.         Patient is not progressing towards the following goals:

## 2022-03-08 NOTE — CARE PLAN
The patient is Stable - Low risk of patient condition declining or worsening    Shift Goals  Clinical Goals: pain control, increase ambulation  Patient Goals: pain control  Family Goals: N/A    Progress made toward(s) clinical / shift goals:    Problem: Pain - Standard  Goal: Alleviation of pain or a reduction in pain to the patient’s comfort goal  Outcome: Progressing  Note: Pain controlled with PRN oxycodone.  Continue to monitor.       Problem: Fall Risk  Goal: Patient will remain free from falls  Outcome: Progressing  Note: Free of falls.  Fall precautions in place.  Patient using call light appropriately.         Patient is not progressing towards the following goals:

## 2022-03-29 LAB
FUNGUS SPEC CULT: NORMAL
FUNGUS SPEC FUNGUS STN: NORMAL
SIGNIFICANT IND 70042: NORMAL
SITE SITE: NORMAL
SOURCE SOURCE: NORMAL

## 2022-04-01 ENCOUNTER — TELEPHONE (OUTPATIENT)
Dept: INFECTIOUS DISEASES | Facility: MEDICAL CENTER | Age: 67
End: 2022-04-01
Payer: COMMERCIAL

## 2022-06-21 ENCOUNTER — APPOINTMENT (OUTPATIENT)
Dept: RADIOLOGY | Facility: MEDICAL CENTER | Age: 67
DRG: 256 | End: 2022-06-21
Attending: EMERGENCY MEDICINE
Payer: COMMERCIAL

## 2022-06-21 ENCOUNTER — HOSPITAL ENCOUNTER (INPATIENT)
Facility: MEDICAL CENTER | Age: 67
LOS: 3 days | DRG: 256 | End: 2022-06-24
Attending: EMERGENCY MEDICINE | Admitting: INTERNAL MEDICINE
Payer: COMMERCIAL

## 2022-06-21 DIAGNOSIS — L03.90 CELLULITIS, UNSPECIFIED CELLULITIS SITE: ICD-10-CM

## 2022-06-21 PROBLEM — E11.628 CELLULITIS IN DIABETIC FOOT (HCC): Status: ACTIVE | Noted: 2022-06-21

## 2022-06-21 PROBLEM — L03.119 CELLULITIS IN DIABETIC FOOT (HCC): Status: ACTIVE | Noted: 2022-06-21

## 2022-06-21 LAB
ALBUMIN SERPL BCP-MCNC: 3.9 G/DL (ref 3.2–4.9)
ALBUMIN/GLOB SERPL: 1.1 G/DL
ALP SERPL-CCNC: 67 U/L (ref 30–99)
ALT SERPL-CCNC: 39 U/L (ref 2–50)
ANION GAP SERPL CALC-SCNC: 12 MMOL/L (ref 7–16)
APPEARANCE UR: CLEAR
AST SERPL-CCNC: 34 U/L (ref 12–45)
BASOPHILS # BLD AUTO: 1.1 % (ref 0–1.8)
BASOPHILS # BLD: 0.1 K/UL (ref 0–0.12)
BILIRUB SERPL-MCNC: 0.3 MG/DL (ref 0.1–1.5)
BILIRUB UR QL STRIP.AUTO: NEGATIVE
BUN SERPL-MCNC: 16 MG/DL (ref 8–22)
CALCIUM SERPL-MCNC: 8.8 MG/DL (ref 8.5–10.5)
CHLORIDE SERPL-SCNC: 95 MMOL/L (ref 96–112)
CO2 SERPL-SCNC: 23 MMOL/L (ref 20–33)
COLOR UR: YELLOW
CREAT SERPL-MCNC: 0.66 MG/DL (ref 0.5–1.4)
EOSINOPHIL # BLD AUTO: 0.3 K/UL (ref 0–0.51)
EOSINOPHIL NFR BLD: 3.4 % (ref 0–6.9)
ERYTHROCYTE [DISTWIDTH] IN BLOOD BY AUTOMATED COUNT: 40.4 FL (ref 35.9–50)
EST. AVERAGE GLUCOSE BLD GHB EST-MCNC: 171 MG/DL
GFR SERPLBLD CREATININE-BSD FMLA CKD-EPI: 103 ML/MIN/1.73 M 2
GLOBULIN SER CALC-MCNC: 3.4 G/DL (ref 1.9–3.5)
GLUCOSE BLD STRIP.AUTO-MCNC: 165 MG/DL (ref 65–99)
GLUCOSE SERPL-MCNC: 237 MG/DL (ref 65–99)
GLUCOSE UR STRIP.AUTO-MCNC: 500 MG/DL
HBA1C MFR BLD: 7.6 % (ref 4–5.6)
HCT VFR BLD AUTO: 35.7 % (ref 42–52)
HGB BLD-MCNC: 11.9 G/DL (ref 14–18)
IMM GRANULOCYTES # BLD AUTO: 0.02 K/UL (ref 0–0.11)
IMM GRANULOCYTES NFR BLD AUTO: 0.2 % (ref 0–0.9)
KETONES UR STRIP.AUTO-MCNC: NEGATIVE MG/DL
LACTATE BLD-SCNC: 1.4 MMOL/L (ref 0.5–2)
LEUKOCYTE ESTERASE UR QL STRIP.AUTO: NEGATIVE
LYMPHOCYTES # BLD AUTO: 2.46 K/UL (ref 1–4.8)
LYMPHOCYTES NFR BLD: 27.9 % (ref 22–41)
MCH RBC QN AUTO: 28.9 PG (ref 27–33)
MCHC RBC AUTO-ENTMCNC: 33.3 G/DL (ref 33.7–35.3)
MCV RBC AUTO: 86.7 FL (ref 81.4–97.8)
MICRO URNS: ABNORMAL
MONOCYTES # BLD AUTO: 0.65 K/UL (ref 0–0.85)
MONOCYTES NFR BLD AUTO: 7.4 % (ref 0–13.4)
NEUTROPHILS # BLD AUTO: 5.28 K/UL (ref 1.82–7.42)
NEUTROPHILS NFR BLD: 60 % (ref 44–72)
NITRITE UR QL STRIP.AUTO: NEGATIVE
NRBC # BLD AUTO: 0 K/UL
NRBC BLD-RTO: 0 /100 WBC
PH UR STRIP.AUTO: 5.5 [PH] (ref 5–8)
PLATELET # BLD AUTO: 371 K/UL (ref 164–446)
PMV BLD AUTO: 8.7 FL (ref 9–12.9)
POTASSIUM SERPL-SCNC: 4.1 MMOL/L (ref 3.6–5.5)
PROT SERPL-MCNC: 7.3 G/DL (ref 6–8.2)
PROT UR QL STRIP: NEGATIVE MG/DL
RBC # BLD AUTO: 4.12 M/UL (ref 4.7–6.1)
RBC UR QL AUTO: NEGATIVE
SODIUM SERPL-SCNC: 130 MMOL/L (ref 135–145)
SP GR UR STRIP.AUTO: 1.01
UROBILINOGEN UR STRIP.AUTO-MCNC: 0.2 MG/DL
WBC # BLD AUTO: 8.8 K/UL (ref 4.8–10.8)

## 2022-06-21 PROCEDURE — 85025 COMPLETE CBC W/AUTO DIFF WBC: CPT

## 2022-06-21 PROCEDURE — 770001 HCHG ROOM/CARE - MED/SURG/GYN PRIV*

## 2022-06-21 PROCEDURE — 36415 COLL VENOUS BLD VENIPUNCTURE: CPT

## 2022-06-21 PROCEDURE — A9270 NON-COVERED ITEM OR SERVICE: HCPCS | Performed by: INTERNAL MEDICINE

## 2022-06-21 PROCEDURE — 71045 X-RAY EXAM CHEST 1 VIEW: CPT

## 2022-06-21 PROCEDURE — 700102 HCHG RX REV CODE 250 W/ 637 OVERRIDE(OP): Performed by: INTERNAL MEDICINE

## 2022-06-21 PROCEDURE — 99223 1ST HOSP IP/OBS HIGH 75: CPT | Performed by: INTERNAL MEDICINE

## 2022-06-21 PROCEDURE — 700111 HCHG RX REV CODE 636 W/ 250 OVERRIDE (IP): Performed by: INTERNAL MEDICINE

## 2022-06-21 PROCEDURE — 93971 EXTREMITY STUDY: CPT | Mod: RT

## 2022-06-21 PROCEDURE — 96366 THER/PROPH/DIAG IV INF ADDON: CPT

## 2022-06-21 PROCEDURE — 96365 THER/PROPH/DIAG IV INF INIT: CPT

## 2022-06-21 PROCEDURE — 96372 THER/PROPH/DIAG INJ SC/IM: CPT

## 2022-06-21 PROCEDURE — 87086 URINE CULTURE/COLONY COUNT: CPT

## 2022-06-21 PROCEDURE — 700105 HCHG RX REV CODE 258: Performed by: EMERGENCY MEDICINE

## 2022-06-21 PROCEDURE — 80053 COMPREHEN METABOLIC PANEL: CPT

## 2022-06-21 PROCEDURE — 82962 GLUCOSE BLOOD TEST: CPT

## 2022-06-21 PROCEDURE — 83036 HEMOGLOBIN GLYCOSYLATED A1C: CPT

## 2022-06-21 PROCEDURE — 81003 URINALYSIS AUTO W/O SCOPE: CPT

## 2022-06-21 PROCEDURE — 83605 ASSAY OF LACTIC ACID: CPT

## 2022-06-21 PROCEDURE — 99285 EMERGENCY DEPT VISIT HI MDM: CPT

## 2022-06-21 PROCEDURE — 96367 TX/PROPH/DG ADDL SEQ IV INF: CPT

## 2022-06-21 PROCEDURE — 700111 HCHG RX REV CODE 636 W/ 250 OVERRIDE (IP): Performed by: EMERGENCY MEDICINE

## 2022-06-21 PROCEDURE — 73700 CT LOWER EXTREMITY W/O DYE: CPT | Mod: RT

## 2022-06-21 PROCEDURE — 87040 BLOOD CULTURE FOR BACTERIA: CPT | Mod: 91

## 2022-06-21 RX ORDER — OXYCODONE HYDROCHLORIDE 5 MG/1
2.5 TABLET ORAL
Status: DISCONTINUED | OUTPATIENT
Start: 2022-06-21 | End: 2022-06-21

## 2022-06-21 RX ORDER — MORPHINE SULFATE 4 MG/ML
2 INJECTION INTRAVENOUS
Status: DISCONTINUED | OUTPATIENT
Start: 2022-06-21 | End: 2022-06-21

## 2022-06-21 RX ORDER — ONDANSETRON 2 MG/ML
4 INJECTION INTRAMUSCULAR; INTRAVENOUS EVERY 4 HOURS PRN
Status: DISCONTINUED | OUTPATIENT
Start: 2022-06-21 | End: 2022-06-24 | Stop reason: HOSPADM

## 2022-06-21 RX ORDER — ENOXAPARIN SODIUM 100 MG/ML
40 INJECTION SUBCUTANEOUS DAILY
Status: DISCONTINUED | OUTPATIENT
Start: 2022-06-21 | End: 2022-06-24 | Stop reason: HOSPADM

## 2022-06-21 RX ORDER — POLYETHYLENE GLYCOL 3350 17 G/17G
1 POWDER, FOR SOLUTION ORAL
Status: DISCONTINUED | OUTPATIENT
Start: 2022-06-21 | End: 2022-06-24 | Stop reason: HOSPADM

## 2022-06-21 RX ORDER — MORPHINE SULFATE 4 MG/ML
4 INJECTION INTRAVENOUS
Status: DISCONTINUED | OUTPATIENT
Start: 2022-06-21 | End: 2022-06-24 | Stop reason: HOSPADM

## 2022-06-21 RX ORDER — DEXTROSE MONOHYDRATE 25 G/50ML
25 INJECTION, SOLUTION INTRAVENOUS
Status: DISCONTINUED | OUTPATIENT
Start: 2022-06-21 | End: 2022-06-24 | Stop reason: HOSPADM

## 2022-06-21 RX ORDER — HYDRALAZINE HYDROCHLORIDE 20 MG/ML
10 INJECTION INTRAMUSCULAR; INTRAVENOUS EVERY 4 HOURS PRN
Status: DISCONTINUED | OUTPATIENT
Start: 2022-06-21 | End: 2022-06-24 | Stop reason: HOSPADM

## 2022-06-21 RX ORDER — ACETAMINOPHEN 325 MG/1
650 TABLET ORAL EVERY 6 HOURS PRN
Status: DISCONTINUED | OUTPATIENT
Start: 2022-06-21 | End: 2022-06-24 | Stop reason: HOSPADM

## 2022-06-21 RX ORDER — BISACODYL 10 MG
10 SUPPOSITORY, RECTAL RECTAL
Status: DISCONTINUED | OUTPATIENT
Start: 2022-06-21 | End: 2022-06-24 | Stop reason: HOSPADM

## 2022-06-21 RX ORDER — CYCLOBENZAPRINE HCL 10 MG
10 TABLET ORAL
Status: DISCONTINUED | OUTPATIENT
Start: 2022-06-21 | End: 2022-06-24 | Stop reason: HOSPADM

## 2022-06-21 RX ORDER — OXYCODONE HYDROCHLORIDE 5 MG/1
5 TABLET ORAL
Status: DISCONTINUED | OUTPATIENT
Start: 2022-06-21 | End: 2022-06-21

## 2022-06-21 RX ORDER — AMOXICILLIN 250 MG
2 CAPSULE ORAL 2 TIMES DAILY
Status: DISCONTINUED | OUTPATIENT
Start: 2022-06-21 | End: 2022-06-24 | Stop reason: HOSPADM

## 2022-06-21 RX ORDER — AMOXICILLIN AND CLAVULANATE POTASSIUM 875; 125 MG/1; MG/1
1 TABLET, FILM COATED ORAL 2 TIMES DAILY
Status: ON HOLD | COMMUNITY
End: 2022-06-24

## 2022-06-21 RX ORDER — INSULIN GLARGINE 100 [IU]/ML
6-15 INJECTION, SOLUTION SUBCUTANEOUS NIGHTLY PRN
COMMUNITY

## 2022-06-21 RX ORDER — HYDROCODONE BITARTRATE AND ACETAMINOPHEN 5; 325 MG/1; MG/1
1 TABLET ORAL 3 TIMES DAILY PRN
Status: DISCONTINUED | OUTPATIENT
Start: 2022-06-21 | End: 2022-06-24 | Stop reason: HOSPADM

## 2022-06-21 RX ORDER — ONDANSETRON 4 MG/1
4 TABLET, ORALLY DISINTEGRATING ORAL EVERY 4 HOURS PRN
Status: DISCONTINUED | OUTPATIENT
Start: 2022-06-21 | End: 2022-06-24 | Stop reason: HOSPADM

## 2022-06-21 RX ORDER — GABAPENTIN 300 MG/1
300 CAPSULE ORAL 3 TIMES DAILY
Status: DISCONTINUED | OUTPATIENT
Start: 2022-06-21 | End: 2022-06-24 | Stop reason: HOSPADM

## 2022-06-21 RX ADMIN — SODIUM CHLORIDE 3 G: 900 INJECTION INTRAVENOUS at 17:37

## 2022-06-21 RX ADMIN — ENOXAPARIN SODIUM 40 MG: 40 INJECTION SUBCUTANEOUS at 22:14

## 2022-06-21 RX ADMIN — INSULIN GLARGINE-YFGN 8 UNITS: 100 INJECTION, SOLUTION SUBCUTANEOUS at 22:14

## 2022-06-21 RX ADMIN — GABAPENTIN 300 MG: 300 CAPSULE ORAL at 22:14

## 2022-06-21 RX ADMIN — VANCOMYCIN HYDROCHLORIDE 2250 MG: 500 INJECTION, POWDER, LYOPHILIZED, FOR SOLUTION INTRAVENOUS at 18:14

## 2022-06-21 RX ADMIN — INSULIN HUMAN 1 UNITS: 100 INJECTION, SOLUTION PARENTERAL at 22:17

## 2022-06-21 ASSESSMENT — ENCOUNTER SYMPTOMS
SPUTUM PRODUCTION: 0
HEMOPTYSIS: 0
ORTHOPNEA: 0
CHILLS: 0
DOUBLE VISION: 0
COUGH: 0
FEVER: 0
BLURRED VISION: 0
TREMORS: 0
WEIGHT LOSS: 0
NAUSEA: 0
FOCAL WEAKNESS: 0
VOMITING: 0
BACK PAIN: 0
NERVOUS/ANXIOUS: 0
PALPITATIONS: 0
HEADACHES: 0
SPEECH CHANGE: 0
FLANK PAIN: 0
HALLUCINATIONS: 0
NECK PAIN: 0
BRUISES/BLEEDS EASILY: 0
POLYDIPSIA: 0
HEARTBURN: 0
PHOTOPHOBIA: 0

## 2022-06-21 ASSESSMENT — LIFESTYLE VARIABLES: SUBSTANCE_ABUSE: 0

## 2022-06-21 ASSESSMENT — FIBROSIS 4 INDEX: FIB4 SCORE: 1.4

## 2022-06-21 NOTE — ED TRIAGE NOTES
Chief Complaint   Patient presents with   • Wound Infection     Patient sent by MD for infection to right foot. Right big toe was amputated in March. Presents for infection which may be related to this. Hx of diabetes. Redness, warmth, and swelling present to right foot. Swelling also present in ankle and calf. Endorses pain to entire right leg.      Pt amb to triage with steady gait for above complaint. Protocol ordered.      Pt is alert and oriented, speaking in full sentences, follows commands and responds appropriately to questions. NAD. Resp are even and unlabored.     Pt placed in lobby. Pt educated on triage process. Pt encouraged to alert staff for any changes.    This RN masked and in appropriate PPE during encounter. Patient also in mask.     BP (!) 141/68   Pulse 92   Temp 36.2 °C (97.1 °F) (Temporal)   Resp 14   Ht 1.829 m (6')   Wt 92.8 kg (204 lb 9.4 oz)   SpO2 98%

## 2022-06-21 NOTE — ED NOTES
PT walked up to PUR 81 no assistance required. He was changed into a gown and put on the monitor.

## 2022-06-22 ENCOUNTER — APPOINTMENT (OUTPATIENT)
Dept: RADIOLOGY | Facility: MEDICAL CENTER | Age: 67
DRG: 256 | End: 2022-06-22
Payer: COMMERCIAL

## 2022-06-22 PROBLEM — S92.324A CLOSED NONDISPLACED FRACTURE OF SECOND METATARSAL BONE OF RIGHT FOOT: Status: ACTIVE | Noted: 2022-06-22

## 2022-06-22 PROBLEM — L97.529 CHRONIC ULCER OF TOE OF LEFT FOOT (HCC): Status: ACTIVE | Noted: 2022-06-22

## 2022-06-22 LAB
ALBUMIN SERPL BCP-MCNC: 3.3 G/DL (ref 3.2–4.9)
ALBUMIN/GLOB SERPL: 1 G/DL
ALP SERPL-CCNC: 58 U/L (ref 30–99)
ALT SERPL-CCNC: 30 U/L (ref 2–50)
ANION GAP SERPL CALC-SCNC: 10 MMOL/L (ref 7–16)
AST SERPL-CCNC: 22 U/L (ref 12–45)
BASOPHILS # BLD AUTO: 1.2 % (ref 0–1.8)
BASOPHILS # BLD: 0.09 K/UL (ref 0–0.12)
BILIRUB SERPL-MCNC: 0.3 MG/DL (ref 0.1–1.5)
BUN SERPL-MCNC: 11 MG/DL (ref 8–22)
CALCIUM SERPL-MCNC: 8.4 MG/DL (ref 8.5–10.5)
CHLORIDE SERPL-SCNC: 103 MMOL/L (ref 96–112)
CO2 SERPL-SCNC: 24 MMOL/L (ref 20–33)
CREAT SERPL-MCNC: 0.56 MG/DL (ref 0.5–1.4)
EOSINOPHIL # BLD AUTO: 0.29 K/UL (ref 0–0.51)
EOSINOPHIL NFR BLD: 4 % (ref 0–6.9)
ERYTHROCYTE [DISTWIDTH] IN BLOOD BY AUTOMATED COUNT: 41.5 FL (ref 35.9–50)
GFR SERPLBLD CREATININE-BSD FMLA CKD-EPI: 109 ML/MIN/1.73 M 2
GLOBULIN SER CALC-MCNC: 3.2 G/DL (ref 1.9–3.5)
GLUCOSE BLD STRIP.AUTO-MCNC: 110 MG/DL (ref 65–99)
GLUCOSE BLD STRIP.AUTO-MCNC: 124 MG/DL (ref 65–99)
GLUCOSE BLD STRIP.AUTO-MCNC: 174 MG/DL (ref 65–99)
GLUCOSE SERPL-MCNC: 158 MG/DL (ref 65–99)
GRAM STN SPEC: NORMAL
HCT VFR BLD AUTO: 34.2 % (ref 42–52)
HGB BLD-MCNC: 11.3 G/DL (ref 14–18)
IMM GRANULOCYTES # BLD AUTO: 0.02 K/UL (ref 0–0.11)
IMM GRANULOCYTES NFR BLD AUTO: 0.3 % (ref 0–0.9)
LYMPHOCYTES # BLD AUTO: 2.28 K/UL (ref 1–4.8)
LYMPHOCYTES NFR BLD: 31.5 % (ref 22–41)
MCH RBC QN AUTO: 29.3 PG (ref 27–33)
MCHC RBC AUTO-ENTMCNC: 33 G/DL (ref 33.7–35.3)
MCV RBC AUTO: 88.6 FL (ref 81.4–97.8)
MONOCYTES # BLD AUTO: 0.55 K/UL (ref 0–0.85)
MONOCYTES NFR BLD AUTO: 7.6 % (ref 0–13.4)
NEUTROPHILS # BLD AUTO: 4.01 K/UL (ref 1.82–7.42)
NEUTROPHILS NFR BLD: 55.4 % (ref 44–72)
NRBC # BLD AUTO: 0 K/UL
NRBC BLD-RTO: 0 /100 WBC
PLATELET # BLD AUTO: 316 K/UL (ref 164–446)
PMV BLD AUTO: 8.7 FL (ref 9–12.9)
POTASSIUM SERPL-SCNC: 4.1 MMOL/L (ref 3.6–5.5)
PROT SERPL-MCNC: 6.5 G/DL (ref 6–8.2)
RBC # BLD AUTO: 3.86 M/UL (ref 4.7–6.1)
SCCMEC + MECA PNL NOSE NAA+PROBE: NEGATIVE
SIGNIFICANT IND 70042: NORMAL
SITE SITE: NORMAL
SODIUM SERPL-SCNC: 137 MMOL/L (ref 135–145)
SOURCE SOURCE: NORMAL
WBC # BLD AUTO: 7.2 K/UL (ref 4.8–10.8)

## 2022-06-22 PROCEDURE — 87205 SMEAR GRAM STAIN: CPT

## 2022-06-22 PROCEDURE — 700111 HCHG RX REV CODE 636 W/ 250 OVERRIDE (IP): Performed by: INTERNAL MEDICINE

## 2022-06-22 PROCEDURE — 87070 CULTURE OTHR SPECIMN AEROBIC: CPT

## 2022-06-22 PROCEDURE — 700102 HCHG RX REV CODE 250 W/ 637 OVERRIDE(OP): Performed by: INTERNAL MEDICINE

## 2022-06-22 PROCEDURE — 73630 X-RAY EXAM OF FOOT: CPT | Mod: RT

## 2022-06-22 PROCEDURE — 86140 C-REACTIVE PROTEIN: CPT

## 2022-06-22 PROCEDURE — 99232 SBSQ HOSP IP/OBS MODERATE 35: CPT

## 2022-06-22 PROCEDURE — 99232 SBSQ HOSP IP/OBS MODERATE 35: CPT | Performed by: HOSPITALIST

## 2022-06-22 PROCEDURE — 85025 COMPLETE CBC W/AUTO DIFF WBC: CPT

## 2022-06-22 PROCEDURE — 36415 COLL VENOUS BLD VENIPUNCTURE: CPT

## 2022-06-22 PROCEDURE — 96366 THER/PROPH/DIAG IV INF ADDON: CPT

## 2022-06-22 PROCEDURE — 82962 GLUCOSE BLOOD TEST: CPT | Mod: 91

## 2022-06-22 PROCEDURE — 87077 CULTURE AEROBIC IDENTIFY: CPT

## 2022-06-22 PROCEDURE — 700105 HCHG RX REV CODE 258: Performed by: INTERNAL MEDICINE

## 2022-06-22 PROCEDURE — 770001 HCHG ROOM/CARE - MED/SURG/GYN PRIV*

## 2022-06-22 PROCEDURE — 80053 COMPREHEN METABOLIC PANEL: CPT

## 2022-06-22 PROCEDURE — A9270 NON-COVERED ITEM OR SERVICE: HCPCS | Performed by: INTERNAL MEDICINE

## 2022-06-22 PROCEDURE — 87641 MR-STAPH DNA AMP PROBE: CPT

## 2022-06-22 PROCEDURE — 84145 PROCALCITONIN (PCT): CPT

## 2022-06-22 RX ADMIN — ENOXAPARIN SODIUM 40 MG: 40 INJECTION SUBCUTANEOUS at 17:37

## 2022-06-22 RX ADMIN — GABAPENTIN 300 MG: 300 CAPSULE ORAL at 09:28

## 2022-06-22 RX ADMIN — GABAPENTIN 300 MG: 300 CAPSULE ORAL at 15:31

## 2022-06-22 RX ADMIN — INSULIN HUMAN 1 UNITS: 100 INJECTION, SOLUTION PARENTERAL at 19:55

## 2022-06-22 RX ADMIN — SODIUM CHLORIDE 3 G: 900 INJECTION INTRAVENOUS at 00:22

## 2022-06-22 RX ADMIN — GABAPENTIN 300 MG: 300 CAPSULE ORAL at 19:55

## 2022-06-22 RX ADMIN — VANCOMYCIN HYDROCHLORIDE 1500 MG: 500 INJECTION, POWDER, LYOPHILIZED, FOR SOLUTION INTRAVENOUS at 06:14

## 2022-06-22 RX ADMIN — SODIUM CHLORIDE 3 G: 900 INJECTION INTRAVENOUS at 06:03

## 2022-06-22 ASSESSMENT — COGNITIVE AND FUNCTIONAL STATUS - GENERAL
DAILY ACTIVITIY SCORE: 24
SUGGESTED CMS G CODE MODIFIER DAILY ACTIVITY: CH
SUGGESTED CMS G CODE MODIFIER MOBILITY: CH
MOBILITY SCORE: 24

## 2022-06-22 ASSESSMENT — PATIENT HEALTH QUESTIONNAIRE - PHQ9
1. LITTLE INTEREST OR PLEASURE IN DOING THINGS: NOT AT ALL
1. LITTLE INTEREST OR PLEASURE IN DOING THINGS: NOT AT ALL
SUM OF ALL RESPONSES TO PHQ9 QUESTIONS 1 AND 2: 0
2. FEELING DOWN, DEPRESSED, IRRITABLE, OR HOPELESS: NOT AT ALL
SUM OF ALL RESPONSES TO PHQ9 QUESTIONS 1 AND 2: 0
2. FEELING DOWN, DEPRESSED, IRRITABLE, OR HOPELESS: NOT AT ALL

## 2022-06-22 ASSESSMENT — ENCOUNTER SYMPTOMS
NAUSEA: 0
CHILLS: 0
FEVER: 0
DIZZINESS: 0
HEADACHES: 0
ABDOMINAL PAIN: 0
PALPITATIONS: 0
SHORTNESS OF BREATH: 0
VOMITING: 0
COUGH: 0

## 2022-06-22 ASSESSMENT — PAIN DESCRIPTION - PAIN TYPE
TYPE: CHRONIC PAIN
TYPE: CHRONIC PAIN

## 2022-06-22 ASSESSMENT — LIFESTYLE VARIABLES
ALCOHOL_USE: NO
HAVE PEOPLE ANNOYED YOU BY CRITICIZING YOUR DRINKING: NO
TOTAL SCORE: 0
TOTAL SCORE: 0
ON A TYPICAL DAY WHEN YOU DRINK ALCOHOL HOW MANY DRINKS DO YOU HAVE: 0
TOTAL SCORE: 0
EVER HAD A DRINK FIRST THING IN THE MORNING TO STEADY YOUR NERVES TO GET RID OF A HANGOVER: NO
DOES PATIENT WANT TO STOP DRINKING: NO
HOW MANY TIMES IN THE PAST YEAR HAVE YOU HAD 5 OR MORE DRINKS IN A DAY: 0
EVER FELT BAD OR GUILTY ABOUT YOUR DRINKING: NO
CONSUMPTION TOTAL: NEGATIVE
AVERAGE NUMBER OF DAYS PER WEEK YOU HAVE A DRINK CONTAINING ALCOHOL: 0
HAVE YOU EVER FELT YOU SHOULD CUT DOWN ON YOUR DRINKING: NO

## 2022-06-22 NOTE — PROGRESS NOTES
Pharmacy Vancomycin Kinetics Note for 6/21/2022     67 y.o. male on Vancomycin day # 1     Vancomycin Indication (AUC Dosing): Skin/skin structure infection    Provider specified end date: 06/26/22    Active Antibiotics (From admission, onward)    Ordered     Ordering Provider       Tue Jun 21, 2022  8:40 PM    06/21/22 2040  vancomycin (VANCOCIN) 1,500 mg in  mL IVPB  (vancomycin (VANCOCIN) IV (LD + Maintenance))  EVERY 12 HOURS         Axel Gamino M.D.       Tue Jun 21, 2022  8:22 PM    06/21/22 2022  ampicillin/sulbactam (UNASYN) 3 g in  mL IVPB  EVERY 6 HOURS         Axel Gamino M.D.    06/21/22 2022  MD Alert...Vancomycin per Pharmacy  PHARMACY TO DOSE        Question:  Indication(s) for vancomycin?  Answer:  Skin and soft tissue infection    Axel Gamino M.D.       Tue Jun 21, 2022  5:22 PM    06/21/22 1722  vancomycin (VANCOCIN) 2,250 mg in  mL IVPB  (vancomycin (VANCOCIN) IV (LD + Maintenance))  ONCE         Reginald Gillis D.O.          Dosing Weight: 92.8 kg (204 lb 9.4 oz)      Admission History: Admitted on 6/21/2022 for Cellulitis in diabetic foot (HCC) [E11.628, L03.119]  Pertinent history: Pt sent to ER by podiatrist for infection of right foot, pt had big toe of same foot amputated in March. PMH of T2DM. Pt endorses pain to entire leg. Redness, warmth, and swelling present on right foot, swelling also present in ankle and calf.    Allergies:     Sulfa drugs, Coconut (cocos nucifera) allergy skin test, Flu virus vaccine, Other food, Pneumococcal vaccines, and Nutrasweet aspartame [aspartame]     Pertinent cultures to date:     Results     Procedure Component Value Units Date/Time    Culture Wound W/ Gram Stain [[id number]]     Order Status: Sent Specimen: Wound from Right Foot     URINALYSIS [[id number]]  (Abnormal) Collected: 06/21/22 1728    Order Status: Completed Specimen: Urine, Clean Catch Updated: 06/21/22 1743     Color Yellow     Character Clear     Specific  "[place] 1.015     Ph 5.5     Glucose 500 mg/dL      Ketones Negative mg/dL      Protein Negative mg/dL      Bilirubin Negative     Urobilinogen, Urine 0.2     Nitrite Negative     Leukocyte Esterase Negative     Occult Blood Negative     Micro Urine Req see below     Comment: Microscopic examination not performed when specimen is clear  and chemically negative for protein, blood, leukocyte esterase  and nitrite.         Narrative:      Indication for culture:->Evaluation for sepsis without a  clear source of infection    URINE CULTURE(NEW) [[id number]] Collected: 22 172    Order Status: Sent Specimen: Urine, Clean Catch Updated: 22    Narrative:      Indication for culture:->Evaluation for sepsis without a  clear source of infection    MRSA By PCR (Amp) [[id number]]     Order Status: Sent Specimen: Respirate from Nares     BLOOD CULTURE [[id number]] Collected: 22    Order Status: Sent Specimen: Blood from Peripheral Updated: 22 160    Narrative:      Per Hospital Policy: Only change Specimen Src: to \"Line\" if  specified by physician order.    BLOOD CULTURE [[id number]] Collected: 22    Order Status: Sent Specimen: Blood from Peripheral Updated: 22 160    Narrative:      Per Hospital Policy: Only change Specimen Src: to \"Line\" if  specified by physician order.          Labs:     Estimated Creatinine Clearance: 120.8 mL/min (by C-G formula based on SCr of 0.66 mg/dL).  Recent Labs     22  1517   WBC 8.8   NEUTSPOLYS 60.00     Recent Labs     22  1517   BUN 16   CREATININE 0.66   ALBUMIN 3.9       Intake/Output Summary (Last 24 hours) at 2022  Last data filed at 2022 1807  Gross per 24 hour   Intake 100 ml   Output --   Net 100 ml      /63   Pulse 86   Temp 36.2 °C (97.1 °F) (Temporal)   Resp 14   Ht 1.829 m (6')   Wt 92.8 kg (204 lb 9.4 oz)   SpO2 98%  Temp (24hrs), Av.2 °C (97.1 °F), Min:36.2 °C (97.1 °F), Max:36.2 °C (97.1 " °F)      List concerns for Vancomycin clearance:     Obesity    Pharmacokinetics:     AUC kinetics:   Ke (hr ^-1): 0.1047 hr^-1  Half life: 6.62 hr  Clearance: 6.316  Estimated TDD: 3158  Estimated Dose: 1132  Estimated interval: 8.6      A/P:     -  Vancomycin dose: 1500 mg IV q12h (0600, 1800)    -  Next vancomycin level(s):    - Will be ordered by floor pharmacist if necessary    -  Predicted vancomycin AUC from initial AUC test calculator: 475 mg·hr/L    -  Comments: vancomycin and Unasyn initiated for diabetic foot infection. Some concern for accumulation given elevated BMI, proceeding with q12h dosing to start. MRSA PCR pending, de-escalation recommended as indicated. Pharmacy will continue to follow.    Froylan Nolasco, PharmD

## 2022-06-22 NOTE — CONSULTS
"LIMB PRESERVATION SERVICE CONSULT      REFERRED BY: Dr Mulligan     DATE OF CONSULTATION: 6/22/2022    REASON FOR CONSULT: Right 2nd toe wound with recent right 1st toe amputation      HISTORY OF PRESENT ILLNESS: Keaton Cervantes is a 67 y.o.  with a past medical history that includes type 2 diabetes, Asthma, HTN, ELLEN. Admitted 6/21/2022 for Cellulitis in diabetic foot (HCC) [E11.628, L03.119].     LPS has been consulted for right 2nd toe wound with recent right 1st toe amputation .    Patient had a right 1st partial ray amputation 3/4/22 by Dr Madera. He states that he was doing well and being seen by his podiatrist and wound MD at [facility]. He states that on 6/14/22 he was seen and this MD removed the toenail on the right 2nd toe and drained \"a lot of fluid\". He has had a dressing on this and changing it daily since. He has been working, light duty, but he is still walking anywhere from 5-15 miles a day. 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   He states that he does not wear his offloading boot at work and has not had any diabetic shoes and inserts made at this time due to increasing swelling to his right foot. He states that he went in to be seen by his Podiatrist/Wound MD at [facility] and was sent in due to concern for increasing infection.   Patient denied fevers, chills, nausea, vomiting.       IV antibiotics were started on this admission.  Infectious diseases has not been consulted.  Xray completed and shows probable osteomyelitis in the right 2nd toe. osteomyelitis. Ortho has not been consulted yet.    Diagnosed with diabetes \"years ago\". Does have numbness to feet.  Checks feet routinely. Usually wears OTC shoes . Does not have diabetic shoes and inserts but states that he was waiting to have these made. Has had previous foot surgery, right partial 1st ray amputation. Current occupation: -night shift .       Smoking:   reports that he has quit smoking. He " has quit using smokeless tobacco.  His smokeless tobacco use included chew.    Alcohol:   reports no history of alcohol use.    Drug:   reports no history of drug use.      PAST MEDICAL HISTORY:   Past Medical History:   Diagnosis Date   • Allergy    • ASTHMA    • Back pain     Chronic   • Bronchitis    • CATARACT    • Dental disorder    • Diabetes     oral rx & insulin   • Hypertension    • MEDICAL HOME    • Neck pain     Chronic   • ELLEN (obstructive sleep apnea)     Bipap   • Sleep apnea         PAST SURGICAL HISTORY:   Past Surgical History:   Procedure Laterality Date   • TOE AMPUTATION Right 3/4/2022    Procedure: AMPUTATION, TOE 1ST RAY;  Surgeon: Wilman Madera M.D.;  Location: [facility];  Service: Orthopedics   • LUMBAR FUSION ANTERIOR  8/4/2009    Performed by JOSEE BLANTON at [facility]   • FUSION, SPINE, LUMBAR, PLIF  8/4/2009    Performed by JOSEE BLANTON at [facility]   • LUMBAR LAMINECTOMY DISKECTOMY  8/4/2009    Performed by JOSEE BLANTON at [facility]   • LAMINOTOMY  8/4/2009    Performed by JOSEE BLANTON at [facility]   • SOMNOPLASTY  6/10/2009    Performed by ELÍAS RILEY at [facility]   • SEPTOPLASTY  6/10/2009    Performed by ELÍAS RILEY at [facility]   • ANTROSTOMY  6/10/2009    Performed by ELÍAS RILEY at [facility]   • ETHMOIDECTOMY  6/10/2009    Performed by ELÍAS RILEY at [facility]   • CATARACT PHACO WITH IOL  7/7/08    Performed by OCTAVIO HARDWICK at [facility]   • CATARACT PHACO WITH IOL  6/16/08    Performed by OCTAVIO HARDWICK at [facility]   • APPENDECTOMY     • ORIF, ANKLE     • VASECTOMY         MEDICATIONS:   Current Facility-Administered Medications   Medication Dose   • senna-docusate (PERICOLACE or SENOKOT S) 8.6-50 MG per tablet 2 Tablet  2 Tablet    And   • polyethylene glycol/lytes  (MIRALAX) PACKET 1 Packet  1 Packet    And   • magnesium hydroxide (MILK OF MAGNESIA) suspension 30 mL  30 mL    And   • bisacodyl (DULCOLAX) suppository 10 mg  10 mg   • enoxaparin (Lovenox) inj 40 mg  40 mg   • acetaminophen (Tylenol) tablet 650 mg  650 mg   • hydrALAZINE (APRESOLINE) injection 10 mg  10 mg   • Pharmacy Consult Request ...Pain Management Review 1 Each  1 Each   • ondansetron (ZOFRAN) syringe/vial injection 4 mg  4 mg   • ondansetron (ZOFRAN ODT) dispertab 4 mg  4 mg   • insulin regular (HumuLIN R,NovoLIN R) injection  1-6 Units    And   • dextrose 50% (D50W) injection 25 g  25 g   • cyclobenzaprine (Flexeril) tablet 10 mg  10 mg   • gabapentin (NEURONTIN) capsule 300 mg  300 mg   • insulin GLARGINE (Lantus,Semglee) injection  8 Units   • HYDROcodone-acetaminophen (NORCO) 5-325 MG per tablet 1 Tablet  1 Tablet   • morphine 4 MG/ML injection 4 mg  4 mg       ALLERGIES:    Allergies   Allergen Reactions   • Sulfa Drugs Unspecified     Pt hasn't had meds in so long he doesn't remember what his reaction is, he just remembers there is a reaction and that he shouldn't have them    • Coconut (Cocos Nucifera) Allergy Skin Test    • Flu Virus Vaccine Shortness of Breath   • Other Food Anaphylaxis     coconut   • Pneumococcal Vaccines Shortness of Breath   • Nutrasweet Aspartame [Aspartame]      Causes migraines        FAMILY HISTORY:   Family History   Problem Relation Age of Onset   • Hypertension Father    • Diabetes Father          REVIEW OF SYSTEMS:   Constitutional: Negative for chills, fever   Respiratory: Negative for cough and shortness of breath.    Cardiovascular:Negative for chest pain, and claudication.   Gastrointestinal: Negative for constipation, diarrhea, nausea and vomiting.   Lower extremities: positive for swelling and redness  Neurological: positive for numbness to feet and lower legs  All other systems reviewed and are negative     RESULTS:     Recent Labs     06/21/22  1517  22  0252   WBC 8.8 7.2   RBC 4.12* 3.86*   HEMOGLOBIN 11.9* 11.3*   HEMATOCRIT 35.7* 34.2*   MCV 86.7 88.6   MCH 28.9 29.3   MCHC 33.3* 33.0*   RDW 40.4 41.5   PLATELETCT 371 316   MPV 8.7* 8.7*     Recent Labs     22  1517 22  0252   SODIUM 130* 137   POTASSIUM 4.1 4.1   CHLORIDE 95* 103   CO2 23 24   GLUCOSE 237* 158*   BUN 16 11         ESR:     none     CRP:       none       COVID-19: Not completed this admission     Imagin22  DX-FOOT-COMPLETE 3+ RIGHT   Final Result      1.  Questionable periosteal reaction of the second toe distal phalanx concerning for acute osteomyelitis.   2.  Interval postsurgical changes of amputation at the great toe metatarsal neck. Cortical irregularity of the distal stump could represent reactive changes from recent surgery versus acute osteomyelitis.   3.  Soft tissue swelling adjacent to the great toe metatarsal stump may be reactive or represent infection.   4.  Oblique, mildly displaced fracture of the second toe metatarsal.      US-EXTREMITY VENOUS LOWER UNILAT RIGHT   Final Result      CT-FOOT W/O PLUS RECONS RIGHT   Final Result      Second and third proximal metatarsal shaft fractures with the third extending into the TMT joint. These may be due to altered biomechanics in the context of interval first transmetatarsal amputation      No acute bony destruction to confirm osteomyelitis. If more sensitive analysis is required, MRI could be performed      Small fluid overlies the first transmetatarsal stump but this does not extend to the skin surface and may just be a bursa or postoperative fluid. A superimposed infection is not excluded on the basis of imaging alone      DX-CHEST-PORTABLE (1 VIEW)   Final Result      No radiographic evidence of acute cardiopulmonary process.      US-EXTREMITY ARTERY LOWER UNILAT W/CYNTHIA (COMBO) RIGHT    (Results Pending)       Arterial studies: 3/4/22                     RIGHT      Waveform            Systolic BPs (mmHg)                               138           Brachial   Triphasic                                Common Femoral   Triphasic                                Popliteal   Hyperemic                  174           Posterior Tibial   Biphasic                   157           Dorsalis Pedis   Normal                     74            Digit                              1.26          CYNTHIA                              0.54          TBI                           LEFT   Waveform        Systolic BPs (mmHg)                              127           Brachial   Triphasic                                Common Femoral   Triphasic                                Popliteal   Triphasic                  177           Posterior Tibial   Triphasic                  162           Dorsalis Pedis   Normal                     115           Digit                              1.28          CYNTHIA                              0.83          TBI         Findings   Right.    The ankle-brachial index is normal.    Toe-brachial indices are reduced, suggesting microvascular disease.    PPG waveforms of the great toe are normal.    Doppler waveforms of the common femoral and popliteal arteries are of high    amplitude and triphasic.    Doppler waveform of the posterior tibial is hyperemic.   Doppler waveform of the dorsalis pedis is of high amplitude and biphasic.       Left.    The ankle-brachial index is normal.    Toe-brachial index is normal.    Doppler waveforms of the common femoral, popliteal, posterior tibial, and    dorsalis pedis arteries are of high amplitude and triphasic.    PPG waveforms of the great toe are normal.        A1c:  Lab Results   Component Value Date/Time    HBA1C 7.6 (H) 06/21/2022 03:17 PM          Microbiology:  Results     Procedure Component Value Units Date/Time    CULTURE WOUND W/ GRAM STAIN [[id number]] Collected: 06/22/22 1356    Order Status: Sent Specimen: Wound from Right Foot     MRSA By PCR (Amp) [[id number]] Collected:  "06/22/22 0023    Order Status: Completed Specimen: Respirate from Nares Updated: 06/22/22 1338     MRSA by PCR Negative    BLOOD CULTURE [[id number]] Collected: 06/21/22 1517    Order Status: Completed Specimen: Blood from Peripheral Updated: 06/22/22 0834     Significant Indicator NEG     Source BLD     Site PERIPHERAL     Culture Result No Growth  Note: Blood cultures are incubated for 5 days and  are monitored continuously.Positive blood cultures  are called to the RN and reported as soon as  they are identified.      Narrative:      Per Hospital Policy: Only change Specimen Src: to \"Line\" if  specified by physician order.  Right AC    BLOOD CULTURE [[id number]] Collected: 06/21/22 1517    Order Status: Completed Specimen: Blood from Peripheral Updated: 06/22/22 0834     Significant Indicator NEG     Source BLD     Site PERIPHERAL     Culture Result No Growth  Note: Blood cultures are incubated for 5 days and  are monitored continuously.Positive blood cultures  are called to the RN and reported as soon as  they are identified.      Narrative:      Per Hospital Policy: Only change Specimen Src: to \"Line\" if  specified by physician order.  Left AC    Culture Wound W/ Gram Stain [[id number]]     Order Status: Sent Specimen: Wound from Right Foot     URINALYSIS [[id number]]  (Abnormal) Collected: 06/21/22 1728    Order Status: Completed Specimen: Urine, Clean Catch Updated: 06/21/22 1743     Color Yellow     Character Clear     Specific Gravity 1.015     Ph 5.5     Glucose 500 mg/dL      Ketones Negative mg/dL      Protein Negative mg/dL      Bilirubin Negative     Urobilinogen, Urine 0.2     Nitrite Negative     Leukocyte Esterase Negative     Occult Blood Negative     Micro Urine Req see below     Comment: Microscopic examination not performed when specimen is clear  and chemically negative for protein, blood, leukocyte esterase  and nitrite.         Narrative:      Indication for culture:->Evaluation for sepsis " without a  clear source of infection    URINE CULTURE(NEW) [[id number]] Collected: 06/21/22 1728    Order Status: Sent Specimen: Urine, Clean Catch Updated: 06/21/22 1734    Narrative:      Indication for culture:->Evaluation for sepsis without a  clear source of infection           PHYSICAL EXAMINATION:     VITAL SIGNS: BP (!) 153/72   Pulse 80   Temp 36.6 °C (97.9 °F) (Temporal)   Resp 16   Ht 1.829 m (6')   Wt 92.8 kg (204 lb 9.4 oz)   SpO2 99%   BMI 27.75 kg/m²       General Appearance:  Well developed, well nourished, in no acute distress    Lower Extremity Assessment:    Edema:   Moderate  edema     ROM dorsi/plantarflexion:   Dorsiflexion limited    Structural /mechanical changes:   partial right 1st ray amputation     Sensory Assessment:  Monofilament testing with a 10 gram force: completed 3/4/22  Visual Inspection: Feet with maceration, ulcers, fissures.  Pedal pulses: intact bilaterally    Feet Insensate to light touch    Pulses:  R foot: palpable DP, palpable PT  L foot: palpable DP, palpable PT    Wound Assessment:    Wound(s)   location: Right 2nd toe  Full thickness, does  probe to bone  Wound characteristics:  red tissue, yellow non viable tissue and slough  Erythema: moderate  Edema: moderate   Drainage: scant sanguinous   Callus: periwound  Odor: none       Wound care completed by LPS APRN. Cleanse with NS, pat dry. Apply gauze, secure with roll gauze and tape.     Wound photo:             ASSESSMENT AND PLAN:   67 y.o. admitted for Cellulitis in diabetic foot (HCC) [E11.628, L03.119]. Presents with right 2nd toe wound.  -Wound probes to bone, xray concerning for osteomyelitis to right second toe   -Xray also shows 2nd and 3rd proximal metatarsal shaft fxs. Discussed with foot & ankle orthopedic surgeon, managed with offloading boot-no need for surgical intervention  -dicussed with orthopedic surgeon, Dr Sanders. Likely right 2nd toe amputation tomorrow 6/23/22 with Dr Madera   -NPO at  midnight      Wound care:   -Wound care orders placed for nursing by LPS   -Right 2nd toe: Cleanse with NS, pat dry. Apply gauze, secure with roll gauze and tape.       Imaging/Labs:  -COVID-19: not completed this admission.     Vascular status:   -  pedal pulses diminished but palpable  bilaterally. With doppler, pedal pulses are brisk    Surgery:   - consulted Ortho Dr. Sanders. Plan for likely right 2nd toe amputation tomorrow 6/23/22 with Dr Madera     Antibiotics:   -currently not on antibiotics managed by hospitalist   - recommend ID consult     Weight Bearing Status:   -Right foot: Heel weight bearing    Offloading:   -Offloading boot  when ambulating; boot ordered   -Orthotic company: [employer]     PT/OT:   -no consult placed      Diabetes Education:   - consult placed for CDE and RD .   -   Lab Results   Component Value Date/Time    HBA1C 7.6 (H) 06/21/2022 03:17 PM          - Implications of loss of protective sensation (LOPS) discussed with patient- including increased risk for amputation.  Advised to check feet at least daily, moisturize feet, and to always wear protective foot wear.   -avoid trimming own nails. See podiatrist or certified foot and nail RN  -keep blood sugars <150 for improved wound healing      Smoking Cessation:   -negative effects of smoking to cardiac, pulmonary, vascular, and skin systems discussed with patient for  3 minutes. Patient not interested  in quitting at this time.             Discharge Plan:  TBD    D/W: pt, RN, Dr. Talley, Dr Sanders, Foot & ankle ortho surgeon     Please note that this dictation was created using voice recognition software. I have  worked with technical experts from [employer] to optimize the interface.  I have made every reasonable attempt to correct obvious errors, but there may be errors of grammar and possibly content that I did not discover before finalizing the note.    Please contact [facility] through [employer].

## 2022-06-22 NOTE — PROGRESS NOTES
Attention order for  diabetic boot to be fitted to pt. Discussed boot fitting  with pt. Pt commented he has already been issued a boot and it is in great condition he stated. He said he will have his wife bring it in for him to use when she comes into visit this evening. If any questions or concerns please do not hesitate to contact the ortho tech team..

## 2022-06-22 NOTE — H&P
Hospital Medicine History & Physical Note    Date of Service  6/21/2022    Primary Care Physician  Pcp Not In Computer    Consultants  orthopedics    Specialist Names: Dr. Sanders    Code Status  Full Code    Chief Complaint  Chief Complaint   Patient presents with   • Wound Infection     Patient sent by MD for infection to right foot. Right big toe was amputated in March. Presents for infection which may be related to this. Hx of diabetes. Redness, warmth, and swelling present to right foot. Swelling also present in ankle and calf. Endorses pain to entire right leg.        History of Presenting Illness  Keaton Cervantes is a 67 y.o. male with past medical history of diabetes, sleep apnea, asthma, hypertension, status post right ray amputation of the great toe on March 4, 2022 for osteomyelitis, who presented 6/21/2022 with complaints of worsening of right foot swelling, pain worsening with ambulation, worsening of erythema especially of the second toe, open wound of the second toe.  Patient was sent to the emergency department by podiatrist with concern for infection.  Patient has not been on any antibiotics recently.  CT of the foot without contrast that performed in ER showed second and third proximal metatarsal shaft fractures, no evidence of osteomyelitis.  There is small fluid overlying the first transmetatarsal stump, superimposed infection is not excluded.    Right lower extremity ultrasound DVT study showed Baker's cyst, no evidence of DVT.  I discussed the plan of care with patient.    Review of Systems  Review of Systems   Constitutional: Negative for chills, fever and weight loss.   HENT: Negative for ear pain, hearing loss and tinnitus.    Eyes: Negative for blurred vision, double vision and photophobia.   Respiratory: Negative for cough, hemoptysis and sputum production.    Cardiovascular: Positive for leg swelling. Negative for chest pain, palpitations and orthopnea.   Gastrointestinal:  Pt. rescheduled for Tuesday, 8/14/18 at 4:30 pm with Dr. Leroy Vides. Negative for heartburn, nausea and vomiting.   Genitourinary: Negative for dysuria, flank pain, frequency and hematuria.   Musculoskeletal: Positive for joint pain. Negative for back pain and neck pain.   Skin: Negative for itching and rash.   Neurological: Negative for tremors, speech change, focal weakness and headaches.   Endo/Heme/Allergies: Negative for environmental allergies and polydipsia. Does not bruise/bleed easily.   Psychiatric/Behavioral: Negative for hallucinations and substance abuse. The patient is not nervous/anxious.        Past Medical History   has a past medical history of Allergy, ASTHMA, Back pain, Bronchitis, CATARACT, Dental disorder, Diabetes, Hypertension, MEDICAL HOME, Neck pain, ELLEN (obstructive sleep apnea), and Sleep apnea.    Surgical History   has a past surgical history that includes cataract phaco with iol (6/16/08); cataract phaco with iol (7/7/08); vasectomy; somnoplasty (6/10/2009); septoplasty (6/10/2009); antrostomy (6/10/2009); ethmoidectomy (6/10/2009); orif, ankle; appendectomy; lumbar fusion anterior (8/4/2009); fusion, spine, lumbar, plif (8/4/2009); lumbar laminectomy diskectomy (8/4/2009); laminotomy (8/4/2009); and toe amputation (Right, 3/4/2022).     Family History  family history includes Diabetes in his father; Hypertension in his father.   Family history reviewed with patient. There is no family history that is pertinent to the chief complaint.     Social History   reports that he has quit smoking. He has quit using smokeless tobacco.  His smokeless tobacco use included chew. He reports that he does not drink alcohol and does not use drugs.    Allergies  Allergies   Allergen Reactions   • Sulfa Drugs Unspecified     Pt hasn't had meds in so long he doesn't remember what his reaction is, he just remembers there is a reaction and that he shouldn't have them    • Coconut (Cocos Nucifera) Allergy Skin Test    • Flu Virus Vaccine Shortness of Breath   • Other Food  Anaphylaxis     coconut   • Pneumococcal Vaccines Shortness of Breath   • Nutrasweet Aspartame [Aspartame]      Causes migraines       Medications  Prior to Admission Medications   Prescriptions Last Dose Informant Patient Reported? Taking?   HYDROcodone-acetaminophen (NORCO) 7.5-325 MG per tablet  Patient Yes No   Sig: Take 1 Tablet by mouth 3 times a day as needed for Severe Pain.   ONE TOUCH ULTRA TEST strip   No No   Sig: USE TO TEST BLOOD 6 TIMES A DAY   Patient not taking: Reported on 6/3/2021   cyclobenzaprine (FLEXERIL) 10 mg Tab  Patient Yes No   Sig: Take 10 mg by mouth at bedtime.   gabapentin (NEURONTIN) 300 MG Cap  Patient Yes No   Sig: Take 300 mg by mouth 3 times a day.   glipiZIDE SR (GLUCOTROL) 10 MG TABLET SR 24 HR  Patient's Home Pharmacy Yes No   Sig: Take 10 mg by mouth 2 times a day.   glucose blood strip   Yes No   Sig: Contour Next Test Strips   use to test blood sugar levels four time daily and as needed   glucose blood strip   Yes No   Sig: Contour Next Test Strips   insulin glargine (LANTUS) 100 UNIT/ML SOLN  Patient No No   Sig: Inject 15 Units as instructed every bedtime. AS DIRECTED   Patient taking differently: Inject 8 Units under the skin every morning. AS DIRECTED   metformin (GLUCOPHAGE) 1000 MG tablet  Patient Yes No   Sig: Take 1,000 mg by mouth 2 times a day with meals.      Facility-Administered Medications: None       Physical Exam  Temp:  [36.2 °C (97.1 °F)] 36.2 °C (97.1 °F)  Pulse:  [86-92] 86  Resp:  [14] 14  BP: (138-141)/(63-68) 138/63  SpO2:  [98 %] 98 %  Blood Pressure : 138/63   Temperature: 36.2 °C (97.1 °F)   Pulse: 86   Respiration: 14   Pulse Oximetry: 98 %       Physical Exam  Vitals and nursing note reviewed.   Constitutional:       General: He is not in acute distress.     Appearance: Normal appearance.   HENT:      Head: Normocephalic and atraumatic.      Nose: Nose normal.      Mouth/Throat:      Mouth: Mucous membranes are moist.   Eyes:      Extraocular  Movements: Extraocular movements intact.      Pupils: Pupils are equal, round, and reactive to light.   Cardiovascular:      Rate and Rhythm: Normal rate and regular rhythm.   Pulmonary:      Effort: Pulmonary effort is normal.      Breath sounds: Normal breath sounds.   Abdominal:      General: Abdomen is flat. There is no distension.      Tenderness: There is no abdominal tenderness.   Musculoskeletal:         General: No swelling or deformity. Normal range of motion.      Cervical back: Normal range of motion and neck supple.      Comments: Right lower extremity and right foot edema and erythema  Right foot is missing great toe  Open wound over the right second toe without significant drainage  Right dorsalis pedis artery: Pulse +3     Skin:     General: Skin is warm and dry.   Neurological:      General: No focal deficit present.      Mental Status: He is alert and oriented to person, place, and time.   Psychiatric:         Mood and Affect: Mood normal.         Behavior: Behavior normal.         Laboratory:  Recent Labs     06/21/22  1517   WBC 8.8   RBC 4.12*   HEMOGLOBIN 11.9*   HEMATOCRIT 35.7*   MCV 86.7   MCH 28.9   MCHC 33.3*   RDW 40.4   PLATELETCT 371   MPV 8.7*     Recent Labs     06/21/22  1517   SODIUM 130*   POTASSIUM 4.1   CHLORIDE 95*   CO2 23   GLUCOSE 237*   BUN 16   CREATININE 0.66   CALCIUM 8.8     Recent Labs     06/21/22  1517   ALTSGPT 39   ASTSGOT 34   ALKPHOSPHAT 67   TBILIRUBIN 0.3   GLUCOSE 237*         No results for input(s): NTPROBNP in the last 72 hours.      No results for input(s): TROPONINT in the last 72 hours.    Imaging:  CT-FOOT W/O PLUS RECONS RIGHT   Final Result      Second and third proximal metatarsal shaft fractures with the third extending into the TMT joint. These may be due to altered biomechanics in the context of interval first transmetatarsal amputation      No acute bony destruction to confirm osteomyelitis. If more sensitive analysis is required, MRI could be  performed      Small fluid overlies the first transmetatarsal stump but this does not extend to the skin surface and may just be a bursa or postoperative fluid. A superimposed infection is not excluded on the basis of imaging alone      DX-CHEST-PORTABLE (1 VIEW)   Final Result      No radiographic evidence of acute cardiopulmonary process.      US-EXTREMITY VENOUS LOWER UNILAT RIGHT    (Results Pending)   US-EXTREMITY ARTERY LOWER UNILAT W/CYNTHIA (COMBO) RIGHT    (Results Pending)       X-Ray:  I have personally reviewed the images and compared with prior images.    Assessment/Plan:  Justification for Admission Status  I anticipate this patient will require at least two midnights for appropriate medical management, necessitating inpatient admission because Treatment for right foot cellulitis with obtaining wound culture will take at least 2 midnights    * Cellulitis in diabetic foot (HCC)- (present on admission)  Assessment & Plan  CT of the right foot noted  Ultrasound DVT study negative for DVT, arterial studies pending, although pulses palpated on dorsalis pedis  Patient started on vancomycin, will continue vancomycin and Unasyn  Will obtain MRI of the right foot to rule out osteomyelitis  Will consult with [facility]  Orthopedic surgery consult requested in ED  Pain control, fall precaution    Diabetes (HCC)- (present on admission)  Assessment & Plan  Uncontrolled, with hyperglycemia  Patient has been taking Lantus 8 to 15 units at home  We will continue Lantus, hold glipizide and metformin  We will recheck A1c      VTE prophylaxis: enoxaparin ppx

## 2022-06-22 NOTE — ED NOTES
Med rec completed per patient  Allergies reviewed    Patient currently taking Augmentin    Patient takes Lantus insulin as needed depending on his blood sugar levels.

## 2022-06-22 NOTE — ASSESSMENT & PLAN NOTE
Uncontrolled, with hyperglycemia  Patient has been taking Lantus 8 to 15 units at home  We will continue Lantus, hold glipizide and metformin  We will recheck A1c 7.6

## 2022-06-22 NOTE — ASSESSMENT & PLAN NOTE
Of 2nd and 3rd metatarsals likely resulting in foot swelling  Ortho recommends offloading boot-no need for surgical intervention

## 2022-06-22 NOTE — ASSESSMENT & PLAN NOTE
CT of the right foot noted no evidence of osteomyelitis. CRP pending, procal negative.   LPS and Orthopedics were consulted.  Patient underwent a right second toe amputation through middle phalanx 6/23.   MRSA swab negative, will continue Unasyn.  Will discuss with ID/LPS regards duration of antibiotics

## 2022-06-22 NOTE — PROGRESS NOTES
Hospital Medicine Daily Progress Note    Date of Service  6/22/2022    Chief Complaint  Keaton Cervantes is a 67 y.o. male admitted 6/21/2022 with foot pain    Hospital Course  67-year-old female with past medical history of diabetes, ELLEN, asthma, hypertension, s/p right ray amputation of great toe in March 2022 presenting with worsening right foot swelling.  CT of the foot showed second and third proximal metatarsal shaft fractures with no evidence of osteomyelitis.  Right lower extremity ultrasound was negative for DVT however showed a Baker's cyst.  Orthopedics was consulted.      Interval Problem Update  He feels his right lower extremity swelling has decreased  He denies any fevers  He states his right foot swelling started a week and a half ago.  He denies any falls but states he walks anywhere from 4 to 15 miles a day  I have reached out to Ortho to see if there is any plan for his metatarsal fractures    I have discussed this patient's plan of care and discharge plan at IDT rounds today with Case Management, Nursing, Nursing leadership, and other members of the IDT team.    Consultants/Specialty  orthopedics    Code Status  Full Code    Disposition  Patient is not medically cleared for discharge.   Anticipate discharge to TBD.  I have placed the appropriate orders for post-discharge needs.    Review of Systems  Review of Systems   Constitutional: Negative for chills and fever.   Respiratory: Negative for cough and shortness of breath.    Cardiovascular: Negative for chest pain and palpitations.   Gastrointestinal: Negative for abdominal pain, nausea and vomiting.   Genitourinary: Negative for dysuria and urgency.   Musculoskeletal:        Right foot swelling   Neurological: Negative for dizziness and headaches.   All other systems reviewed and are negative.       Physical Exam  Temp:  [36.2 °C (97.1 °F)] 36.2 °C (97.1 °F)  Pulse:  [75-92] 77  Resp:  [14-18] 15  BP: (112-149)/(58-72) 142/63  SpO2:  [90  %-98 %] 95 %    Physical Exam  Vitals and nursing note reviewed.   Constitutional:       Appearance: Normal appearance.   Cardiovascular:      Rate and Rhythm: Normal rate and regular rhythm.      Heart sounds: No murmur heard.  Pulmonary:      Effort: Pulmonary effort is normal. No respiratory distress.      Breath sounds: Normal breath sounds.   Musculoskeletal:      Comments: Right great toe amputaion. Swelling of right foot and ankle  Chronic ulcer on tip of second right toe, no evidence of surrounding cellulitis   Neurological:      General: No focal deficit present.      Mental Status: He is alert and oriented to person, place, and time.         Fluids    Intake/Output Summary (Last 24 hours) at 6/22/2022 1057  Last data filed at 6/22/2022 0116  Gross per 24 hour   Intake 700 ml   Output --   Net 700 ml       Laboratory  Recent Labs     06/21/22  1517 06/22/22  0252   WBC 8.8 7.2   RBC 4.12* 3.86*   HEMOGLOBIN 11.9* 11.3*   HEMATOCRIT 35.7* 34.2*   MCV 86.7 88.6   MCH 28.9 29.3   MCHC 33.3* 33.0*   RDW 40.4 41.5   PLATELETCT 371 316   MPV 8.7* 8.7*     Recent Labs     06/21/22  1517 06/22/22  0252   SODIUM 130* 137   POTASSIUM 4.1 4.1   CHLORIDE 95* 103   CO2 23 24   GLUCOSE 237* 158*   BUN 16 11   CREATININE 0.66 0.56   CALCIUM 8.8 8.4*                   Imaging  DX-FOOT-COMPLETE 3+ RIGHT   Final Result      1.  Questionable periosteal reaction of the second toe distal phalanx concerning for acute osteomyelitis.   2.  Interval postsurgical changes of amputation at the great toe metatarsal neck. Cortical irregularity of the distal stump could represent reactive changes from recent surgery versus acute osteomyelitis.   3.  Soft tissue swelling adjacent to the great toe metatarsal stump may be reactive or represent infection.   4.  Oblique, mildly displaced fracture of the second toe metatarsal.      US-EXTREMITY VENOUS LOWER UNILAT RIGHT   Final Result      CT-FOOT W/O PLUS RECONS RIGHT   Final Result       Second and third proximal metatarsal shaft fractures with the third extending into the TMT joint. These may be due to altered biomechanics in the context of interval first transmetatarsal amputation      No acute bony destruction to confirm osteomyelitis. If more sensitive analysis is required, MRI could be performed      Small fluid overlies the first transmetatarsal stump but this does not extend to the skin surface and may just be a bursa or postoperative fluid. A superimposed infection is not excluded on the basis of imaging alone      DX-CHEST-PORTABLE (1 VIEW)   Final Result      No radiographic evidence of acute cardiopulmonary process.      US-EXTREMITY ARTERY LOWER UNILAT W/CYNTHIA (COMBO) RIGHT    (Results Pending)   MR-FOOT-WITH & W/O RIGHT    (Results Pending)        Assessment/Plan  * Cellulitis in diabetic foot (HCC)- (present on admission)  Assessment & Plan  CT of the right foot noted  Pt does NOT have cellulitis of right foot  Wound on second right toe appears chronic and non infected  Dc abx    Chronic ulcer of toe of left foot ([facility])  Assessment & Plan  Does not appear infected  Dc abx  Wound consult    Closed nondisplaced fracture of second metatarsal bone of right foot  Assessment & Plan  Of 2nd and 3rd metatarsals likely resulting in foot swelling  States he ambulates 4-15 miles a day- poss stress fracture  Ortho consulted    Diabetes ([facility])- (present on admission)  Assessment & Plan  Uncontrolled, with hyperglycemia  Patient has been taking Lantus 8 to 15 units at home  We will continue Lantus, hold glipizide and metformin  We will recheck A1c       VTE prophylaxis: enoxaparin ppx    I have performed a physical exam and reviewed and updated ROS and Plan today (6/22/2022). In review of yesterday's note (6/21/2022), there are no changes except as documented above.

## 2022-06-22 NOTE — ED NOTES
Report from LUCIAN Vernon and LUCIAN Kirby. Pt back from ct. Hooked back to monitor. Relative at bedside.

## 2022-06-22 NOTE — ED PROVIDER NOTES
ED Provider Note    CHIEF COMPLAINT  Chief Complaint   Patient presents with   • Wound Infection     Patient sent by MD for infection to right foot. Right big toe was amputated in March. Presents for infection which may be related to this. Hx of diabetes. Redness, warmth, and swelling present to right foot. Swelling also present in ankle and calf. Endorses pain to entire right leg.        HPI  Keaton Cervantes is a 67 y.o. male here for evaluation of right foot cellulitis.  The pt wa seen by his podiatrist today, and sent here for further evaluation.  He has some redness to the foot and ankle, with edema. No fever/chills. No sob, no vomiting.   Pt has not been on any recent abx.  No pain to the calf. He had his great toe amputated in the recent past.       ROS;  Please see HPI  O/W negative     PAST MEDICAL HISTORY   has a past medical history of Allergy, ASTHMA, Back pain, Bronchitis, CATARACT, Dental disorder, Diabetes, Hypertension, MEDICAL HOME, Neck pain, ELLEN (obstructive sleep apnea), and Sleep apnea.    SOCIAL HISTORY  Social History     Tobacco Use   • Smoking status: Former Smoker   • Smokeless tobacco: Former User     Types: Chew   • Tobacco comment: 1992   Substance and Sexual Activity   • Alcohol use: No   • Drug use: No   • Sexual activity: Yes     Partners: Female       SURGICAL HISTORY   has a past surgical history that includes cataract phaco with iol (6/16/08); cataract phaco with iol (7/7/08); vasectomy; somnoplasty (6/10/2009); septoplasty (6/10/2009); antrostomy (6/10/2009); ethmoidectomy (6/10/2009); orif, ankle; appendectomy; lumbar fusion anterior (8/4/2009); fusion, spine, lumbar, plif (8/4/2009); lumbar laminectomy diskectomy (8/4/2009); laminotomy (8/4/2009); and toe amputation (Right, 3/4/2022).    CURRENT MEDICATIONS  Home Medications    **Home medications have not yet been reviewed for this encounter**         ALLERGIES  Allergies   Allergen Reactions   • Sulfa Drugs Unspecified      Pt hasn't had meds in so long he doesn't remember what his reaction is, he just remembers there is a reaction and that he shouldn't have them    • Coconut (Cocos Nucifera) Allergy Skin Test    • Flu Virus Vaccine Shortness of Breath   • Other Food Anaphylaxis     coconut   • Pneumococcal Vaccines Shortness of Breath   • Nutrasweet Aspartame [Aspartame]      Causes migraines       REVIEW OF SYSTEMS  See HPI for further details. Review of systems as above, otherwise all other systems are negative.     PHYSICAL EXAM  VITAL SIGNS: /63   Pulse 86   Temp 36.2 °C (97.1 °F) (Temporal)   Resp 14   Ht 1.829 m (6')   Wt 92.8 kg (204 lb 9.4 oz)   SpO2 98%   BMI 27.75 kg/m²     Constitutional: Well developed, well nourished. mild acute distress.  HEENT: Normocephalic, atraumatic. MMM  Neck: Supple, Full range of motion   Chest/Pulmonary:  No respiratory distress.  Equal expansion   Musculoskeletal: No deformity, no edema, neurovascular intact. Right lowe ext: edema noted 2 plus.  N/v intact distally, erythema noted to the foot, and second toe.  Amputated great toe noted.   Neuro: Clear speech, appropriate, cooperative, cranial nerves II-XII grossly intact.  Psych: Normal mood and affect    Results for orders placed or performed during the hospital encounter of 06/21/22   Lactic acid (lactate)   Result Value Ref Range    Lactic Acid 1.4 0.5 - 2.0 mmol/L   CBC WITH DIFFERENTIAL   Result Value Ref Range    WBC 8.8 4.8 - 10.8 K/uL    RBC 4.12 (L) 4.70 - 6.10 M/uL    Hemoglobin 11.9 (L) 14.0 - 18.0 g/dL    Hematocrit 35.7 (L) 42.0 - 52.0 %    MCV 86.7 81.4 - 97.8 fL    MCH 28.9 27.0 - 33.0 pg    MCHC 33.3 (L) 33.7 - 35.3 g/dL    RDW 40.4 35.9 - 50.0 fL    Platelet Count 371 164 - 446 K/uL    MPV 8.7 (L) 9.0 - 12.9 fL    Neutrophils-Polys 60.00 44.00 - 72.00 %    Lymphocytes 27.90 22.00 - 41.00 %    Monocytes 7.40 0.00 - 13.40 %    Eosinophils 3.40 0.00 - 6.90 %    Basophils 1.10 0.00 - 1.80 %    Immature Granulocytes  0.20 0.00 - 0.90 %    Nucleated RBC 0.00 /100 WBC    Neutrophils (Absolute) 5.28 1.82 - 7.42 K/uL    Lymphs (Absolute) 2.46 1.00 - 4.80 K/uL    Monos (Absolute) 0.65 0.00 - 0.85 K/uL    Eos (Absolute) 0.30 0.00 - 0.51 K/uL    Baso (Absolute) 0.10 0.00 - 0.12 K/uL    Immature Granulocytes (abs) 0.02 0.00 - 0.11 K/uL    NRBC (Absolute) 0.00 K/uL   COMP METABOLIC PANEL   Result Value Ref Range    Sodium 130 (L) 135 - 145 mmol/L    Potassium 4.1 3.6 - 5.5 mmol/L    Chloride 95 (L) 96 - 112 mmol/L    Co2 23 20 - 33 mmol/L    Anion Gap 12.0 7.0 - 16.0    Glucose 237 (H) 65 - 99 mg/dL    Bun 16 8 - 22 mg/dL    Creatinine 0.66 0.50 - 1.40 mg/dL    Calcium 8.8 8.5 - 10.5 mg/dL    AST(SGOT) 34 12 - 45 U/L    ALT(SGPT) 39 2 - 50 U/L    Alkaline Phosphatase 67 30 - 99 U/L    Total Bilirubin 0.3 0.1 - 1.5 mg/dL    Albumin 3.9 3.2 - 4.9 g/dL    Total Protein 7.3 6.0 - 8.2 g/dL    Globulin 3.4 1.9 - 3.5 g/dL    A-G Ratio 1.1 g/dL   URINALYSIS    Specimen: Urine, Clean Catch   Result Value Ref Range    Color Yellow     Character Clear     Specific Gravity 1.015 <1.035    Ph 5.5 5.0 - 8.0    Glucose 500 (A) Negative mg/dL    Ketones Negative Negative mg/dL    Protein Negative Negative mg/dL    Bilirubin Negative Negative    Urobilinogen, Urine 0.2 Negative    Nitrite Negative Negative    Leukocyte Esterase Negative Negative    Occult Blood Negative Negative    Micro Urine Req see below    ESTIMATED GFR   Result Value Ref Range    GFR (CKD-EPI) 103 >60 mL/min/1.73 m 2     CT-FOOT W/O PLUS RECONS RIGHT   Final Result      Second and third proximal metatarsal shaft fractures with the third extending into the TMT joint. These may be due to altered biomechanics in the context of interval first transmetatarsal amputation      No acute bony destruction to confirm osteomyelitis. If more sensitive analysis is required, MRI could be performed      Small fluid overlies the first transmetatarsal stump but this does not extend to the skin  surface and may just be a bursa or postoperative fluid. A superimposed infection is not excluded on the basis of imaging alone      DX-CHEST-PORTABLE (1 VIEW)   Final Result      No radiographic evidence of acute cardiopulmonary process.      US-EXTREMITY VENOUS LOWER UNILAT RIGHT    (Results Pending)       PROCEDURES     MEDICAL RECORD  I have reviewed patient's medical record and pertinent results are listed.    COURSE & MEDICAL DECISION MAKING  I have reviewed any medical record information, laboratory studies and radiographic results as noted above.    8:10 PM  The pt will be admitted to the hospitalist service.  Ortho, Dr. Sanders, will see as consult.   The pt is coming in for iv abx, and MRI of the right foot.   He will have an ultrasound done here as well, that Dr. Hines will follow up on the results of.       FINAL IMPRESSION  Cellulitis   Right lower leg edema       Electronically signed by: Reginald Gillis D.O., 6/21/2022 7:41 PM

## 2022-06-22 NOTE — PROGRESS NOTES
Received report and assumed care of patient.    0815 - Pt up to bathroom.     1000 - MD at bedside. Vancomycin discontinued. Waiting on ortho evaluation.     1255 - Patient roomed in [facility]. Attempted to call report to RN.

## 2022-06-22 NOTE — ED NOTES
Report to LUCIAN Mary. Pt transported to [place] [id number]. All belongings and chart transported with pt.

## 2022-06-22 NOTE — HOSPITAL COURSE
67-year-old female with past medical history of diabetes, ELLEN, asthma, hypertension, s/p right ray amputation of great toe in March 2022 presenting with worsening right foot swelling.  CT of the foot showed second and third proximal metatarsal shaft fractures with no evidence of osteomyelitis.  Right lower extremity ultrasound was negative for DVT however showed a Baker's cyst.  Orthopedics was consulted.

## 2022-06-22 NOTE — ED NOTES
Patient to room from lobby with steady gait. Changed into gown, connected to monitor. Agree with triage note. Charted for ERP. Urine sample cup provided. Patient notified of need for urine sample. Call light within reach.     MD at bedside.

## 2022-06-23 ENCOUNTER — APPOINTMENT (OUTPATIENT)
Dept: RADIOLOGY | Facility: MEDICAL CENTER | Age: 67
DRG: 256 | End: 2022-06-23
Attending: INTERNAL MEDICINE
Payer: COMMERCIAL

## 2022-06-23 ENCOUNTER — ANESTHESIA (OUTPATIENT)
Dept: SURGERY | Facility: MEDICAL CENTER | Age: 67
DRG: 256 | End: 2022-06-23
Payer: COMMERCIAL

## 2022-06-23 ENCOUNTER — ANESTHESIA EVENT (OUTPATIENT)
Dept: SURGERY | Facility: MEDICAL CENTER | Age: 67
DRG: 256 | End: 2022-06-23
Payer: COMMERCIAL

## 2022-06-23 PROBLEM — Z89.411 STATUS POST AMPUTATION OF RIGHT GREAT TOE (HCC): Status: ACTIVE | Noted: 2022-06-23

## 2022-06-23 PROBLEM — L97.529 CHRONIC ULCER OF TOE OF LEFT FOOT (HCC): Status: RESOLVED | Noted: 2022-06-22 | Resolved: 2022-06-23

## 2022-06-23 LAB
BACTERIA UR CULT: NORMAL
CRP SERPL HS-MCNC: 8.57 MG/DL (ref 0–0.75)
EKG IMPRESSION: NORMAL
GLUCOSE BLD STRIP.AUTO-MCNC: 117 MG/DL (ref 65–99)
GLUCOSE BLD STRIP.AUTO-MCNC: 148 MG/DL (ref 65–99)
GLUCOSE BLD STRIP.AUTO-MCNC: 206 MG/DL (ref 65–99)
GLUCOSE BLD STRIP.AUTO-MCNC: 260 MG/DL (ref 65–99)
PROCALCITONIN SERPL-MCNC: 0.06 NG/ML
SIGNIFICANT IND 70042: NORMAL
SITE SITE: NORMAL
SOURCE SOURCE: NORMAL

## 2022-06-23 PROCEDURE — 160048 HCHG OR STATISTICAL LEVEL 1-5: Performed by: ORTHOPAEDIC SURGERY

## 2022-06-23 PROCEDURE — 28825 PARTIAL AMPUTATION OF TOE: CPT | Mod: T6 | Performed by: ORTHOPAEDIC SURGERY

## 2022-06-23 PROCEDURE — 160035 HCHG PACU - 1ST 60 MINS PHASE I: Performed by: ORTHOPAEDIC SURGERY

## 2022-06-23 PROCEDURE — 93005 ELECTROCARDIOGRAM TRACING: CPT | Performed by: ORTHOPAEDIC SURGERY

## 2022-06-23 PROCEDURE — A9270 NON-COVERED ITEM OR SERVICE: HCPCS | Performed by: ANESTHESIOLOGY

## 2022-06-23 PROCEDURE — 160009 HCHG ANES TIME/MIN: Performed by: ORTHOPAEDIC SURGERY

## 2022-06-23 PROCEDURE — 700102 HCHG RX REV CODE 250 W/ 637 OVERRIDE(OP): Performed by: INTERNAL MEDICINE

## 2022-06-23 PROCEDURE — 700111 HCHG RX REV CODE 636 W/ 250 OVERRIDE (IP): Performed by: INTERNAL MEDICINE

## 2022-06-23 PROCEDURE — 01480 ANES OPEN PX LOWER L/A/F NOS: CPT | Performed by: ANESTHESIOLOGY

## 2022-06-23 PROCEDURE — 700102 HCHG RX REV CODE 250 W/ 637 OVERRIDE(OP): Performed by: ANESTHESIOLOGY

## 2022-06-23 PROCEDURE — 28825 PARTIAL AMPUTATION OF TOE: CPT | Mod: ASROC,T6 | Performed by: PHYSICIAN ASSISTANT

## 2022-06-23 PROCEDURE — 700105 HCHG RX REV CODE 258: Performed by: STUDENT IN AN ORGANIZED HEALTH CARE EDUCATION/TRAINING PROGRAM

## 2022-06-23 PROCEDURE — 770001 HCHG ROOM/CARE - MED/SURG/GYN PRIV*

## 2022-06-23 PROCEDURE — 160027 HCHG SURGERY MINUTES - 1ST 30 MINS LEVEL 2: Performed by: ORTHOPAEDIC SURGERY

## 2022-06-23 PROCEDURE — 93010 ELECTROCARDIOGRAM REPORT: CPT | Performed by: INTERNAL MEDICINE

## 2022-06-23 PROCEDURE — 93922 UPR/L XTREMITY ART 2 LEVELS: CPT | Mod: 26 | Performed by: INTERNAL MEDICINE

## 2022-06-23 PROCEDURE — 82962 GLUCOSE BLOOD TEST: CPT | Mod: 91

## 2022-06-23 PROCEDURE — 93922 UPR/L XTREMITY ART 2 LEVELS: CPT

## 2022-06-23 PROCEDURE — A9270 NON-COVERED ITEM OR SERVICE: HCPCS | Performed by: INTERNAL MEDICINE

## 2022-06-23 PROCEDURE — 160002 HCHG RECOVERY MINUTES (STAT): Performed by: ORTHOPAEDIC SURGERY

## 2022-06-23 PROCEDURE — 0Y6R0Z2 DETACHMENT AT RIGHT 2ND TOE, MID, OPEN APPROACH: ICD-10-PCS | Performed by: ORTHOPAEDIC SURGERY

## 2022-06-23 PROCEDURE — 99232 SBSQ HOSP IP/OBS MODERATE 35: CPT | Performed by: STUDENT IN AN ORGANIZED HEALTH CARE EDUCATION/TRAINING PROGRAM

## 2022-06-23 PROCEDURE — 700111 HCHG RX REV CODE 636 W/ 250 OVERRIDE (IP): Performed by: STUDENT IN AN ORGANIZED HEALTH CARE EDUCATION/TRAINING PROGRAM

## 2022-06-23 PROCEDURE — 700111 HCHG RX REV CODE 636 W/ 250 OVERRIDE (IP): Performed by: ANESTHESIOLOGY

## 2022-06-23 RX ORDER — ONDANSETRON 2 MG/ML
4 INJECTION INTRAMUSCULAR; INTRAVENOUS
Status: DISCONTINUED | OUTPATIENT
Start: 2022-06-23 | End: 2022-06-23 | Stop reason: HOSPADM

## 2022-06-23 RX ORDER — HYDROMORPHONE HYDROCHLORIDE 1 MG/ML
0.1 INJECTION, SOLUTION INTRAMUSCULAR; INTRAVENOUS; SUBCUTANEOUS
Status: DISCONTINUED | OUTPATIENT
Start: 2022-06-23 | End: 2022-06-23 | Stop reason: HOSPADM

## 2022-06-23 RX ORDER — HALOPERIDOL 5 MG/ML
1 INJECTION INTRAMUSCULAR
Status: DISCONTINUED | OUTPATIENT
Start: 2022-06-23 | End: 2022-06-23 | Stop reason: HOSPADM

## 2022-06-23 RX ORDER — OXYCODONE HCL 5 MG/5 ML
5 SOLUTION, ORAL ORAL
Status: COMPLETED | OUTPATIENT
Start: 2022-06-23 | End: 2022-06-23

## 2022-06-23 RX ORDER — CEFAZOLIN SODIUM 1 G/3ML
INJECTION, POWDER, FOR SOLUTION INTRAMUSCULAR; INTRAVENOUS PRN
Status: DISCONTINUED | OUTPATIENT
Start: 2022-06-23 | End: 2022-06-23 | Stop reason: SURG

## 2022-06-23 RX ORDER — MEPERIDINE HYDROCHLORIDE 25 MG/ML
12.5 INJECTION INTRAMUSCULAR; INTRAVENOUS; SUBCUTANEOUS
Status: DISCONTINUED | OUTPATIENT
Start: 2022-06-23 | End: 2022-06-23 | Stop reason: HOSPADM

## 2022-06-23 RX ORDER — ALBUTEROL SULFATE 2.5 MG/3ML
2.5 SOLUTION RESPIRATORY (INHALATION)
Status: DISCONTINUED | OUTPATIENT
Start: 2022-06-23 | End: 2022-06-23 | Stop reason: HOSPADM

## 2022-06-23 RX ORDER — OXYCODONE HCL 5 MG/5 ML
10 SOLUTION, ORAL ORAL
Status: COMPLETED | OUTPATIENT
Start: 2022-06-23 | End: 2022-06-23

## 2022-06-23 RX ORDER — HYDROMORPHONE HYDROCHLORIDE 1 MG/ML
0.2 INJECTION, SOLUTION INTRAMUSCULAR; INTRAVENOUS; SUBCUTANEOUS
Status: DISCONTINUED | OUTPATIENT
Start: 2022-06-23 | End: 2022-06-23 | Stop reason: HOSPADM

## 2022-06-23 RX ORDER — DIPHENHYDRAMINE HYDROCHLORIDE 50 MG/ML
12.5 INJECTION INTRAMUSCULAR; INTRAVENOUS
Status: DISCONTINUED | OUTPATIENT
Start: 2022-06-23 | End: 2022-06-23 | Stop reason: HOSPADM

## 2022-06-23 RX ORDER — HYDROMORPHONE HYDROCHLORIDE 1 MG/ML
0.4 INJECTION, SOLUTION INTRAMUSCULAR; INTRAVENOUS; SUBCUTANEOUS
Status: DISCONTINUED | OUTPATIENT
Start: 2022-06-23 | End: 2022-06-23 | Stop reason: HOSPADM

## 2022-06-23 RX ADMIN — INSULIN HUMAN 3 UNITS: 100 INJECTION, SOLUTION PARENTERAL at 20:14

## 2022-06-23 RX ADMIN — GABAPENTIN 300 MG: 300 CAPSULE ORAL at 12:21

## 2022-06-23 RX ADMIN — GABAPENTIN 300 MG: 300 CAPSULE ORAL at 20:17

## 2022-06-23 RX ADMIN — SODIUM CHLORIDE 3 G: 900 INJECTION INTRAVENOUS at 17:28

## 2022-06-23 RX ADMIN — SODIUM CHLORIDE 3 G: 900 INJECTION INTRAVENOUS at 12:22

## 2022-06-23 RX ADMIN — ACETAMINOPHEN 650 MG: 325 TABLET, FILM COATED ORAL at 17:28

## 2022-06-23 RX ADMIN — ENOXAPARIN SODIUM 40 MG: 40 INJECTION SUBCUTANEOUS at 17:00

## 2022-06-23 RX ADMIN — CEFAZOLIN 2 G: 330 INJECTION, POWDER, FOR SOLUTION INTRAMUSCULAR; INTRAVENOUS at 08:45

## 2022-06-23 RX ADMIN — INSULIN GLARGINE-YFGN 8 UNITS: 100 INJECTION, SOLUTION SUBCUTANEOUS at 17:00

## 2022-06-23 RX ADMIN — SODIUM CHLORIDE 3 G: 900 INJECTION INTRAVENOUS at 23:09

## 2022-06-23 RX ADMIN — INSULIN HUMAN 2 UNITS: 100 INJECTION, SOLUTION PARENTERAL at 16:59

## 2022-06-23 RX ADMIN — OXYCODONE HYDROCHLORIDE 10 MG: 5 SOLUTION ORAL at 09:24

## 2022-06-23 RX ADMIN — GABAPENTIN 300 MG: 300 CAPSULE ORAL at 16:58

## 2022-06-23 ASSESSMENT — ENCOUNTER SYMPTOMS
PALPITATIONS: 0
DIZZINESS: 0
COUGH: 0
VOMITING: 0
HEADACHES: 0
ABDOMINAL PAIN: 0
FEVER: 0
NAUSEA: 0
CHILLS: 0
SHORTNESS OF BREATH: 0

## 2022-06-23 ASSESSMENT — PAIN DESCRIPTION - PAIN TYPE
TYPE: SURGICAL PAIN
TYPE: SURGICAL PAIN;PHANTOM PAIN

## 2022-06-23 ASSESSMENT — PAIN SCALES - GENERAL: PAIN_LEVEL: 1

## 2022-06-23 NOTE — PROGRESS NOTES
4 Eyes Skin Assessment Completed by LUCIAN Chua and LUCIAN Barrera.    Head WDL  Ears WDL  Nose WDL  Mouth WDL  Neck WDL  Breast/Chest WDL  Shoulder Blades WDL  Spine Scar  (R) Arm/Elbow/Hand WDL  (L) Arm/Elbow/Hand WDL  Abdomen Scar  Groin WDL  Scrotum/Coccyx/Buttocks WDL  (R) Leg Swelling  (L) Leg WDL  (R) Heel/Foot/Toe Ulcer(s) and Swelling  (L) Heel/Foot/Toe Bruising under 3rd toenail          Devices In Places: none    Interventions In Place N/A    Possible Skin Injury No    Pictures Uploaded Into Epic Yes  Wound Consult Placed Yes  RN Wound Prevention Protocol Ordered No

## 2022-06-23 NOTE — CARE PLAN
The patient is Stable - Low risk of patient condition declining or worsening    Shift Goals  Clinical Goals: NPO, wound care, ortho consult  Patient Goals: Sleep  Family Goals: SHAYNE    Progress made toward(s) clinical / shift goals:    Problem: Knowledge Deficit - Standard  Goal: Patient and family/care givers will demonstrate understanding of plan of care, disease process/condition, diagnostic tests and medications  Outcome: Progressing     Problem: Pain - Standard  Goal: Alleviation of pain or a reduction in pain to the patient’s comfort goal  Outcome: Progressing     Problem: Psychosocial  Goal: Patient's ability to verbalize feelings about condition will improve  Outcome: Progressing  Goal: Patient's ability to re-evaluate and adapt role responsibilities will improve  Outcome: Progressing     Problem: Psychosocial  Goal: Patient's ability to re-evaluate and adapt role responsibilities will improve  Outcome: Progressing     Problem: Communication  Goal: The ability to communicate needs accurately and effectively will improve  Outcome: Progressing     Problem: Discharge Barriers/Planning  Goal: Patient's continuum of care needs are met  Outcome: Progressing     Problem: Hemodynamics  Goal: Patient's hemodynamics, fluid balance and neurologic status will be stable or improve  Outcome: Progressing     Problem: Respiratory  Goal: Patient will achieve/maintain optimum respiratory ventilation and gas exchange  Outcome: Progressing     Problem: Fluid Volume  Goal: Fluid volume balance will be maintained  Outcome: Progressing     Problem: Bowel Elimination  Goal: Establish and maintain regular bowel function  Outcome: Progressing     Problem: Infection - Standard  Goal: Patient will remain free from infection  Outcome: Progressing     Problem: Wound/ / Incision Healing  Goal: Patient's wound/surgical incision will decrease in size and heals properly  Outcome: Progressing       Patient is not progressing towards the  following goals:

## 2022-06-23 NOTE — CARE PLAN
Problem: Pain - Standard  Goal: Alleviation of pain or a reduction in pain to the patient’s comfort goal  Flowsheets (Taken 6/22/2022 1326)  Pain Rating Scale (NPRS): 2     Problem: Communication  Goal: The ability to communicate needs accurately and effectively will improve  Flowsheets (Taken 6/22/2022 1835)  Communication:   Assessed patient's ability to understand and communicate   Oriented family/support system to call light   Oriented patient to call light     Problem: Respiratory  Goal: Patient will achieve/maintain optimum respiratory ventilation and gas exchange  Flowsheets  Taken 6/22/2022 1835 by Harshil Montiel R.N.  Deep Breathe and Cough: Performs Correctly  Taken 6/22/2022 1326 by August Howard, C.N.A.  O2 Delivery Device: None - Room Air     Problem: Mobility  Goal: Patient's capacity to carry out activities will improve  Flowsheets (Taken 6/22/2022 1835)  Mobility:   Monitored for signs of activity intolerance   Provided rest periods between activities  Level of Mobility: Level IV     Problem: Infection - Standard  Goal: Patient will remain free from infection  Flowsheets (Taken 6/22/2022 1835)  Standard Infection Interventions:   Assessed for signs and symptoms of infection   Implemented standard precautions   Instructed patient/family on signs and symptoms of infection   Provided education on proper hand hygiene and infection prevention measures   The patient is Stable - Low risk of patient condition declining or worsening    Shift Goals  Clinical Goals: NPO, wound care, orient to unit  Patient Goals: rest, surgery  Family Goals: SHAYNE    Progress made toward(s) clinical / shift goals:  Pt able to progress to diabetic diet, as surgery has been postponed until tomorrow. Wound care performed and culture sent to lab; wound redressed per orders. Pt oriented to unit and call light. Admission assessment complete. Skin check performed. Pt expresses no other needs at this time. VSS and pt in NAD.

## 2022-06-23 NOTE — ANESTHESIA TIME REPORT
Anesthesia Start and Stop Event Times     Date Time Event    6/23/2022 0832 Ready for Procedure     0843 Anesthesia Start     0910 Anesthesia Stop        Responsible Staff  06/23/22    Name Role Begin End    Omar Kelly M.D. Anesth 0843 0910        Overtime Reason:  no overtime (within assigned shift)    Comments:

## 2022-06-23 NOTE — PROGRESS NOTES
paged to clarify if plan is for pt to go to the OR and whether or not he wants lovenox held. MD states plan is for OR tomorrow and lovenox can be given now. Diet can be resumed and NPO at midnight reinstated.

## 2022-06-23 NOTE — ANESTHESIA PREPROCEDURE EVALUATION
Case: [id number] Date/Time: 06/23/22 1300    Procedure: AMPUTATION, TOE 2ND (Right )    Location: [facility] OR 16 / SURGERY [facility]    Surgeons: Wilman Madera M.D.          Relevant Problems   ANESTHESIA   (positive) ELLEN (obstructive sleep apnea)      PULMONARY   (positive) Asthma      NEURO   (positive) Headache      CARDIAC   (positive) Hypertension      Other   (positive) Osteomyelitis of right foot (HCC)       Physical Exam    Airway   Mallampati: II  TM distance: >3 FB  Neck ROM: full       Cardiovascular - normal exam  Rhythm: regular  Rate: normal  (-) murmur     Dental - normal exam           Pulmonary - normal exam  Breath sounds clear to auscultation     Abdominal    Neurological - normal exam                 Anesthesia Plan    ASA 3       Plan - general               Induction: intravenous    Postoperative Plan: Postoperative administration of opioids is intended.    Pertinent diagnostic labs and testing reviewed    Informed Consent:    Anesthetic plan and risks discussed with patient.    Use of blood products discussed with: patient whom consented to blood products.

## 2022-06-23 NOTE — OR NURSING
0901- Pt arrives to PACU from OR on 6L of oxygen via mask. Report received. Dressing to R foot CDI. Pt denies pain at this time.     0926- Pt medicated for 5/10 pain, medicated per MAR. Pt wife Luisa called and updated on POC and pt status.     0939- Report called to Brian EPSTEIN.

## 2022-06-23 NOTE — ANESTHESIA PROCEDURE NOTES
Airway  Performed by: Omar Kelly M.D.  Authorized by: Omar Kelly M.D.     Location:  OR  Urgency:  Elective  Indications for Airway Management:  Anesthesia      Spontaneous Ventilation: absent    Sedation Level:  Deep  Preoxygenated: Yes    Final Airway Type:  Supraglottic airway  Final Supraglottic Airway:  Standard LMA    SGA Size:  4  Number of Attempts at Approach:  1

## 2022-06-23 NOTE — OP REPORT
DATE OF OPERATION: 6/23/2022    PREOPERATIVE DIAGNOSIS: Necrotic right second toe     POSTOPERATIVE DIAGNOSIS: Same     PROCEDURE PERFORMED: Right second toe amputation through middle phalanx     SURGEON: Wilman Madera M.D.      ASSISTANT: Shen Washington PA-C    ANESTHESIOLOGIST: Omar Kelly MD     ANESTHESIA: General     ESTIMATED BLOOD LOSS: 0     INDICATIONS: The patient is a 67 y.o. male with necrotic right 2nd toe.  The toe  was deemed to be nonviable by the Limb Preservation Service due to vascular status, poor soft tissue and chronic infection.  Given these findings, amputation was indicated.  I discussed the risks and benefits of the procedure, including the risks of infection, wound healing complication, neurovascular injury, need for more proximal amputation, and the medical risks of anesthesia including DVT, PE, MI, stroke, and death.  Benefits include early mobilization and hopeful avoidence of sepsis and death.  Alternatives to surgery were also discussed, including non-operative management.  The patient signed the informed consent and the operative extremity was marked.        PROCEDURE:  The patient underwent anesthesia, and was positioned supine and all bony prominences were well padded.  Preoperative antibiotics were administered. Sequential compression devices were employed. The correct operative site was prepped and draped into a sterile field. A procedural pause was conducted to verify correct patient, correct extremity, presence of the surgeons initials on the operative extremity.     The right second toe was amputated through the middle phalanx back to healthy tissue. Wound was irrigated with normal saline and closed with 2-0 nylon for the skin.  Sterile dressings were applied.       The patient tolerated the procedure well. There were no apparent complications. All sponge, needle, and instrument counts were correct on two separate occasions. The patient was awakened, extubated, and  transferred to the recovery room in satisfactory condition.      Post-Operative Plan:     1.  The patient should be weightbearing through heel on their operative extremity.  Gait aids (crutch or crutches, cane, walker) may be used as needed.  2.  IV antibiotics - may be continued for 24 hours, and longer if deemed appropriate by infectious disease service.  3.  DVT prophylaxis - SCD's and Lovenox 40 mg SQ daily while inpatient.  The patient may transition to Aspirin 325 mg PO BID as an outpatient  4.  Discharge planning  5. Sutures should remain in place 4-6 weeks

## 2022-06-23 NOTE — ANESTHESIA POSTPROCEDURE EVALUATION
Patient: Keaton Cervantes    Procedure Summary     Date: 06/23/22 Room / Location: [place] [address] / SURGERY [facility]    Anesthesia Start: 0843 Anesthesia Stop: 0910    Procedure: AMPUTATION, TOE 2ND (Right Foot) Diagnosis: (NECROTIC RIGHT SECOND TOE)    Surgeons: Wilman Madera M.D. Responsible Provider: Omar Kelly M.D.    Anesthesia Type: general ASA Status: 3          Final Anesthesia Type: general  Last vitals  BP   Blood Pressure : (!) 147/70    Temp   37.3 °C (99.1 °F)    Pulse   72   Resp   15    SpO2   94 %      Anesthesia Post Evaluation    Patient location during evaluation: PACU  Patient participation: complete - patient participated  Level of consciousness: awake and alert  Pain score: 1    Airway patency: patent  Anesthetic complications: no  Cardiovascular status: hemodynamically stable  Respiratory status: acceptable  Hydration status: euvolemic    PONV: none          No complications documented.     Nurse Pain Score: 5 (NPRS)

## 2022-06-23 NOTE — PROGRESS NOTES
Hospital Medicine Daily Progress Note    Date of Service  6/23/2022    Chief Complaint  Keaton Cervantes is a 67 y.o. male admitted 6/21/2022 with foot pain    Hospital Course  67-year-old female with past medical history of diabetes, ELLEN, asthma, hypertension, s/p right ray amputation of great toe in March 2022 presenting with worsening right foot swelling.  CT of the foot showed second and third proximal metatarsal shaft fractures with no evidence of osteomyelitis.  Right lower extremity ultrasound was negative for DVT however showed a Baker's cyst.  LPS and Orthopedics were consulted.  Patient underwent a right second toe amputation through middle phalanx 6/23.   For second and third proximal metatarsal shaft fractures, ortho recommends offloading boot-no need for surgical intervention    Interval Problem Update  No acute overnight events.  Patient is seen after amputation.   Wife is seen at the bedside  Reports pain 2/10, denies fever, chills, numbness.  I restarted Unasyn.  Will discuss with LPS/ID for duration of antibiotics use    I have discussed this patient's plan of care and discharge plan at IDT rounds today with Case Management, Nursing, Nursing leadership, and other members of the IDT team.    Consultants/Specialty  orthopedics    Code Status  Full Code    Disposition  Patient is not medically cleared for discharge.   Anticipate discharge to TBD.  I have placed the appropriate orders for post-discharge needs.    Review of Systems  Review of Systems   Constitutional: Negative for chills and fever.   Respiratory: Negative for cough and shortness of breath.    Cardiovascular: Negative for chest pain and palpitations.   Gastrointestinal: Negative for abdominal pain, nausea and vomiting.   Genitourinary: Negative for dysuria and urgency.   Musculoskeletal:        Right foot swelling   Neurological: Negative for dizziness and headaches.   All other systems reviewed and are negative.       Physical  Exam  Temp:  [36.2 °C (97.2 °F)-37.3 °C (99.1 °F)] 36.2 °C (97.2 °F)  Pulse:  [63-80] 65  Resp:  [12-20] 18  BP: (130-168)/(67-78) 132/69  SpO2:  [91 %-100 %] 96 %    Physical Exam  Vitals and nursing note reviewed.   Constitutional:       Appearance: Normal appearance. He is not ill-appearing.   HENT:      Mouth/Throat:      Mouth: Mucous membranes are dry.   Eyes:      Extraocular Movements: Extraocular movements intact.      Pupils: Pupils are equal, round, and reactive to light.   Cardiovascular:      Rate and Rhythm: Normal rate and regular rhythm.      Heart sounds: No murmur heard.  Pulmonary:      Effort: Pulmonary effort is normal. No respiratory distress.      Breath sounds: Normal breath sounds.      Comments: Oxygen supplement  Musculoskeletal:         General: Swelling present.      Right lower leg: Edema present.      Comments: Right great toe amputaion. Swelling of right foot and ankle  second right toe amputated, with dressing noted   Neurological:      General: No focal deficit present.      Mental Status: He is alert and oriented to person, place, and time.         Fluids  No intake or output data in the 24 hours ending 06/23/22 1153    Laboratory  Recent Labs     06/21/22  1517 06/22/22  0252   WBC 8.8 7.2   RBC 4.12* 3.86*   HEMOGLOBIN 11.9* 11.3*   HEMATOCRIT 35.7* 34.2*   MCV 86.7 88.6   MCH 28.9 29.3   MCHC 33.3* 33.0*   RDW 40.4 41.5   PLATELETCT 371 316   MPV 8.7* 8.7*     Recent Labs     06/21/22  1517 06/22/22  0252   SODIUM 130* 137   POTASSIUM 4.1 4.1   CHLORIDE 95* 103   CO2 23 24   GLUCOSE 237* 158*   BUN 16 11   CREATININE 0.66 0.56   CALCIUM 8.8 8.4*                   Imaging  DX-FOOT-COMPLETE 3+ RIGHT   Final Result      1.  Questionable periosteal reaction of the second toe distal phalanx concerning for acute osteomyelitis.   2.  Interval postsurgical changes of amputation at the great toe metatarsal neck. Cortical irregularity of the distal stump could represent reactive changes  from recent surgery versus acute osteomyelitis.   3.  Soft tissue swelling adjacent to the great toe metatarsal stump may be reactive or represent infection.   4.  Oblique, mildly displaced fracture of the second toe metatarsal.      US-EXTREMITY VENOUS LOWER UNILAT RIGHT   Final Result      CT-FOOT W/O PLUS RECONS RIGHT   Final Result      Second and third proximal metatarsal shaft fractures with the third extending into the TMT joint. These may be due to altered biomechanics in the context of interval first transmetatarsal amputation      No acute bony destruction to confirm osteomyelitis. If more sensitive analysis is required, MRI could be performed      Small fluid overlies the first transmetatarsal stump but this does not extend to the skin surface and may just be a bursa or postoperative fluid. A superimposed infection is not excluded on the basis of imaging alone      DX-CHEST-PORTABLE (1 VIEW)   Final Result      No radiographic evidence of acute cardiopulmonary process.      US-EXTREMITY ARTERY LOWER UNILAT W/CYNTHIA (COMBO) RIGHT    (Results Pending)        Assessment/Plan  * Cellulitis in diabetic foot (HCC)- (present on admission)  Assessment & Plan  CT of the right foot noted no evidence of osteomyelitis. CRP pending, procal negative.   LPS and Orthopedics were consulted.  Patient underwent a right second toe amputation through middle phalanx 6/23.   MRSA swab negative, will continue Unasyn.  Will discuss with ID/LPS regards duration of antibiotics    Diabetes ([facility])- (present on admission)  Assessment & Plan  Uncontrolled, with hyperglycemia  Patient has been taking Lantus 8 to 15 units at home  We will continue Lantus, hold glipizide and metformin  We will recheck A1c 7.6    Status post amputation of right great toe (HCC)  Assessment & Plan  In March 2022    Closed nondisplaced fracture of second metatarsal bone of right foot  Assessment & Plan  Of 2nd and 3rd metatarsals likely resulting in foot  swelling  Ortho recommends offloading boot-no need for surgical intervention       VTE prophylaxis: enoxaparin ppx    I have performed a physical exam and reviewed and updated ROS and Plan today (6/23/2022). In review of yesterday's note (6/22/2022), there are no changes except as documented above.

## 2022-06-24 VITALS
BODY MASS INDEX: 27.71 KG/M2 | RESPIRATION RATE: 16 BRPM | DIASTOLIC BLOOD PRESSURE: 58 MMHG | WEIGHT: 204.59 LBS | SYSTOLIC BLOOD PRESSURE: 125 MMHG | HEART RATE: 84 BPM | OXYGEN SATURATION: 93 % | TEMPERATURE: 98.4 F | HEIGHT: 72 IN

## 2022-06-24 LAB
GLUCOSE BLD STRIP.AUTO-MCNC: 188 MG/DL (ref 65–99)
GLUCOSE BLD STRIP.AUTO-MCNC: 272 MG/DL (ref 65–99)

## 2022-06-24 PROCEDURE — 99239 HOSP IP/OBS DSCHRG MGMT >30: CPT | Performed by: STUDENT IN AN ORGANIZED HEALTH CARE EDUCATION/TRAINING PROGRAM

## 2022-06-24 PROCEDURE — A9270 NON-COVERED ITEM OR SERVICE: HCPCS | Performed by: INTERNAL MEDICINE

## 2022-06-24 PROCEDURE — 700111 HCHG RX REV CODE 636 W/ 250 OVERRIDE (IP): Performed by: STUDENT IN AN ORGANIZED HEALTH CARE EDUCATION/TRAINING PROGRAM

## 2022-06-24 PROCEDURE — 99232 SBSQ HOSP IP/OBS MODERATE 35: CPT

## 2022-06-24 PROCEDURE — 99223 1ST HOSP IP/OBS HIGH 75: CPT | Performed by: INTERNAL MEDICINE

## 2022-06-24 PROCEDURE — 82962 GLUCOSE BLOOD TEST: CPT | Mod: 91

## 2022-06-24 PROCEDURE — 700102 HCHG RX REV CODE 250 W/ 637 OVERRIDE(OP): Performed by: INTERNAL MEDICINE

## 2022-06-24 PROCEDURE — 700105 HCHG RX REV CODE 258: Performed by: STUDENT IN AN ORGANIZED HEALTH CARE EDUCATION/TRAINING PROGRAM

## 2022-06-24 RX ADMIN — SODIUM CHLORIDE 3 G: 900 INJECTION INTRAVENOUS at 11:34

## 2022-06-24 RX ADMIN — SODIUM CHLORIDE 3 G: 900 INJECTION INTRAVENOUS at 05:55

## 2022-06-24 RX ADMIN — HYDROCODONE BITARTRATE AND ACETAMINOPHEN 1 TABLET: 5; 325 TABLET ORAL at 09:08

## 2022-06-24 RX ADMIN — ACETAMINOPHEN 650 MG: 325 TABLET, FILM COATED ORAL at 06:02

## 2022-06-24 RX ADMIN — SENNOSIDES AND DOCUSATE SODIUM 2 TABLET: 50; 8.6 TABLET ORAL at 05:55

## 2022-06-24 RX ADMIN — GABAPENTIN 300 MG: 300 CAPSULE ORAL at 09:09

## 2022-06-24 RX ADMIN — GABAPENTIN 300 MG: 300 CAPSULE ORAL at 15:09

## 2022-06-24 RX ADMIN — ACETAMINOPHEN 650 MG: 325 TABLET, FILM COATED ORAL at 15:08

## 2022-06-24 RX ADMIN — INSULIN HUMAN 3 UNITS: 100 INJECTION, SOLUTION PARENTERAL at 11:32

## 2022-06-24 RX ADMIN — INSULIN HUMAN 1 UNITS: 100 INJECTION, SOLUTION PARENTERAL at 05:53

## 2022-06-24 ASSESSMENT — ENCOUNTER SYMPTOMS
PALPITATIONS: 0
NAUSEA: 0
BLURRED VISION: 0
CONSTIPATION: 0
VOMITING: 0
WEAKNESS: 0
HEMOPTYSIS: 0
DIARRHEA: 0
MYALGIAS: 0
ABDOMINAL PAIN: 0
COUGH: 0
FEVER: 0
CHILLS: 0
NERVOUS/ANXIOUS: 0
BACK PAIN: 0

## 2022-06-24 NOTE — DISCHARGE SUMMARY
Discharge Summary    CHIEF COMPLAINT ON ADMISSION  Chief Complaint   Patient presents with   • Wound Infection     Patient sent by MD for infection to right foot. Right big toe was amputated in March. Presents for infection which may be related to this. Hx of diabetes. Redness, warmth, and swelling present to right foot. Swelling also present in ankle and calf. Endorses pain to entire right leg.        Reason for Admission  Sent by MD foot pain     Admission Date  6/21/2022    CODE STATUS  Full Code    HPI & HOSPITAL COURSE  67-year-old female with past medical history of diabetes, ELLEN, asthma, hypertension, s/p right ray amputation of great toe in March 2022 presenting with worsening right foot swelling.  CT of the foot showed second and third proximal metatarsal shaft fractures with no evidence of osteomyelitis.  Right lower extremity ultrasound was negative for DVT however showed a Baker's cyst.  LPS and Orthopedics were consulted.  Patient underwent a right second toe amputation through middle phalanx 6/23.  He was on empiric antibiotics Unasyn. Wound culture with corynebacterium, this is a common skin contaminants will not specifically treat. ID consulted after amputation, antony to stop antibiotics.  Follow-up with orthopedics in 4 weeks for suture removal.  Follow-up with wound care/podiatrist as outpatient.  Patient voiced understanding.  Weightbearing as tolerated per orthopedic recommendation.    For his diabetes, A1c 7.6.  He is on Lantus and sliding scale while in hospital and will be discharged to continue his home diabetic medications.  Follow-up with PCP in 1 week.    For second and third proximal metatarsal shaft fractures, ortho recommends offloading boot-no need for surgical intervention    Therefore, he is discharged in fair and stable condition to home with close outpatient follow-up.    The patient met 2-midnight criteria for an inpatient stay at the time of discharge.    Discharge  Date  6/24/2022    FOLLOW UP ITEMS POST DISCHARGE  PCP  Orthopedics for suture removal  Wound care/podiatrist    DISCHARGE DIAGNOSES  Principal Problem:    Cellulitis in diabetic foot (HCC) POA: Yes  Active Problems:    Diabetes (HCC) POA: Yes    Closed nondisplaced fracture of second metatarsal bone of right foot POA: Unknown    Status post amputation of right great toe (HCC) POA: Unknown  Resolved Problems:    Chronic ulcer of toe of left foot (HCC) POA: Unknown      FOLLOW UP  No future appointments.  Wilman Madera M.D.  [address] [place] [address]  [phone]    Follow up in 4 week(s)  For suture removal in 4-6 weeks      MEDICATIONS ON DISCHARGE     Medication List      CONTINUE taking these medications      Instructions   cyclobenzaprine 10 mg Tabs  Commonly known as: Flexeril   Take 10 mg by mouth at bedtime.  Dose: 10 mg     gabapentin 300 MG Caps  Commonly known as: NEURONTIN   Take 300 mg by mouth 3 times a day.  Dose: 300 mg     glipiZIDE SR 10 MG Tb24  Commonly known as: GLUCOTROL   Take 10 mg by mouth 2 times a day.  Dose: 10 mg     HYDROcodone-acetaminophen 7.5-325 MG tab  Commonly known as: NORCO   Take 1 Tablet by mouth 3 times a day as needed for Severe Pain.  Dose: 1 Tablet     Lantus 100 UNIT/ML Soln  Generic drug: insulin glargine   Inject 6-15 Units under the skin at bedtime as needed (Blood Sugar Levels).  Dose: 6-15 Units     metformin 1000 MG tablet  Commonly known as: GLUCOPHAGE   Take 1,000 mg by mouth 2 times a day with meals.  Dose: 1,000 mg        STOP taking these medications    amoxicillin-clavulanate 875-125 MG Tabs  Commonly known as: AUGMENTIN            Allergies  Allergies   Allergen Reactions   • Sulfa Drugs Unspecified     Pt hasn't had meds in so long he doesn't remember what his reaction is, he just remembers there is a reaction and that he shouldn't have them    • Coconut (Cocos Nucifera) Allergy Skin Test    • Flu Virus Vaccine Shortness of Breath   • Other  Food Anaphylaxis     coconut   • Pneumococcal Vaccines Shortness of Breath   • Nutrasweet Aspartame [Aspartame]      Causes migraines       DIET  Orders Placed This Encounter   Procedures   • Diet Order Diet: Consistent CHO (Diabetic); Nutrient modifications: (optional): High Protein     Standing Status:   Standing     Number of Occurrences:   1     Order Specific Question:   Diet:     Answer:   Consistent CHO (Diabetic) [4]     Order Specific Question:   Nutrient modifications: (optional)     Answer:   High Protein [4]       ACTIVITY  As tolerated.  Weight bearing as tolerated    CONSULTATIONS  Orthopedics  ID    PROCEDURES  Status post right second toe amputation through middle phalanx 6/23    LABORATORY  Lab Results   Component Value Date    SODIUM 137 06/22/2022    POTASSIUM 4.1 06/22/2022    CHLORIDE 103 06/22/2022    CO2 24 06/22/2022    GLUCOSE 158 (H) 06/22/2022    BUN 11 06/22/2022    CREATININE 0.56 06/22/2022    CREATININE 0.7 08/10/2006        Lab Results   Component Value Date    WBC 7.2 06/22/2022    HEMOGLOBIN 11.3 (L) 06/22/2022    HEMATOCRIT 34.2 (L) 06/22/2022    PLATELETCT 316 06/22/2022     US-CYNTHIA SINGLE LEVEL BILAT   Final Result      DX-FOOT-COMPLETE 3+ RIGHT   Final Result      1.  Questionable periosteal reaction of the second toe distal phalanx concerning for acute osteomyelitis.   2.  Interval postsurgical changes of amputation at the great toe metatarsal neck. Cortical irregularity of the distal stump could represent reactive changes from recent surgery versus acute osteomyelitis.   3.  Soft tissue swelling adjacent to the great toe metatarsal stump may be reactive or represent infection.   4.  Oblique, mildly displaced fracture of the second toe metatarsal.      US-EXTREMITY VENOUS LOWER UNILAT RIGHT   Final Result      CT-FOOT W/O PLUS RECONS RIGHT   Final Result      Second and third proximal metatarsal shaft fractures with the third extending into the TMT joint. These may be due to  altered biomechanics in the context of interval first transmetatarsal amputation      No acute bony destruction to confirm osteomyelitis. If more sensitive analysis is required, MRI could be performed      Small fluid overlies the first transmetatarsal stump but this does not extend to the skin surface and may just be a bursa or postoperative fluid. A superimposed infection is not excluded on the basis of imaging alone      DX-CHEST-PORTABLE (1 VIEW)   Final Result      No radiographic evidence of acute cardiopulmonary process.             Total time of the discharge process exceeds 33 minutes.

## 2022-06-24 NOTE — PROGRESS NOTES
Discharge paperwork and education provided by Dalia Hodge RN. Pt discharged to home with self care. Dressing supplies given and DM education complete.

## 2022-06-24 NOTE — CARE PLAN
Problem: Knowledge Deficit - Standard  Goal: Patient and family/care givers will demonstrate understanding of plan of care, disease process/condition, diagnostic tests and medications  Outcome: Progressing     Problem: Pain - Standard  Goal: Alleviation of pain or a reduction in pain to the patient’s comfort goal  Outcome: Progressing     Problem: Psychosocial  Goal: Patient's ability to verbalize feelings about condition will improve  Outcome: Progressing  Goal: Patient's ability to re-evaluate and adapt role responsibilities will improve  Outcome: Progressing     Problem: Communication  Goal: The ability to communicate needs accurately and effectively will improve  Outcome: Progressing     Problem: Discharge Barriers/Planning  Goal: Patient's continuum of care needs are met  Outcome: Progressing     Problem: Hemodynamics  Goal: Patient's hemodynamics, fluid balance and neurologic status will be stable or improve  Outcome: Progressing     Problem: Respiratory  Goal: Patient will achieve/maintain optimum respiratory ventilation and gas exchange  Outcome: Progressing     Problem: Fluid Volume  Goal: Fluid volume balance will be maintained  Outcome: Progressing     Problem: Bowel Elimination  Goal: Establish and maintain regular bowel function  Outcome: Progressing     Problem: Mobility  Goal: Patient's capacity to carry out activities will improve  Outcome: Progressing     Problem: Self Care  Goal: Patient will have the ability to perform ADLs independently or with assistance (bathe, groom, dress, toilet and feed)  Outcome: Progressing     Problem: Infection - Standard  Goal: Patient will remain free from infection  Outcome: Progressing     Problem: Wound/ / Incision Healing  Goal: Patient's wound/surgical incision will decrease in size and heals properly  Outcome: Progressing   The patient is Stable - Low risk of patient condition declining or worsening    Shift Goals  Clinical Goals: surgery, pain  control  Patient Goals: Sleep  Family Goals: SHAYNE    Progress made toward(s) clinical / shift goals:  Pt surgery and amputation completed. Pain well controlled after return to the unit. He did not require any pain medication for his surgical site, however, did request Tylenol for a headache.

## 2022-06-24 NOTE — CONSULTS
Consults   INFECTIOUS DISEASES INPATIENT CONSULT NOTE     Date of Service: 6/24/2022    Consult Requested By: Alan Ibarra M.D.    Reason for Consultation: Cellulitis    History of Present Illness:   Keaton Cervantes is a 67 y.o.  admitted 6/21/2022. Pt has a past medical history of uncontrolled DMT2 with peripheral neuropathy and monitoring right diabetic foot ulcer, hypertension and ELLEN who was last admitted 3/22 secondary worse infection of the right great toe.  During prior hospitalization he underwent right great toe ray amputation with bone cultures at time positive for strep and MRSA.  He was discharged on Augmentin and doxycycline to complete a 6-week course.  Then presented to ER complaining of new redness, warmth and swelling of his right foot extending to the ankle and calf as well as increased pain of the entire right leg.    Hospital Course:   Patient's been afebrile and vitals unremarkable.  No leukocytosis.  CT of the foot on 6-21 showed the second and third proximal metatarsal shaft fractures with a third extending to the TMT joint.  No bony destruction to confirm osteomyelitis no obvious abscess or cellulitis.  OR on 6/23 for right second toe amputation through the middle phalanx.  No obvious infection noted and no cultures were obtained.  Right foot wound cultures were obtained on 6-22 and are no growth.  Urine and blood cultures also no growth.  Patient symptoms have markedly improved, no obvious infection ongoing.    Review Of Systems:  Review of Systems   Constitutional: Negative for chills, fever and malaise/fatigue.   HENT: Negative for hearing loss.    Eyes: Negative for blurred vision.   Respiratory: Negative for cough and hemoptysis.    Cardiovascular: Negative for chest pain and palpitations.   Gastrointestinal: Negative for abdominal pain, constipation, diarrhea, nausea and vomiting.   Genitourinary: Negative for dysuria.   Musculoskeletal: Negative for back pain, joint pain and  myalgias.   Skin: Negative for rash.   Neurological: Negative for weakness.   Psychiatric/Behavioral: The patient is not nervous/anxious.        PMH:   Past Medical History:   Diagnosis Date   • Allergy    • ASTHMA    • Back pain     Chronic   • Bronchitis    • CATARACT    • Dental disorder    • Diabetes     oral rx & insulin   • Hypertension    • MEDICAL HOME    • Neck pain     Chronic   • ELLEN (obstructive sleep apnea)     Bipap   • Sleep apnea        PSH:  Past Surgical History:   Procedure Laterality Date   • TOE AMPUTATION Right 3/4/2022    Procedure: AMPUTATION, TOE 1ST RAY;  Surgeon: Wilman Madera M.D.;  Location: [facility];  Service: Orthopedics   • LUMBAR FUSION ANTERIOR  8/4/2009    Performed by JOSEE BLANTON at [facility]   • FUSION, SPINE, LUMBAR, PLIF  8/4/2009    Performed by JOSEE BLANTON at [facility]   • LUMBAR LAMINECTOMY DISKECTOMY  8/4/2009    Performed by JOSEE BLANTON at [facility]   • LAMINOTOMY  8/4/2009    Performed by JOSEE BLANTON at [facility]   • SOMNOPLASTY  6/10/2009    Performed by ELÍAS RILEY at SURGERY [facility]S   • SEPTOPLASTY  6/10/2009    Performed by ELÍAS RILEY at SURGERY [facility]   • ANTROSTOMY  6/10/2009    Performed by ELÍAS RILEY at SURGERY [facility]   • ETHMOIDECTOMY  6/10/2009    Performed by ELÍAS RILEY at SURGERY [facility]   • CATARACT PHACO WITH IOL  7/7/08    Performed by OCTAVIO HARDWICK at SURGERY SAME DAY [facility]   • CATARACT PHACO WITH IOL  6/16/08    Performed by OCTAVIO HARDWICK at SURGERY SAME DAY [facility]   • APPENDECTOMY     • ORIF, ANKLE     • VASECTOMY         FAMILY HX:  Family History   Problem Relation Age of Onset   • Hypertension Father    • Diabetes Father        SOCIAL HX:  Social History     Socioeconomic History   • Marital status:      Spouse name: Not on file   • Number of children: Not on file   •  Years of education: Not on file   • Highest education level: Not on file   Occupational History   • Not on file   Tobacco Use   • Smoking status: Former Smoker   • Smokeless tobacco: Former User     Types: Chew   • Tobacco comment: 1992   Substance and Sexual Activity   • Alcohol use: No   • Drug use: No   • Sexual activity: Yes     Partners: Female   Other Topics Concern   • Not on file   Social History Narrative   • Not on file     Social Determinants of Health     Financial Resource Strain: Not on file   Food Insecurity: Not on file   Transportation Needs: Not on file   Physical Activity: Not on file   Stress: Not on file   Social Connections: Not on file   Intimate Partner Violence: Not on file   Housing Stability: Not on file     Social History     Tobacco Use   Smoking Status Former Smoker   Smokeless Tobacco Former User   • Types: Chew   Tobacco Comment    1992     Social History     Substance and Sexual Activity   Alcohol Use No       Allergies/Intolerances:  Allergies   Allergen Reactions   • Sulfa Drugs Unspecified     Pt hasn't had meds in so long he doesn't remember what his reaction is, he just remembers there is a reaction and that he shouldn't have them    • Coconut (Cocos Nucifera) Allergy Skin Test    • Flu Virus Vaccine Shortness of Breath   • Other Food Anaphylaxis     coconut   • Pneumococcal Vaccines Shortness of Breath   • Nutrasweet Aspartame [Aspartame]      Causes migraines       History reviewed with the patient     Other Current Medications:    Current Facility-Administered Medications:   •  ampicillin/sulbactam (UNASYN) 3 g in  mL IVPB, 3 g, Intravenous, Q6HRS, Alan Ibarra M.D., Stopped at 06/24/22 0625  •  senna-docusate (PERICOLACE or SENOKOT S) 8.6-50 MG per tablet 2 Tablet, 2 Tablet, Oral, BID, 2 Tablet at 06/24/22 0555 **AND** polyethylene glycol/lytes (MIRALAX) PACKET 1 Packet, 1 Packet, Oral, QDAY PRN **AND** magnesium hydroxide (MILK OF MAGNESIA) suspension 30 mL, 30 mL,  Oral, QDAY PRN **AND** bisacodyl (DULCOLAX) suppository 10 mg, 10 mg, Rectal, QDAY PRN, Axel Gamino M.D.  •  enoxaparin (Lovenox) inj 40 mg, 40 mg, Subcutaneous, DAILY AT 1800, Axel Gamino M.D., 40 mg at 06/23/22 1700  •  acetaminophen (Tylenol) tablet 650 mg, 650 mg, Oral, Q6HRS PRN, Axel Gamino M.D., 650 mg at 06/24/22 0602  •  hydrALAZINE (APRESOLINE) injection 10 mg, 10 mg, Intravenous, Q4HRS PRN, Axel Gamino M.D.  •  Notify provider if pain remains uncontrolled, , , CONTINUOUS **AND** Use the Numeric Rating Scale (NRS), Jarrett-Baker Faces (WBF), or FLACC on regular floors and Critical-Care Pain Observation Tool (CPOT) on ICUs/Trauma to assess pain, , , CONTINUOUS **AND** Pulse Ox, , , CONTINUOUS **AND** Pharmacy Consult Request ...Pain Management Review 1 Each, 1 Each, Other, PHARMACY TO DOSE **AND** If patient difficult to arouse and/or has respiratory depression (respiratory rate of 10 or less), stop any opiates that are currently infusing and call a Rapid Response., , , CONTINUOUS, Axel Gamino M.D.  •  ondansetron (ZOFRAN) syringe/vial injection 4 mg, 4 mg, Intravenous, Q4HRS PRN, Axel Gamino M.D.  •  ondansetron (ZOFRAN ODT) dispertab 4 mg, 4 mg, Oral, Q4HRS PRN, Axel Gamino M.D.  •  insulin regular (HumuLIN R,NovoLIN R) injection, 1-6 Units, Subcutaneous, 4X/DAY ACHS, 1 Units at 06/24/22 0553 **AND** POC blood glucose manual result, , , Q AC AND BEDTIME(S) **AND** NOTIFY MD and PharmD, , , Once **AND** Administer 20 grams of glucose (approximately 8 ounces of fruit juice) every 15 minutes PRN FSBG less than 70 mg/dL, , , PRN **AND** dextrose 50% (D50W) injection 25 g, 25 g, Intravenous, Q15 MIN PRN, Axel Gamino M.D.  •  cyclobenzaprine (Flexeril) tablet 10 mg, 10 mg, Oral, QHS PRN, Axel Gamino M.D.  •  gabapentin (NEURONTIN) capsule 300 mg, 300 mg, Oral, TID, Axel Gamino M.D., 300 mg at 06/24/22 0909  •  insulin GLARGINE (Lantus,Semglee) injection,  8 Units, Subcutaneous, Q EVENING, Axel Gamino M.D., 8 Units at 22 1700  •  HYDROcodone-acetaminophen (NORCO) 5-325 MG per tablet 1 Tablet, 1 Tablet, Oral, TID PRN, Axel Gamino M.D., 1 Tablet at 22 0908  •  [DISCONTINUED] oxyCODONE immediate-release (ROXICODONE) tablet 2.5 mg, 2.5 mg, Oral, Q3HRS PRN **OR** [DISCONTINUED] oxyCODONE immediate-release (ROXICODONE) tablet 5 mg, 5 mg, Oral, Q3HRS PRN **OR** morphine 4 MG/ML injection 4 mg, 4 mg, Intravenous, Q3HRS PRN, Axel Gamino M.D.  [unfilled]    Most Recent Vital Signs:  /58   Pulse 84   Temp 36.9 °C (98.4 °F) (Temporal)   Resp 16   Ht 1.829 m (6')   Wt 92.8 kg (204 lb 9.4 oz)   SpO2 93%   BMI 27.75 kg/m²   Temp  Av.7 °C (98 °F)  Min: 36.2 °C (97.1 °F)  Max: 37.3 °C (99.1 °F)    Physical Exam:  Physical Exam  Constitutional:       Appearance: Normal appearance.   HENT:      Head: Normocephalic and atraumatic.      Right Ear: External ear normal.      Left Ear: External ear normal.      Nose: Nose normal.      Mouth/Throat:      Mouth: Mucous membranes are moist.      Pharynx: Oropharynx is clear.   Eyes:      Extraocular Movements: Extraocular movements intact.      Conjunctiva/sclera: Conjunctivae normal.      Pupils: Pupils are equal, round, and reactive to light.   Cardiovascular:      Rate and Rhythm: Normal rate and regular rhythm.      Heart sounds: Normal heart sounds.   Pulmonary:      Effort: Pulmonary effort is normal.      Breath sounds: Normal breath sounds.   Abdominal:      General: Abdomen is flat. Bowel sounds are normal.      Palpations: Abdomen is soft.   Musculoskeletal:      Cervical back: Normal range of motion and neck supple.      Comments: Foot and surgical dressings not removed, no tenderness to palpation, lower leg with no edema, no erythema no tenderness   Skin:     General: Skin is warm and dry.   Neurological:      General: No focal deficit present.      Mental Status: He is alert  and oriented to person, place, and time.   Psychiatric:         Mood and Affect: Mood normal.         Behavior: Behavior normal.             Pertinent Lab Results:  Recent Labs     06/21/22  1517 06/22/22  0252   WBC 8.8 7.2      Recent Labs     06/21/22  1517 06/22/22  0252   HEMOGLOBIN 11.9* 11.3*   HEMATOCRIT 35.7* 34.2*   MCV 86.7 88.6   MCH 28.9 29.3   PLATELETCT 371 316         Recent Labs     06/21/22  1517 06/22/22  0252   SODIUM 130* 137   POTASSIUM 4.1 4.1   CHLORIDE 95* 103   CO2 23 24   CREATININE 0.66 0.56        Recent Labs     06/21/22  1517 06/22/22  0252   ALBUMIN 3.9 3.3        Pertinent Micro:  Results     Procedure Component Value Units Date/Time    CULTURE WOUND W/ GRAM STAIN [[id number]] Collected: 06/22/22 1400    Order Status: Completed Specimen: Wound from Right Foot Updated: 06/23/22 1347     Significant Indicator NEG     Source WND     Site RIGHT FOOT     Culture Result Culture in progress.     Gram Stain Result Rare WBCs.  No organisms seen.      Narrative:      Collected By: [id number] MILAGROS BRODY  Collected By: [id number] MILAGROS BRODY    URINE CULTURE(NEW) [[id number]] Collected: 06/21/22 1728    Order Status: Completed Specimen: Urine, Clean Catch Updated: 06/23/22 0724     Significant Indicator NEG     Source UR     Site URINE, CLEAN CATCH     Culture Result No growth at 48 hours.    Narrative:      Indication for culture:->Evaluation for sepsis without a  clear source of infection  Indication for culture:->Evaluation for sepsis without a    GRAM STAIN [[id number]] Collected: 06/22/22 1400    Order Status: Completed Specimen: Wound Updated: 06/22/22 2347     Significant Indicator .     Source WND     Site RIGHT FOOT     Gram Stain Result Rare WBCs.  No organisms seen.      Narrative:      Collected By: [id number] MILAGROS BRODY  Collected By: [id number] MILAGROS BRODY    MRSA By PCR (Amp) [[id number]] Collected: 06/22/22 0023    Order Status: Completed Specimen:  "Respirate from Nares Updated: 06/22/22 1338     MRSA by PCR Negative    BLOOD CULTURE [[id number]] Collected: 06/21/22 1517    Order Status: Completed Specimen: Blood from Peripheral Updated: 06/22/22 0834     Significant Indicator NEG     Source BLD     Site PERIPHERAL     Culture Result No Growth  Note: Blood cultures are incubated for 5 days and  are monitored continuously.Positive blood cultures  are called to the RN and reported as soon as  they are identified.      Narrative:      Per Hospital Policy: Only change Specimen Src: to \"Line\" if  specified by physician order.  Right AC    BLOOD CULTURE [[id number]] Collected: 06/21/22 1517    Order Status: Completed Specimen: Blood from Peripheral Updated: 06/22/22 0834     Significant Indicator NEG     Source BLD     Site PERIPHERAL     Culture Result No Growth  Note: Blood cultures are incubated for 5 days and  are monitored continuously.Positive blood cultures  are called to the RN and reported as soon as  they are identified.      Narrative:      Per Hospital Policy: Only change Specimen Src: to \"Line\" if  specified by physician order.  Left AC    URINALYSIS [[id number]]  (Abnormal) Collected: 06/21/22 1728    Order Status: Completed Specimen: Urine, Clean Catch Updated: 06/21/22 1743     Color Yellow     Character Clear     Specific Gravity 1.015     Ph 5.5     Glucose 500 mg/dL      Ketones Negative mg/dL      Protein Negative mg/dL      Bilirubin Negative     Urobilinogen, Urine 0.2     Nitrite Negative     Leukocyte Esterase Negative     Occult Blood Negative     Micro Urine Req see below     Comment: Microscopic examination not performed when specimen is clear  and chemically negative for protein, blood, leukocyte esterase  and nitrite.         Narrative:      Indication for culture:->Evaluation for sepsis without a  clear source of infection    Culture Wound W/ Gram Stain [[id number]] Collected: 06/21/22 0000    Order Status: Canceled Specimen: Other from " Right Foot         No results found for: BLOODCULTU, BLDCULT, BCHOLD     Studies:  CT-FOOT W/O PLUS RECONS RIGHT    Result Date: 6/21/2022 6/21/2022 7:05 PM HISTORY/REASON FOR EXAM:  Osteomyelitis, foot. Great toe amputation in March and now with erythema and swelling. Diabetes. TECHNIQUE/ EXAM DESCRIPTION: CT scan of the RIGHT ankle/foot without contrast, with reconstructions. Thin-section helical noncontrast images were obtained from the distal tibia/fibula through the forefoot. Sagittal and coronal reconstructions were generated from the axial images. Up to date radiation dose reduction adjustments have been utilized to meet ALARA standards for radiation dose reduction. COMPARISON:  MRI and radiographs 3/3/2022 FINDINGS: There has been a first transmetatarsal amputation with no margin irregularity to suggest osteomyelitis of the stump. There is some fluid density overlying the stump but no skin ulceration is seen and this does not extend to the skin surface. There is fatty replacement of the subcutaneous fat at the first and fifth metatarsal head as well as plantar calcaneal plantar margins but no skin breakdown is confirmed. There is diffuse subcutaneous fat edema and skin thickening in the left foot with relative posterior sparing. No abnormal effusion is confirmed Second and third proximal metatarsal shaft comminuted fractures are identified. The second fracture is extra-articular. The first extends into the joint space with mild displacement, impaction. No fourth or fifth ray fracture is seen There is no subluxation. There is no bony fragmentation or sclerosis.     Second and third proximal metatarsal shaft fractures with the third extending into the TMT joint. These may be due to altered biomechanics in the context of interval first transmetatarsal amputation No acute bony destruction to confirm osteomyelitis. If more sensitive analysis is required, MRI could be performed Small fluid overlies the first  transmetatarsal stump but this does not extend to the skin surface and may just be a bursa or postoperative fluid. A superimposed infection is not excluded on the basis of imaging alone    DX-CHEST-PORTABLE (1 VIEW)    Result Date: 6/21/2022 6/21/2022 4:56 PM HISTORY/REASON FOR EXAM: Possible infection of the right foot.. TECHNIQUE/EXAM DESCRIPTION AND NUMBER OF VIEWS: Single AP view of the chest. COMPARISON: 3/3/2022 FINDINGS: Hardware: None. Lungs: No consolidation detected. Pleura:  No appreciable pneumothorax. No enlarging pleural fluid. Heart and mediastinum: The cardiomediastinal contours are normal. Soft tissue anchors are present in the left humeral head.     No radiographic evidence of acute cardiopulmonary process.    DX-FOOT-COMPLETE 3+ RIGHT    Result Date: 6/22/2022 6/22/2022 10:06 AM HISTORY/REASON FOR EXAM:  rule out osteomylitis TECHNIQUE/EXAM DESCRIPTION AND NUMBER OF VIEWS: 3 views of the RIGHT foot. COMPARISON: Right foot Radiographs 3/3/2022. FINDINGS: Decreased osseous mineralization. Questionable periosteal reaction of the second toe distal phalanx. Interval postsurgical changes of amputation at the great toe metatarsal neck. There is some cortical irregularity of the distal stump. Soft tissue swelling about the medial, distal right foot. Oblique, mildly displaced fracture of the second toe metatarsal. There is no dislocation.  No bone erosion is noted.     1.  Questionable periosteal reaction of the second toe distal phalanx concerning for acute osteomyelitis. 2.  Interval postsurgical changes of amputation at the great toe metatarsal neck. Cortical irregularity of the distal stump could represent reactive changes from recent surgery versus acute osteomyelitis. 3.  Soft tissue swelling adjacent to the great toe metatarsal stump may be reactive or represent infection. 4.  Oblique, mildly displaced fracture of the second toe metatarsal.    US-CYNTHIA SINGLE LEVEL BILAT    Result Date: 6/23/2022    Vascular Laboratory  Conclusions  No evidence of arterial insufficiency.  LUCHO PETERSON  Age:    67    Gender:     M  MRN:    [id number]  :    1955      BSA:  Exam Date:     2022 12:26  Room #:     Inpatient  Priority:     Routine  Ht (in):             Wt (lb):  Ordering Physician:        LOGAN LOJA  Referring Physician:       [id number] Santos  Sonographer:               Martina Quintero RVISSAC  Study Type:                Complete Bilateral  Technical Quality:         Adequate  Indications:     Ulceration of LE  CPT Codes:       60094  ICD Codes:       707.1  History:         Nonhealing wound of the right foot, amputation of the right                   first and second toes. Prior exam 3/4/22.  Limitations:     Central line at the right bicep                 RIGHT  Waveform            Systolic BPs (mmHg)                                           Brachial  Triphasic                                Common Femoral  Triphasic                                Popliteal  Triphasic                  174           Posterior Tibial  Triphasic                  187           Dorsalis Pedis                                           Digit                             1.29          CYNTHIA                                           TBI                       LEFT  Waveform        Systolic BPs (mmHg)                             145           Brachial  Triphasic                                Common Femoral  Triphasic                                Popliteal  Triphasic                  172           Posterior Tibial  Triphasic                  170           Dorsalis Pedis                                           Digit                             1.19          CYNTHIA                                           TBI  Findings  Bilateral-  The ankle-brachial indices are normal.  Doppler waveforms of the common femoral artery and popliteal artery are of  high amplitude and triphasic.  Doppler  waveforms at the ankle are brisk and triphasic.  Additional testing was not performed in accordance with lower extremity  arterial evaluation protocol.  Steph SANCHEZ To  (Electronically Signed)  Final Date:      2022                   15:18    US-EXTREMITY VENOUS LOWER UNILAT RIGHT    Result Date: 2022   Vascular Laboratory  CONCLUSIONS  1)  Normal right lower extremity superficial and deep venous examination.    Examination technically limited as described.  2)  Lower extremity edema  3)  Enlarged right inguinal lymph node with benign morphology  4)  Bakers cyst  EDGARDOLUCHO RODRIGUEZ  Exam Date:     2022 20:05  Room #:     Inpatient  Priority:     Stat  Ht (in):             Wt (lb):  Ordering Physician:        ANDREW KESSLER  Referring Physician:       [id number] Gaspar  Sonographer:               Rosmery Campuzano RVISSAC  Study Type:                Complete Unilateral  Technical Quality:         Adequate  Age:    67    Gender:     M  MRN:    [id number]  :    1955      BSA:  Indications:     Localized swelling, mass and lump, left upper limb, Localized                   swelling, mass and lump, right lower limb, Localized edema  CPT Codes:       37092  ICD Codes:       R22.32  R22.41  R60.0  History:         Swelling/pitting edema of the right calf. Prior duplex                   3/3/22.  Limitations:  PROCEDURES:  Right lower extremity venous duplex imaging.  The following venous structures were evaluated: common femoral, deep  femoral, proximal great saphenous, femoral, popliteal, peroneal, and  posterior tibial veins.  Serial compression, color, and spectral Doppler flow evaluations were  performed.  FINDINGS:  Right lower extremity.  The peroneal and posterior tibial veins are difficult to assess for  compressibility, but flow response to augmentation is demonstrated.  All other veins demonstrate complete color filling and compressibility with  normal venous flow dynamics including  spontaneous flow and respiratory  phasicity.  No evidence of deep venous thrombosis.  Interstitial fluid consistent with edema is observed below the knee.  Flow was evaluated in the contralateral common femoral vein and normal  venous flow dynamics including spontaneous flow and respiratory phasic  variation were demonstrated.  There is fluid collection on the popliteal fossa measuring 2.7 x 1.1 cm.  Macario Fuchs MD  (Electronically Signed)  Final Date:      21 June 2022                   20:43      ASSESSMENT/PLAN:     67 y.o.  admitted 6/21/2022. Pt has a past medical history of uncontrolled DMT2 with peripheral neuropathy and monitoring right diabetic foot ulcer, hypertension and ELLEN who was last admitted 3/22 secondary worse infection of the right great toe.  During prior hospitalization he underwent right great toe ray amputation with bone cultures at time positive for strep and MRSA.  He was discharged on Augmentin and doxycycline to complete a 6-week course.  Then presented to ER complaining of new redness, warmth and swelling of his right foot extending to the ankle and calf as well as increased pain of the entire right leg.    Hospital Course:   Patient's been afebrile and vitals unremarkable.  No leukocytosis.  CT of the foot on 6-21 showed the second and third proximal metatarsal shaft fractures with a third extending to the TMT joint.  No bony destruction to confirm osteomyelitis no obvious abscess or cellulitis.  OR on 6/23 for right second toe amputation through the middle phalanx.  No obvious infection noted and no cultures were obtained.  Right foot wound cultures were obtained on 6-22 and are no growth.  Urine and blood cultures also no growth.  Patient symptoms have markedly improved, no obvious infection ongoing.    Problem List    Cellulitis versus inflammation secondary to fracture-resolved  -Wound culture with corynebacterium, this is a common skin contaminants will not specifically  treat  Fractures of second third proximal metatarsal on right  -Status post amputation  Uncontrolled DMT2  Diabetic foot ulcer  History of foot infections     Plan     --- Patient doing well with resolution of symptoms, okay to stop antibiotics  --- Continue to follow-up with surgery and with podiatry    Dispo: Okay for discharge from ID perspective  PICC: No      Plan of care discussed with BA Ibarra M.D.. Will continue to follow    Harper Mcdaniel M.D.

## 2022-06-24 NOTE — DIETARY
Nutrition Services: Diabetes Education Consult   Day 3 of admit.  Keaton Cervantes is a 67 y.o. male with admitting DX of Cellulitis in diabetic foot (HCC) [E11.628, L03.119]    RD able to visit pt at bedside to provide diabetes education. RD discussed diabetes diet education, provided handout reinforcing topics discussed. Pt demonstrated readiness and evidence of learning. RD able to answer all questions to patient's satisfaction.     No other education needs identified at this time. Consider referral to outpatient nutrition services for continuation of education as indicated or per pt preferences.     Please re-consult RD as indicated.

## 2022-06-24 NOTE — PROGRESS NOTES
" LIMB PRESERVATION SERVICE   POST SURGICAL PROGRESS NOTE      HPI:  Keaton Cervantes is a 67 y.o.  with a past medical history that includes type 2 diabetes, Asthma, HTN, ELLEN. Admitted 6/21/2022 for Cellulitis in diabetic foot (HCC) [E11.628, L03.119].     LPS has been consulted for right 2nd toe wound with recent right 1st toe amputation .    Patient had a right 1st partial ray amputation 3/4/22 by Dr Madera. He states that he was doing well and being seen by his podiatrist and wound MD at [facility]. He states that on 6/14/22 he was seen and this MD removed the toenail on the right 2nd toe and drained \"a lot of fluid\". He has had a dressing on this and changing it daily since. He has been working, light duty, but he is still walking anywhere from 5-15 miles a day. 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   He states that he does not wear his offloading boot at work and has not had any diabetic shoes and inserts made at this time due to increasing swelling to his right foot. He states that he went in to be seen by his Podiatrist/Wound MD at [facility] and was sent in due to concern for increasing infection.     SURGERY DATE: 6/23/2022 Dr Madera   PROCEDURE: Right second toe amputation through middle phalanx      INTERVAL HISTORY:  6/24/2022: POD #1.  Patient denies fevers, chills, nausea, vomiting.  Pain well controlled. Met with pt and spouse to provide dressing education and follow up.       PERTINENT LPS RESULTS:   Pathology: none   OR culture: none    SURGICAL SITE EXAM:      /58   Pulse 84   Temp 36.9 °C (98.4 °F) (Temporal)   Resp 16   Ht 1.829 m (6')   Wt 92.8 kg (204 lb 9.4 oz)   SpO2 93%   BMI 27.75 kg/m²   Monofilament testing completed on 3/4/22  Sensation: insensate  Surgical foot warm     Pedal Pulses:   R foot: palpable DP, palpable PT  L foot: palpable DP, palpable PT    Incision   location: right second toe partial amp   Incision well approximated, sutures " intact.    Erythema: decreasing    Edema: decreasing    Drainage: scant, dried bloody    dressing care completed by MARCEL APRN. Cleanse with Wound Cleanser, pat dry.  Apply single layer of Adaptic over sutures, secure with gauze, roll gauze, tape, Ace wrap.       Photo(s):         DIABETES MANAGEMENT:    A1c:   Lab Results   Component Value Date/Time    HBA1C 7.6 (H) 06/21/2022 03:17 PM            INFECTION MANAGEMENT:    Results from last 7 days   Lab Units 06/22/22  0252 06/21/22  1517   WBC 1501 K/uL 7.2 8.8   PLATELET COUNT 1518 K/uL 316 371     Wound culture results:   Results     Procedure Component Value Units Date/Time    CULTURE WOUND W/ GRAM STAIN [[id number]]  (Abnormal) Collected: 06/22/22 1400    Order Status: Completed Specimen: Wound from Right Foot Updated: 06/24/22 1211     Significant Indicator POS     Source WND     Site RIGHT FOOT     Culture Result -     Gram Stain Result Rare WBCs.  No organisms seen.       Culture Result Corynebacterium striatum group  Light growth      Narrative:      Collected By: [id number] MILAGROS BRODY  Collected By: [id number] MILAGROS BRODY    URINE CULTURE(NEW) [[id number]] Collected: 06/21/22 1728    Order Status: Completed Specimen: Urine, Clean Catch Updated: 06/23/22 0724     Significant Indicator NEG     Source UR     Site URINE, CLEAN CATCH     Culture Result No growth at 48 hours.    Narrative:      Indication for culture:->Evaluation for sepsis without a  clear source of infection  Indication for culture:->Evaluation for sepsis without a    GRAM STAIN [[id number]] Collected: 06/22/22 1400    Order Status: Completed Specimen: Wound Updated: 06/22/22 2347     Significant Indicator .     Source WND     Site RIGHT FOOT     Gram Stain Result Rare WBCs.  No organisms seen.      Narrative:      Collected By: [id number] MILAGROS BRODY  Collected By: [id number] MILAGROS BRODY    MRSA By PCR (Amp) [[id number]] Collected: 06/22/22 0023    Order Status:  "Completed Specimen: Respirate from Nares Updated: 06/22/22 1338     MRSA by PCR Negative    BLOOD CULTURE [[id number]] Collected: 06/21/22 1517    Order Status: Completed Specimen: Blood from Peripheral Updated: 06/22/22 0834     Significant Indicator NEG     Source BLD     Site PERIPHERAL     Culture Result No Growth  Note: Blood cultures are incubated for 5 days and  are monitored continuously.Positive blood cultures  are called to the RN and reported as soon as  they are identified.      Narrative:      Per Hospital Policy: Only change Specimen Src: to \"Line\" if  specified by physician order.  Right AC    BLOOD CULTURE [[id number]] Collected: 06/21/22 1517    Order Status: Completed Specimen: Blood from Peripheral Updated: 06/22/22 0834     Significant Indicator NEG     Source BLD     Site PERIPHERAL     Culture Result No Growth  Note: Blood cultures are incubated for 5 days and  are monitored continuously.Positive blood cultures  are called to the RN and reported as soon as  they are identified.      Narrative:      Per Hospital Policy: Only change Specimen Src: to \"Line\" if  specified by physician order.  Left AC    URINALYSIS [[id number]]  (Abnormal) Collected: 06/21/22 1728    Order Status: Completed Specimen: Urine, Clean Catch Updated: 06/21/22 1743     Color Yellow     Character Clear     Specific Gravity 1.015     Ph 5.5     Glucose 500 mg/dL      Ketones Negative mg/dL      Protein Negative mg/dL      Bilirubin Negative     Urobilinogen, Urine 0.2     Nitrite Negative     Leukocyte Esterase Negative     Occult Blood Negative     Micro Urine Req see below     Comment: Microscopic examination not performed when specimen is clear  and chemically negative for protein, blood, leukocyte esterase  and nitrite.         Narrative:      Indication for culture:->Evaluation for sepsis without a  clear source of infection    Culture Wound W/ Gram Stain [[id number]] Collected: 06/21/22 0000    Order Status: Canceled " Specimen: Other from Right Foot            ASSESSMENT/PLAN:   POD #1 S/P  Right second toe amputation through middle phalanx 6/23/22 Dr Madera   -Education on dressing changes provided to patient and spouse.   -Offloading boot at the bedside  -dressing orders placed       Wound care:   -Wound care orders updated for nursing by [facility]   -Right second toe partial amputation: Cleanse with Wound Cleanser, pat dry. Apply single layer of Adaptic over sutures, secure with gauze, roll gauze, tape, Ace wrap.      Imaging/Labs:  -COVID-19: not detected this admission    Vascular status:   - palpable pedal pulses bilaterally     Surgery:   --no further surgeries planned at this time     Antibiotics:   -ID involved     Weight Bearing Status:   -Weight bearing as tolerated    Offloading:   -Offloading boot  when ambulating;   - Offloading device at bedside.  -Orthotic company: [employer]         PT/OT:   -not involved.       Diabetes Education:   - involved.     - Implications of loss of protective sensation (LOPS) discussed with patient- including increased risk for amputation.  Advised to check feet at least daily, moisturize feet, and to always wear protective foot wear.   -avoid trimming own nails. See podiatrist or certified foot and nail RN  -keep blood sugars <150 for improved wound healing            DISCHARGE PLAN:    Disposition:   -recommend patient discharges to Home   - Ok to discharge from LPS/ortho standpoint    Follow-up: Dr. Madera. Sutures to be removed approximately 4-6 weeks post-op  Does not need wound care clinic or LPS follow up at this time as patient has an incision without open wound.            Discussed with: pt, RN, Dr. Ibarra             Please note that this dictation was created using voice recognition software. I have  worked with technical experts from [employer] to optimize the interface.  I have made every reasonable attempt to correct obvious errors, but there may be errors of grammar and  possibly content that I did not discover before finalizing the note.      Ginny Mon, A.P.R.N.    If any questions or concerns, please contact LPS through voalte.

## 2022-06-24 NOTE — CARE PLAN
The patient is Stable - Low risk of patient condition declining or worsening    Shift Goals  Clinical Goals: pain mgmt  Patient Goals: sleep  Family Goals: SHAYNE    Progress made toward(s) clinical / shift goals:    Problem: Knowledge Deficit - Standard  Goal: Patient and family/care givers will demonstrate understanding of plan of care, disease process/condition, diagnostic tests and medications  Outcome: Progressing     Problem: Pain - Standard  Goal: Alleviation of pain or a reduction in pain to the patient’s comfort goal  Outcome: Progressing     Problem: Psychosocial  Goal: Patient's ability to verbalize feelings about condition will improve  Outcome: Progressing  Goal: Patient's ability to re-evaluate and adapt role responsibilities will improve  Outcome: Progressing     Problem: Communication  Goal: The ability to communicate needs accurately and effectively will improve  Outcome: Progressing     Problem: Discharge Barriers/Planning  Goal: Patient's continuum of care needs are met  Outcome: Progressing     Problem: Hemodynamics  Goal: Patient's hemodynamics, fluid balance and neurologic status will be stable or improve  Outcome: Progressing     Problem: Respiratory  Goal: Patient will achieve/maintain optimum respiratory ventilation and gas exchange  Outcome: Progressing     Problem: Fluid Volume  Goal: Fluid volume balance will be maintained  Outcome: Progressing     Problem: Bowel Elimination  Goal: Establish and maintain regular bowel function  Outcome: Progressing     Problem: Mobility  Goal: Patient's capacity to carry out activities will improve  Outcome: Progressing     Problem: Self Care  Goal: Patient will have the ability to perform ADLs independently or with assistance (bathe, groom, dress, toilet and feed)  Outcome: Progressing     Problem: Infection - Standard  Goal: Patient will remain free from infection  Outcome: Progressing     Problem: Wound/ / Incision Healing  Goal: Patient's wound/surgical  incision will decrease in size and heals properly  Outcome: Progressing       Patient is not progressing towards the following goals:

## 2022-06-24 NOTE — DISCHARGE INSTRUCTIONS
Discharge Instructions per Alan Ibarra M.D.    Please wear off loading boot for R lower extremity. Follow up with Dr. Madera in 4 weeks for suture removal  Please follow-up with wound care/podiatry and PCP as outpatient.  Please discontinue antibiotics    DIET: diabetic diet    ACTIVITY: As tolerated    DIAGNOSIS: s/p R 2nd toe amputation, DM2    Return to ER in the event of new or worsening symptoms. Please note importance of compliance and the patient has agreed to proceed with all medical recommendations and follow up plan indicated above. All medications come with benefits and risks. Risks may include permanent injury or death and these risks can be minimized with close reassessment and monitoring. Please make it to your scheduled follow ups with PCP, wound care/podiatry and orthopedics      Discharge Instructions    Discharged to home by car with relative. Discharged via wheelchair, hospital escort: Yes.  Special equipment needed: Not Applicable    Be sure to schedule a follow-up appointment with your primary care doctor or any specialists as instructed.     Discharge Plan:   Diet Plan: Discussed  Activity Level: Discussed  Confirmed Follow up Appointment: Patient to Call and Schedule Appointment  Confirmed Symptoms Management: Discussed  Medication Reconciliation Updated: Yes    I understand that a diet low in cholesterol, fat, and sodium is recommended for good health. Unless I have been given specific instructions below for another diet, I accept this instruction as my diet prescription.   Other diet Diabetic    Special Instructions: Discharge instructions for the Orthopedic Patient    Follow up with Primary Care Physician within 2 weeks of discharge to home, regarding:  Review of medications and diagnostic testing.  Surveillance for medical complications.  Workup and treatment of osteoporosis, if appropriate.     -Is this a Hip/Knee/Shoulder Joint Replacement patient? No    -Is this patient being  discharged with medication to prevent blood clots?  No    Is patient discharged on Warfarin / Coumadin?   No     Depression / Suicide Risk    As you are discharged from this [facility] facility, it is important to learn how to keep safe from harming yourself.    Recognize the warning signs:  Abrupt changes in personality, positive or negative- including increase in energy   Giving away possessions  Change in eating patterns- significant weight changes-  positive or negative  Change in sleeping patterns- unable to sleep or sleeping all the time   Unwillingness or inability to communicate  Depression  Unusual sadness, discouragement and loneliness  Talk of wanting to die  Neglect of personal appearance   Rebelliousness- reckless behavior  Withdrawal from people/activities they love  Confusion- inability to concentrate     If you or a loved one observes any of these behaviors or has concerns about self-harm, here's what you can do:  Talk about it- your feelings and reasons for harming yourself  Remove any means that you might use to hurt yourself (examples: pills, rope, extension cords, firearm)  Get professional help from the community (Mental Health, Substance Abuse, psychological counseling)  Do not be alone:Call your Safe Contact- someone whom you trust who will be there for you.  Call your local CRISIS HOTLINE 226-1330 or 370-540-3367  Call your local Children's Mobile Crisis Response Team [place] (001) 115-0480 or www.HutGrip  Call the toll free National Suicide Prevention Hotlines   National Suicide Prevention Lifeline 422-888-ENJE (7348)  National Hope Line Network 800-SUICIDE (219-9283)    Diabetes Basics    Diabetes (diabetes mellitus) is a long-term (chronic) disease. It occurs when the body does not properly use sugar (glucose) that is released from food after you eat.  Diabetes may be caused by one or both of these problems:  Your pancreas does not make enough of a hormone called  insulin.  Your body does not react in a normal way to insulin that it makes.  Insulin lets sugars (glucose) go into cells in your body. This gives you energy. If you have diabetes, sugars cannot get into cells. This causes high blood sugar (hyperglycemia).  Follow these instructions at home:  How is diabetes treated?  You may need to take insulin or other diabetes medicines daily to keep your blood sugar in balance. Take your diabetes medicines every day as told by your doctor. List your diabetes medicines here:  Diabetes medicines  Name of medicine: ______________________________  Amount (dose): _______________ Time (a.m./p.m.): _______________ Notes: ___________________________________  Name of medicine: ______________________________  Amount (dose): _______________ Time (a.m./p.m.): _______________ Notes: ___________________________________  Name of medicine: ______________________________  Amount (dose): _______________ Time (a.m./p.m.): _______________ Notes: ___________________________________  If you use insulin, you will learn how to give yourself insulin by injection. You may need to adjust the amount based on the food that you eat. List the types of insulin you use here:  Insulin  Insulin type: ______________________________  Amount (dose): _______________ Time (a.m./p.m.): _______________ Notes: ___________________________________  Insulin type: ______________________________  Amount (dose): _______________ Time (a.m./p.m.): _______________ Notes: ___________________________________  Insulin type: ______________________________  Amount (dose): _______________ Time (a.m./p.m.): _______________ Notes: ___________________________________  Insulin type: ______________________________  Amount (dose): _______________ Time (a.m./p.m.): _______________ Notes: ___________________________________  Insulin type: ______________________________  Amount (dose): _______________ Time (a.m./p.m.): _______________ Notes:  ___________________________________  How do I manage my blood sugar?    Check your blood sugar levels using a blood glucose monitor as directed by your doctor.  Your doctor will set treatment goals for you. Generally, you should have these blood sugar levels:  Before meals (preprandial):  mg/dL (4.4-7.2 mmol/L).  After meals (postprandial): below 180 mg/dL (10 mmol/L).  A1c level: less than 7%.  Write down the times that you will check your blood sugar levels:  Blood sugar checks  Time: _______________ Notes: ___________________________________  Time: _______________ Notes: ___________________________________  Time: _______________ Notes: ___________________________________  Time: _______________ Notes: ___________________________________  Time: _______________ Notes: ___________________________________  Time: _______________ Notes: ___________________________________    What do I need to know about low blood sugar?  Low blood sugar is called hypoglycemia. This is when blood sugar is at or below 70 mg/dL (3.9 mmol/L). Symptoms may include:  Feeling:  Hungry.  Worried or nervous (anxious).  Sweaty and clammy.  Confused.  Dizzy.  Sleepy.  Sick to your stomach (nauseous).  Having:  A fast heartbeat.  A headache.  A change in your vision.  Tingling or no feeling (numbness) around the mouth, lips, or tongue.  Jerky movements that you cannot control (seizure).  Having trouble with:  Moving (coordination).  Sleeping.  Passing out (fainting).  Getting upset easily (irritability).  Treating low blood sugar  To treat low blood sugar, eat or drink something sugary right away. If you can think clearly and swallow safely, follow the 15:15 rule:  Take 15 grams of a fast-acting carb (carbohydrate). Talk with your doctor about how much you should take.  Some fast-acting carbs are:  Sugar tablets (glucose pills). Take 3-4 glucose pills.  6-8 pieces of hard candy.  4-6 oz (120-150 mL) of fruit juice.  4-6 oz (120-150 mL) of  regular (not diet) soda.  1 Tbsp (15 mL) honey or sugar.  Check your blood sugar 15 minutes after you take the carb.  If your blood sugar is still at or below 70 mg/dL (3.9 mmol/L), take 15 grams of a carb again.  If your blood sugar does not go above 70 mg/dL (3.9 mmol/L) after 3 tries, get help right away.  After your blood sugar goes back to normal, eat a meal or a snack within 1 hour.  Treating very low blood sugar  If your blood sugar is at or below 54 mg/dL (3 mmol/L), you have very low blood sugar (severe hypoglycemia). This is an emergency. Do not wait to see if the symptoms will go away. Get medical help right away. Call your local emergency services (911 in the [place] Do not drive yourself to the hospital.  Questions to ask your health care provider  Do I need to meet with a diabetes educator?  What equipment will I need to care for myself at home?  What diabetes medicines do I need? When should I take them?  How often do I need to check my blood sugar?  What number can I call if I have questions?  When is my next doctor's visit?  Where can I find a support group for people with diabetes?  Where to find more information  American Diabetes Association: www.diabetes.org  American Association of Diabetes Educators: www.diabeteseducator.org/patient-resources  Contact a doctor if:  Your blood sugar is at or above 240 mg/dL (13.3 mmol/L) for 2 days in a row.  You have been sick or have had a fever for 2 days or more, and you are not getting better.  You have any of these problems for more than 6 hours:  You cannot eat or drink.  You feel sick to your stomach (nauseous).  You throw up (vomit).  You have watery poop (diarrhea).  Get help right away if:  Your blood sugar is lower than 54 mg/dL (3 mmol/L).  You get confused.  You have trouble:  Thinking clearly.  Breathing.  Summary  Diabetes (diabetes mellitus) is a long-term (chronic) disease. It occurs when the body does not properly use sugar (glucose) that is  released from food after digestion.  Take insulin and diabetes medicines as told.  Check your blood sugar every day, as often as told.  Keep all follow-up visits as told by your doctor. This is important.  This information is not intended to replace advice given to you by your health care provider. Make sure you discuss any questions you have with your health care provider.  Document Released: 03/22/2019 Document Revised: 02/07/2020 Document Reviewed: 03/22/2019  [employer] Patient Education © 2020 [employer]

## 2022-06-25 LAB
BACTERIA WND AEROBE CULT: ABNORMAL
BACTERIA WND AEROBE CULT: ABNORMAL
GRAM STN SPEC: ABNORMAL
SIGNIFICANT IND 70042: ABNORMAL
SITE SITE: ABNORMAL
SOURCE SOURCE: ABNORMAL

## 2022-06-25 NOTE — CONSULTS
Diabetes education: Pt has a hs of diabetes admitted with blood sugar of 237, and Hg a1c of 7.6%. Pt was on Glargine 8 units pm and regular insulin per sliding scale coverage ac and hs.  Spoke with pt before discharge to review diabetes as well as how his oral medications and Lantus works. Discussed what effects his blood sugars. Pt states he uses vials and syringes as they are less expensive than pens. He works nights and states he takes his Metformin pm before work and am before bed ( reviewed how this works and maybe change to before work and before dinner at work rather than before bed). Pt states he does not take his Lantus more than 2 x week as he does not need as blood sugars are with in range. He states he does not take the Lantus when not working. Recommend if he is going to try to change the time, start on a day off and monitor blood sugars.  Pt states he uses a Luis M contour next meter but gets them from [employer] at non insurance cost ( insurance was over $100 and [employer] around $20).  Questions answered.

## 2022-07-10 ENCOUNTER — OFFICE VISIT (OUTPATIENT)
Dept: URGENT CARE | Facility: PHYSICIAN GROUP | Age: 67
End: 2022-07-10
Payer: COMMERCIAL

## 2022-07-10 VITALS
OXYGEN SATURATION: 97 % | HEIGHT: 72 IN | DIASTOLIC BLOOD PRESSURE: 58 MMHG | BODY MASS INDEX: 25.47 KG/M2 | TEMPERATURE: 99.7 F | RESPIRATION RATE: 16 BRPM | WEIGHT: 188 LBS | HEART RATE: 95 BPM | SYSTOLIC BLOOD PRESSURE: 128 MMHG

## 2022-07-10 DIAGNOSIS — Z48.00 DRESSING CHANGE: ICD-10-CM

## 2022-07-10 DIAGNOSIS — Z48.89 ENCOUNTER FOR POST SURGICAL WOUND CHECK: ICD-10-CM

## 2022-07-10 PROCEDURE — 99213 OFFICE O/P EST LOW 20 MIN: CPT | Performed by: PHYSICIAN ASSISTANT

## 2022-07-10 ASSESSMENT — FIBROSIS 4 INDEX: FIB4 SCORE: 0.84

## 2022-07-10 NOTE — PROGRESS NOTES
Subjective:   Keaton Cervantes is a 67 y.o. male who presents for Foot Problem (2 toe amputation surgery 2 weeks ago. Noticed drainage 5 days ago, BURAK redressed bandage. Noticing more drainage recently.)      HPI  The patient presents to the clinic for a dressing change s/p right second toe partial amputation osteomyelitis approximately 2 to 3 weeks ago.  He reports of drainage from the dressing.  He reports of no pain.  Denies any fevers or chills.  He has a follow-up appointment with his PCP tomorrow, podiatry appointment the next day, and follow-up with his surgeon then after that.  Patient currently not on antibiotics.  He received multiple doses of IV antibiotics in the hospital.      Medications:    • cyclobenzaprine Tabs  • gabapentin Caps  • glipiZIDE SR Tb24  • HYDROcodone-acetaminophen  • insulin glargine Soln  • metformin    Allergies: Sulfa drugs, Coconut (cocos nucifera) allergy skin test, Flu virus vaccine, Other food, Pneumococcal vaccines, and Nutrasweet aspartame [aspartame]    Problem List: Keaton Cervantes does not have any pertinent problems on file.    Surgical History:  Past Surgical History:   Procedure Laterality Date   • TOE AMPUTATION Right 6/23/2022    Procedure: AMPUTATION, TOE 2ND;  Surgeon: Wilman Madera M.D.;  Location: [facility];  Service: Orthopedics   • TOE AMPUTATION Right 3/4/2022    Procedure: AMPUTATION, TOE 1ST RAY;  Surgeon: Wilman Madera M.D.;  Location: [facility];  Service: Orthopedics   • LUMBAR FUSION ANTERIOR  8/4/2009    Performed by JOSEE BLANTON at [facility]   • FUSION, SPINE, LUMBAR, PLIF  8/4/2009    Performed by JOSEE BLANTON at [facility]   • LUMBAR LAMINECTOMY DISKECTOMY  8/4/2009    Performed by JOSEE BLANTON at [facility]   • LAMINOTOMY  8/4/2009    Performed by JOSEE BLANTON at [facility]   • SOMNOPLASTY  6/10/2009    Performed by ELÍAS RILEY at  SURGERY [facility]S   • SEPTOPLASTY  6/10/2009    Performed by ELÍAS RILEY at SURGERY [facility]   • ANTROSTOMY  6/10/2009    Performed by ELÍAS RILEY at SURGERY [facility]   • ETHMOIDECTOMY  6/10/2009    Performed by ELÍAS RILEY at SURGERY [facility]   • CATARACT PHACO WITH IOL  7/7/08    Performed by OCTAVIO HARDWICK at SURGERY SAME DAY [facility]   • CATARACT PHACO WITH IOL  6/16/08    Performed by OCTAVIO HARDWICK at SURGERY SAME DAY [facility] ORS   • APPENDECTOMY     • ORIF, ANKLE     • VASECTOMY         Past Social Hx: Keaton Cervantes  reports that he has quit smoking. He has quit using smokeless tobacco.  His smokeless tobacco use included chew. He reports that he does not drink alcohol and does not use drugs.     Past Family Hx:  Keaton Cervantes family history includes Diabetes in his father; Hypertension in his father.     Problem list, medications, and allergies reviewed by myself today in Epic.     Objective:     /58 (BP Location: Right arm, Patient Position: Sitting, BP Cuff Size: Adult)   Pulse 95   Temp 37.6 °C (99.7 °F) (Temporal)   Resp 16   Ht 1.829 m (6')   Wt 85.3 kg (188 lb)   SpO2 97%   BMI 25.50 kg/m²     Physical Exam  Vitals reviewed.   Constitutional:       General: He is not in acute distress.     Appearance: Normal appearance. He is not ill-appearing or toxic-appearing.   Eyes:      Conjunctiva/sclera: Conjunctivae normal.      Pupils: Pupils are equal, round, and reactive to light.   Cardiovascular:      Rate and Rhythm: Normal rate.   Pulmonary:      Effort: Pulmonary effort is normal.   Musculoskeletal:      Cervical back: Neck supple.        Feet:    Feet:      Comments: Right second toe s/p partial amputation with sutures in place.  No active drainage from surgical site, significant surrounding erythema, or edema.  There is a small round approximately 1 cm in diameter ulcerative wound to the plantar foot.  Skin:      General: Skin is warm.   Neurological:      General: No focal deficit present.      Mental Status: He is alert and oriented to person, place, and time.   Psychiatric:         Mood and Affect: Mood normal.         Behavior: Behavior normal.       Diagnosis and associated orders:     1. Dressing change    2. Encounter for post surgical wound check       Comments/MDM:     • No signs of infection. Surgical site healing well s/p partial right second toe amputation.  There is a small ulcerative wound to the plantar foot patient states currently being managed by podiatry and his PCP.  He has podiatry appointment in 2 days, PCP appointment tomorrow, and surgeon follow-up in 3 days.  Dressing removed.  Area was thoroughly cleansed with normal saline and dried.  Xeroform applied over the surgical incision site.  Nonstick dressing wrapped with Kerlix and Coban.     I personally reviewed prior external notes and test results pertinent to today's visit. Supportive care, natural history, differential diagnoses, and indications for immediate follow-up discussed. Patient expresses understanding and agrees to plan. Patient denies any other questions or concerns.     Follow-up with the primary care physician for recheck, reevaluation, and consideration of further management.  Follow-up with podiatry  Follow-up with surgeon    Please note that this dictation was created using voice recognition software. I have made a reasonable attempt to correct obvious errors, but I expect that there are errors of grammar and possibly content that I did not discover before finalizing the note.    This note was electronically signed by Brian Rosado PA-C

## 2022-07-28 ENCOUNTER — OFFICE VISIT (OUTPATIENT)
Dept: WOUND CARE | Facility: MEDICAL CENTER | Age: 67
End: 2022-07-28
Attending: ORTHOPAEDIC SURGERY
Payer: COMMERCIAL

## 2022-07-28 ENCOUNTER — HOSPITAL ENCOUNTER (OUTPATIENT)
Dept: RADIOLOGY | Facility: MEDICAL CENTER | Age: 67
End: 2022-07-28
Attending: STUDENT IN AN ORGANIZED HEALTH CARE EDUCATION/TRAINING PROGRAM
Payer: COMMERCIAL

## 2022-07-28 VITALS
OXYGEN SATURATION: 97 % | DIASTOLIC BLOOD PRESSURE: 76 MMHG | SYSTOLIC BLOOD PRESSURE: 139 MMHG | TEMPERATURE: 97.3 F | RESPIRATION RATE: 18 BRPM | HEART RATE: 101 BPM

## 2022-07-28 DIAGNOSIS — S98.111A AMPUTATION OF RIGHT GREAT TOE (HCC): ICD-10-CM

## 2022-07-28 DIAGNOSIS — E11.65 UNCONTROLLED TYPE 2 DIABETES MELLITUS WITH HYPERGLYCEMIA (HCC): ICD-10-CM

## 2022-07-28 DIAGNOSIS — L97.512 CHRONIC ULCER OF RIGHT FOOT WITH FAT LAYER EXPOSED (HCC): ICD-10-CM

## 2022-07-28 DIAGNOSIS — L97.415 DIABETIC ULCER OF RIGHT MIDFOOT ASSOCIATED WITH TYPE 2 DIABETES MELLITUS, WITH MUSCLE INVOLVEMENT WITHOUT EVIDENCE OF NECROSIS (HCC): ICD-10-CM

## 2022-07-28 DIAGNOSIS — L97.519 DIABETIC ULCER OF RIGHT FOOT ASSOCIATED WITH TYPE 2 DIABETES MELLITUS, UNSPECIFIED PART OF FOOT, UNSPECIFIED ULCER STAGE (HCC): ICD-10-CM

## 2022-07-28 DIAGNOSIS — E11.621 DIABETIC ULCER OF RIGHT MIDFOOT ASSOCIATED WITH TYPE 2 DIABETES MELLITUS, WITH MUSCLE INVOLVEMENT WITHOUT EVIDENCE OF NECROSIS (HCC): ICD-10-CM

## 2022-07-28 DIAGNOSIS — E11.42 DIABETIC POLYNEUROPATHY ASSOCIATED WITH TYPE 2 DIABETES MELLITUS (HCC): ICD-10-CM

## 2022-07-28 DIAGNOSIS — E11.621 DIABETIC ULCER OF RIGHT FOOT ASSOCIATED WITH TYPE 2 DIABETES MELLITUS, UNSPECIFIED PART OF FOOT, UNSPECIFIED ULCER STAGE (HCC): ICD-10-CM

## 2022-07-28 PROCEDURE — 99214 OFFICE O/P EST MOD 30 MIN: CPT | Mod: 25 | Performed by: STUDENT IN AN ORGANIZED HEALTH CARE EDUCATION/TRAINING PROGRAM

## 2022-07-28 PROCEDURE — 11042 DBRDMT SUBQ TIS 1ST 20SQCM/<: CPT

## 2022-07-28 PROCEDURE — 11043 DBRDMT MUSC&/FSCA 1ST 20/<: CPT | Performed by: STUDENT IN AN ORGANIZED HEALTH CARE EDUCATION/TRAINING PROGRAM

## 2022-07-28 PROCEDURE — 99214 OFFICE O/P EST MOD 30 MIN: CPT

## 2022-07-28 PROCEDURE — 11043 DBRDMT MUSC&/FSCA 1ST 20/<: CPT

## 2022-07-28 PROCEDURE — 73630 X-RAY EXAM OF FOOT: CPT | Mod: RT

## 2022-07-28 ASSESSMENT — ENCOUNTER SYMPTOMS
CHILLS: 0
FEVER: 0

## 2022-07-28 NOTE — PROGRESS NOTES
Provider Encounter- Diabetic Foot Ulcer      HISTORY OF PRESENT ILLNESS  Wound History:    START OF CARE IN CLINIC: 7/28/2022    REFERRING PROVIDER: Wilman Madera      WOUND- Diabetic foot ulcer   LOCATION: Right plantar 1-2 MTH   HISTORY:  67M with PMHx of Uncontrolled DM2 with diabetic neuropathy, history of diabetic foot wounds s/p amputation of right hallux and right distal 2nd toe. Patient reports long history of DFU previously managed by podiatrist and [place]'s wound care. Patient was admitted to hospital in 3/2022 where he was noted to have right hallux abscess and osteomyelitis and underwent R Hallux Ray amputation by Dr. Madera. ID was consulted and due to concern for continued OM, he was treated with 6 weeks of Augmentin and Doxycycline until 4/15. Patient subsequently developed right 2nd toe wound which degraded despite podiatry and wound care and was admitted 6/2022 for necrotic right second toe. He was seen by LPS service and underwent amputation of distal right second toe on 6/23 with Dr. Madera. Despite using offloading boot, patient developed worsening right plantar foot wound and was referred to [facility] for further care. According to patient he has been treating with SSD cream and gauze dressing. Patient reports that he walks 5-6 miles per night for work working at [employer].    Pertinent Medical History: Uncotnrolled DM2, Right hallux ray amputation, right 2nd toe amputation    DIABETES HX: Diagnosed with type 2 diabetes in the 1990s, and is currently managing with long acting insulin.  Checks blood sugars twice per day and reports that these typically labile from 150s - 300s.  Has had previous diabetes education.  Does have numbness in feet.  Usually wears non prescription shoes. Does check feet routinely.  Has had previous foot ulcers or foot surgery.  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  Offloading with CAM boot.        TOBACCO USE:   Former smoker    Patient's problem  list, allergies, and current medications reviewed and updated in Epic    Interval History:  7/28/2022: Initial Clinic visit with Dom Chu MD. Patient reports feeling well, denies any fevers or chills. Large plantar foot wound 1-2 MTH with prominent bone and appears to probe to / close bone under tissue on examination. Large amount of undermining. He reports that he walks 5-6 miles per shift working overnight stocking at [employer] He reports compliance wearing offloading boot. Counseled patient on staying off foot as much as possible, though he does not want to quit working and reports that he is already on light duty and does not think he can get a position sitting down.   Discussed concern for extent of wound and appears to probe to bone underneath tissue. Discussed TCC in future, however will repeat imaging to evaluate for underlying OM. If wound fails to improve or radiographic imaging of OM that he may need to be referred to LPS.      REVIEW OF SYSTEMS:   Review of Systems   Constitutional: Negative for chills, fever and malaise/fatigue.   Cardiovascular: Positive for leg swelling (right leg / foot).   Skin:        Large plantar right foot wound with extensive maceration and undermining.       PHYSICAL EXAMINATION:   /76   Pulse (!) 101   Temp 36.3 °C (97.3 °F) (Temporal)   Resp 18   SpO2 97%     Physical Exam  Constitutional:       General: He is not in acute distress.  Cardiovascular:      Rate and Rhythm: Tachycardia present.      Comments: Palpable pulses  Pulmonary:      Effort: Pulmonary effort is normal.   Musculoskeletal:         General: Deformity present.      Comments: S/p Right hallux ray amputation  S/p Right distal 2nd toe amputation   Skin:     Comments: Large plantar right 1-2 MTH DFU involving muscle with concern for probing to bone. Heavy drainage and maceration with extensive undermining.  Right Hallux and R 2nd toe amputation site well healed    See flow sheet for details    Neurological:      Mental Status: He is alert.     Monofilament testing with a 10 gram force: sensation diminished (R: 2/9, L: 2/10)  Visual Inspection: Feet with ulcer  Pedal pulses: Palpable      WOUND ASSESSMENT  Wound 07/28/22 Diabetic Ulcer MTH 1 Plantar Right --Right Plantar 1st Metatarsal Head (Active)   Wound Image    07/28/22 0814   Site Assessment Red;Yellow 07/28/22 0814   Periwound Assessment Callused;Maceration 07/28/22 0814   Margins Unattached edges 07/28/22 0814   Drainage Amount Large 07/28/22 0814   Drainage Description Serosanguineous 07/28/22 0814   Treatments Cleansed;Topical Lidocaine;Provider debridement 07/28/22 0814   Wound Cleansing Puracyn [place] 07/28/22 0814   Periwound Protectant Barrier Paste 07/28/22 0814   Dressing Cleansing/Solutions Not Applicable 07/28/22 0814   Dressing Options Hydrofiber Silver;Nonadhesive Foam;Dry Roll Gauze;Coban;Other (Comments) 07/28/22 0814   Dressing Changed New 07/28/22 0814   Non-staged Wound Description Full thickness 07/28/22 0814   Wound Length (cm) 1.6 cm 07/28/22 0814   Wound Width (cm) 2.4 cm 07/28/22 0814   Wound Depth (cm) 2 cm 07/28/22 0814   Wound Surface Area (cm^2) 3.84 cm^2 07/28/22 0814   Wound Volume (cm^3) 7.68 cm^3 07/28/22 0814   Post-Procedure Length (cm) 2 cm 07/28/22 0814   Post-Procedure Width (cm) 2.9 cm 07/28/22 0814   Post-Procedure Depth (cm) 2 cm 07/28/22 0814   Post-Procedure Surface Area (cm^2) 5.8 cm^2 07/28/22 0814   Post-Procedure Volume (cm^3) 11.6 cm^3 07/28/22 0814   Tunneling (cm) 0 cm 07/28/22 0814   Undermining (cm) 2.5 cm 07/28/22 0814   Undermining of Wound, 1st Location From 12 o'clock;To 12 o'clock 07/28/22 0814   Wound Odor None 07/28/22 0814   Pulses DP;2+ 07/28/22 0814   Right Foot Monofilament 10-point exam (Sensate) 2/10 07/28/22 0814   Left Foot Monofilament 10-point exam (Sensate) 2/10 07/28/22 0814   Exposed Structures None 07/28/22 0814   Number of days: 0         PROCEDURE:   -2% viscous lidocaine  applied topically to wound bed for approximately 5 minutes prior to debridement  -Curette used to debride wound bed and periwound callus.  Excisional debridement was performed to remove devitalized tissue until healthy, bleeding tissue was visualized.   Entire surface of wound, 5.8 cm2 debrided.  Tissue debrided into the Muscle layer.  Periwound callus, ~ 4 cm2, debrided to skin level, excising hyperkeratinized tissue.   -Bleeding controlled with manual pressure.    -Wound care completed by wound RN, refer to flowsheet  -Patient tolerated the procedure well, without c/o pain or discomfort.       Pertinent Labs and Diagnostics:    Labs:     A1c:   Lab Results   Component Value Date/Time    HBA1C 7.6 (H) 06/21/2022 03:17 PM          IMAGING: Foot/Toes Imaging, Past Year DX-FOOT-COMPLETE 3+ RIGHT    Result Date: 6/22/2022  Narrative 6/22/2022 10:06 AM HISTORY/REASON FOR EXAM:  rule out osteomylitis TECHNIQUE/EXAM DESCRIPTION AND NUMBER OF VIEWS: 3 views of the RIGHT foot. COMPARISON: Right foot Radiographs 3/3/2022. FINDINGS: Decreased osseous mineralization. Questionable periosteal reaction of the second toe distal phalanx. Interval postsurgical changes of amputation at the great toe metatarsal neck. There is some cortical irregularity of the distal stump. Soft tissue swelling about the medial, distal right foot. Oblique, mildly displaced fracture of the second toe metatarsal. There is no dislocation.  No bone erosion is noted.     Impression 1.  Questionable periosteal reaction of the second toe distal phalanx concerning for acute osteomyelitis. 2.  Interval postsurgical changes of amputation at the great toe metatarsal neck. Cortical irregularity of the distal stump could represent reactive changes from recent surgery versus acute osteomyelitis. 3.  Soft tissue swelling adjacent to the great toe metatarsal stump may be reactive or represent infection. 4.  Oblique, mildly displaced fracture of the second toe  metatarsal.    Foot/Toes Imaging, Past Year DX-FOOT-COMPLETE 3+ RIGHT    Result Date: 3/3/2022  Narrative 3/3/2022 3:27 PM HISTORY/REASON FOR EXAM:  r/o osteo TECHNIQUE/EXAM DESCRIPTION AND NUMBER OF VIEWS: 3 views of the RIGHT foot. COMPARISON:  None. FINDINGS: Decreased osseous mineralization. No acute fracture is noted. There is no dislocation.  No bone erosion is noted. Soft tissue edema and gas of the medial right foot projecting over the great toe metatarsophalangeal joint.     Impression 1.  Soft tissue edema and gas of the medial right forefoot projecting over the great toe metatarsophalangeal joint. This is concerning for abscess versus necrotic infection. 2.  No radiographic evidence of acute osteomyelitis.. If there is clinical concern for radiographically occult osteomyelitis, MRI can be obtained.      VASCULAR STUDIES: 6/21/2022 CYNTHIA  RIGHT      Waveform            Systolic BPs (mmHg)                                            Brachial   Triphasic                                Common Femoral   Triphasic                                Popliteal   Triphasic                  174           Posterior Tibial   Triphasic                  187           Dorsalis Pedis                                            Digit                              1.29          CYNTHIA                                            TBI                           LEFT   Waveform        Systolic BPs (mmHg)                              145           Brachial   Triphasic                                Common Femoral   Triphasic                                Popliteal   Triphasic                  172           Posterior Tibial   Triphasic                  170           Dorsalis Pedis                                            Digit                              1.19          CYNTHIA                                            TBI         Findings   Bilateral-   The ankle-brachial indices are normal.    Doppler waveforms of the common femoral artery  and popliteal artery are of    high amplitude and triphasic.    Doppler waveforms at the ankle are brisk and triphasic.    LAST  WOUND CULTURE:  DATE :        Lab Results   Component Value Date/Time    CULTRSULT - (A) 06/22/2022 02:00 PM    CULTRSULT Corynebacterium striatum group  Light growth   (A) 06/22/2022 02:00 PM           ASSESSMENT AND PLAN:     1. Diabetic ulcer of right midfoot associated with type 2 diabetes mellitus, with muscle involvement without evidence of necrosis (HCC)    7/28/2022  - Large wound plantar foot with prominent 1st metatarsal with concern for tracking to bone with large drainage and maceration  - Excisional debridement was performed in clinic, medically necessary to promote wound healing.  - High risk for infection and concern for possible OM.  - Will obtain Xray and monitor this week, strongly consider LPS clinic  - If no OM on imaging, can consider TCC in future  - Counseled to stay off feet if possible, reports that he walks 5-6 miles per night during shift. Counseled to look into taking time off work or for light duty  - Continue to wear offloading boot, given Rx for boot adjustment vs new boot to offload plantar foot  - Has palpable pulses and arterial study from 6/2022 with adequate blood flow  - Return to clinic weekly for assessment and debridement as needed.   Wound Care: Hydrofiber silver x 2, Foam, dry roll gauze, coban    2. Uncontrolled type 2 diabetes mellitus with hyperglycemia (HCC)  Comments: Most recent A1C 7.6 6/2022 7/28/2022  - Patient checks glucose BID, reports glucose is labile 150s-300s  - Reports that he only takes long acting insulin when glucose is high. I recommend taking daily to even out glucose spikes for improved diabetic control  - Patient reports that PCP manages DM, can consider endocrinology if glucose remains uncontrolled  - Counseled on importance of good glucose control on wound healing. Counseled that glucose must be below 180 and preferably  < 140 for optimal wound healing    3. Diabetic polyneuropathy associated with type 2 diabetes mellitus (HCC)  - Implications of loss of protective sensation (LOPS) discussed with patient- including increased risk for amputation.  Advised to check feet at least daily, moisturize feet, and to always wear protective foot wear.     4. Amputation of right great toe (HCC)  Comments: Right hallux toe amputation 3/2022, right 2nd toe amputation 6/2022  High risk for further amputations    PATIENT EDUCATION  - Importance of tight glucose control for wound healing   - Implications of loss of protective sensation (LOPS) discussed with patient- including increased risk for amputation.  - Advised to check feet at least daily, moisturize feet, and to always wear protective foot wear.   -  Importance of offloading foot to assist with wound healing  - Advised pt not to trim nails or calluses, seek foot/nail care from podiatrist or certified foot/nail nurse  - Importance of adequate nutrition for wound healing    My total time spent caring for the patient on the day of the encounter was 30 minutes.   This does not include time spent on separately billable procedures/tests.       Please note that this note may have been created using voice recognition software. I have worked with technical experts from [facility] to optimize the interface.  I have made every reasonable attempt to correct obvious errors, but there may be errors of grammar and possibly content that I did not discover before finalizing the note.    N

## 2022-07-28 NOTE — PATIENT INSTRUCTIONS
-Keep dressings clean and dry. Change dressings once between wound clinic visits, and if the dressings become saturated, soiled, or fall off.    -Avoid prolonged standing or sitting without elevating your legs.    -Never walk around the house barefoot. Wear your offloading boot any time you are up walking, even in your home.    -Should you experience any significant changes in your wound, such as signs of infection (increasing redness, swelling, localized heat, increased pain, fever > 101 F, chills) or have any questions regarding your home care instructions, please contact the wound center at [phone]. If after hours, contact your primary care physician or go to the hospital emergency room.     -If you are 5 or more minutes late for an appointment, we reserve the right to cancel and reschedule that appointment. Additionally, if you are habitually late or not showing (3 late cancellations and/or no shows), we reserve the right to cancel your remaining appointments and it will be your responsibility to obtain a new referral if services are still needed.

## 2022-07-29 DIAGNOSIS — E11.621 DIABETIC ULCER OF RIGHT MIDFOOT ASSOCIATED WITH TYPE 2 DIABETES MELLITUS, WITH MUSCLE INVOLVEMENT WITHOUT EVIDENCE OF NECROSIS (HCC): ICD-10-CM

## 2022-07-29 DIAGNOSIS — L97.415 DIABETIC ULCER OF RIGHT MIDFOOT ASSOCIATED WITH TYPE 2 DIABETES MELLITUS, WITH MUSCLE INVOLVEMENT WITHOUT EVIDENCE OF NECROSIS (HCC): ICD-10-CM

## 2022-07-29 DIAGNOSIS — M86.179 ACUTE OSTEOMYELITIS OF ANKLE OR FOOT, UNSPECIFIED LATERALITY (HCC): ICD-10-CM

## 2022-07-29 RX ORDER — AMOXICILLIN AND CLAVULANATE POTASSIUM 875; 125 MG/1; MG/1
1 TABLET, FILM COATED ORAL 2 TIMES DAILY
Qty: 20 TABLET | Refills: 0 | Status: SHIPPED | OUTPATIENT
Start: 2022-07-29 | End: 2022-08-08

## 2022-07-29 NOTE — PROGRESS NOTES
Received phone call from patient this morning.  He had his x-ray done yesterday and read the result this morning and had some concerns.  I reviewed patient's note and wound photos.  He was seen by Dr. Mckeon yesterday for a right plantar first MTH ulcer.  X-ray shows soft tissue gas over first TMA Site.  Patient states he is feeling well, denies fever, chills, nausea, vomiting.  He just completed a course of doxycycline last week.  He is allergic to sulfas.    Rx for Augmentin sent to patient's pharmacy.  I will schedule him for LPS rounds next Friday, 8/5.

## 2022-08-05 ENCOUNTER — OFFICE VISIT (OUTPATIENT)
Dept: WOUND CARE | Facility: MEDICAL CENTER | Age: 67
End: 2022-08-05
Attending: ORTHOPAEDIC SURGERY
Payer: COMMERCIAL

## 2022-08-05 DIAGNOSIS — E11.621 DIABETIC ULCER OF RIGHT MIDFOOT ASSOCIATED WITH TYPE 2 DIABETES MELLITUS, WITH MUSCLE INVOLVEMENT WITHOUT EVIDENCE OF NECROSIS (HCC): ICD-10-CM

## 2022-08-05 DIAGNOSIS — L97.415 DIABETIC ULCER OF RIGHT MIDFOOT ASSOCIATED WITH TYPE 2 DIABETES MELLITUS, WITH MUSCLE INVOLVEMENT WITHOUT EVIDENCE OF NECROSIS (HCC): ICD-10-CM

## 2022-08-05 PROBLEM — L97.519 FOOT ULCER, RIGHT (HCC): Status: ACTIVE | Noted: 2022-08-05

## 2022-08-05 PROCEDURE — 99213 OFFICE O/P EST LOW 20 MIN: CPT

## 2022-08-05 RX ORDER — AMOXICILLIN AND CLAVULANATE POTASSIUM 875; 125 MG/1; MG/1
1 TABLET, FILM COATED ORAL 2 TIMES DAILY
Qty: 20 TABLET | Refills: 0 | Status: SHIPPED | OUTPATIENT
Start: 2022-08-05 | End: 2022-08-15

## 2022-08-05 NOTE — PATIENT INSTRUCTIONS
-Keep your wound dressing clean, dry, and intact.    -Change your dressing if it becomes soiled, soaked, or falls off.    -Should you experience any significant changes in your wound(s), such as infection (redness, swelling, localized heat, increased pain, fever > 101 F, chills) or have any questions regarding your home care instructions, please contact the wound center at [phone]. If after hours, contact your primary care physician or go to the hospital emergency room.

## 2022-08-05 NOTE — WOUND TEAM
Pt seen during ortho rounds with LPS team for right plantar foot wounds.  Measurements(cm):  Following physician assessment, RN evaluated patient's wound and dressed with Aquacel Ag covered with non-adhesive foam and secured with hypafix tape.  Patient will be contacted by Dr. Hardy's office to schedule I&D, GSR, 1st ray amputation and longus to brevis tendon transfer. Patient will continue with weekly appointments and wound care until surgery.     Wound 07/28/22 Diabetic Ulcer MTH 1 Plantar Right --Right Plantar 1st Metatarsal Head (Active)   Wound Image      Site Assessment Red;Yellow;Pink    Periwound Assessment Callused;Maceration    Margins Unattached edges    Drainage Amount Moderate    Drainage Description Serosanguineous    Treatments Cleansed;Site care    Wound Cleansing Puracyn Spray    Periwound Protectant Skin Protectant Wipes to Periwound;Barrier Paste    Dressing Cleansing/Solutions Not Applicable    Dressing Options Hydrofiber Silver;Nonadhesive Foam;Dry Roll Gauze;Hypafix Tape;Coban    Dressing Changed Changed    Dressing Change/Treatment Frequency Every 72 hrs, and As Needed    Non-staged Wound Description Full thickness    Wound Length (cm) 1.3 cm    Wound Width (cm) 2.3 cm    Wound Depth (cm) 1.4 cm    Wound Surface Area (cm^2) 2.99 cm^2    Wound Volume (cm^3) 4.186 cm^3    Wound Healing % 45    Tunneling (cm) 0 cm    Undermining (cm) 1.8 cm    Undermining of Wound, 1st Location From 12 o'clock;To 12 o'clock    Wound Odor None    Pulses DP;2+ 07/28/22 0814   Right Foot Monofilament 10-point exam (Sensate) 2/10 07/28/22 0814   Left Foot Monofilament 10-point exam (Sensate) 2/10 07/28/22 0814   Exposed Structures None

## 2022-08-05 NOTE — PROGRESS NOTES
LIMB PRESERVATION SERVICE ROUNDS    Patient seen in collaboration with interdisciplinary team during LPS rounds in wound clinic for right plantar first MTH wound which probes to bone.  Patient has history of right hallux amputation by Dr. Madera on 3/4/2022 and then subsequently underwent a right second toe amputation on 6/23/2022 by Dr. Madera.  X-ray on 7/28/2022 show soft tissue gas present overlying first transmetatarsal amputation site.    Patient states blood sugar was 163 today.  Latest A1c on 6/21/2022 was 7.6  Denies fevers, chills, nausea, vomiting.        RESULTS:    Lab Results   Component Value Date/Time    HBA1C 7.6 (H) 06/21/2022 03:17 PM            OBJECTIVE FINDINGS:     Pulses: palpable pedal pulses    Wounds: Right plantar first MTH: Red pink tissue to wound bed with undermining and maceration to periwound.  Wound probes to bone.      PROCEDURE   Wound care completed by wound care RN. See note and flowsheet.     Surgical and non surgical options discussed by Dr. Hardy. Surgical options to include tendon transfer, GSR, and I&D with bone biopsy. Non surgical options to include to continue with antibiotic and wound care. Recommend surgery.  Patient states he will discuss further with his wife but would like to proceed with recommended surgery.    PLAN:   Wound Care: Continue at [facility] until surgery can be scheduled  Imaging/Labs: no further imaging/labs at this time  Offloading: Boot adjusted in clinic  Antibiotics: Refill called in to extend Augmentin 875/125 2 times daily for 10 extra days.  Surgery: Tendon transfer, GSR, I&D with bone biopsy to be scheduled within 1 to 2 weeks  Referral: None      LPS Follow up: no further LPS appts at this time.     20 minutes was spent on preparation for visit, examination, counseling and coordination of care regarding the above.      Please note that this dictation was created using voice recognition software. I have  worked with technical experts from  [facility] to optimize the interface.  I have made every reasonable attempt to correct obvious errors, but there may be errors of grammar and possibly content that I did not discover before finalizing the note.

## 2022-08-12 ENCOUNTER — NON-PROVIDER VISIT (OUTPATIENT)
Dept: WOUND CARE | Facility: MEDICAL CENTER | Age: 67
End: 2022-08-12
Attending: ORTHOPAEDIC SURGERY
Payer: COMMERCIAL

## 2022-08-12 ENCOUNTER — PRE-ADMISSION TESTING (OUTPATIENT)
Dept: ADMISSIONS | Facility: MEDICAL CENTER | Age: 67
End: 2022-08-12
Attending: ORTHOPAEDIC SURGERY
Payer: COMMERCIAL

## 2022-08-12 DIAGNOSIS — Z01.812 PRE-OPERATIVE LABORATORY EXAMINATION: ICD-10-CM

## 2022-08-12 LAB
ALBUMIN SERPL BCP-MCNC: 4.2 G/DL (ref 3.2–4.9)
ALBUMIN/GLOB SERPL: 1.4 G/DL
ALP SERPL-CCNC: 74 U/L (ref 30–99)
ALT SERPL-CCNC: 14 U/L (ref 2–50)
ANION GAP SERPL CALC-SCNC: 9 MMOL/L (ref 7–16)
AST SERPL-CCNC: 11 U/L (ref 12–45)
BILIRUB SERPL-MCNC: 0.2 MG/DL (ref 0.1–1.5)
BUN SERPL-MCNC: 21 MG/DL (ref 8–22)
CALCIUM SERPL-MCNC: 9.6 MG/DL (ref 8.5–10.5)
CHLORIDE SERPL-SCNC: 96 MMOL/L (ref 96–112)
CO2 SERPL-SCNC: 27 MMOL/L (ref 20–33)
CREAT SERPL-MCNC: 0.64 MG/DL (ref 0.5–1.4)
ERYTHROCYTE [DISTWIDTH] IN BLOOD BY AUTOMATED COUNT: 41.8 FL (ref 35.9–50)
GFR SERPLBLD CREATININE-BSD FMLA CKD-EPI: 104 ML/MIN/1.73 M 2
GLOBULIN SER CALC-MCNC: 3 G/DL (ref 1.9–3.5)
GLUCOSE SERPL-MCNC: 183 MG/DL (ref 65–99)
HCT VFR BLD AUTO: 40.5 % (ref 42–52)
HGB BLD-MCNC: 12.9 G/DL (ref 14–18)
MCH RBC QN AUTO: 28.5 PG (ref 27–33)
MCHC RBC AUTO-ENTMCNC: 31.9 G/DL (ref 33.7–35.3)
MCV RBC AUTO: 89.6 FL (ref 81.4–97.8)
PLATELET # BLD AUTO: 389 K/UL (ref 164–446)
PMV BLD AUTO: 9 FL (ref 9–12.9)
POTASSIUM SERPL-SCNC: 5.3 MMOL/L (ref 3.6–5.5)
PROT SERPL-MCNC: 7.2 G/DL (ref 6–8.2)
RBC # BLD AUTO: 4.52 M/UL (ref 4.7–6.1)
SODIUM SERPL-SCNC: 132 MMOL/L (ref 135–145)
WBC # BLD AUTO: 8.1 K/UL (ref 4.8–10.8)

## 2022-08-12 PROCEDURE — 36415 COLL VENOUS BLD VENIPUNCTURE: CPT

## 2022-08-12 PROCEDURE — 80053 COMPREHEN METABOLIC PANEL: CPT

## 2022-08-12 PROCEDURE — 85027 COMPLETE CBC AUTOMATED: CPT

## 2022-08-12 PROCEDURE — 97602 WOUND(S) CARE NON-SELECTIVE: CPT

## 2022-08-12 ASSESSMENT — FIBROSIS 4 INDEX: FIB4 SCORE: 0.84

## 2022-08-12 NOTE — DISCHARGE PLANNING
Per PAT LUCIAN Cintron pt declined to speak with RN CM. He stated he was going to be weight bearing.

## 2022-08-12 NOTE — PATIENT INSTRUCTIONS
After Visit Summary Wound Care Instructions    Nutrition - Patient instructed increased protein diet unless contraindicated in renal failure (meat, eggs, fish, yogurt, cottage cheese, beans), use of multivitamin with minerals and Arginaid supplementation (check if ok with Primary Care Provider first).    Infection -  instructed signs and symptoms of infection, increased redness and swelling, localized heat over wound and surrounding area/fever/chills/nausea and vomiting, when to call doctor or go to Emergency Room.     Dressing Changes - Instructed patient rationale for wound care products. Instructed to keep dressings clean and dry, shower on clinic days right before coming in. Change your dressing if it becomes soiled, soaked, or falls off.      Dressing change procedure   Remove old dressing.  Cleanse the wound with saline and gauze.  Dry surrounding skin with gauze, then apply skin prep wipe, allow to dry, then apply zinc oxide barrier paste.  Cut a small piece of Aquacel silver dressing, and pack loosely into wound to fill the dead space and the undermining areas.   Cut a larger piece of Aquacel silver and place this on top.   Cover with foam dressing and secure with the hypafix tape if needed.   Change 1-2 x week and as needed at home, come to wound clinic 1 x week.   Change your dressing if it becomes soiled, soaked, or falls off.    Instructed patient about fall prevention.      Diabetic Instruction - Patient instructed keep tight control over Blood Sugar, <140 for optimal wound healing; Implications of loss of protective sensation (LOPS) discussed with patient, including increased risk for amputation.  Advised to check feet at least daily, moisturize feet, and to always wear protective foot wear, arrange meticulous regular foot care by podiatrist or Certified Foot Care Nurse (CFCN). Patient with good understanding.       Compression Wraps - Avoid prolonged standing or sitting without elevating your  legs.  Apply tubigrip to your legs ending 2 fingers below back of knee without wrinkles.   Questions - should you have any questions regarding your home care instructions, please contact the wound center at [phone]. If after hours, contact your primary care physician or go to the hospital emergency room.

## 2022-08-17 ENCOUNTER — ANESTHESIA EVENT (OUTPATIENT)
Dept: SURGERY | Facility: MEDICAL CENTER | Age: 67
End: 2022-08-17
Payer: COMMERCIAL

## 2022-08-17 ENCOUNTER — APPOINTMENT (OUTPATIENT)
Dept: RADIOLOGY | Facility: MEDICAL CENTER | Age: 67
End: 2022-08-17
Attending: ORTHOPAEDIC SURGERY
Payer: COMMERCIAL

## 2022-08-17 ENCOUNTER — ANESTHESIA (OUTPATIENT)
Dept: SURGERY | Facility: MEDICAL CENTER | Age: 67
End: 2022-08-17
Payer: COMMERCIAL

## 2022-08-17 ENCOUNTER — HOSPITAL ENCOUNTER (OUTPATIENT)
Facility: MEDICAL CENTER | Age: 67
End: 2022-08-17
Attending: ORTHOPAEDIC SURGERY | Admitting: ORTHOPAEDIC SURGERY
Payer: COMMERCIAL

## 2022-08-17 VITALS
HEIGHT: 72 IN | BODY MASS INDEX: 26.99 KG/M2 | WEIGHT: 199.3 LBS | TEMPERATURE: 96.5 F | RESPIRATION RATE: 16 BRPM | SYSTOLIC BLOOD PRESSURE: 137 MMHG | DIASTOLIC BLOOD PRESSURE: 65 MMHG | OXYGEN SATURATION: 97 % | HEART RATE: 70 BPM

## 2022-08-17 LAB
GLUCOSE BLD STRIP.AUTO-MCNC: 141 MG/DL (ref 65–99)
GRAM STN SPEC: NORMAL
GRAM STN SPEC: NORMAL
PATHOLOGY CONSULT NOTE: NORMAL
SIGNIFICANT IND 70042: NORMAL
SIGNIFICANT IND 70042: NORMAL
SITE SITE: NORMAL
SITE SITE: NORMAL
SOURCE SOURCE: NORMAL
SOURCE SOURCE: NORMAL

## 2022-08-17 PROCEDURE — 87075 CULTR BACTERIA EXCEPT BLOOD: CPT | Mod: 91

## 2022-08-17 PROCEDURE — 28122 PARTIAL REMOVAL OF FOOT BONE: CPT | Mod: 78,RT | Performed by: ORTHOPAEDIC SURGERY

## 2022-08-17 PROCEDURE — 82962 GLUCOSE BLOOD TEST: CPT

## 2022-08-17 PROCEDURE — 28122 PARTIAL REMOVAL OF FOOT BONE: CPT | Mod: ASROC,78,RT | Performed by: SPECIALIST/TECHNOLOGIST, OTHER

## 2022-08-17 PROCEDURE — 87015 SPECIMEN INFECT AGNT CONCNTJ: CPT | Mod: 91

## 2022-08-17 PROCEDURE — A9270 NON-COVERED ITEM OR SERVICE: HCPCS | Performed by: ANESTHESIOLOGY

## 2022-08-17 PROCEDURE — 700101 HCHG RX REV CODE 250: Performed by: ORTHOPAEDIC SURGERY

## 2022-08-17 PROCEDURE — 88304 TISSUE EXAM BY PATHOLOGIST: CPT

## 2022-08-17 PROCEDURE — 160025 RECOVERY II MINUTES (STATS): Performed by: ORTHOPAEDIC SURGERY

## 2022-08-17 PROCEDURE — 20240 BONE BIOPSY OPEN SUPERFICIAL: CPT | Mod: 78,RT | Performed by: ORTHOPAEDIC SURGERY

## 2022-08-17 PROCEDURE — 160041 HCHG SURGERY MINUTES - EA ADDL 1 MIN LEVEL 4: Performed by: ORTHOPAEDIC SURGERY

## 2022-08-17 PROCEDURE — 87205 SMEAR GRAM STAIN: CPT

## 2022-08-17 PROCEDURE — 700105 HCHG RX REV CODE 258: Performed by: ANESTHESIOLOGY

## 2022-08-17 PROCEDURE — 28003 TREATMENT OF FOOT INFECTION: CPT | Mod: 78,59,RT | Performed by: ORTHOPAEDIC SURGERY

## 2022-08-17 PROCEDURE — 160002 HCHG RECOVERY MINUTES (STAT): Performed by: ORTHOPAEDIC SURGERY

## 2022-08-17 PROCEDURE — 87186 SC STD MICRODIL/AGAR DIL: CPT

## 2022-08-17 PROCEDURE — 87070 CULTURE OTHR SPECIMN AEROBIC: CPT

## 2022-08-17 PROCEDURE — 160009 HCHG ANES TIME/MIN: Performed by: ORTHOPAEDIC SURGERY

## 2022-08-17 PROCEDURE — 28003 TREATMENT OF FOOT INFECTION: CPT | Mod: ASROC,78,59,RT | Performed by: SPECIALIST/TECHNOLOGIST, OTHER

## 2022-08-17 PROCEDURE — 27687 REVISION OF CALF TENDON: CPT | Mod: ASROC,78,RT | Performed by: SPECIALIST/TECHNOLOGIST, OTHER

## 2022-08-17 PROCEDURE — 27687 REVISION OF CALF TENDON: CPT | Mod: 78,RT | Performed by: ORTHOPAEDIC SURGERY

## 2022-08-17 PROCEDURE — 700111 HCHG RX REV CODE 636 W/ 250 OVERRIDE (IP): Performed by: ANESTHESIOLOGY

## 2022-08-17 PROCEDURE — 27690 REVISE LOWER LEG TENDON: CPT | Mod: 78,RT | Performed by: ORTHOPAEDIC SURGERY

## 2022-08-17 PROCEDURE — 700102 HCHG RX REV CODE 250 W/ 637 OVERRIDE(OP): Performed by: ANESTHESIOLOGY

## 2022-08-17 PROCEDURE — 700105 HCHG RX REV CODE 258: Performed by: ORTHOPAEDIC SURGERY

## 2022-08-17 PROCEDURE — 160046 HCHG PACU - 1ST 60 MINS PHASE II: Performed by: ORTHOPAEDIC SURGERY

## 2022-08-17 PROCEDURE — 20240 BONE BIOPSY OPEN SUPERFICIAL: CPT | Mod: ASROC,78,RT | Performed by: SPECIALIST/TECHNOLOGIST, OTHER

## 2022-08-17 PROCEDURE — 27690 REVISE LOWER LEG TENDON: CPT | Mod: ASROC,78,RT | Performed by: SPECIALIST/TECHNOLOGIST, OTHER

## 2022-08-17 PROCEDURE — 160048 HCHG OR STATISTICAL LEVEL 1-5: Performed by: ORTHOPAEDIC SURGERY

## 2022-08-17 PROCEDURE — 160029 HCHG SURGERY MINUTES - 1ST 30 MINS LEVEL 4: Performed by: ORTHOPAEDIC SURGERY

## 2022-08-17 PROCEDURE — 160036 HCHG PACU - EA ADDL 30 MINS PHASE I: Performed by: ORTHOPAEDIC SURGERY

## 2022-08-17 PROCEDURE — 01470 ANES PX NRV MSC LW L/A/F NOS: CPT | Performed by: ANESTHESIOLOGY

## 2022-08-17 PROCEDURE — 88311 DECALCIFY TISSUE: CPT

## 2022-08-17 PROCEDURE — 160035 HCHG PACU - 1ST 60 MINS PHASE I: Performed by: ORTHOPAEDIC SURGERY

## 2022-08-17 RX ORDER — ONDANSETRON 2 MG/ML
4 INJECTION INTRAMUSCULAR; INTRAVENOUS
Status: DISCONTINUED | OUTPATIENT
Start: 2022-08-17 | End: 2022-08-17 | Stop reason: HOSPADM

## 2022-08-17 RX ORDER — LIDOCAINE HYDROCHLORIDE 10 MG/ML
INJECTION, SOLUTION INFILTRATION; PERINEURAL
Status: DISCONTINUED | OUTPATIENT
Start: 2022-08-17 | End: 2022-08-17 | Stop reason: HOSPADM

## 2022-08-17 RX ORDER — SODIUM CHLORIDE, SODIUM LACTATE, POTASSIUM CHLORIDE, CALCIUM CHLORIDE 600; 310; 30; 20 MG/100ML; MG/100ML; MG/100ML; MG/100ML
INJECTION, SOLUTION INTRAVENOUS CONTINUOUS
Status: DISCONTINUED | OUTPATIENT
Start: 2022-08-17 | End: 2022-08-17 | Stop reason: HOSPADM

## 2022-08-17 RX ORDER — SODIUM CHLORIDE, SODIUM LACTATE, POTASSIUM CHLORIDE, CALCIUM CHLORIDE 600; 310; 30; 20 MG/100ML; MG/100ML; MG/100ML; MG/100ML
INJECTION, SOLUTION INTRAVENOUS
Status: DISCONTINUED | OUTPATIENT
Start: 2022-08-17 | End: 2022-08-17 | Stop reason: SURG

## 2022-08-17 RX ORDER — OXYCODONE HCL 5 MG/5 ML
10 SOLUTION, ORAL ORAL
Status: COMPLETED | OUTPATIENT
Start: 2022-08-17 | End: 2022-08-17

## 2022-08-17 RX ORDER — HYDROMORPHONE HYDROCHLORIDE 1 MG/ML
0.4 INJECTION, SOLUTION INTRAMUSCULAR; INTRAVENOUS; SUBCUTANEOUS
Status: DISCONTINUED | OUTPATIENT
Start: 2022-08-17 | End: 2022-08-17 | Stop reason: HOSPADM

## 2022-08-17 RX ORDER — AMOXICILLIN AND CLAVULANATE POTASSIUM 875; 125 MG/1; MG/1
1 TABLET, FILM COATED ORAL 2 TIMES DAILY
COMMUNITY
End: 2022-09-09

## 2022-08-17 RX ORDER — SODIUM CHLORIDE, SODIUM GLUCONATE, SODIUM ACETATE, POTASSIUM CHLORIDE AND MAGNESIUM CHLORIDE 526; 502; 368; 37; 30 MG/100ML; MG/100ML; MG/100ML; MG/100ML; MG/100ML
500 INJECTION, SOLUTION INTRAVENOUS CONTINUOUS
Status: DISCONTINUED | OUTPATIENT
Start: 2022-08-17 | End: 2022-08-17 | Stop reason: HOSPADM

## 2022-08-17 RX ORDER — LABETALOL HYDROCHLORIDE 5 MG/ML
5 INJECTION, SOLUTION INTRAVENOUS
Status: DISCONTINUED | OUTPATIENT
Start: 2022-08-17 | End: 2022-08-17 | Stop reason: HOSPADM

## 2022-08-17 RX ORDER — DIPHENHYDRAMINE HYDROCHLORIDE 50 MG/ML
12.5 INJECTION INTRAMUSCULAR; INTRAVENOUS
Status: DISCONTINUED | OUTPATIENT
Start: 2022-08-17 | End: 2022-08-17 | Stop reason: HOSPADM

## 2022-08-17 RX ORDER — MEPERIDINE HYDROCHLORIDE 25 MG/ML
12.5 INJECTION INTRAMUSCULAR; INTRAVENOUS; SUBCUTANEOUS
Status: DISCONTINUED | OUTPATIENT
Start: 2022-08-17 | End: 2022-08-17 | Stop reason: HOSPADM

## 2022-08-17 RX ORDER — BUPIVACAINE HYDROCHLORIDE 5 MG/ML
INJECTION, SOLUTION EPIDURAL; INTRACAUDAL
Status: DISCONTINUED | OUTPATIENT
Start: 2022-08-17 | End: 2022-08-17 | Stop reason: HOSPADM

## 2022-08-17 RX ORDER — HALOPERIDOL 5 MG/ML
1 INJECTION INTRAMUSCULAR
Status: DISCONTINUED | OUTPATIENT
Start: 2022-08-17 | End: 2022-08-17 | Stop reason: HOSPADM

## 2022-08-17 RX ORDER — HYDROMORPHONE HYDROCHLORIDE 1 MG/ML
0.2 INJECTION, SOLUTION INTRAMUSCULAR; INTRAVENOUS; SUBCUTANEOUS
Status: DISCONTINUED | OUTPATIENT
Start: 2022-08-17 | End: 2022-08-17 | Stop reason: HOSPADM

## 2022-08-17 RX ORDER — ONDANSETRON 2 MG/ML
INJECTION INTRAMUSCULAR; INTRAVENOUS PRN
Status: DISCONTINUED | OUTPATIENT
Start: 2022-08-17 | End: 2022-08-17 | Stop reason: SURG

## 2022-08-17 RX ORDER — ALBUTEROL SULFATE 2.5 MG/3ML
2.5 SOLUTION RESPIRATORY (INHALATION)
Status: DISCONTINUED | OUTPATIENT
Start: 2022-08-17 | End: 2022-08-17 | Stop reason: HOSPADM

## 2022-08-17 RX ORDER — DEXAMETHASONE SODIUM PHOSPHATE 4 MG/ML
INJECTION, SOLUTION INTRA-ARTICULAR; INTRALESIONAL; INTRAMUSCULAR; INTRAVENOUS; SOFT TISSUE PRN
Status: DISCONTINUED | OUTPATIENT
Start: 2022-08-17 | End: 2022-08-17 | Stop reason: SURG

## 2022-08-17 RX ORDER — HYDRALAZINE HYDROCHLORIDE 20 MG/ML
5 INJECTION INTRAMUSCULAR; INTRAVENOUS
Status: DISCONTINUED | OUTPATIENT
Start: 2022-08-17 | End: 2022-08-17 | Stop reason: HOSPADM

## 2022-08-17 RX ORDER — CEFAZOLIN SODIUM 1 G/3ML
INJECTION, POWDER, FOR SOLUTION INTRAMUSCULAR; INTRAVENOUS PRN
Status: DISCONTINUED | OUTPATIENT
Start: 2022-08-17 | End: 2022-08-17 | Stop reason: SURG

## 2022-08-17 RX ORDER — HYDROMORPHONE HYDROCHLORIDE 1 MG/ML
0.6 INJECTION, SOLUTION INTRAMUSCULAR; INTRAVENOUS; SUBCUTANEOUS
Status: DISCONTINUED | OUTPATIENT
Start: 2022-08-17 | End: 2022-08-17 | Stop reason: HOSPADM

## 2022-08-17 RX ORDER — COVID-19 ANTIGEN TEST
440 KIT MISCELLANEOUS ONCE
COMMUNITY
End: 2023-06-12

## 2022-08-17 RX ORDER — MAGNESIUM HYDROXIDE 1200 MG/15ML
LIQUID ORAL
Status: COMPLETED | OUTPATIENT
Start: 2022-08-17 | End: 2022-08-17

## 2022-08-17 RX ORDER — MIDAZOLAM HYDROCHLORIDE 1 MG/ML
INJECTION INTRAMUSCULAR; INTRAVENOUS PRN
Status: DISCONTINUED | OUTPATIENT
Start: 2022-08-17 | End: 2022-08-17 | Stop reason: SURG

## 2022-08-17 RX ORDER — OXYCODONE HCL 5 MG/5 ML
5 SOLUTION, ORAL ORAL
Status: COMPLETED | OUTPATIENT
Start: 2022-08-17 | End: 2022-08-17

## 2022-08-17 RX ORDER — CEFAZOLIN SODIUM 1 G/3ML
2 INJECTION, POWDER, FOR SOLUTION INTRAMUSCULAR; INTRAVENOUS ONCE
Status: DISCONTINUED | OUTPATIENT
Start: 2022-08-17 | End: 2022-08-17 | Stop reason: HOSPADM

## 2022-08-17 RX ADMIN — SODIUM CHLORIDE, POTASSIUM CHLORIDE, SODIUM LACTATE AND CALCIUM CHLORIDE: 600; 310; 30; 20 INJECTION, SOLUTION INTRAVENOUS at 07:22

## 2022-08-17 RX ADMIN — MIDAZOLAM HYDROCHLORIDE 2 MG: 1 INJECTION, SOLUTION INTRAMUSCULAR; INTRAVENOUS at 08:03

## 2022-08-17 RX ADMIN — SODIUM CHLORIDE, POTASSIUM CHLORIDE, SODIUM LACTATE AND CALCIUM CHLORIDE: 600; 310; 30; 20 INJECTION, SOLUTION INTRAVENOUS at 08:03

## 2022-08-17 RX ADMIN — PROPOFOL 200 MG: 10 INJECTION, EMULSION INTRAVENOUS at 08:05

## 2022-08-17 RX ADMIN — ONDANSETRON 4 MG: 2 INJECTION INTRAMUSCULAR; INTRAVENOUS at 08:45

## 2022-08-17 RX ADMIN — FENTANYL CITRATE 50 MCG: 50 INJECTION, SOLUTION INTRAMUSCULAR; INTRAVENOUS at 08:57

## 2022-08-17 RX ADMIN — CEFAZOLIN 2 G: 330 INJECTION, POWDER, FOR SOLUTION INTRAMUSCULAR; INTRAVENOUS at 08:10

## 2022-08-17 RX ADMIN — DEXAMETHASONE SODIUM PHOSPHATE 4 MG: 4 INJECTION, SOLUTION INTRA-ARTICULAR; INTRALESIONAL; INTRAMUSCULAR; INTRAVENOUS; SOFT TISSUE at 08:45

## 2022-08-17 RX ADMIN — SODIUM CHLORIDE, SODIUM GLUCONATE, SODIUM ACETATE, POTASSIUM CHLORIDE AND MAGNESIUM CHLORIDE 500 ML: 526; 502; 368; 37; 30 INJECTION, SOLUTION INTRAVENOUS at 09:14

## 2022-08-17 RX ADMIN — FENTANYL CITRATE 50 MCG: 50 INJECTION, SOLUTION INTRAMUSCULAR; INTRAVENOUS at 09:39

## 2022-08-17 RX ADMIN — FENTANYL CITRATE 100 MCG: 50 INJECTION, SOLUTION INTRAMUSCULAR; INTRAVENOUS at 08:05

## 2022-08-17 RX ADMIN — OXYCODONE HYDROCHLORIDE 10 MG: 5 SOLUTION ORAL at 09:19

## 2022-08-17 RX ADMIN — FENTANYL CITRATE 50 MCG: 50 INJECTION, SOLUTION INTRAMUSCULAR; INTRAVENOUS at 09:20

## 2022-08-17 ASSESSMENT — PAIN SCALES - GENERAL: PAIN_LEVEL: 1

## 2022-08-17 ASSESSMENT — FIBROSIS 4 INDEX: FIB4 SCORE: 0.5

## 2022-08-17 ASSESSMENT — PAIN DESCRIPTION - PAIN TYPE: TYPE: SURGICAL PAIN

## 2022-08-17 NOTE — LETTER
August 8, 2022    Patient Name: Keaton Cervantes  Surgeon Name: Parrish Hardy M.D.  Surgery Facility: [facility] ([address] [place] [address])  Surgery Date: 8/17/2022    The time of your surgery is not final and may change up to and until the day of your surgery. Your current check in time will be 6:30 am for surgery at 8:30 am.     If you will not be at one of the below numbers please call the surgery scheduler at [phone]  Preferred Phone: [phone]    BEFORE YOUR SURGERY   Pre Registration and/or Lab Work must be done within and no earlier than 28 days prior to your surgery date. Please call [facility] at [phone] for an appointment as soon as possible.    COVID testing is not required.     DAY OF YOUR SURGERY  Nothing to eat or drink after midnight     Refrain from smoking any substance after midnight prior to surgery. Smoking may interfere with the anesthetic and frequently produces nausea during the recovery period.    Continue taking all lifesaving medications. Including the morning of your surgery with small sip of water.    Please arrive at the hospital/surgery center at the check-in time provided.     An adult will need to bring you and take you home after your surgery.     AFTER YOUR SURGERY  Post op Appointment:   Date: 8.30.22   Time: 1:45 PM   With: Parrish Hardy M.D.   Location: [address] [place] [id number]    - Post Surgery - You will need someone to drive you home  - Post Surgery - You will need someone to stay with you for 24 hours  - You must have someone provide transportation post surgery and someone to monitor you for at least 24 hours post-surgery. If you don't have either of these your appointment will be canceled.          TIME OFF WORK  FMLA or Disability forms can be faxed directly to: [phone] or you may drop them off at [address] [place] [address]. Our office charges a $35.00 fee per form. Forms will be  completed within 10 business days of drop off and payment received. For the status of your forms you may contact our disability office directly [phone].    MEDICATION INSTRUCTIONS **Please read section completely**    The following medications should be stopped a minimum of 10 days prior to surgery:  All over the counter, Supplements & Herbal medications    Anorectics: Phentermine (Adipex-P, Lomaira and Suprenza), Phentermine-topiramate (Qsymia), Bupropion-naltrexone (Contrave)    Opiod Partial Agonists/Opioid Antagonists: Buprenorphine (Subocone, Belbuca, Butrans, Probuphine Implant, Sublocade), Naltrexone (ReVia, Vivitrol), Naloxone    Amphetamines: Dextroamphetamine/Amphetamine (Adderall, Mydayis), Methylphenidate Hydrochloride (Concerta, Metadate, Methylin, Ritalin)    The following medications should be stopped 5 days prior to surgery:  Blood Thinners: Any Aspirin, Aspirin products, anti-inflammatories such as ibuprofen and any blood thinners such as Coumadin and Plavix. Please consult your prescribing physician if you are on life saving blood thinners, in regards to when to stop medications prior to surgery.     The following medications should be stopped a minimum of 3 days prior to surgery:  PDE-5 inhibitors: Sildenafil (Viagra), Tadalafil (Cialis), Vardenafil (Levitra), Avanafil (Stendra)    MAO Inhibitors: Rasagiline (Azilect), Selegiline (Eldepryl, Emsam, Selapar), Isocarboxazid (Marplan), Phenelzine (Nardil)

## 2022-08-17 NOTE — H&P
Surgery Orthopedic History & Physical Note    Date  8/17/2022    Primary Care Physician  DAVID Haskins      Pre-Op Diagnosis Codes:     * Foot ulcer, right ([facility]) [L97.519]    HPI  This is a 67 y.o. male who presented with right foot diabetic wound, osteomyelitis. Here for surgical correction of deformity/intervention.    Past Medical History:   Diagnosis Date    Allergy     ASTHMA     Back pain     Chronic    Bronchitis     CATARACT     Dental disorder     Diabetes     oral rx & insulin    Hypertension     MEDICAL HOME     Neck pain     Chronic    ELLEN (obstructive sleep apnea)     Bipap    ELLEN (obstructive sleep apnea) 08/12/2022    Pt. states no longer using machine since wt loss.    Sleep apnea        Past Surgical History:   Procedure Laterality Date    TOE AMPUTATION Right 06/23/2022    Procedure: AMPUTATION, TOE 2ND;  Surgeon: Wilman Madera M.D.;  Location: [facility];  Service: Orthopedics    ROTATOR CUFF REPAIR Left 04/2022    [facility]    TOE AMPUTATION Right 03/04/2022    Procedure: AMPUTATION, TOE 1ST RAY;  Surgeon: Wilman Madera M.D.;  Location: [facility];  Service: Orthopedics    LUMBAR FUSION ANTERIOR  08/04/2009    Performed by JOSEE BLANTON at SURGERY [facility] ORS    FUSION, SPINE, LUMBAR, PLIF  08/04/2009    Performed by JOSEE BLANTON at SURGERY [facility] ORS    LUMBAR LAMINECTOMY DISKECTOMY  08/04/2009    Performed by JOSEE BLANTON at [facility]    LAMINOTOMY  08/04/2009    Performed by JOSEE BLANTON at [facility] ORS    SOMNOPLASTY  06/10/2009    Performed by ELÍAS RILEY at [facility] ORS    SEPTOPLASTY  06/10/2009    Performed by ELÍAS RILEY at SURGERY [facility] ORS    ANTROSTOMY  06/10/2009    Performed by ELÍAS RILEY at [facility] ORS    ETHMOIDECTOMY  06/10/2009    Performed by ELÍAS RILEY at [facility] ORS    CATARACT PHACO WITH IOL  07/07/2008    Performed  "by OCTAVIO HARDWICK at SURGERY SAME DAY [facility] ORS    CATARACT PHACO WITH IOL  2008    Performed by OCTAVIO HARDWCIK at SURGERY SAME DAY [facility] ORS    APPENDECTOMY      ORIF, ANKLE      VASECTOMY         Current Facility-Administered Medications   Medication Dose Route Frequency Provider Last Rate Last Admin    lidocaine (XYLOCAINE) 1 % injection 0.5 mL  0.5 mL Intradermal Once PRN Parrish Hardy M.D.        lactated ringers infusion   Intravenous Continuous Parrish Hardy M.D. 10 mL/hr at 22 0722 New Bag at 22 0722    ceFAZolin (ANCEF) injection 2 g  2 g Intravenous Once Parrish Hardy M.D.           Social History     Socioeconomic History    Marital status:      Spouse name: Not on file    Number of children: Not on file    Years of education: Not on file    Highest education level: Not on file   Occupational History    Not on file   Tobacco Use    Smoking status: Former     Types: Cigarettes     Quit date:      Years since quittin.6    Smokeless tobacco: Current     Types: Chew   Vaping Use    Vaping Use: Never used   Substance and Sexual Activity    Alcohol use: No     Comment: Pt. states, \"H/O Alcoholism, quit .\"    Drug use: No    Sexual activity: Yes     Partners: Female   Other Topics Concern    Not on file   Social History Narrative    Not on file     Social Determinants of Health     Financial Resource Strain: Not on file   Food Insecurity: Not on file   Transportation Needs: Not on file   Physical Activity: Not on file   Stress: Not on file   Social Connections: Not on file   Intimate Partner Violence: Not on file   Housing Stability: Not on file       Family History   Problem Relation Age of Onset    Hypertension Father     Diabetes Father        Allergies  Sulfa drugs, Coconut (cocos nucifera) allergy skin test, Flu virus vaccine, Other food, Pneumococcal vaccines, Sulfamethoxazole, and Nutrasweet aspartame [aspartame]    Review of " Systems  Negative    Physical Exam    Vital Signs  Blood Pressure : (!) 141/73   Temperature: 36.3 °C (97.3 °F)   Pulse: 65   Respiration: 16   Pulse Oximetry: 97 %     Exam:  Cardiovascular: Well perfused, DP/PT pulses intact, cap refill < 2 secs.  General: Well-developed, well nourished male in no apparent distress.  Psych: Normal mood and affect.  Eyes: Eyes demonstrate normal movement.  There is no evidence of jaundice or scleral icterus.  HEENT: Normocephalic, atraumatic.  NECK: trachea midline.  PULMONARY: Breathing comfortably on room air, without labored breathing.  CARDIAC: regular rate  Right UE:  strength is intact.  MP joint flexion demonstrates no pain.  Full MP joint extension is noted.  Musculoskeletal  Right lower extremity  +gastrocnemius contracture  +cavovarus and overpull of peroneus longus  Palpable pulses  Plantar first metatarsal wound with deep probing to bone  Wound appxoimately 2.5cm x 2.5cm      Labs:                    Radiology:  DX-PORTABLE FLUORO > 1 HOUR    (Results Pending)   DX-FOOT-2- RIGHT    (Results Pending)         Assessment/Plan:  Pre-Op Diagnosis Codes:     * Foot ulcer, right ([facility]) [L97.519]  Procedure(s):  RIGHT GASTROCNEMIUS RECESSION, PERONEUS LONGUS TO BREVIS TRANSFER, FOOT IRRIGATION AND DEBRIDEMENT, PARTIAL FIRST METATARSAL EXCISION, WITH BONE BIOPSY  TRANSFER, TENDON  IRRIGATION AND DEBRIDEMENT, WOUND  EXCISION, METATARSAL BONE, HEAD  BIOPSY, BONE

## 2022-08-17 NOTE — OR NURSING
Patient aaox4, vss. Right LE has clean and intact dressing, in walking boot. Patient moves toes and cap refill intact. Wife at bedside. Instructions given.  To car by wheelchair.

## 2022-08-17 NOTE — ANESTHESIA TIME REPORT
Anesthesia Start and Stop Event Times     Date Time Event    8/17/2022 0742 Ready for Procedure     0803 Anesthesia Start     0910 Anesthesia Stop        Responsible Staff  08/17/22    Name Role Begin End    Alexandro De Santiago M.D. Anesth 0803 0910        Overtime Reason:  no overtime (within assigned shift)    Comments: ANNALEE

## 2022-08-17 NOTE — OR NURSING
0900: Pt arrived from OR post L foot surgery under anesthesia. Pt is asleep. Sight dressing is CDI; cap refill is brisk in RLE digits. Cardiac rhythm appears to be SR with bigeminal PACs; consistent with pre-op heart rhythm per anesthesiologist.     0910: Pt more alert. Pt denies nausea. Pt reports pain.     1000: Updated pt's spouse.     1009: Report to LUCIAN Galvez. Pt to phase II via jitendra with CNA.

## 2022-08-17 NOTE — DISCHARGE INSTRUCTIONS
If any questions arise, call your provider.  If your provider is not available, please feel free to call the Surgical Center at [phone].    MEDICATIONS: Resume taking daily medication.  Take prescribed pain medication with food.  If no medication is prescribed, you may take non-aspirin pain medication if needed.  PAIN MEDICATION CAN BE VERY CONSTIPATING.  Take a stool softener or laxative such as senokot, pericolace, or milk of magnesia if needed.    Last pain medication given at 09:19am.    What to Expect Post Anesthesia    Rest and take it easy for the first 24 hours.  A responsible adult is recommended to remain with you during that time.  It is normal to feel sleepy.  We encourage you to not do anything that requires balance, judgment or coordination.    FOR 24 HOURS DO NOT:  Drive, operate machinery or run household appliances.  Drink beer or alcoholic beverages.  Make important decisions or sign legal documents.    To avoid nausea, slowly advance diet as tolerated, avoiding spicy or greasy foods for the first day.  Add more substantial food to your diet according to your provider's instructions.  Babies can be fed formula or breast milk as soon as they are hungry.  INCREASE FLUIDS AND FIBER TO AVOID CONSTIPATION.    MILD FLU-LIKE SYMPTOMS ARE NORMAL.  YOU MAY EXPERIENCE GENERALIZED MUSCLE ACHES, THROAT IRRITATION, HEADACHE AND/OR SOME NAUSEA.    Diet    Resume your normal diet as tolerated.  A diet low in cholesterol, fat, and sodium is recommended for good health.     GENERAL ORTHO DISCHARGE INSTRUCTIONS:    ACTIVITY: RIGHT LEG:   Heel weight bearing    When you stand or walk, you may place some of your body weight on your injured leg.  Do not place any more than half your weigh on your leg.  A cane or walker can help you walk without losing your balance.     ASSISTED DEVICES: You are being discharged with the following special equipment:  walking boot    WOUND CARE:  You have a surgical wound that was  closed with staples or sutures. These need to be removed 10-14 days after surgery.  If you are NOT in a splint or cast, the operative dressing should be removed 2 days after surgery, and dry gauze dressing changes should be performed daily after that.  You may shower when the operative dressing is removed.    SPLINT/CAST CARE:  If present, you should keep your splint or cast clean, dry, and intact. For bathing/showering, wrap the splint in a double plastic bag with tape around the upper part of the extremity to keep it dry.  Be aware that some spotting of the dressing with blood can occur and is normal. You should elevate your operative extremity to minimize swelling in your fingers.     ICE: Apply ice packs to the affected area (15 minutes on the knee, 15 minutes off the knee) for the first week, as you feel necessary to help with the pain and swelling.    ELEVATION: Keep your extremity elevated as much as possible, above your heart, to help control pain and swelling for at least 2 weeks. If the sensation in your toes of your operative extremity changes, or the toes change colors, or should your pain become too difficult to control, you should immediately elevate your operative extremity.  If these symptoms do not resolve after 30 minutes to 1 hour, you should seek medical care immediately.    IMMOBILIZATION:  If you are wearing a sling or a brace, you may remove it when awake and seated, if desired.  You may move your foot/ankle as your splint allows. Please wear your sling or brace at all times otherwise, which includes sleeping.    Activity    No Strenuous Activity    No strenuous activity until cleared by your follow-up provider.  You may tire easily; rest as needed during the day.

## 2022-08-17 NOTE — ANESTHESIA POSTPROCEDURE EVALUATION
Patient: Keaton Cervantes    Procedure Summary     Date: 08/17/22 Room / Location: [place] [address] / SURGERY [facility]    Anesthesia Start: 0803 Anesthesia Stop: 0910    Procedures:       RIGHT GASTROCNEMIUS RECESSION, (Right: Ankle)      TRANSFER, TENDON-PERONEUS LONGUS TO BREVIS TRANSFER (Right: Foot)      IRRIGATION AND DEBRIDEMENT, WOUND- FOOT (Right: Foot)      EXCISION, METATARSAL BONE, HEAD-PARTIAL FIRST METATARSAL EXCISION (Right: Foot)      BIOPSY, BONE (Right: Foot) Diagnosis:       Foot ulcer, right ([facility])      (Foot ulcer, right ([facility]) [L97.519], Osteomyelitis)    Surgeons: Parrish Hardy M.D. Responsible Provider: Alexandro De Santiago M.D.    Anesthesia Type: general ASA Status: 2          Final Anesthesia Type: general  Last vitals  BP   Blood Pressure : (!) 153/69    Temp   36.1 °C (97 °F)    Pulse   74   Resp   20    SpO2   98 %      Anesthesia Post Evaluation    Patient location during evaluation: PACU  Patient participation: complete - patient participated  Level of consciousness: awake and alert  Pain score: 1    Airway patency: patent  Anesthetic complications: no  Cardiovascular status: hemodynamically stable  Respiratory status: acceptable  Hydration status: euvolemic    PONV: none          No notable events documented.     Nurse Pain Score: 3 (NPRS)

## 2022-08-17 NOTE — OR NURSING
POC reviewed with pt. Call light within reach, educated to call for assistance, hourly rounding in place, bed in lowest position and locked.

## 2022-08-17 NOTE — OP REPORT
Date of operation: 8/17/2022    Service: Orthopaedic Surgery    Surgeon: Parrish Hardy MD    Assistant: LEVI Aviles    Preoperative Diagnosis:   Right foot osteomyelitis  Right plantar foot wound, diabetic  Right gastrocnemius contracture  Right foot cavovarus    Post operative Diagnosis: Same    Procedure Performed:   Right gastrocnemius recession  Right peroneus longus to brevis tendon transfer, deep  Right foot irrigation debridement dorsal and plantar compartments to the level of bone, approximately 4 cm x 5 cm  Right first metatarsal partial excision  Right first metatarsal bone biopsy      Complications: None    Specimens:   Right foot deep culture  Right first metatarsal bone biopsy x2, dirty/infected bone and clean margin    Tourniquet Time: 31 minutes    Implants: none      Indication for procedure: The patient is a pleasant 67-year-old male with a history of the above-stated diagnoses.  He was managed conservatively with offloading measures.  He had also undergone surgical intervention of first ray amputation.  He was found to have a wound to the plantar aspect of the foot with probing down to bone, cavovarus posture of the foot in addition to gastrocnemius contracture.  Operative and nonoperative treatment were discussed with the patient.  He elected for operative intervention. Risks of the procedure were discussed with the patient which include but not limited to bleeding, infection, damage to surrounding nerves, tendons, ligaments, other structures, need for future or revision surgery, continued pain, unsightly scar, dissatisfaction with outcome, DVT, stroke, myocardial infarction and even death.  Benefits include improved pain control and improved overall functional outcome.  The patient wished to proceed and the surgical consent forms were signed.      Description of Procedure: On the date of surgery the patient was met in the preoperative area where the operative extremity was  identified by myself, confirmed the patient, marked with my initials.  The patient was then brought back to the operating room, placed supine on the operating table.  Laryngal mask airway anesthesia was undertaken without incident.  A nonsterile thigh tourniquet was placed on the operative thigh, SCD on the contralateral lower extremity.  Operative extremity was prepped and draped in the usual sterile fashion.  Multidisciplinary timeout was performed confirm the correct site, correct patient, correct procedure. The patient received ancef within 30 minutes of incision. Operative extremity was then elevated for approximately 2 minutes time and the tourniquet was brought to 250mmHg    We began with a gastrocnemius recession.  The posterior medial incision was placed on the posterior medial aspect of the leg.  Longitudinal incision was placed in this area.  Skin subcutaneous tissues were incised.  Crural fascia was opened.  Gastrocnemius fascia was identified and cut in a lateral to medial direction under direct visualization.  The patient was then able to achieve an additional 15 degrees of dorsiflexion following this resection.  This wound was then copiously irrigated and closed in layers    We then turned our attention to the peroneus longus to brevis transfer.  The previous lateral incision for fibula fracture was utilized.  This was carefully dissected down.  Peroneal tendon sheath was opened.  Peroneus longus and brevis were identified.  With the ankle in neutral dorsiflexion the longus and brevis tendons were then tenodesed to 1 another with #2 FiberWire suture.  We then cut the peroneus longus distally.  This was then Pulvertaft weaved into the peroneus brevis and sutured down to the peroneus brevis.  The distalmost portion of the peroneal is longus was then cut.  The tendon transfer was then tested and this was found to only nir the foot and not plantarflexed on the first ray.    At this point we then turned  our attention to the first metatarsal partial excision.  Previous medial incision was then utilized.  This was carefully dissected down to the bone.  A deep bone culture was then taken at the area that was exposed under the plantar wound.  This was sent off the back table for culture and biopsy.  First metatarsal partial excision was then performed with a microsagittal saw, removing approximately 2 cm of bone.  This was then sent off the back table and a bone biopsy of the margin was then taken utilizing a clean curette with additional deep dissection into the first metatarsal base.  Culture and biopsy of this area was taken.    We then turned our attention to the foot irrigation and debridement.  The plantar wound was then explored.  Skin, subcutaneous tissue, muscle, fascia down to the level of bone was debrided in an excisional fashion with forceps and scissors, rongeur, curettes.  Dorsal and plantar compartments of the foot were debrided, approximately 4 cm x 5 cm in total debrided area.  This was brought down to healthy appearing tissue.  At this point the wound was then copiously irrigated with 2 L of normal saline.  All of the surgical wounds were closed in layers and the plantar foot wound was left open to heal by secondary intention.  Tourniquet was let down, hemostasis was achieved.  Sterile dressings were applied, the patient was awoken from anesthesia, moved onto the postoperative gurney and into PACU in stable condition      Post-Operative Plan: The patient will be heel weightbearing on the right lower extremity in a postoperative boot.  He will follow-up with wound care in the coming days.  We will continue to follow the intraoperative cultures, biopsy and the appearance of the wound postsurgically.

## 2022-08-17 NOTE — ANESTHESIA PREPROCEDURE EVALUATION
Case: [id number] Date/Time: 08/17/22 0815    Procedures:       RIGHT GASTROCNEMIUS RECESSION, PERONEUS LONGUS TO BREVIS TRANSFER, FOOT IRRIGATION AND DEBRIDEMENT, PARTIAL FIRST METATARSAL EXCISION, WITH BONE BIOPSY      TRANSFER, TENDON      IRRIGATION AND DEBRIDEMENT, WOUND      EXCISION, METATARSAL BONE, HEAD      BIOPSY, BONE    Diagnosis: Foot ulcer, right (HCC) [L97.519]    Pre-op diagnosis: Foot ulcer, right (HCC) [L97.519]    Location: [place] [address] / SURGERY [facility]    Surgeons: Parrish Hardy M.D.          Relevant Problems   ANESTHESIA   (positive) ELLEN (obstructive sleep apnea)      PULMONARY   (positive) Asthma      NEURO   (positive) Headache      CARDIAC   (positive) Hypertension      Other   (positive) Osteomyelitis of right foot (HCC)       Physical Exam    Airway   Mallampati: II  TM distance: >3 FB  Neck ROM: full       Cardiovascular - normal exam  Rhythm: regular  Rate: normal  (-) murmur     Dental - normal exam           Pulmonary - normal exam  Breath sounds clear to auscultation     Abdominal    Neurological - normal exam                 Anesthesia Plan    ASA 2       Plan - general       Airway plan will be LMA          Induction: intravenous    Postoperative Plan: Postoperative administration of opioids is intended.    Pertinent diagnostic labs and testing reviewed    Informed Consent:    Anesthetic plan and risks discussed with patient.    Use of blood products discussed with: patient whom consented to blood products.

## 2022-08-17 NOTE — ANESTHESIA PROCEDURE NOTES
Airway    Date/Time: 8/17/2022 8:06 AM  Performed by: Alexandro De Santiago M.D.  Authorized by: Alexandro De Santiago M.D.     Location:  OR  Urgency:  Elective  Indications for Airway Management:  Anesthesia      Spontaneous Ventilation: absent    Sedation Level:  Deep  Preoxygenated: Yes    Final Airway Type:  Supraglottic airway  Final Supraglottic Airway:  Standard LMA    SGA Size:  4  Number of Attempts at Approach:  1

## 2022-08-19 ENCOUNTER — OFFICE VISIT (OUTPATIENT)
Dept: WOUND CARE | Facility: MEDICAL CENTER | Age: 67
End: 2022-08-19
Attending: ORTHOPAEDIC SURGERY
Payer: COMMERCIAL

## 2022-08-19 VITALS
HEART RATE: 72 BPM | RESPIRATION RATE: 20 BRPM | SYSTOLIC BLOOD PRESSURE: 146 MMHG | DIASTOLIC BLOOD PRESSURE: 68 MMHG | TEMPERATURE: 97.4 F | OXYGEN SATURATION: 95 %

## 2022-08-19 DIAGNOSIS — E11.65 UNCONTROLLED TYPE 2 DIABETES MELLITUS WITH HYPERGLYCEMIA (HCC): ICD-10-CM

## 2022-08-19 DIAGNOSIS — L97.415 DIABETIC ULCER OF RIGHT MIDFOOT ASSOCIATED WITH TYPE 2 DIABETES MELLITUS, WITH MUSCLE INVOLVEMENT WITHOUT EVIDENCE OF NECROSIS (HCC): ICD-10-CM

## 2022-08-19 DIAGNOSIS — M86.179 ACUTE OSTEOMYELITIS OF ANKLE OR FOOT, UNSPECIFIED LATERALITY (HCC): ICD-10-CM

## 2022-08-19 DIAGNOSIS — E11.42 DIABETIC POLYNEUROPATHY ASSOCIATED WITH TYPE 2 DIABETES MELLITUS (HCC): ICD-10-CM

## 2022-08-19 DIAGNOSIS — S98.111A AMPUTATION OF RIGHT GREAT TOE (HCC): ICD-10-CM

## 2022-08-19 DIAGNOSIS — E11.621 DIABETIC ULCER OF RIGHT MIDFOOT ASSOCIATED WITH TYPE 2 DIABETES MELLITUS, WITH MUSCLE INVOLVEMENT WITHOUT EVIDENCE OF NECROSIS (HCC): ICD-10-CM

## 2022-08-19 PROCEDURE — 99213 OFFICE O/P EST LOW 20 MIN: CPT | Performed by: NURSE PRACTITIONER

## 2022-08-19 PROCEDURE — 11042 DBRDMT SUBQ TIS 1ST 20SQCM/<: CPT

## 2022-08-19 PROCEDURE — 99213 OFFICE O/P EST LOW 20 MIN: CPT

## 2022-08-19 ASSESSMENT — ENCOUNTER SYMPTOMS
DIARRHEA: 0
CHILLS: 0
SHORTNESS OF BREATH: 0
VOMITING: 0
COUGH: 0
FEVER: 0
NAUSEA: 1
CONSTIPATION: 0

## 2022-08-19 NOTE — PROGRESS NOTES
Provider Encounter- Diabetic Foot Ulcer      HISTORY OF PRESENT ILLNESS  Wound History:    START OF CARE IN CLINIC: 8/19/2022 (return to clinic after surgery)    REFERRING PROVIDER: Sonny Weaver MD     WOUND- Diabetic foot ulcer   LOCATION: Right plantar 1-2 MTH   HISTORY:  67M with PMHx of Uncontrolled DM2 with diabetic neuropathy, history of diabetic foot wounds s/p amputation of right hallux and right distal 2nd toe. Patient reports long history of DFU previously managed by podiatrist and [place] wound care. Patient was admitted to hospital in 3/2022 where he was noted to have right hallux abscess and osteomyelitis and underwent R Hallux Ray amputation by Dr. Madera. ID was consulted and due to concern for continued OM, he was treated with 6 weeks of Augmentin and Doxycycline until 4/15. Patient subsequently developed right 2nd toe wound which degraded despite podiatry and wound care and was admitted 6/2022 for necrotic right second toe. He was seen by LPS service and underwent amputation of distal right second toe on 6/23 with Dr. Madera. Despite using offloading boot, patient developed worsening right plantar foot wound and was referred to [facility] for further care. According to patient he has been treating with SSD cream and gauze dressing. Patient reports that he walks 5-6 miles per night for work working at [employer].     Patient was taken to surgery on 8/17/2022 by Dr. Weaver, underwent the following procedure:  Procedure Performed:   Right gastrocnemius recession  Right peroneus longus to brevis tendon transfer, deep  Right foot irrigation debridement dorsal and plantar compartments to the level of bone, approximately 4 cm x 5 cm  Right first metatarsal partial excision  Right first metatarsal bone biopsy    Postop plan was for patient to be heel weightbearing on the right lower extremity in postoperative boot, and to return to wound clinic to resume treatment of his wound.  Surgery to follow  intraoperative cultures, biopsy, and appearance of the wound postsurgically.    Pertinent Medical History: Uncotnrolled DM2, Right hallux ray amputation, right 2nd toe amputation    DIABETES HX: Diagnosed with type 2 diabetes in the 1990s, and is currently managing with long acting insulin.  Checks blood sugars twice per day and reports that these typically labile from 150s - 300s.  Has had previous diabetes education.  Does have numbness in feet.  Usually wears non prescription shoes. Does check feet routinely.  Has had previous foot ulcers or foot surgery.  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  Offloading with CAM boot.        TOBACCO USE:   Former smoker    Patient's problem list, allergies, and current medications reviewed and updated in Epic    Interval History:  8/19/2022 : Clinic visit with ARPAN Cox, FNSUGEY-BC, CWANNAN, CFCN.   Patient returns to clinic after having surgery 2 days ago.  He states that he is having a little bit of nausea, but otherwise feeling okay.  Denies much pain from his wound.  He has been taking pain medication as prescribed.  He is on doxycycline and Augmentin, ordered by surgery.  Reports that his blood sugar this morning was 108.  He has been wearing his offloading boot consistently.  Patient is enrolled in the TTAX study, randomized in surgery, placed in a control group, no product applied.  Susie, , present for today's visit.      REVIEW OF SYSTEMS:   Review of Systems   Constitutional:  Positive for malaise/fatigue. Negative for chills and fever.        Feeling fatigued since surgery   Respiratory:  Negative for cough and shortness of breath.    Cardiovascular:  Positive for leg swelling (right leg / foot).        Increased swelling in leg and foot surgery   Gastrointestinal:  Positive for nausea. Negative for constipation, diarrhea and vomiting.        Has had some nausea since surgery, no vomiting     PHYSICAL EXAMINATION:   BP (!) 146/68  (BP Location: Right arm, Patient Position: Sitting)   Pulse 72   Temp 36.3 °C (97.4 °F) (Temporal)   Resp 20   SpO2 95%     Physical Exam  Constitutional:       Appearance: He is ill-appearing.   HENT:      Head: Normocephalic.   Eyes:      Pupils: Pupils are equal, round, and reactive to light.   Cardiovascular:      Rate and Rhythm: Normal rate.      Pulses: Normal pulses.      Comments: Pedal pulses are palpable, 2+  Pulmonary:      Effort: Pulmonary effort is normal.   Musculoskeletal:         General: Deformity present.      Comments: S/p Right hallux ray amputation  S/p Right distal 2nd toe amputation   Skin:     General: Skin is warm.      Comments: Right plantar first MTH ulcer, full-thickness, wound area has increased since last assessment (debrided in surgery 2 days ago), moderate serosanguineous drainage, localized erythema  Surgical incision to right lateral lower leg, sutures intact, incision well approximated, minimal serous drainage, no periwound erythema or induration    Refer to wound flowsheet and photos    Neurological:      General: No focal deficit present.      Mental Status: He is alert and oriented to person, place, and time.   Psychiatric:         Mood and Affect: Mood normal.         Behavior: Behavior normal.   Monofilament testing with a 10 gram force: sensation diminished (R: 2/9, L: 2/10)  Visual Inspection: Feet with ulcer  Pedal pulses: Palpable      WOUND ASSESSMENT  Wound 07/28/22 Diabetic Ulcer MTH 1 Plantar Right --Right Plantar 1st Metatarsal Head (Active)   Wound Image    08/19/22 1000   Site Assessment Red;[place] 08/19/22 1000   Periwound Assessment Maceration 08/19/22 1000   Margins Attached edges 08/19/22 1000   Drainage Amount Large 08/19/22 1000   Drainage Description Sanguineous 08/19/22 1000   Treatments Cleansed;Topical Lidocaine;Provider debridement 08/19/22 1000   Wound Cleansing Normal Saline Irrigation 08/19/22 1000   Periwound Protectant Skin Protectant Wipes to  Periwound;Barrier Paste 08/19/22 1000   Dressing Cleansing/Solutions Not Applicable 08/19/22 1000   Dressing Options Hydrofiber;Nonadhesive Foam;Hypafix Tape;Dry Roll Gauze;Coban;Tubigrip 08/19/22 1000   Dressing Changed New 08/19/22 1000   Dressing Change/Treatment Frequency Every 72 hrs, and As Needed 08/19/22 1000   Non-staged Wound Description Full thickness 08/12/22 0700   Wound Length (cm) 2.1 cm 08/19/22 1000   Wound Width (cm) 3 cm 08/19/22 1000   Wound Depth (cm) 0.3 cm 08/19/22 1000   Wound Surface Area (cm^2) 6.3 cm^2 08/19/22 1000   Wound Volume (cm^3) 1.89 cm^3 08/19/22 1000   Post-Procedure Length (cm) 2.5 cm 08/19/22 1000   Post-Procedure Width (cm) 3.1 cm 08/19/22 1000   Post-Procedure Depth (cm) 1.6 cm 08/19/22 1000   Post-Procedure Surface Area (cm^2) 7.75 cm^2 08/19/22 1000   Post-Procedure Volume (cm^3) 12.4 cm^3 08/19/22 1000   Wound Healing % 75 08/19/22 1000   Tunneling (cm) 0 cm 08/19/22 1000   Undermining (cm) 0 cm 08/19/22 1000   Undermining of Wound, 1st Location From 2 o'clock;To 5 o'clock 08/12/22 0700   Wound Odor None 08/19/22 1000   Pulses Right;2+;DP;PT 08/19/22 1000   Right Foot Monofilament 10-point exam (Sensate) 2/10 07/28/22 0814   Left Foot Monofilament 10-point exam (Sensate) 2/10 07/28/22 0814   Exposed Structures None 08/19/22 1000   Number of days: 22       Wound 08/17/22 Incision Foot Right Adaptic, 4x4s, soft roll, ace wrap (Active)   Site Assessment Clean;Dry;Intact 08/19/22 1000   Periwound Assessment Clean;Dry;Intact 08/19/22 1000   Margins Attached edges 08/19/22 1000   Closure Surface sutures 08/19/22 1000   Drainage Amount None 08/19/22 1000   Wound Cleansing Foam Cleanser/Washcloth 08/19/22 1000   Periwound Protectant Adaptic 08/19/22 1000   Dressing Cleansing/Solutions Not Applicable 08/19/22 1000   Dressing Options Nonadhesive Foam;Hypafix Tape;Dry Roll Gauze;Coban;Tubigrip 08/19/22 1000   Tunneling (cm) 0 cm 08/19/22 1000   Undermining (cm) 0 cm 08/19/22 1000    Wound Odor None 08/19/22 1000   Exposed Structures None 08/19/22 1000   Number of days: 2         PROCEDURE:   -2% viscous lidocaine applied topically to wound bed for approximately 5 minutes prior to debridement  -Blunt debridement with saline and gauze to remove clotted blood  -Wound care completed by wound RN, refer to flowsheet  -Patient tolerated the procedure well, without c/o pain or discomfort.       Pertinent Labs and Diagnostics:    Labs:     A1c:   Lab Results   Component Value Date/Time    HBA1C 7.6 (H) 06/21/2022 03:17 PM            IMAGING: Foot/Toes Imaging, Past Year DX-FOOT-COMPLETE 3+ RIGHT    Result Date: 6/22/2022  Narrative 6/22/2022 10:06 AM HISTORY/REASON FOR EXAM:  rule out osteomylitis TECHNIQUE/EXAM DESCRIPTION AND NUMBER OF VIEWS: 3 views of the RIGHT foot. COMPARISON: Right foot Radiographs 3/3/2022. FINDINGS: Decreased osseous mineralization. Questionable periosteal reaction of the second toe distal phalanx. Interval postsurgical changes of amputation at the great toe metatarsal neck. There is some cortical irregularity of the distal stump. Soft tissue swelling about the medial, distal right foot. Oblique, mildly displaced fracture of the second toe metatarsal. There is no dislocation.  No bone erosion is noted.     Impression 1.  Questionable periosteal reaction of the second toe distal phalanx concerning for acute osteomyelitis. 2.  Interval postsurgical changes of amputation at the great toe metatarsal neck. Cortical irregularity of the distal stump could represent reactive changes from recent surgery versus acute osteomyelitis. 3.  Soft tissue swelling adjacent to the great toe metatarsal stump may be reactive or represent infection. 4.  Oblique, mildly displaced fracture of the second toe metatarsal.    Foot/Toes Imaging, Past Year DX-FOOT-COMPLETE 3+ RIGHT    Result Date: 3/3/2022  Narrative 3/3/2022 3:27 PM HISTORY/REASON FOR EXAM:  r/o osteo TECHNIQUE/EXAM DESCRIPTION AND  NUMBER OF VIEWS: 3 views of the RIGHT foot. COMPARISON:  None. FINDINGS: Decreased osseous mineralization. No acute fracture is noted. There is no dislocation.  No bone erosion is noted. Soft tissue edema and gas of the medial right foot projecting over the great toe metatarsophalangeal joint.     Impression 1.  Soft tissue edema and gas of the medial right forefoot projecting over the great toe metatarsophalangeal joint. This is concerning for abscess versus necrotic infection. 2.  No radiographic evidence of acute osteomyelitis.. If there is clinical concern for radiographically occult osteomyelitis, MRI can be obtained.      VASCULAR STUDIES: 6/21/2022 CYNTHIA  RIGHT      Waveform            Systolic BPs (mmHg)                                            Brachial   Triphasic                                Common Femoral   Triphasic                                Popliteal   Triphasic                  174           Posterior Tibial   Triphasic                  187           Dorsalis Pedis                                            Digit                              1.29          CYNTHIA                                            TBI                           LEFT   Waveform        Systolic BPs (mmHg)                              145           Brachial   Triphasic                                Common Femoral   Triphasic                                Popliteal   Triphasic                  172           Posterior Tibial   Triphasic                  170           Dorsalis Pedis                                            Digit                              1.19          CYNTHIA                                            TBI         Findings   Bilateral-   The ankle-brachial indices are normal.    Doppler waveforms of the common femoral artery and popliteal artery are of    high amplitude and triphasic.    Doppler waveforms at the ankle are brisk and triphasic.    LAST  WOUND CULTURE:  DATE :        Lab Results   Component Value  Date/Time    CULTRSULT No growth at 48 hours. 08/17/2022 08:26 AM    CULTRSULT Culture in progress. 08/17/2022 08:26 AM           ASSESSMENT AND PLAN:     1. Diabetic ulcer of right midfoot associated with type 2 diabetes mellitus, with muscle involvement without evidence of necrosis (HCC)    8/19/2022: Patient returns to clinic after surgery 2 days ago.  Area of plantar first MTH ulcer has increased, post I&D.  Wound bed with moderate amount of clotted blood.  -Blunt debridement only today using 4 x 4 and saline  -Patient to continue wearing offloading boot to right foot at all times  -Follow-up with his surgeon next week  -Patient is enrolled in TTAX study, controlled group, cannot have silver or collagen products applied     Wound Care: Plain Hydrofiber, Foam, dry roll gauze, coban    2. Uncontrolled type 2 diabetes mellitus with hyperglycemia (HCC)  Comments: Most recent A1C 7.6 6/2022 8/19/2022: Patient's blood sugar today is 108.  -Patient has been counseled to keep blood sugars below 140 in order to optimize wound healing.    3. Diabetic polyneuropathy associated with type 2 diabetes mellitus (HCC)  - Implications of loss of protective sensation (LOPS) previously discussed with patient- including increased risk for amputation.  Advised to check feet at least daily, moisturize feet, and to always wear protective foot wear.     4. Amputation of right great toe (HCC)  Comments: Right hallux toe amputation 3/2022, right 2nd toe amputation 6/2022, both sites have healed well  High risk for further amputations    PATIENT EDUCATION  - Importance of tight glucose control for wound healing   - Implications of loss of protective sensation (LOPS) discussed with patient- including increased risk for amputation.  - Advised to check feet at least daily, moisturize feet, and to always wear protective foot wear.   -  Importance of offloading foot to assist with wound healing  - Advised pt not to trim nails or calluses,  seek foot/nail care from podiatrist or certified foot/nail nurse  - Importance of adequate nutrition for wound healing    My total time spent caring for the patient on the day of the encounter was 20minutes.   This does not include time spent on separately billable procedures/tests.       Please note that this note may have been created using voice recognition software. I have worked with technical experts from [facility] to optimize the interface.  I have made every reasonable attempt to correct obvious errors, but there may be errors of grammar and possibly content that I did not discover before finalizing the note.    N

## 2022-08-19 NOTE — PATIENT INSTRUCTIONS
-Keep your wound dressing clean, dry, and intact.    -Change your dressing if it becomes soiled, soaked, or falls off.    -Should you experience any significant changes in your wound(s), such as infection (redness, swelling, localized heat, increased pain, fever > 101 F, chills) or have any questions regarding your home care instructions, please contact the wound center at [phone]. If after hours, contact your primary care physician or go to the hospital emergency room.

## 2022-08-21 LAB
BACTERIA SPEC ANAEROBE CULT: NORMAL
BACTERIA TISS AEROBE CULT: ABNORMAL
BACTERIA TISS AEROBE CULT: ABNORMAL
BACTERIA TISS AEROBE CULT: NORMAL
GRAM STN SPEC: ABNORMAL
GRAM STN SPEC: NORMAL
SIGNIFICANT IND 70042: ABNORMAL
SIGNIFICANT IND 70042: NORMAL
SIGNIFICANT IND 70042: NORMAL
SITE SITE: ABNORMAL
SITE SITE: NORMAL
SITE SITE: NORMAL
SOURCE SOURCE: ABNORMAL
SOURCE SOURCE: NORMAL
SOURCE SOURCE: NORMAL

## 2022-08-22 LAB
BACTERIA SPEC ANAEROBE CULT: NORMAL
SIGNIFICANT IND 70042: NORMAL
SITE SITE: NORMAL
SOURCE SOURCE: NORMAL

## 2022-08-25 ENCOUNTER — OFFICE VISIT (OUTPATIENT)
Dept: WOUND CARE | Facility: MEDICAL CENTER | Age: 67
End: 2022-08-25
Attending: ORTHOPAEDIC SURGERY
Payer: COMMERCIAL

## 2022-08-25 VITALS
TEMPERATURE: 98.1 F | RESPIRATION RATE: 18 BRPM | OXYGEN SATURATION: 98 % | HEART RATE: 86 BPM | DIASTOLIC BLOOD PRESSURE: 74 MMHG | SYSTOLIC BLOOD PRESSURE: 119 MMHG

## 2022-08-25 DIAGNOSIS — E11.65 UNCONTROLLED TYPE 2 DIABETES MELLITUS WITH HYPERGLYCEMIA (HCC): ICD-10-CM

## 2022-08-25 DIAGNOSIS — S98.111A AMPUTATION OF RIGHT GREAT TOE (HCC): ICD-10-CM

## 2022-08-25 DIAGNOSIS — E11.42 DIABETIC POLYNEUROPATHY ASSOCIATED WITH TYPE 2 DIABETES MELLITUS (HCC): ICD-10-CM

## 2022-08-25 DIAGNOSIS — E11.621 DIABETIC ULCER OF RIGHT MIDFOOT ASSOCIATED WITH TYPE 2 DIABETES MELLITUS, WITH MUSCLE INVOLVEMENT WITHOUT EVIDENCE OF NECROSIS (HCC): ICD-10-CM

## 2022-08-25 DIAGNOSIS — M86.271 SUBACUTE OSTEOMYELITIS OF RIGHT FOOT (HCC): ICD-10-CM

## 2022-08-25 DIAGNOSIS — L97.415 DIABETIC ULCER OF RIGHT MIDFOOT ASSOCIATED WITH TYPE 2 DIABETES MELLITUS, WITH MUSCLE INVOLVEMENT WITHOUT EVIDENCE OF NECROSIS (HCC): ICD-10-CM

## 2022-08-25 PROCEDURE — 11042 DBRDMT SUBQ TIS 1ST 20SQCM/<: CPT

## 2022-08-25 PROCEDURE — 11042 DBRDMT SUBQ TIS 1ST 20SQCM/<: CPT | Performed by: STUDENT IN AN ORGANIZED HEALTH CARE EDUCATION/TRAINING PROGRAM

## 2022-08-25 ASSESSMENT — ENCOUNTER SYMPTOMS
NAUSEA: 0
CHILLS: 0
CONSTIPATION: 0
FEVER: 0
SHORTNESS OF BREATH: 0
VOMITING: 0
COUGH: 0
DIARRHEA: 0

## 2022-08-25 NOTE — PROGRESS NOTES
Provider Encounter- Diabetic Foot Ulcer      HISTORY OF PRESENT ILLNESS  Wound History:    START OF CARE IN CLINIC: 8/19/2022 (return to clinic after surgery)    REFERRING PROVIDER: Sonny Weaver MD     WOUND- Diabetic foot ulcer   LOCATION: Right plantar 1-2 MTH   HISTORY:  67M with PMHx of Uncontrolled DM2 with diabetic neuropathy, history of diabetic foot wounds s/p amputation of right hallux and right distal 2nd toe. Patient reports long history of DFU previously managed by podiatrist and [place] wound care. Patient was admitted to hospital in 3/2022 where he was noted to have right hallux abscess and osteomyelitis and underwent R Hallux Ray amputation by Dr. Madera. ID was consulted and due to concern for continued OM, he was treated with 6 weeks of Augmentin and Doxycycline until 4/15. Patient subsequently developed right 2nd toe wound which degraded despite podiatry and wound care and was admitted 6/2022 for necrotic right second toe. He was seen by LPS service and underwent amputation of distal right second toe on 6/23 with Dr. Madera. Despite using offloading boot, patient developed worsening right plantar foot wound and was referred to [facility] for further care. According to patient he has been treating with SSD cream and gauze dressing. Patient reports that he walks 5-6 miles per night for work working at [employer].     Patient was taken to surgery on 8/17/2022 by Dr. Weaver, underwent the following procedure:  Procedure Performed:   Right gastrocnemius recession  Right peroneus longus to brevis tendon transfer, deep  Right foot irrigation debridement dorsal and plantar compartments to the level of bone, approximately 4 cm x 5 cm  Right first metatarsal partial excision  Right first metatarsal bone biopsy    Postop plan was for patient to be heel weightbearing on the right lower extremity in postoperative boot, and to return to wound clinic to resume treatment of his wound.  Surgery to follow  intraoperative cultures, biopsy, and appearance of the wound postsurgically.    Pertinent Medical History: Uncotnrolled DM2, Right hallux ray amputation, right 2nd toe amputation    DIABETES HX: Diagnosed with type 2 diabetes in the 1990s, and is currently managing with long acting insulin.  Checks blood sugars twice per day and reports that these typically labile from 150s - 300s.  Has had previous diabetes education.  Does have numbness in feet.  Usually wears non prescription shoes. Does check feet routinely.  Has had previous foot ulcers or foot surgery.  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  Offloading with CAM boot.        TOBACCO USE:   Former smoker    Patient's problem list, allergies, and current medications reviewed and updated in Epic    Interval History:  8/19/2022 : Clinic visit with ARPAN Cox, FNP-BC, CWANNAN, CFCN.   Patient returns to clinic after having surgery 2 days ago.  He states that he is having a little bit of nausea, but otherwise feeling okay.  Denies much pain from his wound.  He has been taking pain medication as prescribed.  He is on doxycycline and Augmentin, ordered by surgery.  Reports that his blood sugar this morning was 108.  He has been wearing his offloading boot consistently.  Patient is enrolled in the TTAX study, randomized in surgery, placed in a control group, no product applied.  Susie, , present for today's visit.    8/25/2022: Clinic visit with Dom Chu MD. Patient reports doing ok, denies any fevers or chills, reports pain is appropriate post surgical and improving. Reviewed pathology results and culture results, pathology positive for OM with clean margins and OR culture positive for ESBL Ecoli in bone. Discussed results with Dr. Hardy and will continue oral Abx for now as the osteomyelitis was removed with clean margins and does not appear to have significant soft tissue infection.      REVIEW OF SYSTEMS:   Review  of Systems   Constitutional:  Negative for chills, fever and malaise/fatigue.   Respiratory:  Negative for cough and shortness of breath.    Cardiovascular:  Negative for leg swelling.        Improved swelling in leg and foot following surgery   Gastrointestinal:  Negative for constipation, diarrhea, nausea and vomiting.   Musculoskeletal:  Positive for joint pain (plantar foot, appropriate postsurgical pain).     PHYSICAL EXAMINATION:   /74 (BP Location: Left arm, Patient Position: Sitting)   Pulse 86   Temp 36.7 °C (98.1 °F)   Resp 18   SpO2 98%     Physical Exam  Constitutional:       General: He is not in acute distress.  Cardiovascular:      Rate and Rhythm: Normal rate.      Pulses: Normal pulses.      Comments: Pedal pulses are palpable, 2+  Pulmonary:      Effort: Pulmonary effort is normal.   Musculoskeletal:         General: Deformity present.      Comments: S/p Right hallux ray amputation  S/p Right distal 2nd toe amputation   Skin:     General: Skin is warm.      Comments: Right plantar first MTH ulcer, full-thickness - Wound measuring smaller, no undermining. No erythema or odor  Surgical incision to right lateral lower leg, sutures intact, incision well approximated, minimal serous drainage, no periwound erythema or induration    Refer to wound flowsheet and photos    Neurological:      Mental Status: He is alert.   Monofilament testing with a 10 gram force: sensation diminished (R: 2/9, L: 2/10)  Visual Inspection: Feet with ulcer  Pedal pulses: Palpable      WOUND ASSESSMENT  Wound 07/28/22 Diabetic Ulcer MTH 1 Plantar Right --Right Plantar 1st Metatarsal Head (Active)   Wound Image    08/25/22 1100   Site Assessment Red;Pink;White 08/25/22 1100   Periwound Assessment Maceration 08/25/22 1100   Margins Epibole (rolled edges) 08/25/22 1100   Drainage Amount Large 08/25/22 1100   Drainage Description Serosanguineous 08/25/22 1100   Treatments Cleansed;Site care;Topical Lidocaine;Provider  debridement 08/25/22 1100   Wound Cleansing Normal Saline Irrigation 08/25/22 1100   Periwound Protectant Skin Protectant Wipes to Periwound;Barrier Paste 08/25/22 1100   Dressing Cleansing/Solutions Not Applicable 08/25/22 1100   Dressing Options Hydrofiber;Nonadhesive Foam;Hypafix Tape;Dry Roll Gauze;Coban;Tubigrip 08/25/22 1100   Dressing Changed Changed 08/25/22 1100   Dressing Change/Treatment Frequency Every 72 hrs, and As Needed 08/25/22 1100   Non-staged Wound Description Full thickness 08/25/22 1100   Wound Length (cm) 2 cm 08/25/22 1100   Wound Width (cm) 2.5 cm 08/25/22 1100   Wound Depth (cm) 0.6 cm 08/25/22 1100   Wound Surface Area (cm^2) 5 cm^2 08/25/22 1100   Wound Volume (cm^3) 3 cm^3 08/25/22 1100   Post-Procedure Length (cm) 1.8 cm 08/25/22 1100   Post-Procedure Width (cm) 2.8 cm 08/25/22 1100   Post-Procedure Depth (cm) 0.6 cm 08/25/22 1100   Post-Procedure Surface Area (cm^2) 5.04 cm^2 08/25/22 1100   Post-Procedure Volume (cm^3) 3.024 cm^3 08/25/22 1100   Wound Healing % 61 08/25/22 1100   Tunneling (cm) 0 cm 08/25/22 1100   Undermining (cm) 0 cm 08/25/22 1100   Undermining of Wound, 1st Location From 2 o'clock;To 5 o'clock 08/12/22 0700   Wound Odor None 08/25/22 1100   Pulses Right;2+;DP;PT 08/19/22 1000   Right Foot Monofilament 10-point exam (Sensate) 2/10 07/28/22 0814   Left Foot Monofilament 10-point exam (Sensate) 2/10 07/28/22 0814   Exposed Structures None 08/25/22 1100   Number of days: 28       Wound 08/17/22 Incision right medial distal foot incision (Active)   Site Assessment Clean;Dry;Intact 08/25/22 1100   Periwound Assessment Clean;Dry;Intact 08/25/22 1100   Margins Attached edges 08/25/22 1100   Closure Sutures 08/25/22 1100   Drainage Amount None 08/25/22 1100   Wound Cleansing Foam Cleanser/Washcloth 08/25/22 1100   Periwound Protectant Adaptic 08/25/22 1100   Dressing Cleansing/Solutions Not Applicable 08/25/22 1100   Dressing Options Adaptic;Dry Roll Gauze;Coban;Tubigrip  08/25/22 1100   Tunneling (cm) 0 cm 08/25/22 1100   Undermining (cm) 0 cm 08/25/22 1100   Wound Odor None 08/25/22 1100   Exposed Structures None 08/25/22 1100   Number of days: 8         PROCEDURE:   -2% viscous lidocaine applied topically to wound bed for approximately 5 minutes prior to debridement  -Curette used to debride wound bed and wound edges.  Excisional debridement was performed to remove devitalized tissue until healthy, bleeding tissue was visualized.   Entire surface of wound, 5.04 cm2 debrided.  Tissue debrided into the subcutaneous layer.    -Bleeding controlled with manual pressure.    -Wound care completed by wound RN, refer to flowsheet  -Patient tolerated the procedure well, without c/o pain or discomfort.        Pertinent Labs and Diagnostics:    Labs:     A1c:   Lab Results   Component Value Date/Time    HBA1C 7.6 (H) 06/21/2022 03:17 PM            IMAGING: Foot/Toes Imaging, Past Year DX-FOOT-COMPLETE 3+ RIGHT    Result Date: 6/22/2022  Narrative 6/22/2022 10:06 AM HISTORY/REASON FOR EXAM:  rule out osteomylitis TECHNIQUE/EXAM DESCRIPTION AND NUMBER OF VIEWS: 3 views of the RIGHT foot. COMPARISON: Right foot Radiographs 3/3/2022. FINDINGS: Decreased osseous mineralization. Questionable periosteal reaction of the second toe distal phalanx. Interval postsurgical changes of amputation at the great toe metatarsal neck. There is some cortical irregularity of the distal stump. Soft tissue swelling about the medial, distal right foot. Oblique, mildly displaced fracture of the second toe metatarsal. There is no dislocation.  No bone erosion is noted.     Impression 1.  Questionable periosteal reaction of the second toe distal phalanx concerning for acute osteomyelitis. 2.  Interval postsurgical changes of amputation at the great toe metatarsal neck. Cortical irregularity of the distal stump could represent reactive changes from recent surgery versus acute osteomyelitis. 3.  Soft tissue swelling  adjacent to the great toe metatarsal stump may be reactive or represent infection. 4.  Oblique, mildly displaced fracture of the second toe metatarsal.    Foot/Toes Imaging, Past Year DX-FOOT-COMPLETE 3+ RIGHT    Result Date: 3/3/2022  Narrative 3/3/2022 3:27 PM HISTORY/REASON FOR EXAM:  r/o osteo TECHNIQUE/EXAM DESCRIPTION AND NUMBER OF VIEWS: 3 views of the RIGHT foot. COMPARISON:  None. FINDINGS: Decreased osseous mineralization. No acute fracture is noted. There is no dislocation.  No bone erosion is noted. Soft tissue edema and gas of the medial right foot projecting over the great toe metatarsophalangeal joint.     Impression 1.  Soft tissue edema and gas of the medial right forefoot projecting over the great toe metatarsophalangeal joint. This is concerning for abscess versus necrotic infection. 2.  No radiographic evidence of acute osteomyelitis.. If there is clinical concern for radiographically occult osteomyelitis, MRI can be obtained.      VASCULAR STUDIES: 6/21/2022 CYNTHIA  RIGHT      Waveform            Systolic BPs (mmHg)                                            Brachial   Triphasic                                Common Femoral   Triphasic                                Popliteal   Triphasic                  174           Posterior Tibial   Triphasic                  187           Dorsalis Pedis                                            Digit                              1.29          CYNTHIA                                            TBI                           LEFT   Waveform        Systolic BPs (mmHg)                              145           Brachial   Triphasic                                Common Femoral   Triphasic                                Popliteal   Triphasic                  172           Posterior Tibial   Triphasic                  170           Dorsalis Pedis                                            Digit                              1.19          CYNTHIA                                             TBI         Findings   Bilateral-   The ankle-brachial indices are normal.    Doppler waveforms of the common femoral artery and popliteal artery are of    high amplitude and triphasic.    Doppler waveforms at the ankle are brisk and triphasic.    LAST  WOUND CULTURE:  DATE :        Lab Results   Component Value Date/Time    CULTRSULT - (A) 08/17/2022 08:26 AM    CULTRSULT (A) 08/17/2022 08:26 AM     Escherichia coli ESBL  Rare growth  Extended Spectrum Beta-lactamase (ESBL) isolated.  ESBL's may be clinically resistant to therapy with  Penicillins,Cephalosporins or Aztreonam despite  apparent in vitro susceptibility to some of these agents.  The patient requires contact isolation.  Please contact pharmacy or an Infectious Disease Specialist  if you have any questions about appropriate therapy.      CULTRSULT No Anaerobes isolated. 08/17/2022 08:26 AM           ASSESSMENT AND PLAN:     1. Diabetic ulcer of right midfoot associated with type 2 diabetes mellitus, with muscle involvement without evidence of necrosis ([facility])    8/25/2022:   - Wound measuring smaller and no undermining, appears to be improving slowly  - Excisional debridement was performed in clinic, medically necessary to promote wound healing and remove thin layer of slough.  -Patient to continue wearing offloading boot to right foot at all times per ortho  -Follow-up with his surgeon next week  -Patient is enrolled in TTAX study, controlled group, cannot have silver or collagen products applied     Wound Care: Plain Hydrofiber, Foam, dry roll gauze, coban    2. Subacute osteomyelitis of right foot (HCC)    8/25/2022  - Pathology and cultures reviewed with patient. Pathology consistent with OM, clean margins of first metatarsal. Bone culture positive for ESBL E Coli  - Discussed case with Dr. Hardy, with clean margins and lack of soft tissue infection, does not need IV Abx at this point.  - Remains on oral Abx, surgery to manage    3.  Uncontrolled type 2 diabetes mellitus with hyperglycemia (HCC)  Comments: Most recent A1C 7.6 6/2022 8/25/2022: Patient's blood sugar today is 137.  -Patient has been counseled to keep blood sugars below 140 in order to optimize wound healing.    4. Diabetic polyneuropathy associated with type 2 diabetes mellitus (HCC)  - Implications of loss of protective sensation (LOPS) previously discussed with patient- including increased risk for amputation.  Advised to check feet at least daily, moisturize feet, and to always wear protective foot wear.     5. Amputation of right great toe (HCC)  Comments: Right hallux toe amputation 3/2022, right 2nd toe amputation 6/2022, both sites have healed well  - High risk for further amputations      PATIENT EDUCATION  - Importance of tight glucose control for wound healing   - Implications of loss of protective sensation (LOPS) discussed with patient- including increased risk for amputation.  - Advised to check feet at least daily, moisturize feet, and to always wear protective foot wear.   -  Importance of offloading foot to assist with wound healing  - Advised pt not to trim nails or calluses, seek foot/nail care from podiatrist or certified foot/nail nurse  - Importance of adequate nutrition for wound healing      Please note that this note may have been created using voice recognition software. I have worked with technical experts from [employer] to optimize the interface.  I have made every reasonable attempt to correct obvious errors, but there may be errors of grammar and possibly content that I did not discover before finalizing the note.    N

## 2022-08-25 NOTE — PATIENT INSTRUCTIONS
-Keep your wound dressing clean, dry, and intact.    -Change your dressing if it becomes soiled, soaked, or falls off.      -Should you experience any significant changes in your wound(s), such as infection (redness, swelling, localized heat, increased pain, fever > 101 F, chills) or have any questions regarding your home care instructions, please contact the wound center at [phone]. If after hours, contact your primary care physician or go to the hospital emergency room.

## 2022-09-02 ENCOUNTER — OFFICE VISIT (OUTPATIENT)
Dept: WOUND CARE | Facility: MEDICAL CENTER | Age: 67
End: 2022-09-02
Attending: ORTHOPAEDIC SURGERY
Payer: COMMERCIAL

## 2022-09-02 VITALS
RESPIRATION RATE: 18 BRPM | HEART RATE: 71 BPM | DIASTOLIC BLOOD PRESSURE: 71 MMHG | OXYGEN SATURATION: 96 % | TEMPERATURE: 97.4 F | SYSTOLIC BLOOD PRESSURE: 132 MMHG

## 2022-09-02 DIAGNOSIS — E11.621 DIABETIC ULCER OF RIGHT MIDFOOT ASSOCIATED WITH TYPE 2 DIABETES MELLITUS, WITH MUSCLE INVOLVEMENT WITHOUT EVIDENCE OF NECROSIS (HCC): ICD-10-CM

## 2022-09-02 DIAGNOSIS — L97.415 DIABETIC ULCER OF RIGHT MIDFOOT ASSOCIATED WITH TYPE 2 DIABETES MELLITUS, WITH MUSCLE INVOLVEMENT WITHOUT EVIDENCE OF NECROSIS (HCC): ICD-10-CM

## 2022-09-02 DIAGNOSIS — E11.42 DIABETIC POLYNEUROPATHY ASSOCIATED WITH TYPE 2 DIABETES MELLITUS (HCC): ICD-10-CM

## 2022-09-02 DIAGNOSIS — S98.111A AMPUTATION OF RIGHT GREAT TOE (HCC): ICD-10-CM

## 2022-09-02 DIAGNOSIS — E11.65 UNCONTROLLED TYPE 2 DIABETES MELLITUS WITH HYPERGLYCEMIA (HCC): ICD-10-CM

## 2022-09-02 DIAGNOSIS — M86.271 SUBACUTE OSTEOMYELITIS OF RIGHT FOOT (HCC): ICD-10-CM

## 2022-09-02 PROCEDURE — 11042 DBRDMT SUBQ TIS 1ST 20SQCM/<: CPT

## 2022-09-02 PROCEDURE — 11042 DBRDMT SUBQ TIS 1ST 20SQCM/<: CPT | Performed by: NURSE PRACTITIONER

## 2022-09-02 NOTE — PATIENT INSTRUCTIONS
-Keep dressings clean and dry. Change dressings once between wound clinic visits, and if the dressings become saturated, soiled, or fall off.    -Avoid prolonged standing or sitting without elevating your legs.    -Remove your compression garments if you have severe pain, severe swelling, numbness, color change, or temperature change in your toes. If you need to remove your compression garments, do so by unrolling them. Do not cut the compression garments off, this is to prevent cutting yourself on accident.    -Wear your offloading boot any time you are up walking.    -Never walk around the house barefoot. Always wear a rubber soled slipper when walking around the house.    -Should you experience any significant changes in your wound(s), such as signs of infection (increasing redness, swelling, localized heat, increased pain, fever > 101 F, chills) or have any questions regarding your home care instructions, please contact the wound center at [phone]. If after hours, contact your primary care physician or go to the hospital emergency room.     -If you are 5 or more minutes late for an appointment, we reserve the right to cancel and reschedule that appointment. Additionally, if you are habitually late or not showing (3 late cancellations and/or no shows), we reserve the right to cancel your remaining appointments and it will be your responsibility to obtain a new referral if services are still needed.

## 2022-09-05 NOTE — PROGRESS NOTES
Provider Encounter- Diabetic Foot Ulcer      HISTORY OF PRESENT ILLNESS  Wound History:    START OF CARE IN CLINIC: 8/19/2022 (return to clinic after surgery)    REFERRING PROVIDER: Sonny Weaver MD     WOUND- Diabetic foot ulcer   LOCATION: Right plantar 1-2 MTH   HISTORY:  67M with PMHx of Uncontrolled DM2 with diabetic neuropathy, history of diabetic foot wounds s/p amputation of right hallux and right distal 2nd toe. Patient reports long history of DFU previously managed by podiatrist and [facility] wound care. Patient was admitted to hospital in 3/2022 where he was noted to have right hallux abscess and osteomyelitis and underwent R Hallux Ray amputation by Dr. Madera. ID was consulted and due to concern for continued OM, he was treated with 6 weeks of Augmentin and Doxycycline until 4/15. Patient subsequently developed right 2nd toe wound which degraded despite podiatry and wound care and was admitted 6/2022 for necrotic right second toe. He was seen by LPS service and underwent amputation of distal right second toe on 6/23 with Dr. Madera. Despite using offloading boot, patient developed worsening right plantar foot wound and was referred to [facility] for further care. According to patient he has been treating with SSD cream and gauze dressing. Patient reports that he walks 5-6 miles per night for work working at [employer].     Patient was taken to surgery on 8/17/2022 by Dr. Weaver, underwent the following procedure:  Procedure Performed:   Right gastrocnemius recession  Right peroneus longus to brevis tendon transfer, deep  Right foot irrigation debridement dorsal and plantar compartments to the level of bone, approximately 4 cm x 5 cm  Right first metatarsal partial excision  Right first metatarsal bone biopsy    Postop plan was for patient to be heel weightbearing on the right lower extremity in postoperative boot, and to return to wound clinic to resume treatment of his wound.  Surgery to follow  intraoperative cultures, biopsy, and appearance of the wound postsurgically.    Pertinent Medical History: Uncotnrolled DM2, Right hallux ray amputation, right 2nd toe amputation    DIABETES HX: Diagnosed with type 2 diabetes in the 1990s, and is currently managing with long acting insulin.  Checks blood sugars twice per day and reports that these typically labile from 150s - 300s.  Has had previous diabetes education.  Does have numbness in feet.  Usually wears non prescription shoes. Does check feet routinely.  Has had previous foot ulcers or foot surgery.  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  Offloading with CAM boot.        TOBACCO USE:   Former smoker    Patient's problem list, allergies, and current medications reviewed and updated in Epic    Interval History:  8/19/2022 : Clinic visit with ARPAN Cox, FNP-BC, CWOCN, CFCN.   Patient returns to clinic after having surgery 2 days ago.  He states that he is having a little bit of nausea, but otherwise feeling okay.  Denies much pain from his wound.  He has been taking pain medication as prescribed.  He is on doxycycline and Augmentin, ordered by surgery.  Reports that his blood sugar this morning was 108.  He has been wearing his offloading boot consistently.  Patient is enrolled in the TTAX study, randomized in surgery, placed in a control group, no product applied.  Susie, , present for today's visit.    8/25/2022: Clinic visit with Dom Chu MD. Patient reports doing ok, denies any fevers or chills, reports pain is appropriate post surgical and improving. Reviewed pathology results and culture results, pathology positive for OM with clean margins and OR culture positive for ESBL Ecoli in bone. Discussed results with Dr. Hardy and will continue oral Abx for now as the osteomyelitis was removed with clean margins and does not appear to have significant soft tissue infection.    9/2/2022 : Clinic visit with  Whitley Saravia, APRN, FNP-BC, CWOCN, CFCN.   Carlos states he is feeling well overall, denies fevers, chills, nausea, vomiting, cough or shortness of breath.  He was seen by his surgeon, Dr. Hardy, earlier this week.  Sutures were removed.  He is currently being treated with a 6-week course of doxycycline p.o..  He has been wearing his offloading boot consistently.  He reports his blood sugars have been a bit labile, ranging from 113-216 over the past day.      REVIEW OF SYSTEMS:   Unchanged from previous assessment on 8/25/2022    PHYSICAL EXAMINATION:   /71 (BP Location: Right arm, Patient Position: Sitting)   Pulse 71   Temp 36.3 °C (97.4 °F) (Temporal)   Resp 18   SpO2 96%     Physical Exam  Constitutional:       General: He is not in acute distress.  Cardiovascular:      Rate and Rhythm: Normal rate.      Pulses: Normal pulses.      Comments: Pedal pulses are palpable, 2+  Pulmonary:      Effort: Pulmonary effort is normal.   Musculoskeletal:         General: Deformity present.      Comments: S/p Right hallux ray amputation  S/p Right distal 2nd toe amputation   Skin:     General: Skin is warm.      Comments: Right plantar first MTH ulcer, full-thickness - Wound measuring smaller, no undermining. No erythema or odor  Surgical incisions to right medial calf and right lateral lower leg appear to be healing well, no drainage, no  erythema    Refer to wound flowsheet and photos    Neurological:      Mental Status: He is alert.   Monofilament testing with a 10 gram force: sensation diminished (R: 2/9, L: 2/10)  Visual Inspection: Feet with ulcer  Pedal pulses: Palpable      WOUND ASSESSMENT  Wound 07/28/22 Diabetic Ulcer MTH 1 Plantar Right --Right Plantar 1st Metatarsal Head (Active)   Wound Image    09/02/22 1505   Site Assessment Pink;Yellow 09/02/22 1505   Periwound Assessment Fragile;Edema 09/02/22 1505   Margins Attached edges;Epibole (rolled edges) 09/02/22 1505   Drainage Amount Moderate 09/02/22  1505   Drainage Description Serosanguineous 09/02/22 1505   Treatments Cleansed;Topical Lidocaine;Provider debridement 09/02/22 1505   Wound Cleansing Normal Saline Irrigation 09/02/22 1505   Periwound Protectant Skin Protectant Wipes to Periwound;Barrier Paste 09/02/22 1505   Dressing Cleansing/Solutions Not Applicable 09/02/22 1505   Dressing Options Other (Comments);Hydrofiber;Nonadhesive Foam;Hypafix Tape;Tubigrip 09/02/22 1505   Dressing Changed Changed 08/25/22 1100   Dressing Change/Treatment Frequency Every 72 hrs, and As Needed 08/25/22 1100   Non-staged Wound Description Full thickness 09/02/22 1505   Wound Length (cm) 0.4 cm 09/02/22 1505   Wound Width (cm) 2.1 cm 09/02/22 1505   Wound Depth (cm) 0.3 cm 09/02/22 1505   Wound Surface Area (cm^2) 0.84 cm^2 09/02/22 1505   Wound Volume (cm^3) 0.252 cm^3 09/02/22 1505   Post-Procedure Length (cm) 0.5 cm 09/02/22 1505   Post-Procedure Width (cm) 2.1 cm 09/02/22 1505   Post-Procedure Depth (cm) 1.6 cm 09/02/22 1505   Post-Procedure Surface Area (cm^2) 1.05 cm^2 09/02/22 1505   Post-Procedure Volume (cm^3) 1.68 cm^3 09/02/22 1505   Wound Healing % 97 09/02/22 1505   Tunneling (cm) 0 cm 09/02/22 1505   Undermining (cm) 0 cm 09/02/22 1505   Undermining of Wound, 1st Location From 2 o'clock;To 5 o'clock 08/12/22 0700   Wound Odor None 09/02/22 1505   Pulses Right;2+;DP;PT 08/19/22 1000   Right Foot Monofilament 10-point exam (Sensate) 2/10 07/28/22 0814   Left Foot Monofilament 10-point exam (Sensate) 2/10 07/28/22 0814   Exposed Structures None 09/02/22 1505   Number of days: 39         PROCEDURE:   -2% viscous lidocaine applied topically to wound bed for approximately 5 minutes prior to debridement  -Curette used to debride wound bed and wound edges.  Excisional debridement was performed to remove devitalized tissue until healthy, bleeding tissue was visualized.   Entire surface of wound, 1.05 cm2 debrided.  Tissue debrided into the subcutaneous layer.     -Bleeding controlled with manual pressure.    -Wound care completed by wound RN, refer to flowsheet  -Patient tolerated the procedure well, without c/o pain or discomfort.        Pertinent Labs and Diagnostics:    Labs:     A1c:   Lab Results   Component Value Date/Time    HBA1C 7.6 (H) 06/21/2022 03:17 PM            IMAGING: Foot/Toes Imaging, Past Year DX-FOOT-COMPLETE 3+ RIGHT    Result Date: 6/22/2022  Narrative 6/22/2022 10:06 AM HISTORY/REASON FOR EXAM:  rule out osteomylitis TECHNIQUE/EXAM DESCRIPTION AND NUMBER OF VIEWS: 3 views of the RIGHT foot. COMPARISON: Right foot Radiographs 3/3/2022. FINDINGS: Decreased osseous mineralization. Questionable periosteal reaction of the second toe distal phalanx. Interval postsurgical changes of amputation at the great toe metatarsal neck. There is some cortical irregularity of the distal stump. Soft tissue swelling about the medial, distal right foot. Oblique, mildly displaced fracture of the second toe metatarsal. There is no dislocation.  No bone erosion is noted.     Impression 1.  Questionable periosteal reaction of the second toe distal phalanx concerning for acute osteomyelitis. 2.  Interval postsurgical changes of amputation at the great toe metatarsal neck. Cortical irregularity of the distal stump could represent reactive changes from recent surgery versus acute osteomyelitis. 3.  Soft tissue swelling adjacent to the great toe metatarsal stump may be reactive or represent infection. 4.  Oblique, mildly displaced fracture of the second toe metatarsal.    Foot/Toes Imaging, Past Year DX-FOOT-COMPLETE 3+ RIGHT    Result Date: 3/3/2022  Narrative 3/3/2022 3:27 PM HISTORY/REASON FOR EXAM:  r/o osteo TECHNIQUE/EXAM DESCRIPTION AND NUMBER OF VIEWS: 3 views of the RIGHT foot. COMPARISON:  None. FINDINGS: Decreased osseous mineralization. No acute fracture is noted. There is no dislocation.  No bone erosion is noted. Soft tissue edema and gas of the medial right foot  projecting over the great toe metatarsophalangeal joint.     Impression 1.  Soft tissue edema and gas of the medial right forefoot projecting over the great toe metatarsophalangeal joint. This is concerning for abscess versus necrotic infection. 2.  No radiographic evidence of acute osteomyelitis.. If there is clinical concern for radiographically occult osteomyelitis, MRI can be obtained.      VASCULAR STUDIES: 6/21/2022 CYNTHIA  RIGHT      Waveform            Systolic BPs (mmHg)                                            Brachial   Triphasic                                Common Femoral   Triphasic                                Popliteal   Triphasic                  174           Posterior Tibial   Triphasic                  187           Dorsalis Pedis                                            Digit                              1.29          CYNTHIA                                            TBI                           LEFT   Waveform        Systolic BPs (mmHg)                              145           Brachial   Triphasic                                Common Femoral   Triphasic                                Popliteal   Triphasic                  172           Posterior Tibial   Triphasic                  170           Dorsalis Pedis                                            Digit                              1.19          CYNTHIA                                            TBI         Findings   Bilateral-   The ankle-brachial indices are normal.    Doppler waveforms of the common femoral artery and popliteal artery are of    high amplitude and triphasic.    Doppler waveforms at the ankle are brisk and triphasic.    LAST  WOUND CULTURE:  DATE :        Lab Results   Component Value Date/Time    CULTRSULT - (A) 08/17/2022 08:26 AM    CULTRSULT (A) 08/17/2022 08:26 AM     Escherichia coli ESBL  Rare growth  Extended Spectrum Beta-lactamase (ESBL) isolated.  ESBL's may be clinically resistant to therapy  with  Penicillins,Cephalosporins or Aztreonam despite  apparent in vitro susceptibility to some of these agents.  The patient requires contact isolation.  Please contact pharmacy or an Infectious Disease Specialist  if you have any questions about appropriate therapy.      CULTRSULT No Anaerobes isolated. 08/17/2022 08:26 AM           ASSESSMENT AND PLAN:     1. Diabetic ulcer of right midfoot associated with type 2 diabetes mellitus, with muscle involvement without evidence of necrosis (HCC)    9/2/2022: Wound area has decreased significantly since last assessment, no evidence of infection  - Excisional debridement was performed in clinic, medically necessary to promote wound healing   -Patient to continue wearing offloading boot to right foot at all times per ortho  -Follow-up with surgeon in 4 weeks  -Patient is enrolled in TTAX study, controlled group.  We cannot use VAC, silver or collagen products     Wound Care: Plain Hydrofiber, Foam, dry roll gauze, coban    2. Subacute osteomyelitis of right foot (HCC)    9/2/2022: Patient is on a 6-week course of doxycycline.  He is tolerating without any difficulty.  -Antibiotics managed by surgeon.    3. Uncontrolled type 2 diabetes mellitus with hyperglycemia ([facility])  Comments: Most recent A1C 7.6 6/2022 9/2/2022: Patient reports his blood sugar this morning was 113, 216 later in the day.  -Patient has been counseled to keep blood sugars below 140 in order to optimize wound healing.    4. Diabetic polyneuropathy associated with type 2 diabetes mellitus (HCC)  - Implications of loss of protective sensation (LOPS) previously discussed with patient- including increased risk for amputation.  Advised to check feet at least daily, moisturize feet, and to always wear protective foot wear.     5. Amputation of right great toe (HCC)  Comments: Right hallux toe amputation 3/2022, right 2nd toe amputation 6/2022, both sites have healed well  - High risk for further  amputations      PATIENT EDUCATION  - Importance of tight glucose control for wound healing   - Implications of loss of protective sensation (LOPS) discussed with patient- including increased risk for amputation.  - Advised to check feet at least daily, moisturize feet, and to always wear protective foot wear.   -  Importance of offloading foot to assist with wound healing  - Advised pt not to trim nails or calluses, seek foot/nail care from podiatrist or certified foot/nail nurse  - Importance of adequate nutrition for wound healing      Please note that this note may have been created using voice recognition software. I have worked with technical experts from [employer] to optimize the interface.  I have made every reasonable attempt to correct obvious errors, but there may be errors of grammar and possibly content that I did not discover before finalizing the note.    N

## 2022-09-09 ENCOUNTER — OFFICE VISIT (OUTPATIENT)
Dept: WOUND CARE | Facility: MEDICAL CENTER | Age: 67
End: 2022-09-09
Attending: ORTHOPAEDIC SURGERY
Payer: COMMERCIAL

## 2022-09-09 VITALS
RESPIRATION RATE: 18 BRPM | HEART RATE: 72 BPM | SYSTOLIC BLOOD PRESSURE: 154 MMHG | DIASTOLIC BLOOD PRESSURE: 79 MMHG | OXYGEN SATURATION: 98 % | TEMPERATURE: 97.5 F

## 2022-09-09 DIAGNOSIS — E11.65 UNCONTROLLED TYPE 2 DIABETES MELLITUS WITH HYPERGLYCEMIA (HCC): ICD-10-CM

## 2022-09-09 DIAGNOSIS — M86.271 SUBACUTE OSTEOMYELITIS OF RIGHT FOOT (HCC): ICD-10-CM

## 2022-09-09 DIAGNOSIS — E11.621 DIABETIC ULCER OF RIGHT MIDFOOT ASSOCIATED WITH TYPE 2 DIABETES MELLITUS, WITH MUSCLE INVOLVEMENT WITHOUT EVIDENCE OF NECROSIS (HCC): ICD-10-CM

## 2022-09-09 DIAGNOSIS — L97.415 DIABETIC ULCER OF RIGHT MIDFOOT ASSOCIATED WITH TYPE 2 DIABETES MELLITUS, WITH MUSCLE INVOLVEMENT WITHOUT EVIDENCE OF NECROSIS (HCC): ICD-10-CM

## 2022-09-09 DIAGNOSIS — S98.111A AMPUTATION OF RIGHT GREAT TOE (HCC): ICD-10-CM

## 2022-09-09 DIAGNOSIS — E11.42 DIABETIC POLYNEUROPATHY ASSOCIATED WITH TYPE 2 DIABETES MELLITUS (HCC): ICD-10-CM

## 2022-09-09 PROCEDURE — 11042 DBRDMT SUBQ TIS 1ST 20SQCM/<: CPT

## 2022-09-09 PROCEDURE — 11042 DBRDMT SUBQ TIS 1ST 20SQCM/<: CPT | Performed by: STUDENT IN AN ORGANIZED HEALTH CARE EDUCATION/TRAINING PROGRAM

## 2022-09-09 RX ORDER — OXYCODONE HYDROCHLORIDE 5 MG/1
1 TABLET ORAL PRN
COMMUNITY
End: 2022-09-09

## 2022-09-09 RX ORDER — IBUPROFEN 600 MG/1
1 TABLET ORAL EVERY 6 HOURS
COMMUNITY
End: 2023-06-12

## 2022-09-09 RX ORDER — DOXYCYCLINE 100 MG/1
1 TABLET ORAL 2 TIMES DAILY
COMMUNITY
End: 2022-09-09

## 2022-09-09 NOTE — PROGRESS NOTES
Provider Encounter- Diabetic Foot Ulcer      HISTORY OF PRESENT ILLNESS  Wound History:    START OF CARE IN CLINIC: 8/19/2022 (return to clinic after surgery)    REFERRING PROVIDER: Sonny Weaver MD     WOUND- Diabetic foot ulcer   LOCATION: Right plantar 1-2 MTH   HISTORY:  67M with PMHx of Uncontrolled DM2 with diabetic neuropathy, history of diabetic foot wounds s/p amputation of right hallux and right distal 2nd toe. Patient reports long history of DFU previously managed by podiatrist and [place] wound care. Patient was admitted to hospital in 3/2022 where he was noted to have right hallux abscess and osteomyelitis and underwent R Hallux Ray amputation by Dr. Madera. ID was consulted and due to concern for continued OM, he was treated with 6 weeks of Augmentin and Doxycycline until 4/15. Patient subsequently developed right 2nd toe wound which degraded despite podiatry and wound care and was admitted 6/2022 for necrotic right second toe. He was seen by LPS service and underwent amputation of distal right second toe on 6/23 with Dr. Madera. Despite using offloading boot, patient developed worsening right plantar foot wound and was referred to [facility] for further care. According to patient he has been treating with SSD cream and gauze dressing. Patient reports that he walks 5-6 miles per night for work working at [employer].     Patient was taken to surgery on 8/17/2022 by Dr. Weaver, underwent the following procedure:  Procedure Performed:   Right gastrocnemius recession  Right peroneus longus to brevis tendon transfer, deep  Right foot irrigation debridement dorsal and plantar compartments to the level of bone, approximately 4 cm x 5 cm  Right first metatarsal partial excision  Right first metatarsal bone biopsy    Postop plan was for patient to be heel weightbearing on the right lower extremity in postoperative boot, and to return to wound clinic to resume treatment of his wound.  Surgery to follow  intraoperative cultures, biopsy, and appearance of the wound postsurgically.    Pertinent Medical History: Uncotnrolled DM2, Right hallux ray amputation, right 2nd toe amputation    DIABETES HX: Diagnosed with type 2 diabetes in the 1990s, and is currently managing with long acting insulin.  Checks blood sugars twice per day and reports that these typically labile from 150s - 300s.  Has had previous diabetes education.  Does have numbness in feet.  Usually wears non prescription shoes. Does check feet routinely.  Has had previous foot ulcers or foot surgery.  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  Offloading with CAM boot.        TOBACCO USE:   Former smoker    Patient's problem list, allergies, and current medications reviewed and updated in Epic    Interval History:  8/19/2022 : Clinic visit with ARPAN Cox, FNP-BC, CWOCN, CFCN.   Patient returns to clinic after having surgery 2 days ago.  He states that he is having a little bit of nausea, but otherwise feeling okay.  Denies much pain from his wound.  He has been taking pain medication as prescribed.  He is on doxycycline and Augmentin, ordered by surgery.  Reports that his blood sugar this morning was 108.  He has been wearing his offloading boot consistently.  Patient is enrolled in the TTAX study, randomized in surgery, placed in a control group, no product applied.  Susie, , present for today's visit.    8/25/2022: Clinic visit with Dom Chu MD. Patient reports doing ok, denies any fevers or chills, reports pain is appropriate post surgical and improving. Reviewed pathology results and culture results, pathology positive for OM with clean margins and OR culture positive for ESBL Ecoli in bone. Discussed results with Dr. Hardy and will continue oral Abx for now as the osteomyelitis was removed with clean margins and does not appear to have significant soft tissue infection.    9/2/2022 : Clinic visit with  Whitley Saravia, APRN, FNP-BC, CWOCN, CFCN.   Carlos states he is feeling well overall, denies fevers, chills, nausea, vomiting, cough or shortness of breath.  He was seen by his surgeon, Dr. Hardy, earlier this week.  Sutures were removed.  He is currently being treated with a 6-week course of doxycycline p.o..  He has been wearing his offloading boot consistently.  He reports his blood sugars have been a bit labile, ranging from 113-216 over the past day.    9/9/2022: Clinic visit with Dom Chu MD. Patient reports doing ok. He noted some increased swelling of right lower extremity, worse when he just got off of overnight shift with leg down. Counseled to try to elevate while at work. He has new area of pressure lateral to wound, prominent bone midfoot noted, I removed some additional pegs from boot and will Mepilex for added pressure and shear protection. No evidence of new or worsening infection. Reports glucose 115 went up to 190 after eating an orange. Tolerating doxycycline without issue.    REVIEW OF SYSTEMS:   Unchanged from previous assessment on 9/2/2022    PHYSICAL EXAMINATION:   BP (!) 154/79 (BP Location: Left arm, Patient Position: Sitting) Comment: RN notified  Pulse 72   Temp 36.4 °C (97.5 °F) (Temporal)   Resp 18   SpO2 98%     Physical Exam  Constitutional:       General: He is not in acute distress.  Cardiovascular:      Rate and Rhythm: Normal rate.      Pulses: Normal pulses.      Comments: Pedal pulses are palpable, 2+  Pulmonary:      Effort: Pulmonary effort is normal.   Musculoskeletal:         General: Deformity present.      Comments: S/p Right hallux ray amputation  S/p Right distal 2nd toe amputation   Skin:     General: Skin is warm.      Comments: Right plantar first MTH ulcer, full-thickness - Wound measuring smaller, no undermining. No erythema or odor.    Surgical incisions to right medial calf and right lateral lower leg appear to be healing well, no drainage, no   erythema    Refer to wound flowsheet and photos    Neurological:      Mental Status: He is alert.   Monofilament testing with a 10 gram force: sensation diminished (R: 2/9, L: 2/10)  Visual Inspection: Feet with ulcer  Pedal pulses: Palpable      WOUND ASSESSMENT  Wound 07/28/22 Diabetic Ulcer MTH 1 Plantar Right --Right Plantar 1st Metatarsal Head (Active)   Wound Image    09/09/22 1040   Site Assessment Pink;Red 09/09/22 1040   Periwound Assessment Fragile;Edema;Blanchable erythema 09/09/22 1040   Margins Attached edges;Epibole (rolled edges) 09/09/22 1040   Drainage Amount Moderate 09/09/22 1040   Drainage Description Serosanguineous 09/09/22 1040   Treatments Cleansed;Topical Lidocaine;Provider debridement;Site care 09/09/22 1040   Wound Cleansing Normal Saline Irrigation 09/09/22 1040   Periwound Protectant Skin Protectant Wipes to Periwound;Barrier Paste;Skin Moisturizer 09/09/22 1040   Dressing Cleansing/Solutions Not Applicable 09/09/22 1040   Dressing Options Other (Comments);Hydrofiber;Mepilex;Hypafix Tape 09/09/22 1040   Dressing Changed Changed 09/09/22 1040   Dressing Change/Treatment Frequency Every 72 hrs, and As Needed 09/09/22 1040   Non-staged Wound Description Full thickness 09/09/22 1040   Wound Length (cm) 0.6 cm 09/09/22 1040   Wound Width (cm) 1 cm 09/09/22 1040   Wound Depth (cm) 1.3 cm 09/09/22 1040   Wound Surface Area (cm^2) 0.6 cm^2 09/09/22 1040   Wound Volume (cm^3) 0.78 cm^3 09/09/22 1040   Post-Procedure Length (cm) 0.5 cm 09/09/22 1040   Post-Procedure Width (cm) 1.1 cm 09/09/22 1040   Post-Procedure Depth (cm) 1.3 cm 09/09/22 1040   Post-Procedure Surface Area (cm^2) 0.55 cm^2 09/09/22 1040   Post-Procedure Volume (cm^3) 0.715 cm^3 09/09/22 1040   Wound Healing % 90 09/09/22 1040   Tunneling (cm) 0 cm 09/09/22 1040   Undermining (cm) 0 cm 09/09/22 1040   Undermining of Wound, 1st Location From 2 o'clock;To 5 o'clock 08/12/22 0700   Wound Odor None 09/09/22 1040   Pulses  Right;2+;DP;PT 08/19/22 1000   Right Foot Monofilament 10-point exam (Sensate) 2/10 07/28/22 0814   Left Foot Monofilament 10-point exam (Sensate) 2/10 07/28/22 0814   Exposed Structures None 09/09/22 1040   Number of days: 43         PROCEDURE:   -2% viscous lidocaine applied topically to wound bed for approximately 5 minutes prior to debridement  -Curette used to debride wound bed and wound edges.  Excisional debridement was performed to remove devitalized tissue until healthy, bleeding tissue was visualized.   Entire surface of wound, 0.55 cm2 debrided.  Tissue debrided into the subcutaneous layer.    -Bleeding controlled with manual pressure.    -Wound care completed by wound RN, refer to flowsheet  -Patient tolerated the procedure well, without c/o pain or discomfort.        Pertinent Labs and Diagnostics:    Labs:     A1c:   Lab Results   Component Value Date/Time    HBA1C 7.6 (H) 06/21/2022 03:17 PM            IMAGING: Foot/Toes Imaging, Past Year DX-FOOT-COMPLETE 3+ RIGHT    Result Date: 6/22/2022  Narrative 6/22/2022 10:06 AM HISTORY/REASON FOR EXAM:  rule out osteomylitis TECHNIQUE/EXAM DESCRIPTION AND NUMBER OF VIEWS: 3 views of the RIGHT foot. COMPARISON: Right foot Radiographs 3/3/2022. FINDINGS: Decreased osseous mineralization. Questionable periosteal reaction of the second toe distal phalanx. Interval postsurgical changes of amputation at the great toe metatarsal neck. There is some cortical irregularity of the distal stump. Soft tissue swelling about the medial, distal right foot. Oblique, mildly displaced fracture of the second toe metatarsal. There is no dislocation.  No bone erosion is noted.     Impression 1.  Questionable periosteal reaction of the second toe distal phalanx concerning for acute osteomyelitis. 2.  Interval postsurgical changes of amputation at the great toe metatarsal neck. Cortical irregularity of the distal stump could represent reactive changes from recent surgery versus  acute osteomyelitis. 3.  Soft tissue swelling adjacent to the great toe metatarsal stump may be reactive or represent infection. 4.  Oblique, mildly displaced fracture of the second toe metatarsal.    Foot/Toes Imaging, Past Year DX-FOOT-COMPLETE 3+ RIGHT    Result Date: 3/3/2022  Narrative 3/3/2022 3:27 PM HISTORY/REASON FOR EXAM:  r/o osteo TECHNIQUE/EXAM DESCRIPTION AND NUMBER OF VIEWS: 3 views of the RIGHT foot. COMPARISON:  None. FINDINGS: Decreased osseous mineralization. No acute fracture is noted. There is no dislocation.  No bone erosion is noted. Soft tissue edema and gas of the medial right foot projecting over the great toe metatarsophalangeal joint.     Impression 1.  Soft tissue edema and gas of the medial right forefoot projecting over the great toe metatarsophalangeal joint. This is concerning for abscess versus necrotic infection. 2.  No radiographic evidence of acute osteomyelitis.. If there is clinical concern for radiographically occult osteomyelitis, MRI can be obtained.      VASCULAR STUDIES: 6/21/2022 CYNTHIA  RIGHT      Waveform            Systolic BPs (mmHg)                                            Brachial   Triphasic                                Common Femoral   Triphasic                                Popliteal   Triphasic                  174           Posterior Tibial   Triphasic                  187           Dorsalis Pedis                                            Digit                              1.29          CYNTHIA                                            TBI                           LEFT   Waveform        Systolic BPs (mmHg)                              145           Brachial   Triphasic                                Common Femoral   Triphasic                                Popliteal   Triphasic                  172           Posterior Tibial   Triphasic                  170           Dorsalis Pedis                                            Digit                               1.19          CYNTHIA                                            TBI         Findings   Bilateral-   The ankle-brachial indices are normal.    Doppler waveforms of the common femoral artery and popliteal artery are of    high amplitude and triphasic.    Doppler waveforms at the ankle are brisk and triphasic.    LAST  WOUND CULTURE:  DATE :        Lab Results   Component Value Date/Time    CULTRSULT - (A) 08/17/2022 08:26 AM    CULTRSULT (A) 08/17/2022 08:26 AM     Escherichia coli ESBL  Rare growth  Extended Spectrum Beta-lactamase (ESBL) isolated.  ESBL's may be clinically resistant to therapy with  Penicillins,Cephalosporins or Aztreonam despite  apparent in vitro susceptibility to some of these agents.  The patient requires contact isolation.  Please contact pharmacy or an Infectious Disease Specialist  if you have any questions about appropriate therapy.      CULTRSULT No Anaerobes isolated. 08/17/2022 08:26 AM           ASSESSMENT AND PLAN:     1. Diabetic ulcer of right midfoot associated with type 2 diabetes mellitus, with muscle involvement without evidence of necrosis (HCC)    9/9/2022: Wound area has decreased significantly from previous assessment.  - Excisional debridement was performed in clinic, medically necessary to promote wound healing   -Patient to continue wearing offloading boot to right foot at all times per ortho. Removed additional pegs from boot as he has bone prominence plantar 3-4th MTH with some early erythema and concern for possible eventual development into wound.  -Follow-up with surgeon  -Patient is enrolled in TTAX study, controlled group.  We cannot use VAC, silver or collagen products     Wound Care: Plain Hydrofiber, Foam, dry roll gauze, coban    2. Subacute osteomyelitis of right foot (HCC)    9/9/2022: Patient is on a 6-week course of doxycycline.  He is tolerating without any difficulty.  -Antibiotics managed by surgeon.    3. Uncontrolled type 2 diabetes mellitus with  hyperglycemia (HCC)  Comments: Most recent A1C 7.6 6/2022, improved from 9.6    9/9/2022: Patient reports his blood sugar this morning was 115, reports 190 after eating an orange.  -Patient has been counseled to keep blood sugars below 140 in order to optimize wound healing.    4. Diabetic polyneuropathy associated with type 2 diabetes mellitus (HCC)  - Implications of loss of protective sensation (LOPS) previously discussed with patient- including increased risk for amputation.  Advised to check feet at least daily, moisturize feet, and to always wear protective foot wear.     5. Amputation of right great toe (HCC)  Comments: Right hallux toe amputation 3/2022, right 2nd toe amputation 6/2022, both sites have healed well  - High risk for further amputations      PATIENT EDUCATION  - Importance of tight glucose control for wound healing   - Implications of loss of protective sensation (LOPS) discussed with patient- including increased risk for amputation.  - Advised to check feet at least daily, moisturize feet, and to always wear protective foot wear.   -  Importance of offloading foot to assist with wound healing  - Advised pt not to trim nails or calluses, seek foot/nail care from podiatrist or certified foot/nail nurse  - Importance of adequate nutrition for wound healing      Please note that this note may have been created using voice recognition software. I have worked with technical experts from [employer] to optimize the interface.  I have made every reasonable attempt to correct obvious errors, but there may be errors of grammar and possibly content that I did not discover before finalizing the note.    N

## 2022-09-09 NOTE — PATIENT INSTRUCTIONS
-Keep your wound dressing clean, dry, and intact.    -Change your dressing if it becomes soiled, soaked, or falls off.    -Should you experience any significant changes in your wound(s), such as infection (redness, swelling, localized heat, increased pain, fever > 101 F, chills) or have any questions regarding your home care instructions, please contact the wound center at [phone]. If after hours, contact your primary care physician or go to the hospital emergency room.

## 2022-09-16 ENCOUNTER — OFFICE VISIT (OUTPATIENT)
Dept: WOUND CARE | Facility: MEDICAL CENTER | Age: 67
End: 2022-09-16
Attending: ORTHOPAEDIC SURGERY
Payer: COMMERCIAL

## 2022-09-16 VITALS
OXYGEN SATURATION: 100 % | DIASTOLIC BLOOD PRESSURE: 70 MMHG | HEART RATE: 63 BPM | TEMPERATURE: 97.5 F | SYSTOLIC BLOOD PRESSURE: 148 MMHG | RESPIRATION RATE: 18 BRPM

## 2022-09-16 DIAGNOSIS — E11.65 UNCONTROLLED TYPE 2 DIABETES MELLITUS WITH HYPERGLYCEMIA (HCC): ICD-10-CM

## 2022-09-16 DIAGNOSIS — E11.621 DIABETIC ULCER OF RIGHT MIDFOOT ASSOCIATED WITH TYPE 2 DIABETES MELLITUS, WITH MUSCLE INVOLVEMENT WITHOUT EVIDENCE OF NECROSIS (HCC): ICD-10-CM

## 2022-09-16 DIAGNOSIS — S98.111A AMPUTATION OF RIGHT GREAT TOE (HCC): ICD-10-CM

## 2022-09-16 DIAGNOSIS — L97.415 DIABETIC ULCER OF RIGHT MIDFOOT ASSOCIATED WITH TYPE 2 DIABETES MELLITUS, WITH MUSCLE INVOLVEMENT WITHOUT EVIDENCE OF NECROSIS (HCC): ICD-10-CM

## 2022-09-16 DIAGNOSIS — E11.42 DIABETIC POLYNEUROPATHY ASSOCIATED WITH TYPE 2 DIABETES MELLITUS (HCC): ICD-10-CM

## 2022-09-16 DIAGNOSIS — M86.271 SUBACUTE OSTEOMYELITIS OF RIGHT FOOT (HCC): ICD-10-CM

## 2022-09-16 PROCEDURE — 11042 DBRDMT SUBQ TIS 1ST 20SQCM/<: CPT | Performed by: STUDENT IN AN ORGANIZED HEALTH CARE EDUCATION/TRAINING PROGRAM

## 2022-09-16 PROCEDURE — 11042 DBRDMT SUBQ TIS 1ST 20SQCM/<: CPT

## 2022-09-16 NOTE — PROGRESS NOTES
Provider Encounter- Diabetic Foot Ulcer      HISTORY OF PRESENT ILLNESS  Wound History:    START OF CARE IN CLINIC: 8/19/2022 (return to clinic after surgery)    REFERRING PROVIDER: Sonny Weaver MD     WOUND- Diabetic foot ulcer   LOCATION: Right plantar 1-2 MTH   HISTORY:  67M with PMHx of Uncontrolled DM2 with diabetic neuropathy, history of diabetic foot wounds s/p amputation of right hallux and right distal 2nd toe. Patient reports long history of DFU previously managed by podiatrist and [facility] wound care. Patient was admitted to hospital in 3/2022 where he was noted to have right hallux abscess and osteomyelitis and underwent R Hallux Ray amputation by Dr. Madera. ID was consulted and due to concern for continued OM, he was treated with 6 weeks of Augmentin and Doxycycline until 4/15. Patient subsequently developed right 2nd toe wound which degraded despite podiatry and wound care and was admitted 6/2022 for necrotic right second toe. He was seen by LPS service and underwent amputation of distal right second toe on 6/23 with Dr. Madera. Despite using offloading boot, patient developed worsening right plantar foot wound and was referred to [facility] for further care. According to patient he has been treating with SSD cream and gauze dressing. Patient reports that he walks 5-6 miles per night for work working at [employer].     Patient was taken to surgery on 8/17/2022 by Dr. Weaver, underwent the following procedure:  Procedure Performed:   Right gastrocnemius recession  Right peroneus longus to brevis tendon transfer, deep  Right foot irrigation debridement dorsal and plantar compartments to the level of bone, approximately 4 cm x 5 cm  Right first metatarsal partial excision  Right first metatarsal bone biopsy    Postop plan was for patient to be heel weightbearing on the right lower extremity in postoperative boot, and to return to wound clinic to resume treatment of his wound.  Surgery to follow  intraoperative cultures, biopsy, and appearance of the wound postsurgically.    Pertinent Medical History: Uncotnrolled DM2, Right hallux ray amputation, right 2nd toe amputation    DIABETES HX: Diagnosed with type 2 diabetes in the 1990s, and is currently managing with long acting insulin.  Checks blood sugars twice per day and reports that these typically labile from 150s - 300s.  Has had previous diabetes education.  Does have numbness in feet.  Usually wears non prescription shoes. Does check feet routinely.  Has had previous foot ulcers or foot surgery.  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  Offloading with CAM boot.        TOBACCO USE:   Former smoker    Patient's problem list, allergies, and current medications reviewed and updated in Epic    Interval History:  8/19/2022 : Clinic visit with ARPAN Cox, FNP-BC, CWOCN, CFCN.   Patient returns to clinic after having surgery 2 days ago.  He states that he is having a little bit of nausea, but otherwise feeling okay.  Denies much pain from his wound.  He has been taking pain medication as prescribed.  He is on doxycycline and Augmentin, ordered by surgery.  Reports that his blood sugar this morning was 108.  He has been wearing his offloading boot consistently.  Patient is enrolled in the TTAX study, randomized in surgery, placed in a control group, no product applied.  Susie, , present for today's visit.    8/25/2022: Clinic visit with Dom Chu MD. Patient reports doing ok, denies any fevers or chills, reports pain is appropriate post surgical and improving. Reviewed pathology results and culture results, pathology positive for OM with clean margins and OR culture positive for ESBL Ecoli in bone. Discussed results with Dr. Hardy and will continue oral Abx for now as the osteomyelitis was removed with clean margins and does not appear to have significant soft tissue infection.    9/2/2022 : Clinic visit with  Whitley Saravia, APRN, FNP-BC, CWOCN, CFCN.   Carlos states he is feeling well overall, denies fevers, chills, nausea, vomiting, cough or shortness of breath.  He was seen by his surgeon, Dr. Hardy, earlier this week.  Sutures were removed.  He is currently being treated with a 6-week course of doxycycline p.o..  He has been wearing his offloading boot consistently.  He reports his blood sugars have been a bit labile, ranging from 113-216 over the past day.    9/9/2022: Clinic visit with Dom Chu MD. Patient reports doing ok. He noted some increased swelling of right lower extremity, worse when he just got off of overnight shift with leg down. Counseled to try to elevate while at work. He has new area of pressure lateral to wound, prominent bone midfoot noted, I removed some additional pegs from boot and will Mepilex for added pressure and shear protection. No evidence of new or worsening infection. Reports glucose 115 went up to 190 after eating an orange. Tolerating doxycycline without issue.    9/16/2022: Clinic visit with Dom Chu MD. Patient reports feeling well. He denies any symptoms of infection. Tolerating Abx without difficulty. Reports glucose has been well controlled, 130 when last checked. He is concerned about continued edema of ankle, likely postsurgical and depended as he returned to work and foot is down during most of shift. Discussed compression options and would like to continue with double layer tubigrip. No evidence of acute infection.    REVIEW OF SYSTEMS:   Unchanged from previous assessment on 9/9/2022    PHYSICAL EXAMINATION:   BP (!) 148/70 (BP Location: Left arm, Patient Position: Sitting) Comment: RN notified  Pulse 63   Temp 36.4 °C (97.5 °F) (Temporal)   Resp 18   SpO2 100%     Physical Exam  Constitutional:       General: He is not in acute distress.  Cardiovascular:      Rate and Rhythm: Normal rate.      Pulses: Normal pulses.      Comments: Pedal pulses are  palpable, 2+  Pulmonary:      Effort: Pulmonary effort is normal.   Musculoskeletal:         General: Deformity present.      Comments: S/p Right hallux ray amputation  S/p Right distal 2nd toe amputation   Skin:     General: Skin is warm.      Comments: Right plantar first MTH ulcer, full-thickness - Wound measuring smaller, thin layer of slough and epibole.    Surgical incisions to right medial calf and right lateral lower leg appear to be healing well, no drainage, no  erythema    Refer to wound flowsheet and photos    Neurological:      Mental Status: He is alert.   Monofilament testing with a 10 gram force: sensation diminished (R: 2/9, L: 2/10)  Visual Inspection: Feet with ulcer  Pedal pulses: Palpable      WOUND ASSESSMENT  Wound 07/28/22 Diabetic Ulcer MTH 1 Plantar Right --Right Plantar 1st Metatarsal Head (Active)   Wound Image    09/16/22 1101   Site Assessment Pink;Red 09/16/22 1101   Periwound Assessment Fragile;Edema;Blanchable erythema 09/16/22 1101   Margins Attached edges;Epibole (rolled edges) 09/16/22 1101   Drainage Amount Moderate 09/16/22 1101   Drainage Description Serosanguineous 09/16/22 1101   Treatments Cleansed;Topical Lidocaine;Provider debridement 09/16/22 1101   Wound Cleansing Normal Saline Irrigation 09/16/22 1101   Periwound Protectant Skin Protectant Wipes to Periwound;Barrier Paste;Skin Moisturizer 09/16/22 1101   Dressing Cleansing/Solutions Not Applicable 09/16/22 1101   Dressing Options Hydrofiber;Mepilex;Tubigrip 09/16/22 1101   Dressing Changed Changed 09/09/22 1040   Dressing Change/Treatment Frequency Weekly, and As Needed 09/16/22 1101   Non-staged Wound Description Full thickness 09/16/22 1101   Wound Length (cm) 0.2 cm 09/16/22 1101   Wound Width (cm) 1 cm 09/16/22 1101   Wound Depth (cm) 0.4 cm 09/16/22 1101   Wound Surface Area (cm^2) 0.2 cm^2 09/16/22 1101   Wound Volume (cm^3) 0.08 cm^3 09/16/22 1101   Post-Procedure Length (cm) 0.4 cm 09/16/22 1101   Post-Procedure  Width (cm) 0.9 cm 09/16/22 1101   Post-Procedure Depth (cm) 0.5 cm 09/16/22 1101   Post-Procedure Surface Area (cm^2) 0.36 cm^2 09/16/22 1101   Post-Procedure Volume (cm^3) 0.18 cm^3 09/16/22 1101   Wound Healing % 99 09/16/22 1101   Tunneling (cm) 0 cm 09/16/22 1101   Undermining (cm) 0 cm 09/16/22 1101   Undermining of Wound, 1st Location From 2 o'clock;To 5 o'clock 08/12/22 0700   Wound Odor None 09/16/22 1101   Pulses Right;2+;DP;PT 08/19/22 1000   Right Foot Monofilament 10-point exam (Sensate) 2/10 07/28/22 0814   Left Foot Monofilament 10-point exam (Sensate) 2/10 07/28/22 0814   Exposed Structures None 09/16/22 1101   Number of days: 50         PROCEDURE:   -2% viscous lidocaine applied topically to wound bed for approximately 5 minutes prior to debridement  -Curette used to debride wound bed and wound edges.  Excisional debridement was performed to remove devitalized tissue until healthy, bleeding tissue was visualized.   Entire surface of wound, 0.36 cm2 debrided.  Tissue debrided into the subcutaneous layer.    -Bleeding controlled with manual pressure.    -Wound care completed by wound RN, refer to flowsheet  -Patient tolerated the procedure well, without c/o pain or discomfort.        Pertinent Labs and Diagnostics:    Labs:     A1c:   Lab Results   Component Value Date/Time    HBA1C 7.6 (H) 06/21/2022 03:17 PM            IMAGING: Foot/Toes Imaging, Past Year DX-FOOT-COMPLETE 3+ RIGHT    Result Date: 6/22/2022  Narrative 6/22/2022 10:06 AM HISTORY/REASON FOR EXAM:  rule out osteomylitis TECHNIQUE/EXAM DESCRIPTION AND NUMBER OF VIEWS: 3 views of the RIGHT foot. COMPARISON: Right foot Radiographs 3/3/2022. FINDINGS: Decreased osseous mineralization. Questionable periosteal reaction of the second toe distal phalanx. Interval postsurgical changes of amputation at the great toe metatarsal neck. There is some cortical irregularity of the distal stump. Soft tissue swelling about the medial, distal right foot.  Oblique, mildly displaced fracture of the second toe metatarsal. There is no dislocation.  No bone erosion is noted.     Impression 1.  Questionable periosteal reaction of the second toe distal phalanx concerning for acute osteomyelitis. 2.  Interval postsurgical changes of amputation at the great toe metatarsal neck. Cortical irregularity of the distal stump could represent reactive changes from recent surgery versus acute osteomyelitis. 3.  Soft tissue swelling adjacent to the great toe metatarsal stump may be reactive or represent infection. 4.  Oblique, mildly displaced fracture of the second toe metatarsal.    Foot/Toes Imaging, Past Year DX-FOOT-COMPLETE 3+ RIGHT    Result Date: 3/3/2022  Narrative 3/3/2022 3:27 PM HISTORY/REASON FOR EXAM:  r/o osteo TECHNIQUE/EXAM DESCRIPTION AND NUMBER OF VIEWS: 3 views of the RIGHT foot. COMPARISON:  None. FINDINGS: Decreased osseous mineralization. No acute fracture is noted. There is no dislocation.  No bone erosion is noted. Soft tissue edema and gas of the medial right foot projecting over the great toe metatarsophalangeal joint.     Impression 1.  Soft tissue edema and gas of the medial right forefoot projecting over the great toe metatarsophalangeal joint. This is concerning for abscess versus necrotic infection. 2.  No radiographic evidence of acute osteomyelitis.. If there is clinical concern for radiographically occult osteomyelitis, MRI can be obtained.      VASCULAR STUDIES: 6/21/2022 CYNTHIA  RIGHT      Waveform            Systolic BPs (mmHg)                                            Brachial   Triphasic                                Common Femoral   Triphasic                                Popliteal   Triphasic                  174           Posterior Tibial   Triphasic                  187           Dorsalis Pedis                                            Digit                              1.29          CYNTHIA                                            TBI                            LEFT   Waveform        Systolic BPs (mmHg)                              145           Brachial   Triphasic                                Common Femoral   Triphasic                                Popliteal   Triphasic                  172           Posterior Tibial   Triphasic                  170           Dorsalis Pedis                                            Digit                              1.19          CYNTHIA                                            TBI         Findings   Bilateral-   The ankle-brachial indices are normal.    Doppler waveforms of the common femoral artery and popliteal artery are of    high amplitude and triphasic.    Doppler waveforms at the ankle are brisk and triphasic.    LAST  WOUND CULTURE:  DATE :        Lab Results   Component Value Date/Time    CULTRSULT - (A) 08/17/2022 08:26 AM    CULTRSULT (A) 08/17/2022 08:26 AM     Escherichia coli ESBL  Rare growth  Extended Spectrum Beta-lactamase (ESBL) isolated.  ESBL's may be clinically resistant to therapy with  Penicillins,Cephalosporins or Aztreonam despite  apparent in vitro susceptibility to some of these agents.  The patient requires contact isolation.  Please contact pharmacy or an Infectious Disease Specialist  if you have any questions about appropriate therapy.      CULTRSULT No Anaerobes isolated. 08/17/2022 08:26 AM           ASSESSMENT AND PLAN:     1. Diabetic ulcer of right midfoot associated with type 2 diabetes mellitus, with muscle involvement without evidence of necrosis ([facility])    9/16/2022: Wound area decreasing, slough and epibole.  - Excisional debridement was performed in clinic, medically necessary to promote wound healing   -Patient to continue wearing offloading boot to right foot at all times per ortho. Plantar foot appears less erythematous after removal of additional pegs last week, less pressure.  -Follow-up with surgeon  -Patient is enrolled in TTAX study, controlled group.  We cannot use  VAC, silver or collagen products     Wound Care: Plain Hydrofiber, Foam, dry roll gauze, coban    2. Subacute osteomyelitis of right foot (HCC)    9/16/2022: Patient is on a 6-week course of doxycycline.  He is tolerating without any difficulty.  -Antibiotics managed by surgeon.    3. Uncontrolled type 2 diabetes mellitus with hyperglycemia (HCC)  Comments: Most recent A1C 7.6 6/2022, improved from 9.6    9/16/2022: Patient reports his blood sugar this morning was 135, glucose has been better controlled this week  -Patient has been counseled to keep blood sugars below 140 in order to optimize wound healing.    4. Diabetic polyneuropathy associated with type 2 diabetes mellitus (HCC)  - Implications of loss of protective sensation (LOPS) previously discussed with patient- including increased risk for amputation.  Advised to check feet at least daily, moisturize feet, and to always wear protective foot wear.     5. Amputation of right great toe (HCC)  Comments: Right hallux toe amputation 3/2022, right 2nd toe amputation 6/2022, both sites have healed well  - High risk for further amputations      PATIENT EDUCATION  - Importance of tight glucose control for wound healing   - Implications of loss of protective sensation (LOPS) discussed with patient- including increased risk for amputation.  - Advised to check feet at least daily, moisturize feet, and to always wear protective foot wear.   -  Importance of offloading foot to assist with wound healing  - Advised pt not to trim nails or calluses, seek foot/nail care from podiatrist or certified foot/nail nurse  - Importance of adequate nutrition for wound healing      Please note that this note may have been created using voice recognition software. I have worked with technical experts from [employer] to optimize the interface.  I have made every reasonable attempt to correct obvious errors, but there may be errors of grammar and possibly content that I did not  discover before finalizing the note.    N

## 2022-09-16 NOTE — PATIENT INSTRUCTIONS
-Keep dressings clean and dry. Change dressings once between wound clinic visits, and if they become over saturated, soiled or fall off.     -Avoid prolonged standing or sitting without elevating your legs.    -Remove your compression garments if you have severe pain, severe swelling, numbness, color change, or temperature change in your toes. If you need to remove your compression garments, do so by unrolling them. Do not cut the compression garments off, this is to prevent cutting yourself on accident.    -Should you experience any significant changes in your wound(s), such as signs of infection (increasing redness, swelling, localized heat, increased pain, fever > 101 F, chills) or have any questions regarding your home care instructions, please contact the wound center at [phone]. If after hours, contact your primary care physician or go to the hospital emergency room.     -If you are 5 or more minutes late for an appointment, we reserve the right to cancel and reschedule that appointment. Additionally, if you are habitually late or not showing (3 late cancellations and/or no shows), we reserve the right to cancel your remaining appointments and it will be your responsibility to obtain a new referral if services are still needed.

## 2022-09-23 ENCOUNTER — OFFICE VISIT (OUTPATIENT)
Dept: WOUND CARE | Facility: MEDICAL CENTER | Age: 67
End: 2022-09-23
Attending: ORTHOPAEDIC SURGERY
Payer: COMMERCIAL

## 2022-09-23 VITALS
DIASTOLIC BLOOD PRESSURE: 79 MMHG | TEMPERATURE: 98 F | RESPIRATION RATE: 18 BRPM | HEART RATE: 83 BPM | OXYGEN SATURATION: 97 % | SYSTOLIC BLOOD PRESSURE: 146 MMHG

## 2022-09-23 DIAGNOSIS — E11.621 DIABETIC ULCER OF RIGHT MIDFOOT ASSOCIATED WITH TYPE 2 DIABETES MELLITUS, WITH MUSCLE INVOLVEMENT WITHOUT EVIDENCE OF NECROSIS (HCC): ICD-10-CM

## 2022-09-23 DIAGNOSIS — E11.65 UNCONTROLLED TYPE 2 DIABETES MELLITUS WITH HYPERGLYCEMIA (HCC): ICD-10-CM

## 2022-09-23 DIAGNOSIS — E11.42 DIABETIC POLYNEUROPATHY ASSOCIATED WITH TYPE 2 DIABETES MELLITUS (HCC): ICD-10-CM

## 2022-09-23 DIAGNOSIS — L97.415 DIABETIC ULCER OF RIGHT MIDFOOT ASSOCIATED WITH TYPE 2 DIABETES MELLITUS, WITH MUSCLE INVOLVEMENT WITHOUT EVIDENCE OF NECROSIS (HCC): ICD-10-CM

## 2022-09-23 DIAGNOSIS — M86.271 SUBACUTE OSTEOMYELITIS OF RIGHT FOOT (HCC): ICD-10-CM

## 2022-09-23 DIAGNOSIS — S98.111A AMPUTATION OF RIGHT GREAT TOE (HCC): ICD-10-CM

## 2022-09-23 PROCEDURE — 11055 PARING/CUTG B9 HYPRKER LES 1: CPT | Performed by: NURSE PRACTITIONER

## 2022-09-23 PROCEDURE — 11055 PARING/CUTG B9 HYPRKER LES 1: CPT

## 2022-09-23 PROCEDURE — 99213 OFFICE O/P EST LOW 20 MIN: CPT

## 2022-09-23 PROCEDURE — 99213 OFFICE O/P EST LOW 20 MIN: CPT | Mod: 25 | Performed by: NURSE PRACTITIONER

## 2022-09-23 NOTE — PATIENT INSTRUCTIONS
-Keep dressings clean and dry. Change dressings once between wound clinic visits, and if they become over saturated, soiled or fall off.     -Avoid prolonged standing or sitting without elevating your legs.    -Remove your compression garments if you have severe pain, severe swelling, numbness, color change, or temperature change in your toes. If you need to remove your compression garments, do so by unrolling them. Do not cut the compression garments off, this is to prevent cutting yourself on accident.    -Should you experience any significant changes in your wound(s), such as signs of infection (increasing redness, swelling, localized heat, increased pain, fever > 101 F, chills) or have any questions regarding your home care instructions, please contact the wound center at [phone]. If after hours, contact your primary care physician or go to the hospital emergency room.     -If you are 5 or more minutes late for an appointment, we reserve the right to cancel and reschedule that appointment. Additionally, if you are habitually late or not showing (3 late cancellations and/or no shows), we reserve the right to cancel your remaining appointments and it will be your responsibility to obtain a new referral if services are still needed.

## 2022-09-23 NOTE — PROGRESS NOTES
Provider Encounter- Diabetic Foot Ulcer      HISTORY OF PRESENT ILLNESS  Wound History:    START OF CARE IN CLINIC: 8/19/2022 (return to clinic after surgery)    REFERRING PROVIDER: Sonny Weaver MD     WOUND- Diabetic foot ulcer   LOCATION: Right plantar 1-2 MTH   HISTORY:  67M with PMHx of Uncontrolled DM2 with diabetic neuropathy, history of diabetic foot wounds s/p amputation of right hallux and right distal 2nd toe. Patient reports long history of DFU previously managed by podiatrist and [place] wound care. Patient was admitted to hospital in 3/2022 where he was noted to have right hallux abscess and osteomyelitis and underwent R Hallux Ray amputation by Dr. Madera. ID was consulted and due to concern for continued OM, he was treated with 6 weeks of Augmentin and Doxycycline until 4/15. Patient subsequently developed right 2nd toe wound which degraded despite podiatry and wound care and was admitted 6/2022 for necrotic right second toe. He was seen by LPS service and underwent amputation of distal right second toe on 6/23 with Dr. Madera. Despite using offloading boot, patient developed worsening right plantar foot wound and was referred to [facility] for further care. According to patient he has been treating with SSD cream and gauze dressing. Patient reports that he walks 5-6 miles per night for work working at [employer].     Patient was taken to surgery on 8/17/2022 by Dr. Weaver, underwent the following procedure:  Procedure Performed:   Right gastrocnemius recession  Right peroneus longus to brevis tendon transfer, deep  Right foot irrigation debridement dorsal and plantar compartments to the level of bone, approximately 4 cm x 5 cm  Right first metatarsal partial excision  Right first metatarsal bone biopsy    Postop plan was for patient to be heel weightbearing on the right lower extremity in postoperative boot, and to return to wound clinic to resume treatment of his wound.  Surgery to follow  intraoperative cultures, biopsy, and appearance of the wound postsurgically.    Pertinent Medical History: Uncotnrolled DM2, Right hallux ray amputation, right 2nd toe amputation    DIABETES HX: Diagnosed with type 2 diabetes in the 1990s, and is currently managing with long acting insulin.  Checks blood sugars twice per day and reports that these typically labile from 150s - 300s.  Has had previous diabetes education.  Does have numbness in feet.  Usually wears non prescription shoes. Does check feet routinely.  Has had previous foot ulcers or foot surgery.  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  Offloading with CAM boot.        TOBACCO USE:   Former smoker    Patient's problem list, allergies, and current medications reviewed and updated in Epic    Interval History:  8/19/2022 : Clinic visit with ARPAN Cox, FNP-BC, CWOCN, CFCN.   Patient returns to clinic after having surgery 2 days ago.  He states that he is having a little bit of nausea, but otherwise feeling okay.  Denies much pain from his wound.  He has been taking pain medication as prescribed.  He is on doxycycline and Augmentin, ordered by surgery.  Reports that his blood sugar this morning was 108.  He has been wearing his offloading boot consistently.  Patient is enrolled in the TTAX study, randomized in surgery, placed in a control group, no product applied.  Susie, , present for today's visit.    8/25/2022: Clinic visit with Dom Chu MD. Patient reports doing ok, denies any fevers or chills, reports pain is appropriate post surgical and improving. Reviewed pathology results and culture results, pathology positive for OM with clean margins and OR culture positive for ESBL Ecoli in bone. Discussed results with Dr. Hardy and will continue oral Abx for now as the osteomyelitis was removed with clean margins and does not appear to have significant soft tissue infection.    9/2/2022 : Clinic visit with  ARPAN Cox, FNP-BC, CWOCN, CFCN.   Carlos states he is feeling well overall, denies fevers, chills, nausea, vomiting, cough or shortness of breath.  He was seen by his surgeon, Dr. Hardy, earlier this week.  Sutures were removed.  He is currently being treated with a 6-week course of doxycycline p.o..  He has been wearing his offloading boot consistently.  He reports his blood sugars have been a bit labile, ranging from 113-216 over the past day.    9/9/2022: Clinic visit with Dom Chu MD. Patient reports doing ok. He noted some increased swelling of right lower extremity, worse when he just got off of overnight shift with leg down. Counseled to try to elevate while at work. He has new area of pressure lateral to wound, prominent bone midfoot noted, I removed some additional pegs from boot and will Mepilex for added pressure and shear protection. No evidence of new or worsening infection. Reports glucose 115 went up to 190 after eating an orange. Tolerating doxycycline without issue.    9/16/2022: Clinic visit with Dom Chu MD. Patient reports feeling well. He denies any symptoms of infection. Tolerating Abx without difficulty. Reports glucose has been well controlled, 130 when last checked. He is concerned about continued edema of ankle, likely postsurgical and depended as he returned to work and foot is down during most of shift. Discussed compression options and would like to continue with double layer tubigrip. No evidence of acute infection.    9/23/2022 : Clinic visit with ARPAN Cox, FNP-BC, CWOCN, CFCN.   Carlos states he is feeling well today.  Blood sugars consistently in the 140s, monitored by CGM.  He has had very little drainage from his wound over the past week.  Wound today is covered with thin layer of epithelium.  Given patient's history, I feel it wise to have him come back to the clinic next week, at which time he will likely be discharged.  I provided him with Rx  for shoes and inserts today and advised him to begin process ASAP.  In the meantime, he is to continue wearing his offloading boot until her shoes are ready.  He is still taking doxycycline, should be completing these in the next week or so.  He is still concerned about the edema in his foot.  Tubigrip has been moderately effective.  Advised him to consider purchasing compression stockings, with compression level of 20 to 30 mmHg.    REVIEW OF SYSTEMS:   Unchanged from previous assessment on 9/16/2022    PHYSICAL EXAMINATION:   BP (!) 146/79 (BP Location: Left arm, Patient Position: Sitting) Comment: RN notified  Pulse 83   Temp 36.7 °C (98 °F) (Temporal)   Resp 18   SpO2 97%     Physical Exam  Constitutional:       General: He is not in acute distress.  Cardiovascular:      Rate and Rhythm: Normal rate.      Pulses: Normal pulses.      Comments: Pedal pulses are palpable, 2+  Pulmonary:      Effort: Pulmonary effort is normal.   Musculoskeletal:         General: Deformity present.      Comments: S/p Right hallux ray amputation  S/p Right distal 2nd toe amputation   Skin:     General: Skin is warm.      Comments: Right plantar first MTH ulcer, full-thickness -wound bed covered with thin layer of epithelium, scant drainage over the past week, periwound callused, no periwound erythema or induration    Surgical incisions to right medial calf and right lateral lower leg are all healed, no drainage or erythema    Refer to wound flowsheet and photos    Neurological:      Mental Status: He is alert.   Monofilament testing with a 10 gram force: sensation diminished (R: 2/9, L: 2/10)  Visual Inspection: Feet with ulcer  Pedal pulses: Palpable      WOUND ASSESSMENT  Wound 07/28/22 Diabetic Ulcer MTH 1 Plantar Right --Right Plantar 1st Metatarsal Head (Active)   Wound Image    09/23/22 1103   Site Assessment Pink;Epithelialization 09/23/22 1103   Periwound Assessment Callused 09/23/22 1103   Margins Attached edges 09/23/22  1103   Drainage Amount Scant 09/23/22 1103   Drainage Description Serous 09/23/22 1103   Treatments Cleansed;Topical Lidocaine;Provider debridement 09/23/22 1103   Wound Cleansing Normal Saline Irrigation 09/23/22 1103   Periwound Protectant Skin Protectant Wipes to Periwound;Barrier Paste;Skin Moisturizer 09/23/22 1103   Dressing Cleansing/Solutions Not Applicable 09/23/22 1103   Dressing Options Hydrofiber;Mepilex;Tubigrip 09/23/22 1103   Dressing Changed Changed 09/09/22 1040   Dressing Change/Treatment Frequency Weekly, and As Needed 09/23/22 1103   Non-staged Wound Description Full thickness 09/16/22 1101   Wound Length (cm) 0.2 cm 09/16/22 1101   Wound Width (cm) 1 cm 09/16/22 1101   Wound Depth (cm) 0.4 cm 09/16/22 1101   Wound Surface Area (cm^2) 0.2 cm^2 09/16/22 1101   Wound Volume (cm^3) 0.08 cm^3 09/16/22 1101   Post-Procedure Length (cm) 0.1 cm 09/23/22 1103   Post-Procedure Width (cm) 0.1 cm 09/23/22 1103   Post-Procedure Depth (cm) 0 cm 09/23/22 1103   Post-Procedure Surface Area (cm^2) 0.01 cm^2 09/23/22 1103   Post-Procedure Volume (cm^3) 0 cm^3 09/23/22 1103   Wound Healing % 99 09/16/22 1101   Tunneling (cm) 0 cm 09/23/22 1103   Undermining (cm) 0 cm 09/23/22 1103   Undermining of Wound, 1st Location From 2 o'clock;To 5 o'clock 08/12/22 0700   Wound Odor None 09/23/22 1103   Pulses Right;2+;DP;PT 08/19/22 1000   Right Foot Monofilament 10-point exam (Sensate) 2/10 07/28/22 0814   Left Foot Monofilament 10-point exam (Sensate) 2/10 07/28/22 0814   Exposed Structures None 09/23/22 1103   Number of days: 57         PROCEDURE:   -2% viscous lidocaine applied topically to wound bed for approximately 5 minutes prior to debridement  -Curette used to debride periwound callus to skin level.  Total area of callus debrided approximately 0.5 cm grade.  -Wound care completed by wound RN, refer to flowsheet  -Patient tolerated the procedure well, without c/o pain or discomfort.        Pertinent Labs and  Diagnostics:    Labs:     A1c:   Lab Results   Component Value Date/Time    HBA1C 7.6 (H) 06/21/2022 03:17 PM            IMAGING: Foot/Toes Imaging, Past Year DX-FOOT-COMPLETE 3+ RIGHT    Result Date: 6/22/2022  Narrative 6/22/2022 10:06 AM HISTORY/REASON FOR EXAM:  rule out osteomylitis TECHNIQUE/EXAM DESCRIPTION AND NUMBER OF VIEWS: 3 views of the RIGHT foot. COMPARISON: Right foot Radiographs 3/3/2022. FINDINGS: Decreased osseous mineralization. Questionable periosteal reaction of the second toe distal phalanx. Interval postsurgical changes of amputation at the great toe metatarsal neck. There is some cortical irregularity of the distal stump. Soft tissue swelling about the medial, distal right foot. Oblique, mildly displaced fracture of the second toe metatarsal. There is no dislocation.  No bone erosion is noted.     Impression 1.  Questionable periosteal reaction of the second toe distal phalanx concerning for acute osteomyelitis. 2.  Interval postsurgical changes of amputation at the great toe metatarsal neck. Cortical irregularity of the distal stump could represent reactive changes from recent surgery versus acute osteomyelitis. 3.  Soft tissue swelling adjacent to the great toe metatarsal stump may be reactive or represent infection. 4.  Oblique, mildly displaced fracture of the second toe metatarsal.    Foot/Toes Imaging, Past Year DX-FOOT-COMPLETE 3+ RIGHT    Result Date: 3/3/2022  Narrative 3/3/2022 3:27 PM HISTORY/REASON FOR EXAM:  r/o osteo TECHNIQUE/EXAM DESCRIPTION AND NUMBER OF VIEWS: 3 views of the RIGHT foot. COMPARISON:  None. FINDINGS: Decreased osseous mineralization. No acute fracture is noted. There is no dislocation.  No bone erosion is noted. Soft tissue edema and gas of the medial right foot projecting over the great toe metatarsophalangeal joint.     Impression 1.  Soft tissue edema and gas of the medial right forefoot projecting over the great toe metatarsophalangeal joint. This is  concerning for abscess versus necrotic infection. 2.  No radiographic evidence of acute osteomyelitis.. If there is clinical concern for radiographically occult osteomyelitis, MRI can be obtained.      VASCULAR STUDIES: 6/21/2022 CYNTHIA  RIGHT      Waveform            Systolic BPs (mmHg)                                            Brachial   Triphasic                                Common Femoral   Triphasic                                Popliteal   Triphasic                  174           Posterior Tibial   Triphasic                  187           Dorsalis Pedis                                            Digit                              1.29          CYNTHIA                                            TBI                           LEFT   Waveform        Systolic BPs (mmHg)                              145           Brachial   Triphasic                                Common Femoral   Triphasic                                Popliteal   Triphasic                  172           Posterior Tibial   Triphasic                  170           Dorsalis Pedis                                            Digit                              1.19          CYNTHIA                                            TBI         Findings   Bilateral-   The ankle-brachial indices are normal.    Doppler waveforms of the common femoral artery and popliteal artery are of    high amplitude and triphasic.    Doppler waveforms at the ankle are brisk and triphasic.    LAST  WOUND CULTURE:  DATE :        Lab Results   Component Value Date/Time    CULTRSULT - (A) 08/17/2022 08:26 AM    CULTRSULT (A) 08/17/2022 08:26 AM     Escherichia coli ESBL  Rare growth  Extended Spectrum Beta-lactamase (ESBL) isolated.  ESBL's may be clinically resistant to therapy with  Penicillins,Cephalosporins or Aztreonam despite  apparent in vitro susceptibility to some of these agents.  The patient requires contact isolation.  Please contact pharmacy or an Infectious Disease  Specialist  if you have any questions about appropriate therapy.      CULTRSULT No Anaerobes isolated. 08/17/2022 08:26 AM           ASSESSMENT AND PLAN:     1. Diabetic ulcer of right midfoot associated with type 2 diabetes mellitus, with muscle involvement without evidence of necrosis (HCC)    9/23/2022: Wound covered with thin layer of epithelium, scant drainage over the past week.  Periwound callus  -Callus debrided to skin level.  No need to debride wound bed today  -Patient is to return to clinic next week, at which time we will likely discharge him due to healing.  -Rx for shoes and inserts provided in clinic today.  Patient advised to begin process ASAP  -Patient to continue wearing offloading boot until his orthotics are ready.  -Follow-up with surgeon  -Patient is enrolled in TTAX study, controlled group.  We cannot use VAC, silver or collagen products  -Patient advised to consider buying compression stockings to manage edema of his right lower extremity.  I recommended stockings with 20 to 30 mmHg compression     Wound Care: Plain Hydrofiber, Foam, dry roll gauze, coban    2. Subacute osteomyelitis of right foot (HCC)    9/23/2022: Patient is on a 6-week course of doxycycline.  He is tolerating without any difficulty.  He should be completing these in the next week or so  -Antibiotics managed by surgeon.    3. Uncontrolled type 2 diabetes mellitus with hyperglycemia ([facility])  Comments: Most recent A1C 7.6 6/2022, improved from 9.6    9/23/2022: Patient reports his blood sugars are consistently in the 140s.  He has a CGM which is able to calculate his averages.  -Encouraged to get his blood sugars below 140.  Advised him to follow-up with his PCP    4. Diabetic polyneuropathy associated with type 2 diabetes mellitus (HCC)  - Implications of loss of protective sensation (LOPS) previously discussed with patient- including increased risk for amputation.  Advised to check feet at least daily, moisturize feet, and  to always wear protective foot wear.     5. Amputation of right great toe (HCC)  Comments: Right hallux toe amputation 3/2022, right 2nd toe amputation 6/2022, both sites have healed well  - High risk for further amputations      PATIENT EDUCATION  - Importance of tight glucose control for wound healing   - Implications of loss of protective sensation (LOPS) discussed with patient- including increased risk for amputation.  - Advised to check feet at least daily, moisturize feet, and to always wear protective foot wear.   -  Importance of offloading foot to assist with wound healing  - Advised pt not to trim nails or calluses, seek foot/nail care from podiatrist or certified foot/nail nurse  - Importance of adequate nutrition for wound healing      My total time spent caring for the patient on the day of the encounter was 20 minutes.   This does not include time spent on separately billable procedures/tests.     Please note that this note may have been created using voice recognition software. I have worked with technical experts from [facility] to optimize the interface.  I have made every reasonable attempt to correct obvious errors, but there may be errors of grammar and possibly content that I did not discover before finalizing the note.    N

## 2022-09-23 NOTE — PROGRESS NOTES
Patient given prescription for diabetic shoes and inserts from [facility]. Prescription scanned into patient chart via [employer].   Prescription for shoes, patient facesheet, and provider note faxed to [facility] at fax #[phone].

## 2022-09-30 ENCOUNTER — OFFICE VISIT (OUTPATIENT)
Dept: WOUND CARE | Facility: MEDICAL CENTER | Age: 67
End: 2022-09-30
Attending: ORTHOPAEDIC SURGERY
Payer: COMMERCIAL

## 2022-09-30 VITALS
TEMPERATURE: 97.4 F | HEART RATE: 60 BPM | SYSTOLIC BLOOD PRESSURE: 154 MMHG | RESPIRATION RATE: 18 BRPM | DIASTOLIC BLOOD PRESSURE: 76 MMHG | OXYGEN SATURATION: 99 %

## 2022-09-30 DIAGNOSIS — E11.42 DIABETIC POLYNEUROPATHY ASSOCIATED WITH TYPE 2 DIABETES MELLITUS (HCC): ICD-10-CM

## 2022-09-30 DIAGNOSIS — S98.111A AMPUTATION OF RIGHT GREAT TOE (HCC): ICD-10-CM

## 2022-09-30 DIAGNOSIS — M86.271 SUBACUTE OSTEOMYELITIS OF RIGHT FOOT (HCC): ICD-10-CM

## 2022-09-30 DIAGNOSIS — L97.415 DIABETIC ULCER OF RIGHT MIDFOOT ASSOCIATED WITH TYPE 2 DIABETES MELLITUS, WITH MUSCLE INVOLVEMENT WITHOUT EVIDENCE OF NECROSIS (HCC): ICD-10-CM

## 2022-09-30 DIAGNOSIS — E11.65 UNCONTROLLED TYPE 2 DIABETES MELLITUS WITH HYPERGLYCEMIA (HCC): ICD-10-CM

## 2022-09-30 DIAGNOSIS — E11.621 DIABETIC ULCER OF RIGHT MIDFOOT ASSOCIATED WITH TYPE 2 DIABETES MELLITUS, WITH MUSCLE INVOLVEMENT WITHOUT EVIDENCE OF NECROSIS (HCC): ICD-10-CM

## 2022-09-30 PROCEDURE — 99213 OFFICE O/P EST LOW 20 MIN: CPT | Performed by: NURSE PRACTITIONER

## 2022-09-30 PROCEDURE — 99213 OFFICE O/P EST LOW 20 MIN: CPT

## 2022-09-30 NOTE — PROGRESS NOTES
Provider Encounter- Diabetic Foot Ulcer      HISTORY OF PRESENT ILLNESS  Wound History:    START OF CARE IN CLINIC: 8/19/2022 (return to clinic after surgery)    REFERRING PROVIDER: Sonny Weaver MD     WOUND- Diabetic foot ulcer   LOCATION: Right plantar 1-2 MTH   HISTORY:  67M with PMHx of Uncontrolled DM2 with diabetic neuropathy, history of diabetic foot wounds s/p amputation of right hallux and right distal 2nd toe. Patient reports long history of DFU previously managed by podiatrist and [facility] wound care. Patient was admitted to hospital in 3/2022 where he was noted to have right hallux abscess and osteomyelitis and underwent R Hallux Ray amputation by Dr. Madera. ID was consulted and due to concern for continued OM, he was treated with 6 weeks of Augmentin and Doxycycline until 4/15. Patient subsequently developed right 2nd toe wound which degraded despite podiatry and wound care and was admitted 6/2022 for necrotic right second toe. He was seen by LPS service and underwent amputation of distal right second toe on 6/23 with Dr. Madera. Despite using offloading boot, patient developed worsening right plantar foot wound and was referred to [facility] for further care. According to patient he has been treating with SSD cream and gauze dressing. Patient reports that he walks 5-6 miles per night for work working at [employer].     Patient was taken to surgery on 8/17/2022 by Dr. Weaver, underwent the following procedure:  Procedure Performed:   Right gastrocnemius recession  Right peroneus longus to brevis tendon transfer, deep  Right foot irrigation debridement dorsal and plantar compartments to the level of bone, approximately 4 cm x 5 cm  Right first metatarsal partial excision  Right first metatarsal bone biopsy    Postop plan was for patient to be heel weightbearing on the right lower extremity in postoperative boot, and to return to wound clinic to resume treatment of his wound.  Surgery to follow  intraoperative cultures, biopsy, and appearance of the wound postsurgically.    Pertinent Medical History: Uncotnrolled DM2, Right hallux ray amputation, right 2nd toe amputation    DIABETES HX: Diagnosed with type 2 diabetes in the 1990s, and is currently managing with long acting insulin.  Checks blood sugars twice per day and reports that these typically labile from 150s - 300s.  Has had previous diabetes education.  Does have numbness in feet.  Usually wears non prescription shoes. Does check feet routinely.  Has had previous foot ulcers or foot surgery.  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  Offloading with CAM boot.        TOBACCO USE:   Former smoker    Patient's problem list, allergies, and current medications reviewed and updated in Epic    Interval History:  8/19/2022 : Clinic visit with ARPAN Cox, FNP-BC, CWOCN, CFCN.   Patient returns to clinic after having surgery 2 days ago.  He states that he is having a little bit of nausea, but otherwise feeling okay.  Denies much pain from his wound.  He has been taking pain medication as prescribed.  He is on doxycycline and Augmentin, ordered by surgery.  Reports that his blood sugar this morning was 108.  He has been wearing his offloading boot consistently.  Patient is enrolled in the TTAX study, randomized in surgery, placed in a control group, no product applied.  Susie, , present for today's visit.    8/25/2022: Clinic visit with Dom Chu MD. Patient reports doing ok, denies any fevers or chills, reports pain is appropriate post surgical and improving. Reviewed pathology results and culture results, pathology positive for OM with clean margins and OR culture positive for ESBL Ecoli in bone. Discussed results with Dr. Hardy and will continue oral Abx for now as the osteomyelitis was removed with clean margins and does not appear to have significant soft tissue infection.    9/2/2022 : Clinic visit with  ARPAN Cox, FNP-BC, CWOCN, CFCN.   Carlos states he is feeling well overall, denies fevers, chills, nausea, vomiting, cough or shortness of breath.  He was seen by his surgeon, Dr. Hardy, earlier this week.  Sutures were removed.  He is currently being treated with a 6-week course of doxycycline p.o..  He has been wearing his offloading boot consistently.  He reports his blood sugars have been a bit labile, ranging from 113-216 over the past day.    9/9/2022: Clinic visit with Dom Chu MD. Patient reports doing ok. He noted some increased swelling of right lower extremity, worse when he just got off of overnight shift with leg down. Counseled to try to elevate while at work. He has new area of pressure lateral to wound, prominent bone midfoot noted, I removed some additional pegs from boot and will Mepilex for added pressure and shear protection. No evidence of new or worsening infection. Reports glucose 115 went up to 190 after eating an orange. Tolerating doxycycline without issue.    9/16/2022: Clinic visit with Dom Chu MD. Patient reports feeling well. He denies any symptoms of infection. Tolerating Abx without difficulty. Reports glucose has been well controlled, 130 when last checked. He is concerned about continued edema of ankle, likely postsurgical and depended as he returned to work and foot is down during most of shift. Discussed compression options and would like to continue with double layer tubigrip. No evidence of acute infection.    9/23/2022 : Clinic visit with ARPAN Cox, FNP-BC, CWOCN, CFCN.   Carlos states he is feeling well today.  Blood sugars consistently in the 140s, monitored by CGM.  He has had very little drainage from his wound over the past week.  Wound today is covered with thin layer of epithelium.  Given patient's history, I feel it wise to have him come back to the clinic next week, at which time he will likely be discharged.  I provided him with Rx  for shoes and inserts today and advised him to begin process ASAP.  In the meantime, he is to continue wearing his offloading boot until her shoes are ready.  He is still taking doxycycline, should be completing these in the next week or so.  He is still concerned about the edema in his foot.  Tubigrip has been moderately effective.  Advised him to consider purchasing compression stockings, with compression level of 20 to 30 mmHg.    9/30/2022 : Clinic visit with Whitley Saravia, ARPAN, FNP-BC, CWANNAN, CFCN.   Carlos continues to feel well.  Reports that his blood sugars are consistently in the low 140s or below, which he monitors via CGM.  There has been no drainage from his wound over the past week, and it appears to be 100% epithelialized.  He is discharged t from [facility].  He understands he is to return to clinic ASAP if wound reopens, or if he develops new wounds.   He does have some concerns regarding the swelling in his leg, and new sensations in his foot.  He has an appointment with his surgeon, Dr. Hardy, later today.  I encouraged him to discuss these issues, and to request referral to physical therapy.  Also advised patient to continue wearing compression to his leg, and that he might need to wear compression garment to this leg from now on.   He has an appointment with  orthotics next week to begin process for shoes and inserts.  He will continue to wear his offloading boot until his shoes are ready.    REVIEW OF SYSTEMS:   Unchanged from previous assessment on 9/23/2022    PHYSICAL EXAMINATION:   BP (!) 154/76 (BP Location: Right arm, Patient Position: Sitting)   Pulse 60   Temp 36.3 °C (97.4 °F)   Resp 18   SpO2 99%     Physical Exam  Constitutional:       General: He is not in acute distress.  Cardiovascular:      Rate and Rhythm: Normal rate.      Pulses: Normal pulses.      Comments: Pedal pulses are palpable, 2+  Pulmonary:      Effort: Pulmonary effort is normal.   Musculoskeletal:          General: Deformity present.      Comments: S/p Right hallux ray amputation  S/p Right distal 2nd toe amputation   Skin:     General: Skin is warm.      Comments: Right plantar first MTH ulcer, full-thickness -wound bed covered with thin layer of epithelium, scant drainage over the past week, periwound callused, no periwound erythema or induration    Surgical incisions to right medial calf and right lateral lower leg are all healed, no drainage or erythema    Refer to wound flowsheet and photos    Neurological:      Mental Status: He is alert.   Monofilament testing with a 10 gram force: sensation diminished (R: 2/9, L: 2/10)  Visual Inspection: Feet with ulcer  Pedal pulses: Palpable      WOUND ASSESSMENT  Wound 07/28/22 Diabetic Ulcer MTH 1 Plantar Right --Right Plantar 1st Metatarsal Head (Active)   Wound Image    09/30/22 1100   Site Assessment Epithelialization 09/30/22 1100   Periwound Assessment Dry;Intact 09/30/22 1100   Margins Attached edges 09/30/22 1100   Drainage Amount None 09/30/22 1100   Drainage Description Serous 09/23/22 1103   Treatments Cleansed;Site care 09/30/22 1100   Wound Cleansing Foam Cleanser/Washcloth 09/30/22 1100   Periwound Protectant Skin Protectant Wipes to Periwound 09/30/22 1100   Dressing Cleansing/Solutions Not Applicable 09/30/22 1100   Dressing Options Mepilex;Hypafix Tape 09/30/22 1100   Dressing Changed Changed 09/09/22 1040   Dressing Change/Treatment Frequency Weekly, and As Needed 09/23/22 1103   Non-staged Wound Description Full thickness 09/16/22 1101   Wound Length (cm) 0.2 cm 09/16/22 1101   Wound Width (cm) 1 cm 09/16/22 1101   Wound Depth (cm) 0.4 cm 09/16/22 1101   Wound Surface Area (cm^2) 0.2 cm^2 09/16/22 1101   Wound Volume (cm^3) 0.08 cm^3 09/16/22 1101   Post-Procedure Length (cm) 0 cm 09/30/22 1100   Post-Procedure Width (cm) 0 cm 09/30/22 1100   Post-Procedure Depth (cm) 0 cm 09/30/22 1100   Post-Procedure Surface Area (cm^2) 0 cm^2 09/30/22 1100    Post-Procedure Volume (cm^3) 0 cm^3 09/30/22 1100   Wound Healing % 99 09/16/22 1101   Tunneling (cm) 0 cm 09/30/22 1100   Undermining (cm) 0 cm 09/30/22 1100   Undermining of Wound, 1st Location From 2 o'clock;To 5 o'clock 08/12/22 0700   Wound Odor None 09/30/22 1100   Pulses Right;2+;DP;PT 08/19/22 1000   Right Foot Monofilament 10-point exam (Sensate) 2/10 07/28/22 0814   Left Foot Monofilament 10-point exam (Sensate) 2/10 07/28/22 0814   Exposed Structures None 09/30/22 1100   Number of days: 64         PROCEDURE:   - no need for debridement today  -Wound care completed by wound RN, refer to flowsheet  -Patient tolerated the procedure well, without c/o pain or discomfort.        Pertinent Labs and Diagnostics:    Labs:     A1c:   Lab Results   Component Value Date/Time    HBA1C 7.6 (H) 06/21/2022 03:17 PM            IMAGING: Foot/Toes Imaging, Past Year DX-FOOT-COMPLETE 3+ RIGHT    Result Date: 6/22/2022  Narrative 6/22/2022 10:06 AM HISTORY/REASON FOR EXAM:  rule out osteomylitis TECHNIQUE/EXAM DESCRIPTION AND NUMBER OF VIEWS: 3 views of the RIGHT foot. COMPARISON: Right foot Radiographs 3/3/2022. FINDINGS: Decreased osseous mineralization. Questionable periosteal reaction of the second toe distal phalanx. Interval postsurgical changes of amputation at the great toe metatarsal neck. There is some cortical irregularity of the distal stump. Soft tissue swelling about the medial, distal right foot. Oblique, mildly displaced fracture of the second toe metatarsal. There is no dislocation.  No bone erosion is noted.     Impression 1.  Questionable periosteal reaction of the second toe distal phalanx concerning for acute osteomyelitis. 2.  Interval postsurgical changes of amputation at the great toe metatarsal neck. Cortical irregularity of the distal stump could represent reactive changes from recent surgery versus acute osteomyelitis. 3.  Soft tissue swelling adjacent to the great toe metatarsal stump may be  reactive or represent infection. 4.  Oblique, mildly displaced fracture of the second toe metatarsal.    Foot/Toes Imaging, Past Year DX-FOOT-COMPLETE 3+ RIGHT    Result Date: 3/3/2022  Narrative 3/3/2022 3:27 PM HISTORY/REASON FOR EXAM:  r/o osteo TECHNIQUE/EXAM DESCRIPTION AND NUMBER OF VIEWS: 3 views of the RIGHT foot. COMPARISON:  None. FINDINGS: Decreased osseous mineralization. No acute fracture is noted. There is no dislocation.  No bone erosion is noted. Soft tissue edema and gas of the medial right foot projecting over the great toe metatarsophalangeal joint.     Impression 1.  Soft tissue edema and gas of the medial right forefoot projecting over the great toe metatarsophalangeal joint. This is concerning for abscess versus necrotic infection. 2.  No radiographic evidence of acute osteomyelitis.. If there is clinical concern for radiographically occult osteomyelitis, MRI can be obtained.      VASCULAR STUDIES: 6/21/2022 CYNTHIA  RIGHT      Waveform            Systolic BPs (mmHg)                                            Brachial   Triphasic                                Common Femoral   Triphasic                                Popliteal   Triphasic                  174           Posterior Tibial   Triphasic                  187           Dorsalis Pedis                                            Digit                              1.29          CYNTHIA                                            TBI                           LEFT   Waveform        Systolic BPs (mmHg)                              145           Brachial   Triphasic                                Common Femoral   Triphasic                                Popliteal   Triphasic                  172           Posterior Tibial   Triphasic                  170           Dorsalis Pedis                                            Digit                              1.19          CYNTHIA                                            TBI         Findings   Bilateral-    The ankle-brachial indices are normal.    Doppler waveforms of the common femoral artery and popliteal artery are of    high amplitude and triphasic.    Doppler waveforms at the ankle are brisk and triphasic.    LAST  WOUND CULTURE:  DATE :        Lab Results   Component Value Date/Time    CULTRSULT - (A) 08/17/2022 08:26 AM    CULTRSULT (A) 08/17/2022 08:26 AM     Escherichia coli ESBL  Rare growth  Extended Spectrum Beta-lactamase (ESBL) isolated.  ESBL's may be clinically resistant to therapy with  Penicillins,Cephalosporins or Aztreonam despite  apparent in vitro susceptibility to some of these agents.  The patient requires contact isolation.  Please contact pharmacy or an Infectious Disease Specialist  if you have any questions about appropriate therapy.      CULTRSULT No Anaerobes isolated. 08/17/2022 08:26 AM           ASSESSMENT AND PLAN:     1. Diabetic ulcer of right midfoot associated with type 2 diabetes mellitus, with muscle involvement without evidence of necrosis (HCC)    9/30/2022: No drainage from wound over the past week, appears to be 100% reepithelialized  --Discharge from [facility]  -Patient to return to clinic ASAP if wound recurs, or if he develops new wounds   -Patient is enrolled in TTAX study, control group.  He will continue follow-ups with study coordinators at [facility] from here on out.  -Patient advised to purchase compression stockings for his right lower extremity to control edema.  I recommended 20 to 30 mmHg.    -I also advised patient to continue elevating his legs is much as possible  -Patient to follow-up with surgeon later today.  I encouraged him to ask about physical therapy for strengthening and ROM of lower extremity.     Wound Care: Mepilex foam dressing to protect newly epithelialized tissue and provide pressure/friction relief.    2. Subacute osteomyelitis of right foot (HCC)    9/30/2022: Next week course of doxycycline prescribed postoperatively.  He should be completing the  soon.  -Antibiotics managed by surgeon.    3. Uncontrolled type 2 diabetes mellitus with hyperglycemia (HCC)  Comments: Most recent A1C 7.6 6/2022, improved from 9.6    9/30/2022 patient reports his blood sugars are consistently in the low 140s or below.  He has a CGM which is able to calculate his averages.      4. Diabetic polyneuropathy associated with type 2 diabetes mellitus (HCC)  - Implications of loss of protective sensation (LOPS) previously discussed with patient- including increased risk for amputation.  Advised to check feet at least daily, moisturize feet, and to always wear protective foot wear.     5. Amputation of right great toe (HCC)  Comments: Right hallux toe amputation 3/2022, right 2nd toe amputation 6/2022, both sites have healed well  - High risk for further amputations      PATIENT EDUCATION  - Importance of tight glucose control for wound healing   - Implications of loss of protective sensation (LOPS) discussed with patient- including increased risk for amputation.  - Advised to check feet at least daily, moisturize feet, and to always wear protective foot wear.   -  Importance of offloading foot to assist with wound healing  - Advised pt not to trim nails or calluses, seek foot/nail care from podiatrist or certified foot/nail nurse  - Importance of adequate nutrition for wound healing      My total time spent caring for the patient on the day of the encounter was 20 minutes.   This does not include time spent on separately billable procedures/tests.     Please note that this note may have been created using voice recognition software. I have worked with technical experts from [employer] [employer] to optimize the interface.  I have made every reasonable attempt to correct obvious errors, but there may be errors of grammar and possibly content that I did not discover before finalizing the note.    N

## 2022-09-30 NOTE — PATIENT INSTRUCTIONS
- Resolved wound be fragile for a few days, bathe and dry area gently, only ever regains a maximum of 80% of the tensile strength of the surrounding skin, remodeling of scar can continue for 6mo - a year. Contact PCP for a referral back her if any problems with area opening and draining again.    -Should you experience any significant changes in your wound(s), such as infection (redness, swelling, localized heat, increased pain, fever > 101 F, chills) or have any questions regarding your home care instructions, please contact the wound center at [phone]. If after hours, contact your primary care physician or go to the hospital emergency room.

## 2022-11-02 ENCOUNTER — PATIENT MESSAGE (OUTPATIENT)
Dept: HEALTH INFORMATION MANAGEMENT | Facility: OTHER | Age: 67
End: 2022-11-02

## 2022-11-23 ENCOUNTER — HOSPITAL ENCOUNTER (OUTPATIENT)
Dept: LAB | Facility: MEDICAL CENTER | Age: 67
End: 2022-11-23
Attending: NURSE PRACTITIONER
Payer: COMMERCIAL

## 2022-11-23 LAB
ALBUMIN SERPL BCP-MCNC: 4.3 G/DL (ref 3.2–4.9)
ALBUMIN/GLOB SERPL: 1.4 G/DL
ALP SERPL-CCNC: 60 U/L (ref 30–99)
ALT SERPL-CCNC: 21 U/L (ref 2–50)
ANION GAP SERPL CALC-SCNC: 10 MMOL/L (ref 7–16)
AST SERPL-CCNC: 20 U/L (ref 12–45)
BASOPHILS # BLD AUTO: 1.9 % (ref 0–1.8)
BASOPHILS # BLD: 0.13 K/UL (ref 0–0.12)
BILIRUB SERPL-MCNC: <0.2 MG/DL (ref 0.1–1.5)
BUN SERPL-MCNC: 17 MG/DL (ref 8–22)
CALCIUM SERPL-MCNC: 9.5 MG/DL (ref 8.5–10.5)
CHLORIDE SERPL-SCNC: 97 MMOL/L (ref 96–112)
CO2 SERPL-SCNC: 27 MMOL/L (ref 20–33)
CREAT SERPL-MCNC: 0.76 MG/DL (ref 0.5–1.4)
EOSINOPHIL # BLD AUTO: 0.28 K/UL (ref 0–0.51)
EOSINOPHIL NFR BLD: 4 % (ref 0–6.9)
ERYTHROCYTE [DISTWIDTH] IN BLOOD BY AUTOMATED COUNT: 43.4 FL (ref 35.9–50)
EST. AVERAGE GLUCOSE BLD GHB EST-MCNC: 174 MG/DL
FASTING STATUS PATIENT QL REPORTED: NORMAL
GFR SERPLBLD CREATININE-BSD FMLA CKD-EPI: 99 ML/MIN/1.73 M 2
GLOBULIN SER CALC-MCNC: 3 G/DL (ref 1.9–3.5)
GLUCOSE SERPL-MCNC: 274 MG/DL (ref 65–99)
HBA1C MFR BLD: 7.7 % (ref 4–5.6)
HCT VFR BLD AUTO: 42.2 % (ref 42–52)
HGB BLD-MCNC: 13.6 G/DL (ref 14–18)
IMM GRANULOCYTES # BLD AUTO: 0.01 K/UL (ref 0–0.11)
IMM GRANULOCYTES NFR BLD AUTO: 0.1 % (ref 0–0.9)
LYMPHOCYTES # BLD AUTO: 3.6 K/UL (ref 1–4.8)
LYMPHOCYTES NFR BLD: 51.7 % (ref 22–41)
MCH RBC QN AUTO: 29.1 PG (ref 27–33)
MCHC RBC AUTO-ENTMCNC: 32.2 G/DL (ref 33.7–35.3)
MCV RBC AUTO: 90.2 FL (ref 81.4–97.8)
MONOCYTES # BLD AUTO: 0.56 K/UL (ref 0–0.85)
MONOCYTES NFR BLD AUTO: 8 % (ref 0–13.4)
NEUTROPHILS # BLD AUTO: 2.38 K/UL (ref 1.82–7.42)
NEUTROPHILS NFR BLD: 34.3 % (ref 44–72)
NRBC # BLD AUTO: 0 K/UL
NRBC BLD-RTO: 0 /100 WBC
PLATELET # BLD AUTO: 320 K/UL (ref 164–446)
PMV BLD AUTO: 9.4 FL (ref 9–12.9)
POTASSIUM SERPL-SCNC: 4.7 MMOL/L (ref 3.6–5.5)
PROT SERPL-MCNC: 7.3 G/DL (ref 6–8.2)
RBC # BLD AUTO: 4.68 M/UL (ref 4.7–6.1)
SODIUM SERPL-SCNC: 134 MMOL/L (ref 135–145)
WBC # BLD AUTO: 7 K/UL (ref 4.8–10.8)

## 2022-11-23 PROCEDURE — 80053 COMPREHEN METABOLIC PANEL: CPT

## 2022-11-23 PROCEDURE — 83036 HEMOGLOBIN GLYCOSYLATED A1C: CPT

## 2022-11-23 PROCEDURE — 36415 COLL VENOUS BLD VENIPUNCTURE: CPT

## 2022-11-23 PROCEDURE — 85025 COMPLETE CBC W/AUTO DIFF WBC: CPT

## 2023-02-02 ENCOUNTER — HOSPITAL ENCOUNTER (OUTPATIENT)
Dept: LAB | Facility: MEDICAL CENTER | Age: 68
End: 2023-02-02
Attending: ORTHOPAEDIC SURGERY
Payer: COMMERCIAL

## 2023-02-17 ENCOUNTER — HOSPITAL ENCOUNTER (OUTPATIENT)
Dept: LAB | Facility: MEDICAL CENTER | Age: 68
End: 2023-02-17
Attending: NURSE PRACTITIONER
Payer: COMMERCIAL

## 2023-02-17 LAB
25(OH)D3 SERPL-MCNC: 25 NG/ML (ref 30–100)
ALBUMIN SERPL BCP-MCNC: 4.6 G/DL (ref 3.2–4.9)
ALBUMIN/GLOB SERPL: 1.6 G/DL
ALP SERPL-CCNC: 55 U/L (ref 30–99)
ALT SERPL-CCNC: 22 U/L (ref 2–50)
ANION GAP SERPL CALC-SCNC: 10 MMOL/L (ref 7–16)
AST SERPL-CCNC: 20 U/L (ref 12–45)
BASOPHILS # BLD AUTO: 1.2 % (ref 0–1.8)
BASOPHILS # BLD: 0.13 K/UL (ref 0–0.12)
BILIRUB SERPL-MCNC: 0.3 MG/DL (ref 0.1–1.5)
BUN SERPL-MCNC: 22 MG/DL (ref 8–22)
CALCIUM ALBUM COR SERPL-MCNC: 9 MG/DL (ref 8.5–10.5)
CALCIUM SERPL-MCNC: 9.5 MG/DL (ref 8.5–10.5)
CHLORIDE SERPL-SCNC: 99 MMOL/L (ref 96–112)
CHOLEST SERPL-MCNC: 166 MG/DL (ref 100–199)
CO2 SERPL-SCNC: 26 MMOL/L (ref 20–33)
CREAT SERPL-MCNC: 0.71 MG/DL (ref 0.5–1.4)
EOSINOPHIL # BLD AUTO: 0.23 K/UL (ref 0–0.51)
EOSINOPHIL NFR BLD: 2.2 % (ref 0–6.9)
ERYTHROCYTE [DISTWIDTH] IN BLOOD BY AUTOMATED COUNT: 43.9 FL (ref 35.9–50)
EST. AVERAGE GLUCOSE BLD GHB EST-MCNC: 174 MG/DL
FASTING STATUS PATIENT QL REPORTED: NORMAL
FERRITIN SERPL-MCNC: 47.3 NG/ML (ref 22–322)
GFR SERPLBLD CREATININE-BSD FMLA CKD-EPI: 101 ML/MIN/1.73 M 2
GLOBULIN SER CALC-MCNC: 2.9 G/DL (ref 1.9–3.5)
GLUCOSE SERPL-MCNC: 108 MG/DL (ref 65–99)
HBA1C MFR BLD: 7.7 % (ref 4–5.6)
HCT VFR BLD AUTO: 42.6 % (ref 42–52)
HDLC SERPL-MCNC: 61 MG/DL
HGB BLD-MCNC: 13.9 G/DL (ref 14–18)
IMM GRANULOCYTES # BLD AUTO: 0.03 K/UL (ref 0–0.11)
IMM GRANULOCYTES NFR BLD AUTO: 0.3 % (ref 0–0.9)
LDLC SERPL CALC-MCNC: 97 MG/DL
LYMPHOCYTES # BLD AUTO: 4.07 K/UL (ref 1–4.8)
LYMPHOCYTES NFR BLD: 38.1 % (ref 22–41)
MCH RBC QN AUTO: 30.1 PG (ref 27–33)
MCHC RBC AUTO-ENTMCNC: 32.6 G/DL (ref 33.7–35.3)
MCV RBC AUTO: 92.2 FL (ref 81.4–97.8)
MONOCYTES # BLD AUTO: 0.74 K/UL (ref 0–0.85)
MONOCYTES NFR BLD AUTO: 6.9 % (ref 0–13.4)
NEUTROPHILS # BLD AUTO: 5.49 K/UL (ref 1.82–7.42)
NEUTROPHILS NFR BLD: 51.3 % (ref 44–72)
NRBC # BLD AUTO: 0 K/UL
NRBC BLD-RTO: 0 /100 WBC
PLATELET # BLD AUTO: 317 K/UL (ref 164–446)
PMV BLD AUTO: 9.5 FL (ref 9–12.9)
POTASSIUM SERPL-SCNC: 4.2 MMOL/L (ref 3.6–5.5)
PROT SERPL-MCNC: 7.5 G/DL (ref 6–8.2)
RBC # BLD AUTO: 4.62 M/UL (ref 4.7–6.1)
SODIUM SERPL-SCNC: 135 MMOL/L (ref 135–145)
T3FREE SERPL-MCNC: 3.25 PG/ML (ref 2–4.4)
T4 FREE SERPL-MCNC: 1.13 NG/DL (ref 0.93–1.7)
TRIGL SERPL-MCNC: 39 MG/DL (ref 0–149)
TSH SERPL DL<=0.005 MIU/L-ACNC: 3.49 UIU/ML (ref 0.38–5.33)
WBC # BLD AUTO: 10.7 K/UL (ref 4.8–10.8)

## 2023-02-17 PROCEDURE — 36415 COLL VENOUS BLD VENIPUNCTURE: CPT

## 2023-02-17 PROCEDURE — 84481 FREE ASSAY (FT-3): CPT

## 2023-02-17 PROCEDURE — 80061 LIPID PANEL: CPT

## 2023-02-17 PROCEDURE — 83036 HEMOGLOBIN GLYCOSYLATED A1C: CPT

## 2023-02-17 PROCEDURE — 80053 COMPREHEN METABOLIC PANEL: CPT

## 2023-02-17 PROCEDURE — 82306 VITAMIN D 25 HYDROXY: CPT

## 2023-02-17 PROCEDURE — 83540 ASSAY OF IRON: CPT

## 2023-02-17 PROCEDURE — 86800 THYROGLOBULIN ANTIBODY: CPT

## 2023-02-17 PROCEDURE — 84443 ASSAY THYROID STIM HORMONE: CPT

## 2023-02-17 PROCEDURE — 82728 ASSAY OF FERRITIN: CPT

## 2023-02-17 PROCEDURE — 83550 IRON BINDING TEST: CPT

## 2023-02-17 PROCEDURE — 85025 COMPLETE CBC W/AUTO DIFF WBC: CPT

## 2023-02-17 PROCEDURE — 84439 ASSAY OF FREE THYROXINE: CPT

## 2023-02-18 LAB
IRON SATN MFR SERPL: 14 % (ref 15–55)
IRON SERPL-MCNC: 47 UG/DL (ref 50–180)
TIBC SERPL-MCNC: 327 UG/DL (ref 250–450)
UIBC SERPL-MCNC: 280 UG/DL (ref 110–370)

## 2023-02-19 LAB — THYROGLOB AB SERPL-ACNC: <0.9 IU/ML (ref 0–4)

## 2023-06-12 ENCOUNTER — OFFICE VISIT (OUTPATIENT)
Dept: NEUROLOGY | Facility: MEDICAL CENTER | Age: 68
End: 2023-06-12
Attending: PSYCHIATRY & NEUROLOGY
Payer: COMMERCIAL

## 2023-06-12 VITALS
SYSTOLIC BLOOD PRESSURE: 130 MMHG | TEMPERATURE: 98.4 F | DIASTOLIC BLOOD PRESSURE: 74 MMHG | WEIGHT: 198.19 LBS | HEART RATE: 68 BPM | RESPIRATION RATE: 16 BRPM | BODY MASS INDEX: 26.88 KG/M2 | OXYGEN SATURATION: 97 %

## 2023-06-12 DIAGNOSIS — G44.52 NEW PERSISTENT DAILY HEADACHE: ICD-10-CM

## 2023-06-12 DIAGNOSIS — G44.52 NEW DAILY PERSISTENT HEADACHE: Primary | ICD-10-CM

## 2023-06-12 PROCEDURE — 3078F DIAST BP <80 MM HG: CPT | Performed by: PSYCHIATRY & NEUROLOGY

## 2023-06-12 PROCEDURE — 99212 OFFICE O/P EST SF 10 MIN: CPT | Performed by: PSYCHIATRY & NEUROLOGY

## 2023-06-12 PROCEDURE — 99204 OFFICE O/P NEW MOD 45 MIN: CPT | Performed by: PSYCHIATRY & NEUROLOGY

## 2023-06-12 PROCEDURE — 3075F SYST BP GE 130 - 139MM HG: CPT | Performed by: PSYCHIATRY & NEUROLOGY

## 2023-06-12 RX ORDER — BACLOFEN 10 MG/1
10 TABLET ORAL 2 TIMES DAILY
Qty: 60 TABLET | Refills: 3 | Status: SHIPPED | OUTPATIENT
Start: 2023-06-12 | End: 2023-09-18

## 2023-06-12 ASSESSMENT — FIBROSIS 4 INDEX: FIB4 SCORE: 0.9

## 2023-06-12 ASSESSMENT — PATIENT HEALTH QUESTIONNAIRE - PHQ9: CLINICAL INTERPRETATION OF PHQ2 SCORE: 0

## 2023-06-12 NOTE — PROGRESS NOTES
"[facility] NEUROLOGY  GENERAL NEUROLOGY  NEW PATIENT VISIT    Referral source: ARPAN Velasquez    CC: \"headache syndrome...\"    HISTORY OF ILLNESS:  Keaton Cervantes is a 68 y.o. man with a history most notable for asthma, HTN, DM, and ELLEN.  Today, he was unaccompanied, and he provided the following history:    [id number]:  Carlos lost 200 lbs.    The following is a summary of headache symptoms, presented in my standard format:    Family History: none  Age at onset: rare headaches earlier in life, current headaches started at age 67 (immediately after toe amputation)  Location: occipital (worse on the left than the right)  Radiation: left parietal  Frequency: constant  Duration: constant x1 year  Headache Days/Month: 30/30  Quality: \"like someone hit him with something, stabbing\"  Intensity: with treatment: 2/10, can reach 8/10   Aura: none  Photophobia/Phonophobia/Nausea/Vomiting: no/yes (when pain is 8/10 intensity)/no/no  Provoked by Physical Activity?:   Triggers: none identified, stress can intensity an already-existing headache  Associated Symptoms:   Autonomic Signs (such as ptosis, miosis, conjunctival injection, rhinorrhea, increased lacrimation): none  Head Trauma: 2003: fell 14' off a ladder, ruptured discs, s/p fusion  Association with Menses: n/a  ED Visits: none  Hospitalizations: none  Missed Work Days (wendy at [employer]): no  Sleep: 6-8 hours/night  Caffeine Intake: 3 cups of coffee/day  Hydration: stays well-hydrated (1-1.5 gallons of water/day)  Nutrition: eats regularly  Exercise:   Analgesic Overuse: uses pain medications daily on work days    Current Medication Regimen:  - hydrocodone-acetaminophen: helpful, wears off after ~2 hours  - ASA: helpful, wears off after ~2 hours  - lying down: helpful    Medications Tried: Response  Preventive:  -     Rescue:  -     Medications Not Tried:  -     MEDICAL AND SURGICAL HISTORY:  Past Medical History:   Diagnosis Date    Allergy     ASTHMA     Back " pain     Chronic    Bronchitis     CATARACT     Dental disorder     Diabetes     oral rx & insulin    Hypertension     MEDICAL HOME     Neck pain     Chronic    ELLEN (obstructive sleep apnea)     Bipap    ELLEN (obstructive sleep apnea) 08/12/2022    Pt. states no longer using machine since wt loss.    Sleep apnea      Past Surgical History:   Procedure Laterality Date    TENDON LENGHTENING Right 8/17/2022    Procedure: RIGHT GASTROCNEMIUS RECESSION,;  Surgeon: Parrish Hardy M.D.;  Location: [facility];  Service: Orthopedics    TENDON TRANSFER Right 8/17/2022    Procedure: TRANSFER, TENDON-PERONEUS LONGUS TO BREVIS TRANSFER;  Surgeon: Parirsh Hardy M.D.;  Location: [facility];  Service: Orthopedics    IRRIGATION & DEBRIDEMENT GENERAL Right 8/17/2022    Procedure: IRRIGATION AND DEBRIDEMENT, WOUND- FOOT;  Surgeon: Parrish Hardy M.D.;  Location: [facility];  Service: Orthopedics    METATARSAL HEAD RESECTION Right 8/17/2022    Procedure: EXCISION, METATARSAL BONE, HEAD-PARTIAL FIRST METATARSAL EXCISION;  Surgeon: Parrish Hrady M.D.;  Location: [facility];  Service: Orthopedics    BONE BIOPSY Right 8/17/2022    Procedure: BIOPSY, BONE;  Surgeon: Parrish Hardy M.D.;  Location: [facility];  Service: Orthopedics    TOE AMPUTATION Right 06/23/2022    Procedure: AMPUTATION, TOE 2ND;  Surgeon: Wilman Madera M.D.;  Location: [facility];  Service: Orthopedics    ROTATOR CUFF REPAIR Left 04/2022    [facility]    TOE AMPUTATION Right 03/04/2022    Procedure: AMPUTATION, TOE 1ST RAY;  Surgeon: Wilman Madera M.D.;  Location: [facility];  Service: Orthopedics    LUMBAR FUSION ANTERIOR  08/04/2009    Performed by JOSEE BLANTON at [facility] ORS    FUSION, SPINE, LUMBAR, PLIF  08/04/2009    Performed by JOSEE BLANTON at [facility] ORS    LUMBAR LAMINECTOMY DISKECTOMY  08/04/2009    Performed by FRANNY  JOSEE MENSAH at SURGERY [facility] ORS    LAMINOTOMY  2009    Performed by JOSEE BLANTON at SURGERY [facility] ORS    SOMNOPLASTY  06/10/2009    Performed by ELÍAS RILEY at SURGERY [facility]    SEPTOPLASTY  06/10/2009    Performed by ELÍAS RILEY at SURGERY [facility] ORS    ANTROSTOMY  06/10/2009    Performed by ELÍAS RILEY at SURGERY [facility] ORS    ETHMOIDECTOMY  06/10/2009    Performed by ELÍAS RILEY at SURGERY [facility]    CATARACT PHACO WITH IOL  2008    Performed by OCTAVIO HARDWICK at SURGERY SAME DAY [facility] ORS    CATARACT PHACO WITH IOL  2008    Performed by OCTAVIO HARDWICK at SURGERY SAME DAY [facility] ORS    APPENDECTOMY      ORIF, ANKLE      VASECTOMY       MEDICATIONS:  Current Outpatient Medications   Medication Sig    baclofen (LIORESAL) 10 MG Tab Take 1 Tablet by mouth 2 times a day for 120 days.    Insulin Glargine-yfgn 100 UNIT/ML Solution Pen-injector INJECT 8-10 UNITS SUBCUTANEOUSLY AT BEDTIME    LANTUS SOLOSTAR 100 UNIT/ML Solution Pen-injector injection     gabapentin (NEURONTIN) 100 MG Cap TAKE ONE CAPSULE BY MOUTH THREE TIMES DAILY for 14 days.    insulin glargine (LANTUS) 100 UNIT/ML Solution Inject 6-15 Units under the skin at bedtime as needed (Blood Sugar Levels).    HYDROcodone-acetaminophen (NORCO) 7.5-325 MG per tablet Take 1 Tablet by mouth 3 times a day as needed for Severe Pain.    glipiZIDE SR (GLUCOTROL) 10 MG TABLET SR 24 HR Take 10 mg by mouth 2 times a day.    metformin (GLUCOPHAGE) 1000 MG tablet Take 1,000 mg by mouth 2 times a day with meals.    cyclobenzaprine (FLEXERIL) 10 mg Tab Take 10 mg by mouth as needed.    gabapentin (NEURONTIN) 300 MG Cap Take 300 mg by mouth 3 times a day as needed. (Patient not taking: Reported on 2022)     SOCIAL HISTORY:  Social History     Tobacco Use    Smoking status: Former     Types: Cigarettes     Quit date:      Years since quittin.4    Smokeless tobacco:  "Current     Types: Chew   Vaping Use    Vaping Use: Never used   Substance Use Topics    Alcohol use: No     Comment: Pt. states, \"H/O Alcoholism, quit 1992.\"     Social History     Social History Narrative    Not on file     FAMILY HISTORY:  Family History   Problem Relation Age of Onset    Hypertension Father     Diabetes Father      REVIEW OF SYSTEMS:  A ROS was completed.  Pertinent positives and negatives were included in the HPI, above.  All other systems were reviewed and are negative.    PHYSICAL EXAM:  General/Medical:  - NAD  - hair, skin, nails, and joints were normal    Neuro:  MENTAL STATUS: awake and alert; no deficits of speech or language; oriented to person, place, and time; affect was appropriate to situation    CRANIAL NERVES:    II: acuity: J1+/J5-1, fields: intact to confrontation, pupils: 3/3 to 2/2 without a relative afferent pupillary defect, discs: sharp    III/IV/VI: versions: intact without nystagmus    V: facial sensation: symmetric to light touch    VII: facial expression: symmetric    VIII: hearing: intact to finger rub    IX/X: palate: elevates symmetrically    XI: shoulder shrug: symmetric    XII: tongue: midline    MOTOR:  - bulk: normal throughout  - tone: NT  Upper Extremity Strength  (R/L)    5/5   Elbow flexion 5/5   Elbow extension 5/5   Shoulder abduction 5/5     Lower Extremity Strength  (R/L)   Hip flexion 5/5   Knee extension 5/5   Knee flexion 5/5   Ankle plantarflexion NT   Ankle dorsiflexion NT     - pronator drift: absent  - abnormal movements: none    SENSATION:  - light touch: NT  - vibration (R/L, seconds): NT/NT at the great toes  - pinprick: NT  - proprioception: NT  - Romberg: absent    COORDINATION:  - finger to nose: normal, no ataxia on exam  - finger tapping: NT    REFLEXES:  Reflex Right Left   BR 2+ 2+   Biceps 2+ 1+   Triceps NT NT   Patellae 1+ 2+   Achilles NT NT   Toes NT NT     GAIT:  - narrow base and normal  - heel-raised/toe-raised gait: " intact/difficulty on the left  - tandem gait: NT    REVIEW OF IMAGING STUDIES:  No data available.    REVIEW OF LABORATORY STUDIES:  2/17/2023:  - CBC w/ diff: WNL  - CMP: notable for glucose: 108    ASSESSMENT:  Keaton Cervantes is a 68 y.o. man with daily persistent headache and a history otherwise notable for asthma, HTN, DM, and ELLEN.  Carlos probably meets criteria for new daily persistent headache.  I recommended MRI and a trial of low-dose baclofen.  Otherwise, plans/recommendations as follows:    PLAN:  Daily Persistent Headache:  - trial of baclofen at a goal dosage of 10 mg BID  - keep a headache log  - MRI brain w/o contrast    Follow-Up:  - Return in about 3 months (around 9/12/2023).    Signed: Kunal Baker M.D.

## 2023-06-19 ENCOUNTER — TELEPHONE (OUTPATIENT)
Dept: HEALTH INFORMATION MANAGEMENT | Facility: OTHER | Age: 68
End: 2023-06-19
Payer: COMMERCIAL

## 2023-07-05 ENCOUNTER — HOSPITAL ENCOUNTER (OUTPATIENT)
Dept: RADIOLOGY | Facility: MEDICAL CENTER | Age: 68
End: 2023-07-05
Attending: PSYCHIATRY & NEUROLOGY
Payer: COMMERCIAL

## 2023-07-05 DIAGNOSIS — G44.52 NEW DAILY PERSISTENT HEADACHE: ICD-10-CM

## 2023-07-05 PROCEDURE — 70551 MRI BRAIN STEM W/O DYE: CPT

## 2023-09-18 ENCOUNTER — OFFICE VISIT (OUTPATIENT)
Dept: NEUROLOGY | Facility: MEDICAL CENTER | Age: 68
End: 2023-09-18
Attending: PSYCHIATRY & NEUROLOGY
Payer: COMMERCIAL

## 2023-09-18 VITALS
OXYGEN SATURATION: 98 % | WEIGHT: 189.15 LBS | TEMPERATURE: 97.7 F | HEART RATE: 76 BPM | SYSTOLIC BLOOD PRESSURE: 122 MMHG | DIASTOLIC BLOOD PRESSURE: 58 MMHG | BODY MASS INDEX: 25.62 KG/M2 | HEIGHT: 72 IN

## 2023-09-18 DIAGNOSIS — G44.52 NEW PERSISTENT DAILY HEADACHE: Primary | ICD-10-CM

## 2023-09-18 PROCEDURE — 3074F SYST BP LT 130 MM HG: CPT | Performed by: PSYCHIATRY & NEUROLOGY

## 2023-09-18 PROCEDURE — 99213 OFFICE O/P EST LOW 20 MIN: CPT | Performed by: PSYCHIATRY & NEUROLOGY

## 2023-09-18 PROCEDURE — 99211 OFF/OP EST MAY X REQ PHY/QHP: CPT

## 2023-09-18 PROCEDURE — 3078F DIAST BP <80 MM HG: CPT | Performed by: PSYCHIATRY & NEUROLOGY

## 2023-09-18 RX ORDER — TOPIRAMATE 25 MG/1
25 TABLET ORAL 2 TIMES DAILY
Qty: 60 TABLET | Refills: 3 | Status: SHIPPED | OUTPATIENT
Start: 2023-09-18 | End: 2024-01-04

## 2023-09-18 ASSESSMENT — FIBROSIS 4 INDEX: FIB4 SCORE: 0.9

## 2023-09-18 NOTE — PROGRESS NOTES
"[facility] NEUROLOGY  GENERAL NEUROLOGY  FOLLOW-UP VISIT    CC: daily persistent headache    INTERVAL HISTORY:  Keaton Cervantes is a 68 y.o. man with daily persistent headache and a history otherwise notable for asthma, HTN, DM, and ELLEN.  I last saw him in the clinic on 6/12/2023.  At that time I recommended a trial of baclofen with a goal dosage of 10 mg BID, headache log, and MRI brain.  Today, he was unaccompanied, and he provided the following interval history:    The following is a summary of headache symptoms, presented in my standard format:     Family History: none  Age at onset: rare headaches earlier in life, current headaches started at age 67 (immediately after toe amputation)  Location: occipital (worse on the left than the right)  Radiation: left parietal, left supra-orbital  Frequency: constant  Duration: constant x1 year  Headache Days/Month: 30/30  Quality: \"like someone hit him with something, stabbing\"  Intensity: with treatment: 2/10, can reach 8/10   Aura: none  Photophobia/Phonophobia/Nausea/Vomiting: no/yes (when pain is 8/10 intensity)/no/no  Provoked by Physical Activity?:   Triggers: none identified, stress can intensity an already-existing headache  Associated Symptoms:   Autonomic Signs (such as ptosis, miosis, conjunctival injection, rhinorrhea, increased lacrimation): none  Head Trauma: 2003: fell 14' off a ladder, ruptured discs, s/p fusion  Association with Menses: n/a  ED Visits: none  Hospitalizations: none  Missed Work Days (wendy at [employer]): no  Sleep: 6-8 hours/night  Caffeine Intake: 3 cups of coffee/day  Hydration: stays well-hydrated (1-1.5 gallons of water/day)  Nutrition: eats regularly  Exercise:   Analgesic Overuse: uses pain medications daily on work days     Current Medication Regimen:  - hydrocodone-acetaminophen: prescribed for back pain, helpful, wears off after ~2 hours  - ASA: helpful, wears off after ~2 hours  - lying down: helpful     Medications Tried: " Response  Preventive:  - baclofen: ineffective     Rescue:  -      Medications Not Tried:  -     MEDICATIONS:  Current Outpatient Medications   Medication Sig    topiramate (TOPAMAX) 25 MG Tab Take 1 Tablet by mouth 2 times a day for 120 days.    Insulin Glargine-yfgn 100 UNIT/ML Solution Pen-injector INJECT 8-10 UNITS SUBCUTANEOUSLY AT BEDTIME    LANTUS SOLOSTAR 100 UNIT/ML Solution Pen-injector injection     insulin glargine (LANTUS) 100 UNIT/ML Solution Inject 6-15 Units under the skin at bedtime as needed (Blood Sugar Levels).    HYDROcodone-acetaminophen (NORCO) 7.5-325 MG per tablet Take 1 Tablet by mouth 3 times a day as needed for Severe Pain.    gabapentin (NEURONTIN) 300 MG Cap Take 300 mg by mouth 3 times a day as needed.    glipiZIDE SR (GLUCOTROL) 10 MG TABLET SR 24 HR Take 10 mg by mouth 2 times a day.    metformin (GLUCOPHAGE) 1000 MG tablet Take 1,000 mg by mouth 2 times a day with meals.    cyclobenzaprine (FLEXERIL) 10 mg Tab Take 10 mg by mouth as needed.     MEDICAL, SOCIAL, AND FAMILY HISTORY:  There is no change in the patient's ROS or medical, social, or family histories since the previous visit on 6/12/2023.    REVIEW OF SYSTEMS:  A ROS was completed.  Pertinent positives and negatives were included in the HPI, above.  All other systems were reviewed and are negative.    PHYSICAL EXAM:  General/Medical:  - NAD  - hair, skin, nails, and joints were normal    Neuro:  MENTAL STATUS: awake and alert; no deficits of speech or language; oriented to person, place, and time; affect was appropriate to situation    CRANIAL NERVES:    II: acuity: J2/J2 with glasses, fields: intact to confrontation, pupils: NT, discs: sharp    III/IV/VI: versions: intact without nystagmus    V: facial sensation: symmetric to light touch    VII: facial expression: symmetric    VIII: hearing: intact to finger rub    IX/X: palate: elevates symmetrically    XI: shoulder shrug: symmetric    XII: tongue: midline    MOTOR:  -  "bulk: NT  - tone: NT  Upper Extremity Strength (R/L)    NT   Elbow flexion NT   Elbow extension NT   Shoulder abduction NT     Lower Extremity Strength (R/L)   Hip flexion NT   Knee extension NT   Knee flexion NT   Ankle plantarflexion NT   Ankle dorsiflexion NT     - pronator drift: NT  - abnormal movements: none    SENSATION:  - light touch: NT  - vibration (R/L, seconds): NT at the great toes  - pinprick: NT  - proprioception: NT  - Romberg: NT    COORDINATION:  - finger to nose: NT  - finger tapping: NT    REFLEXES:  Reflex Right Left   BR NT NT   Biceps NT NT   Triceps NT NT   Patellae NT NT   Achilles NT NT   Toes NT NT     GAIT:  - NT    REVIEW OF IMAGING STUDIES: I reviewed the following studies:  MRI Brain:  Date: 7/5/2023  W/o and w/ contrast?: without  Indication: \"headache x1 year\"  Comparison: none  Impression:  \"No acute intracranial process.  Nonspecific T2 hyperintensities are noted in the periventricular and deep white matter, most likely related to chronic microvascular ischemia.\"    REVIEW OF LABORATORY STUDIES:  No recent data available.    ASSESSMENT:  Keaton Cervantes is a 68 y.o. man with daily persistent headache and a history otherwise notable for asthma, HTN, DM, and ELLEN.  Plans/recommendations as follows:    PLAN:  Daily Persistent Headache:  Prevention:  - trial of topiramate with a goal dosage of 50 mg daily; most common side effects discussed  - get 7-9 hours of sleep per night; can try supplementing melatonin 2-10 mg, 2-3 hours before bedtime  - drink plenty of fluids (urine should be nearly clear)  - avoid excessive caffeine intake (no more than 2 servings per day and nothing in the afternoon)  - eat regular meals (don't skip meals)  - get moderate exercise (even just a 20 minute walk daily)    Rescue:  - do not use analgesics (e.g., ibuprofen, acetaminophen) more than 2 days per week in order to avoid analgesic rebound headaches    - keep a headache log    Follow-Up:  - " Return in about 3 months (around 12/18/2023).    Signed: Kunal Baker M.D.

## 2023-09-19 ENCOUNTER — TELEPHONE (OUTPATIENT)
Dept: NEUROLOGY | Facility: MEDICAL CENTER | Age: 68
End: 2023-09-19
Payer: COMMERCIAL

## 2023-09-19 NOTE — TELEPHONE ENCOUNTER
Received Refill PA request via [facility]  for Topiramate 25mg tab. (Quantity:60 tab , Day Supply:30)     Insurance: [employer]  Member ID:  [id number]  BIN: [id number]  PCN: [id number]  Group: [id number]     Ran Test claim via [facility] medication Pays for a $8.03 copay. Will outreach to patient to offer specialty pharmacy services and or release to preferred pharmacy

## 2023-09-28 ENCOUNTER — TELEPHONE (OUTPATIENT)
Dept: PHYSICAL THERAPY | Facility: REHABILITATION | Age: 68
End: 2023-09-28
Payer: COMMERCIAL

## 2023-09-28 NOTE — OP THERAPY DISCHARGE SUMMARY
"  Outpatient Physical Therapy  DISCHARGE SUMMARY NOTE      [address]  [address]  [place] [address]  Phone:  [phone]  Fax:  [phone]    Date of Visit: 09/28/2023    Patient: Carlos Cervantes  YOB: 1955  MRN: [id number]     Referring Provider: Omar Gaston D.O.   Referring Diagnosis left, subsequent encounter S46.344W         Functional Assessment Used        Your patient is being discharged from Physical Therapy with the following comments:   Progress plateau  Patient has failed to schedule or reschedule follow-up visits  Date: 10/01/2021       SUBJECTIVE:  Noticeable increased AROM with patient still reporting that he has increased pain at end range and with any weight in his hand... tape seemed to help  OBJECTIVE:  Fle: 160\" n just stops\"  abd 150 pain aarom to 180 \" no worse\"  Er 65  Resisted cuff tests:  Empty can mild, strong  ER: strong no pain  IR strong mild  Full can strong , no pain    arom FLE: 160    ASSESSMENT:   Increased AROM with increased strength mid-range --resisted Cuff tests were strong and mildly painful--cont to suspect GHJ instability but as patient increases strength, pain is lessnening and AROM is improving  PLAN/RECOMMENDATIONS: No patient contact since 10/1/21 .  Patient is independent with his HEP and is being discharged from therapy at this time.        Alan Suero, PT, DPT, OCS    Date: 9/28/2023         "

## 2024-01-04 ENCOUNTER — OFFICE VISIT (OUTPATIENT)
Dept: NEUROLOGY | Facility: MEDICAL CENTER | Age: 69
End: 2024-01-04
Attending: PSYCHIATRY & NEUROLOGY
Payer: COMMERCIAL

## 2024-01-04 VITALS
TEMPERATURE: 97.3 F | HEART RATE: 71 BPM | WEIGHT: 185.63 LBS | DIASTOLIC BLOOD PRESSURE: 76 MMHG | OXYGEN SATURATION: 100 % | RESPIRATION RATE: 16 BRPM | SYSTOLIC BLOOD PRESSURE: 140 MMHG | BODY MASS INDEX: 25.14 KG/M2 | HEIGHT: 72 IN

## 2024-01-04 DIAGNOSIS — G44.52 NEW PERSISTENT DAILY HEADACHE: Primary | ICD-10-CM

## 2024-01-04 PROCEDURE — 99213 OFFICE O/P EST LOW 20 MIN: CPT | Performed by: PSYCHIATRY & NEUROLOGY

## 2024-01-04 PROCEDURE — 3077F SYST BP >= 140 MM HG: CPT | Performed by: PSYCHIATRY & NEUROLOGY

## 2024-01-04 PROCEDURE — 3078F DIAST BP <80 MM HG: CPT | Performed by: PSYCHIATRY & NEUROLOGY

## 2024-01-04 PROCEDURE — 99212 OFFICE O/P EST SF 10 MIN: CPT | Performed by: PSYCHIATRY & NEUROLOGY

## 2024-01-04 RX ORDER — AMITRIPTYLINE HYDROCHLORIDE 10 MG/1
20 TABLET, FILM COATED ORAL NIGHTLY
Qty: 60 TABLET | Refills: 11 | Status: SHIPPED | OUTPATIENT
Start: 2024-01-04 | End: 2024-12-29

## 2024-01-04 ASSESSMENT — PATIENT HEALTH QUESTIONNAIRE - PHQ9: CLINICAL INTERPRETATION OF PHQ2 SCORE: 0

## 2024-01-04 ASSESSMENT — FIBROSIS 4 INDEX: FIB4 SCORE: 0.9

## 2024-01-04 NOTE — PROGRESS NOTES
"[facility] NEUROLOGY  GENERAL NEUROLOGY  FOLLOW-UP VISIT    CC: daily persistent headache    INTERVAL HISTORY:  Keaton Cervantes is a 68 y.o. man with daily persistent headache and a history otherwise notable for asthma, HTN, DM, and ELLEN.  I last saw him in the clinic on 9/18/2023.  At that time I recommended a a trial of topiramate.  Today, he was unaccompanied, and he provided the following interval history:    The following is a summary of headache symptoms, presented in my standard format:    Family History: none  Age at onset: rare headaches earlier in life, current headaches started at age 67 (immediately after toe amputation)  Location: occipital (worse on the left than the right)  Radiation: left parietal, left supra-orbital  Frequency: constant  Duration: constant since 6/2023  Headache Days/Month: 30/30, worse for 15 minutes to 2 hours at a time  Quality: \"like someone hit him with something, stabbing\"  Intensity: with treatment: 2/10, can reach 9/10   Aura: none  Photophobia/Phonophobia/Nausea/Vomiting: no/yes (when pain is 8/10 intensity)/no/no  Provoked by Physical Activity?:   Triggers: bright flashing lights, stress can intensity an already-existing headache  Associated Symptoms: no vision changes  Autonomic Signs (such as ptosis, miosis, conjunctival injection, rhinorrhea, increased lacrimation): none  Head Trauma: 2003: fell 14' off a ladder, ruptured discs, s/p fusion  Association with Menses: n/a  ED Visits: none  Hospitalizations: none  Missed Work Days (wendy at [employer]): no  Sleep: 5-6 hours/night  Caffeine Intake: 3 cups of coffee/day  Hydration: stays well-hydrated (1-1.5 gallons of water/day)  Nutrition: eats regularly  Exercise: nothing formal, active at work  Analgesic Overuse: uses pain medications daily on work days     Current Medication Regimen:  - topiramate: 50 mg daily, associated with cognitive side effects  - hydrocodone-acetaminophen: prescribed for back pain, helpful, " wears off after ~2 hours  - ASA: helpful, wears off after ~2 hours  - lying down: helpful     Medications Tried: Response  Preventive:  - baclofen: ineffective     Rescue:  -      Medications Not Tried:  -     MEDICATIONS:  Current Outpatient Medications   Medication Sig    asa/apap/caffeine (EXCEDRIN) 250-250-65 MG Tab Take 1 Tablet by mouth every 6 hours as needed for Headache.    amitriptyline (ELAVIL) 10 MG Tab Take 2 Tablets by mouth every evening for 360 days.    Insulin Glargine-yfgn 100 UNIT/ML Solution Pen-injector INJECT 8-10 UNITS SUBCUTANEOUSLY AT BEDTIME    LANTUS SOLOSTAR 100 UNIT/ML Solution Pen-injector injection     insulin glargine (LANTUS) 100 UNIT/ML Solution Inject 6-15 Units under the skin at bedtime as needed (Blood Sugar Levels).    HYDROcodone-acetaminophen (NORCO) 7.5-325 MG per tablet Take 1 Tablet by mouth 3 times a day as needed for Severe Pain.    gabapentin (NEURONTIN) 300 MG Cap Take 300 mg by mouth 3 times a day as needed.    glipiZIDE SR (GLUCOTROL) 10 MG TABLET SR 24 HR Take 10 mg by mouth 2 times a day.    metformin (GLUCOPHAGE) 1000 MG tablet Take 1,000 mg by mouth 2 times a day with meals.    cyclobenzaprine (FLEXERIL) 10 mg Tab Take 10 mg by mouth as needed.     MEDICAL, SOCIAL, AND FAMILY HISTORY:  There is no change in the patient's ROS or medical, social, or family histories since the previous visit on 9/18/2023.    REVIEW OF SYSTEMS:  A ROS was completed.  Pertinent positives and negatives were included in the HPI, above.  All other systems were reviewed and are negative.    PHYSICAL EXAM:  General/Medical:  - NAD  - hair, skin, nails, and joints were normal    Neuro:  MENTAL STATUS: awake and alert; no deficits of speech or language; oriented to person, place, and time; affect was appropriate to situation    CRANIAL NERVES:    II: acuity: J2/J2 with glasses, fields: intact to confrontation, pupils: NT, discs: sharp    III/IV/VI: versions: intact without nystagmus    V:  facial sensation: symmetric to light touch    VII: facial expression: symmetric    VIII: hearing: intact to finger rub    IX/X: palate: elevates symmetrically    XI: shoulder shrug: symmetric    XII: tongue: midline    MOTOR:  - bulk: NT  - tone: NT  Upper Extremity Strength (R/L)    NT   Elbow flexion NT   Elbow extension NT   Shoulder abduction NT     Lower Extremity Strength (R/L)   Hip flexion NT   Knee extension NT   Knee flexion NT   Ankle plantarflexion NT   Ankle dorsiflexion NT     - pronator drift: NT  - abnormal movements: none    SENSATION:  - light touch: NT  - vibration (R/L, seconds): NT at the great toes  - pinprick: NT  - proprioception: NT  - Romberg: NT    COORDINATION:  - finger to nose: NT  - finger tapping: NT    REFLEXES:  Reflex Right Left   BR NT NT   Biceps NT NT   Triceps NT NT   Patellae NT NT   Achilles NT NT   Toes NT NT     GAIT:  - NT    REVIEW OF IMAGING STUDIES:  No additional data since the last visit.    REVIEW OF LABORATORY STUDIES:  No recent data available.    ASSESSMENT:  Keaton Cervantes is a 68 y.o. man with daily persistent headache and a history otherwise notable for asthma, HTN, DM, and ELLEN.  Plans/recommendations as follows:    PLAN:  Daily Persistent Headache:  Prevention:  - stop topiramate  - trial of amitriptyline with a goal dosage of 20 mg before bed; most common side effects discussed  - referral to Pain Management for consideration of occipital nerve block since differential also includes occipital neuralgia  - get 7-9 hours of sleep per night; can try supplementing melatonin 2-10 mg, 2-3 hours before bedtime  - drink plenty of fluids (urine should be nearly clear)  - avoid excessive caffeine intake (no more than 2 servings per day and nothing in the afternoon)  - eat regular meals (don't skip meals)  - get moderate exercise (even just a 20 minute walk daily)    Rescue:  - do not use analgesics (e.g., ibuprofen, acetaminophen) more than 2 days per week  in order to avoid analgesic rebound headaches    - keep a headache log    Follow-Up:  - Return in about 3 months (around 4/4/2024).    Signed: Kunal Baker M.D.

## 2024-01-24 ENCOUNTER — OFFICE VISIT (OUTPATIENT)
Dept: PHYSICAL MEDICINE AND REHAB | Facility: MEDICAL CENTER | Age: 69
End: 2024-01-24
Payer: COMMERCIAL

## 2024-01-24 VITALS
WEIGHT: 180.78 LBS | DIASTOLIC BLOOD PRESSURE: 85 MMHG | HEIGHT: 72 IN | OXYGEN SATURATION: 97 % | BODY MASS INDEX: 24.49 KG/M2 | TEMPERATURE: 97 F | SYSTOLIC BLOOD PRESSURE: 135 MMHG | HEART RATE: 86 BPM

## 2024-01-24 DIAGNOSIS — R07.81 RIB PAIN: ICD-10-CM

## 2024-01-24 DIAGNOSIS — Z98.890 HISTORY OF LUMBAR SURGERY: ICD-10-CM

## 2024-01-24 DIAGNOSIS — M54.81 OCCIPITAL NEURALGIA OF LEFT SIDE: ICD-10-CM

## 2024-01-24 DIAGNOSIS — G89.29 CHRONIC BILATERAL LOW BACK PAIN WITHOUT SCIATICA: ICD-10-CM

## 2024-01-24 DIAGNOSIS — M54.50 CHRONIC BILATERAL LOW BACK PAIN WITHOUT SCIATICA: ICD-10-CM

## 2024-01-24 DIAGNOSIS — G89.29 OTHER CHRONIC PAIN: ICD-10-CM

## 2024-01-24 PROCEDURE — 99204 OFFICE O/P NEW MOD 45 MIN: CPT | Performed by: STUDENT IN AN ORGANIZED HEALTH CARE EDUCATION/TRAINING PROGRAM

## 2024-01-24 PROCEDURE — 3075F SYST BP GE 130 - 139MM HG: CPT | Performed by: STUDENT IN AN ORGANIZED HEALTH CARE EDUCATION/TRAINING PROGRAM

## 2024-01-24 PROCEDURE — 3079F DIAST BP 80-89 MM HG: CPT | Performed by: STUDENT IN AN ORGANIZED HEALTH CARE EDUCATION/TRAINING PROGRAM

## 2024-01-24 ASSESSMENT — FIBROSIS 4 INDEX: FIB4 SCORE: 0.9

## 2024-01-24 ASSESSMENT — PATIENT HEALTH QUESTIONNAIRE - PHQ9: CLINICAL INTERPRETATION OF PHQ2 SCORE: 0

## 2024-01-24 NOTE — PROGRESS NOTES
"New Patient Note    Interventional Pain and Spine  Physiatry (Physical Medicine and Rehabilitation)     Patient Name: Keaton Cervantes  : 1955  Date of Service: 2024  PCP: NAVA Velasquez.  Referring Provider: Kunal Baker M.D.    Chief Complaint:   Chief Complaint   Patient presents with    New Patient     New persistent daily headache    Headache     Back pain       HPI  HISTORY FROM INITIAL VISIT (2024):  Keaton Cervantes is a 68 y.o. male who presents today as referred by neurology for consideration of left occipital nerve block.  He reports left occipital headaches that feel like \"someone is hitting me with a stick.\" Started after he received a right partial foot amputation in 22. Headaches never go away. Denies redness or tearing of the eye. HA improve with norco and ASA which he takes for his back. HA impair his ability to sleep. Has not had injections for this.     Also reports lumbar back pain that started after he fell in his house onto tile floor around . Has hx of lumbar fusion with Dr. Goldberg 2009 (Anterior lumbar interbody fusions, L3-L4, L4-L5, L5-S1.  2. Insertion of biomechanical devices, L3-L4, L4-L5, L5-S1.  Synthes      PEEK large YOVANY spacers, 50-mm device for L3-L4, 17-mm device for      L4-L5, and a 13-mm device for L5-S1.  3. Insertion of BMP and large Infuse kit evenly divided between L3-      L4, L4-L5, L5-S1.)    This surgery initially improved pain from 8-9/10 to 2-3/10 for a few years, then the pain worsened and is now approximately 5/10 on NRS. Manages this by wearing a back brace to work. He takes norco for this as prescribed by his PCP Cami ANTONY.    Reports having had two falls at work, 4 and 7 years ago where he injured his left shoulder and sustained fractured ribs (2 on the right, 1 on the left). Notes ongoing pain at his bilateral posterior ribs. Feels like a shooting pain from the inside outward, doesn't radiate in a " dermatomal distribution. He takes norco for this as prescribed by his PCP Cami ANTONY.    Pain right now is 5/10 on the numeric pain scale. His pain at its best-worse level during the course of the day is typically 4-5/10, respectively.  Pain worsens with bending forward, bending backwards, walking upstairs, and walking downstairs and improves with medications. His pain interferes somewhat with ADLs. The patient otherwise denies radiating pain, new focal weakness, numbness, or bladder/bowel incontinence.     The patient has done a provider driven physical therapy program for low back pain but not recently.    Patient has tried the following medications with varied success (current meds in bold):   norco 7.5-325mg BID PRN    Therapeutic modalities and interventional therapies to date include:  -no injections    Psychological testing for pain as depression and pain commonly coexist and need to both be treated.     Opioid Risk Score: 0      Interpretation of Opioid Risk Score   Score 0-3 = Low risk of abuse. Do UDS at least once per year.  Score 4-7 = Moderate risk of abuse. Do UDS 1-4 times per year.  Score 8+ = High risk of abuse. Refer to specialist.    PHQ      6/12/2023     1:00 PM 1/4/2024     9:00 AM 1/24/2024     8:40 AM   Depression Screen (PHQ-2/PHQ-9)   PHQ-2 Total Score 0 0 0       Interpretation of PHQ-9 Total Score   Score Severity   1-4 No Depression   5-9 Mild Depression   10-14 Moderate Depression   15-19 Moderately Severe Depression   20-27 Severe Depression      Medical records review:  I reviewed the note from the referring provider Kunal Baker M.D. including the note dated 1/4/2024.    ROS:   Red Flags ROS:   Fever, Chills, Sweats: Denies  Involuntary Weight Loss: Denies  Bladder Incontinence: Denies  Bowel Incontinence: denies  Saddle Anesthesia: Denies    All other systems reviewed and negative.     PMHx:   Past Medical History:   Diagnosis Date    Allergy     ASTHMA     Back pain      Chronic    Bronchitis     CATARACT     Dental disorder     Diabetes     oral rx & insulin    Hypertension     MEDICAL HOME     Neck pain     Chronic    ELLEN (obstructive sleep apnea)     Bipap    ELLEN (obstructive sleep apnea) 08/12/2022    Pt. states no longer using machine since wt loss.    Sleep apnea        PSHx:   Past Surgical History:   Procedure Laterality Date    TENDON LENGHTENING Right 8/17/2022    Procedure: RIGHT GASTROCNEMIUS RECESSION,;  Surgeon: Parrish Hardy M.D.;  Location: [facility];  Service: Orthopedics    TENDON TRANSFER Right 8/17/2022    Procedure: TRANSFER, TENDON-PERONEUS LONGUS TO BREVIS TRANSFER;  Surgeon: Parrish Hardy M.D.;  Location: [facility];  Service: Orthopedics    IRRIGATION & DEBRIDEMENT GENERAL Right 8/17/2022    Procedure: IRRIGATION AND DEBRIDEMENT, WOUND- FOOT;  Surgeon: Parrish Hardy M.D.;  Location: [facility];  Service: Orthopedics    METATARSAL HEAD RESECTION Right 8/17/2022    Procedure: EXCISION, METATARSAL BONE, HEAD-PARTIAL FIRST METATARSAL EXCISION;  Surgeon: Parrish Hardy M.D.;  Location: [facility];  Service: Orthopedics    BONE BIOPSY Right 8/17/2022    Procedure: BIOPSY, BONE;  Surgeon: Parrish Hardy M.D.;  Location: [facility];  Service: Orthopedics    TOE AMPUTATION Right 06/23/2022    Procedure: AMPUTATION, TOE 2ND;  Surgeon: Wilman Madera M.D.;  Location: [facility];  Service: Orthopedics    ROTATOR CUFF REPAIR Left 04/2022    [facility]    TOE AMPUTATION Right 03/04/2022    Procedure: AMPUTATION, TOE 1ST RAY;  Surgeon: Wilman Madera M.D.;  Location: [facility];  Service: Orthopedics    LUMBAR FUSION ANTERIOR  08/04/2009    Performed by JOSEE BLANTON at [facility] ORS    FUSION, SPINE, LUMBAR, PLIF  08/04/2009    Performed by JOSEE BLANTON at [facility] ORS    LUMBAR LAMINECTOMY DISKECTOMY  08/04/2009    Performed by FRANNY  "JOSEE MENSAH at [facility] ORS    LAMINOTOMY  2009    Performed by JOSEE BLANTON at [facility]S    SOMNOPLASTY  06/10/2009    Performed by ELÍAS RILEY at [facility] ORS    SEPTOPLASTY  06/10/2009    Performed by ELÍAS RILEY at [facility]S    ANTROSTOMY  06/10/2009    Performed by ELÍAS RILEY at [facility] ORS    ETHMOIDECTOMY  06/10/2009    Performed by ELÍAS RILEY at [facility] ORS    CATARACT PHACO WITH IOL  2008    Performed by OCTAVIO HARDWICK at [facility]    CATARACT PHACO WITH IOL  2008    [facility]ICK [facility]    APPENDECTOMY      ORIF, ANKLE      VASECTOMY         Family Hx:   Family History   Problem Relation Age of Onset    Hypertension Father     Diabetes Father        Social Hx:  Social History     Socioeconomic History    Marital status:      Spouse name: Not on file    Number of children: Not on file    Years of education: Not on file    Highest education level: Not on file   Occupational History    Not on file   Tobacco Use    Smoking status: Former     Current packs/day: 0.00     Types: Cigarettes     Quit date:      Years since quittin.0    Smokeless tobacco: Current     Types: Chew   Vaping Use    Vaping Use: Never used   Substance and Sexual Activity    Alcohol use: No     Comment: Pt. states, \"H/O Alcoholism, quit .\"    Drug use: No    Sexual activity: Yes     Partners: Female   Other Topics Concern    Not on file   Social History Narrative    Not on file     Social Determinants of Health     Financial Resource Strain: Not on file   Food Insecurity: Not on file   Transportation Needs: Not on file   Physical Activity: Not on file   Stress: Not on file   Social Connections: Not on file   Intimate Partner Violence: Not on file   Housing Stability: Not on file       Allergies:  Allergies   Allergen Reactions    Sulfa " Drugs Unspecified     Pt hasn't had meds in so long he doesn't remember what his reaction is, he just remembers there is a reaction and that he shouldn't have them     Coconut (Cocos Nucifera) Allergy Skin Test     Coconut Oil     Flu Virus Vaccine Shortness of Breath    Other Food Anaphylaxis     coconut    Pneumococcal Vaccines Shortness of Breath    Sulfamethoxazole     Nutrasweet Aspartame [Aspartame]      Causes migraines       Medications: reviewed on epic.   Outpatient Medications Marked as Taking for the 1/24/24 encounter (Office Visit) with Missy Rhoades M.D.   Medication Sig Dispense Refill    asa/apap/caffeine (EXCEDRIN) 250-250-65 MG Tab Take 1 Tablet by mouth every 6 hours as needed for Headache.      amitriptyline (ELAVIL) 10 MG Tab Take 2 Tablets by mouth every evening for 360 days. 60 Tablet 11    Insulin Glargine-yfgn 100 UNIT/ML Solution Pen-injector INJECT 8-10 UNITS SUBCUTANEOUSLY AT BEDTIME      LANTUS SOLOSTAR 100 UNIT/ML Solution Pen-injector injection       insulin glargine (LANTUS) 100 UNIT/ML Solution Inject 6-15 Units under the skin at bedtime as needed (Blood Sugar Levels).      HYDROcodone-acetaminophen (NORCO) 7.5-325 MG per tablet Take 1 Tablet by mouth 3 times a day as needed for Severe Pain.      gabapentin (NEURONTIN) 300 MG Cap Take 300 mg by mouth 3 times a day as needed.      glipiZIDE SR (GLUCOTROL) 10 MG TABLET SR 24 HR Take 10 mg by mouth 2 times a day.      metformin (GLUCOPHAGE) 1000 MG tablet Take 1,000 mg by mouth 2 times a day with meals.      cyclobenzaprine (FLEXERIL) 10 mg Tab Take 10 mg by mouth as needed.          Current Outpatient Medications on File Prior to Visit   Medication Sig Dispense Refill    asa/apap/caffeine (EXCEDRIN) 250-250-65 MG Tab Take 1 Tablet by mouth every 6 hours as needed for Headache.      amitriptyline (ELAVIL) 10 MG Tab Take 2 Tablets by mouth every evening for 360 days. 60 Tablet 11    Insulin Glargine-yfgn 100 UNIT/ML Solution  Pen-injector INJECT 8-10 UNITS SUBCUTANEOUSLY AT BEDTIME      LANTUS SOLOSTAR 100 UNIT/ML Solution Pen-injector injection       insulin glargine (LANTUS) 100 UNIT/ML Solution Inject 6-15 Units under the skin at bedtime as needed (Blood Sugar Levels).      HYDROcodone-acetaminophen (NORCO) 7.5-325 MG per tablet Take 1 Tablet by mouth 3 times a day as needed for Severe Pain.      gabapentin (NEURONTIN) 300 MG Cap Take 300 mg by mouth 3 times a day as needed.      glipiZIDE SR (GLUCOTROL) 10 MG TABLET SR 24 HR Take 10 mg by mouth 2 times a day.      metformin (GLUCOPHAGE) 1000 MG tablet Take 1,000 mg by mouth 2 times a day with meals.      cyclobenzaprine (FLEXERIL) 10 mg Tab Take 10 mg by mouth as needed.       No current facility-administered medications on file prior to visit.         EXAMINATION     Physical Exam:   /85 (BP Location: Right arm, Patient Position: Sitting, BP Cuff Size: Adult)   Pulse 86   Temp 36.1 °C (97 °F) (Temporal)   Ht 1.829 m (6')   Wt 82 kg (180 lb 12.4 oz)   SpO2 97%     Constitutional:   Body Habitus: Body mass index is 24.52 kg/m².  Cooperation: Fully cooperates with exam  Appearance: Well-groomed, well-nourished.    Eyes: No scleral icterus to suggest severe liver disease, no proptosis to suggest severe hyperthyroidism    ENT -no obvious auditory deficits, no noticeable facial droop     Skin -no rashes or lesions noted     Respiratory-  breathing comfortably on room air, no audible wheezing    Cardiovascular-distal extremities warm and well perfused.  No lower extremity edema is noted.     Gastrointestinal - no obvious abdominal masses, non-distended    Psychiatric- alert and oriented ×3. Normal affect.     Gait - normal gait, no use of ambulatory device, nonantalgic. Heel walking intact.  Difficulty with toe walking       Musculoskeletal and Neuro -     Positive Tinel's over left greater occipital nerve reproducing pain radiating up her head     Thoracic/Lumbar Spine/Sacral  Spine/Hips   Inspection: No evidence of atrophy in bilateral lower extremities throughout     There is limited active range of motion with lumbar extension    Facet loading maneuver negative bilaterally    Palpation:   Tenderness to palpation over the  bilateral lateral ribs around T8 level, midline sacrum, right lumbar facets . No tenderness to palpation elsewhere in the low back/hips including  midline lumbar spine .    Lumbar spine /hip provocative exam maneuvers  Straight leg raise negative bilaterally  FADIR test negative bilaterally  Slump-sit test negative bilaterally    SI joint tests  SAMANTHA test negative bilaterally  Thigh thrust test positive bilaterally  SI joint compression positive bilaterally  SI joint distraction negative bilaterally  Sacral thrust test negative bilaterally  Gaenslens maneuver positive bilaterally    Key points for the international standards for neurological classification of spinal cord injury (ISNCSCI) to light touch.   Dermatome R L   L2 2 2   L3 2 2   L4 2 2   L5 2 2   S1 2 2   S2 2 2       Motor Exam Lower Extremities  ? Myotome R L   Hip flexion L2 5 5   Knee extension L3 5 5   Ankle dorsiflexion L4 5 5   Toe extension L5 5 5   Ankle plantarflexion S1 5 5   Hip abduction 4/5 bilaterally    Reflexes  ?  R L   Patella  2+ 2+   Achilles   2+ 2+     Clonus of the ankle negative bilaterally       MEDICAL DECISION MAKING    Medical records review: see under HPI section.     DATA    Labs: Personally reviewed at today's visit:     Lab Results   Component Value Date/Time    SODIUM 135 02/17/2023 12:20 PM    POTASSIUM 4.2 02/17/2023 12:20 PM    CHLORIDE 99 02/17/2023 12:20 PM    CO2 26 02/17/2023 12:20 PM    ANION 10.0 02/17/2023 12:20 PM    GLUCOSE 108 (H) 02/17/2023 12:20 PM    BUN 22 02/17/2023 12:20 PM    CREATININE 0.71 02/17/2023 12:20 PM    CREATININE 0.7 08/10/2006 01:05 PM    CALCIUM 9.5 02/17/2023 12:20 PM    ASTSGOT 20 02/17/2023 12:20 PM    ALTSGPT 22 02/17/2023 12:20 PM     TBILIRUBIN 0.3 02/17/2023 12:20 PM    ALBUMIN 4.6 02/17/2023 12:20 PM    TOTPROTEIN 7.5 02/17/2023 12:20 PM    GLOBULIN 2.9 02/17/2023 12:20 PM    AGRATIO 1.6 02/17/2023 12:20 PM       Lab Results   Component Value Date/Time    PROTHROMBTM 10.7 (L) 08/10/2006 01:05 PM    INR 0.85 (L) 08/10/2006 01:05 PM        Lab Results   Component Value Date/Time    WBC 10.7 02/17/2023 12:20 PM    RBC 4.62 (L) 02/17/2023 12:20 PM    HEMOGLOBIN 13.9 (L) 02/17/2023 12:20 PM    HEMATOCRIT 42.6 02/17/2023 12:20 PM    MCV 92.2 02/17/2023 12:20 PM    MCH 30.1 02/17/2023 12:20 PM    MCHC 32.6 (L) 02/17/2023 12:20 PM    MPV 9.5 02/17/2023 12:20 PM    NEUTSPOLYS 51.30 02/17/2023 12:20 PM    LYMPHOCYTES 38.10 02/17/2023 12:20 PM    MONOCYTES 6.90 02/17/2023 12:20 PM    EOSINOPHILS 2.20 02/17/2023 12:20 PM    BASOPHILS 1.20 02/17/2023 12:20 PM        Lab Results   Component Value Date/Time    HBA1C 7.7 (H) 02/17/2023 12:20 PM        Imaging:   I personally reviewed following images, these are my reads    Chest x-ray 6/21/2022  No evidence of acute rib fracture. See formal radiology report for further details.      IMAGING radiology reads. I reviewed the following radiology reads       Results for orders placed during the hospital encounter of 07/05/23    MR-BRAIN-W/O    Impression  No acute intracranial process.    Nonspecific T2 hyperintensities are noted in the periventricular and deep white matter, most likely related to chronic microvascular ischemia.             Results for orders placed during the hospital encounter of 02/03/09    MR-CERVICAL SPINE-W/O    Impression  IMPRESSION:    1. POSTERIOR SPONDYLOTIC DISC BULGING AT C5-6 WHICH EXTENDS TO THE  LEFT-SIDED NEURAL FORAMEN WITH MILD CANAL STENOSIS AND LEFT-SIDED NEURAL  FORAMINAL NARROWING PRESENT.  THERE IS ALSO ACCOMPANYING LEFT-SIDED  UNCINATE SPURRING.    2. MILD DISC DESICCATION AT C2-3, C3-4, C4-5, AND C6-7 AS DESCRIBED.      CBG/bht        Read By JOVANY FLORENCE MD on Feb 4  2009  1:23PM  : TONI Transcription Date: Feb 4 2009  2:16PM  THIS DOCUMENT HAS BEEN ELECTRONICALLY SIGNED BY: JOVANY FLORENCE MD on Feb 6 2009 12:16PM      Results for orders placed during the hospital encounter of 03/06/09    MR-LUMBAR SPINE-W/O    Impression  IMPRESSION:    1. CENTRAL POSTERIOR DISC PROTRUSION WITH ANNULAR TEAR PRESENT AT L4-5.  ASSOCIATED MILD CANAL NARROWING IS SEEN ALONG WITH FACET AND LIGAMENTUM  FLAVUM HYPERTROPHY.  MILD LEFT-SIDED FORAMINAL NARROWING IS PRESENT.    2. SMALL LEFT PARACENTRAL DISC PROTRUSION PRESENT AT L5-S1 WITHOUT  SIGNIFICANT ASSOCIATED CENTRAL CANAL OR FORAMINAL NARROWING.  THERE MAY  BE MILD NARROWING OF THE LEFT LATERAL RECESS.    3. NO SIGNIFICANT MALALIGNMENT AT THE LUMBAR SPINE.    4. EXTENSIVE VERTEBRAL BODY HEMANGIOMA THROUGHOUT L1.    5. NO MARROW EDEMA TO INDICATE LUMBAR SPINE FRACTURE.    6. MOTION ARTIFACT LIMITS EXAM.    MM/ljp    Read By TAYLOR LOVE MD on Mar  6 2009  1:29PM  : LATOSHA Transcription Date: Mar  8 2009  4:50PM  THIS DOCUMENT HAS BEEN ELECTRONICALLY SIGNED BY: TAYLOR LOVE MD on Mar  11 2009  1:34PM        Results for orders placed during the hospital encounter of 03/06/09    MR-LUMBAR SPINE-W/O    Impression  IMPRESSION:    1. CENTRAL POSTERIOR DISC PROTRUSION WITH ANNULAR TEAR PRESENT AT L4-5.  ASSOCIATED MILD CANAL NARROWING IS SEEN ALONG WITH FACET AND LIGAMENTUM  FLAVUM HYPERTROPHY.  MILD LEFT-SIDED FORAMINAL NARROWING IS PRESENT.    2. SMALL LEFT PARACENTRAL DISC PROTRUSION PRESENT AT L5-S1 WITHOUT  SIGNIFICANT ASSOCIATED CENTRAL CANAL OR FORAMINAL NARROWING.  THERE MAY  BE MILD NARROWING OF THE LEFT LATERAL RECESS.    3. NO SIGNIFICANT MALALIGNMENT AT THE LUMBAR SPINE.    4. EXTENSIVE VERTEBRAL BODY HEMANGIOMA THROUGHOUT L1.    5. NO MARROW EDEMA TO INDICATE LUMBAR SPINE FRACTURE.    6. MOTION ARTIFACT LIMITS EXAM.    MM/ljp    Read By TAYLOR LOVE MD on Mar  6 2009  1:29PM  : LATOSHA  Transcription Date: Mar  8 2009  4:50PM  THIS DOCUMENT HAS BEEN ELECTRONICALLY SIGNED BY: TAYLOR LOVE MD on Mar  11 2009  1:34PM                                      Results for orders placed during the hospital encounter of 11/06/11    DX-ANKLE 3+ VIEWS    Impression  No acute bony abnormality.      Results for orders placed during the hospital encounter of 05/27/09    DX-CHEST-2 VIEWS    Impression  IMPRESSION:    NORMAL CHEST.      WILMERW:ignacia    Read By WILL THOMPSON MD on May 27 2009  3:24PM  : IGNACIA Transcription Date: May 28 2009 11:52AM  THIS DOCUMENT HAS BEEN ELECTRONICALLY SIGNED BY: WILL THOMPSON MD on May  29 2009 10:45PM       Results for orders placed in visit on 11/03/12    DX-FINGER(S) 2+    Impression  No evidence of fracture or dislocation.    Results for orders placed during the hospital encounter of 07/28/22    DX-FOOT-COMPLETE 3+ RIGHT    Impression  1.  Soft tissue gas is present overlying the first transmetatarsal amputation site and indistinctness of the cortex of the every dictation site site of the first metatarsal which raises suspicion for infection. MRI of the foot with and without contrast  could be obtained for further evaluation if indicated.  2.  Subacute nonunited fracture of the second metatarsal base with new moderate lateral displacement and bone callus formation.  3.  Interval agitation of the second digit at the proximal phalanx               Results for orders placed during the hospital encounter of 08/04/09    DX-LUMBAR SPINE-2 OR 3 VIEWS    Impression  IMPRESSION:    INTRAOPERATIVE FLUORO SPOT IMAGES AS DESCRIBED ABOVE.    CHER/guillermo    Read By WILL THOMPSON MD on Aug  4 2009  6:16PM  : GUILLERMO Transcription Date: Aug  4 2009  7:46PM  THIS DOCUMENT HAS BEEN ELECTRONICALLY SIGNED BY: WILL THOMPSON MD on Aug  5 2009  8:03AM      Results for orders placed during the hospital encounter of 05/27/21    GL-NELM-CLVQRGEIMH (WITH 1-VIEW CXR) LEFT    Impression  Left  lateral seventh and ninth acute rib fractures.    No pneumothorax or acute cardiopulmonary abnormality.         Results for orders placed during the hospital encounter of 21    DX-SHOULDER 2+ LEFT    Impression  No acute osseous abnormality.              Diagnosis  Visit Diagnoses     ICD-10-CM   1. Occipital neuralgia of left side  M54.81   2. Chronic bilateral low back pain without sciatica  M54.50    G89.29   3. Other chronic pain  G89.29   4. Rib pain  R07.81   5. History of lumbar surgery  Z98.890         ASSESSMENT AND PLAN:  Keaton Cervantes ( 1955) is a male      Keaton was seen today for new patient and headache.    Diagnoses and all orders for this visit:    Occipital neuralgia of left side  -     Referral to Pain Clinic    Chronic bilateral low back pain without sciatica  -     Referral to Physical Therapy  -     DX-LUMBAR SPINE-2 OR 3 VIEWS; Future  -     MR-LUMBAR SPINE-W/O; Future    Other chronic pain  -     Referral to Physical Therapy    Rib pain  -     Referral to Physical Therapy    History of lumbar surgery  -     Referral to Physical Therapy  -     DX-LUMBAR SPINE-2 OR 3 VIEWS; Future  -     MR-LUMBAR SPINE-W/O; Future          PLAN  Physical Therapy: I ordered physical therapy to focus on strengthening and stretching as well as a home exercise program. On exam today the pt exhibited weakness of the hip abductors.  Discussed that this is an important stabilizer of the lumbar spine and hips, and strengthening this muscle with physical therapy should improve pain.    Home Exercise Program: I provided the patient with a home exercise program focusing on strengthening and stretching at today's visit.     Diagnostic workup: as above and Personally reviewed at today's visit:   Chest x-ray 2022    Medications:   -Okay to continue Norco 7.5-325 mg twice daily as needed as prescribed by the patient's PCP as long as there is compliance with .  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  He is low risk for narcotic abuse with an opioid risk score of 0 at today's visit    Interventions:   -Plan for left greater occipital nerve block under ultrasound guidance.  The risks, benefits, and alternatives to this procedure were discussed and the patient wishes to proceed with the procedure. Risks include but are not limited to damage to surrounding structures, infection, bleeding, worsening of pain which can be permanent, and weakness which can be permanent. Benefits include pain relief and improved function. Alternatives include not doing the procedure.    -Neuraxial injections pending x-ray and MRI lumbar spine  - Discussed possible trial of intercostal nerve blocks for rib pain    Follow-up: 6 weeks to assess progress with provider driven home exercise program, or sooner if occipital nerve block authorized sooner    Orders Placed This Encounter    DX-LUMBAR SPINE-2 OR 3 VIEWS    MR-LUMBAR SPINE-W/O    Referral to Pain Clinic    Referral to Physical Therapy       Missy Rhoades MD  Interventional Pain and Spine  Physical Medicine and Rehabilitation  [facility] Kunal Baker M.D.     The above note documents my personal evaluation of this patient. In addition, I have reviewed and confirmed with the patient and MA the supportive information documented in today's Patient Health Questionnaire and Office Note.     Please note that this dictation was created using voice recognition software. I have made every reasonable attempt to correct obvious errors, but I expect that there are errors of grammar and possibly content that I did not discover before finalizing the note.

## 2024-02-06 ENCOUNTER — HOSPITAL ENCOUNTER (OUTPATIENT)
Dept: LAB | Facility: MEDICAL CENTER | Age: 69
End: 2024-02-06
Payer: COMMERCIAL

## 2024-02-06 LAB
25(OH)D3 SERPL-MCNC: 23 NG/ML (ref 30–100)
BASOPHILS # BLD AUTO: 1.8 % (ref 0–1.8)
BASOPHILS # BLD: 0.16 K/UL (ref 0–0.12)
EOSINOPHIL # BLD AUTO: 0.23 K/UL (ref 0–0.51)
EOSINOPHIL NFR BLD: 2.6 % (ref 0–6.9)
ERYTHROCYTE [DISTWIDTH] IN BLOOD BY AUTOMATED COUNT: 41.3 FL (ref 35.9–50)
EST. AVERAGE GLUCOSE BLD GHB EST-MCNC: 137 MG/DL
HBA1C MFR BLD: 6.4 % (ref 4–5.6)
HCT VFR BLD AUTO: 48.1 % (ref 42–52)
HGB BLD-MCNC: 15.5 G/DL (ref 14–18)
IMM GRANULOCYTES # BLD AUTO: 0.02 K/UL (ref 0–0.11)
IMM GRANULOCYTES NFR BLD AUTO: 0.2 % (ref 0–0.9)
LYMPHOCYTES # BLD AUTO: 2.69 K/UL (ref 1–4.8)
LYMPHOCYTES NFR BLD: 30.9 % (ref 22–41)
MCH RBC QN AUTO: 29.8 PG (ref 27–33)
MCHC RBC AUTO-ENTMCNC: 32.2 G/DL (ref 32.3–36.5)
MCV RBC AUTO: 92.3 FL (ref 81.4–97.8)
MONOCYTES # BLD AUTO: 0.43 K/UL (ref 0–0.85)
MONOCYTES NFR BLD AUTO: 4.9 % (ref 0–13.4)
NEUTROPHILS # BLD AUTO: 5.18 K/UL (ref 1.82–7.42)
NEUTROPHILS NFR BLD: 59.6 % (ref 44–72)
NRBC # BLD AUTO: 0 K/UL
NRBC BLD-RTO: 0 /100 WBC (ref 0–0.2)
PLATELET # BLD AUTO: 344 K/UL (ref 164–446)
PMV BLD AUTO: 9.5 FL (ref 9–12.9)
PSA SERPL-MCNC: 0.26 NG/ML (ref 0–4)
RBC # BLD AUTO: 5.21 M/UL (ref 4.7–6.1)
WBC # BLD AUTO: 8.7 K/UL (ref 4.8–10.8)

## 2024-02-06 PROCEDURE — 83036 HEMOGLOBIN GLYCOSYLATED A1C: CPT

## 2024-02-06 PROCEDURE — 84153 ASSAY OF PSA TOTAL: CPT

## 2024-02-06 PROCEDURE — 82306 VITAMIN D 25 HYDROXY: CPT

## 2024-02-06 PROCEDURE — 85025 COMPLETE CBC W/AUTO DIFF WBC: CPT

## 2024-02-06 PROCEDURE — 36415 COLL VENOUS BLD VENIPUNCTURE: CPT

## 2024-02-06 PROCEDURE — 80061 LIPID PANEL: CPT

## 2024-02-06 PROCEDURE — 80053 COMPREHEN METABOLIC PANEL: CPT

## 2024-02-07 ENCOUNTER — HOSPITAL ENCOUNTER (OUTPATIENT)
Dept: RADIOLOGY | Facility: MEDICAL CENTER | Age: 69
End: 2024-02-07
Attending: STUDENT IN AN ORGANIZED HEALTH CARE EDUCATION/TRAINING PROGRAM
Payer: COMMERCIAL

## 2024-02-07 DIAGNOSIS — M54.50 CHRONIC BILATERAL LOW BACK PAIN WITHOUT SCIATICA: ICD-10-CM

## 2024-02-07 DIAGNOSIS — G89.29 CHRONIC BILATERAL LOW BACK PAIN WITHOUT SCIATICA: ICD-10-CM

## 2024-02-07 DIAGNOSIS — Z98.890 HISTORY OF LUMBAR SURGERY: ICD-10-CM

## 2024-02-07 LAB
ALBUMIN SERPL BCP-MCNC: 4.5 G/DL (ref 3.2–4.9)
ALBUMIN/GLOB SERPL: 1.3 G/DL
ALP SERPL-CCNC: 55 U/L (ref 30–99)
ALT SERPL-CCNC: 8 U/L (ref 2–50)
ANION GAP SERPL CALC-SCNC: 11 MMOL/L (ref 7–16)
AST SERPL-CCNC: 21 U/L (ref 12–45)
BILIRUB SERPL-MCNC: 0.3 MG/DL (ref 0.1–1.5)
BUN SERPL-MCNC: 18 MG/DL (ref 8–22)
CALCIUM ALBUM COR SERPL-MCNC: 9.6 MG/DL (ref 8.5–10.5)
CALCIUM SERPL-MCNC: 10 MG/DL (ref 8.5–10.5)
CHLORIDE SERPL-SCNC: 102 MMOL/L (ref 96–112)
CHOLEST SERPL-MCNC: 151 MG/DL (ref 100–199)
CO2 SERPL-SCNC: 24 MMOL/L (ref 20–33)
CREAT SERPL-MCNC: 0.85 MG/DL (ref 0.5–1.4)
FASTING STATUS PATIENT QL REPORTED: NORMAL
GFR SERPLBLD CREATININE-BSD FMLA CKD-EPI: 95 ML/MIN/1.73 M 2
GLOBULIN SER CALC-MCNC: 3.5 G/DL (ref 1.9–3.5)
GLUCOSE SERPL-MCNC: 94 MG/DL (ref 65–99)
HDLC SERPL-MCNC: 63 MG/DL
LDLC SERPL CALC-MCNC: 80 MG/DL
POTASSIUM SERPL-SCNC: 5.3 MMOL/L (ref 3.6–5.5)
PROT SERPL-MCNC: 8 G/DL (ref 6–8.2)
SODIUM SERPL-SCNC: 137 MMOL/L (ref 135–145)
TRIGL SERPL-MCNC: 42 MG/DL (ref 0–149)

## 2024-02-07 PROCEDURE — 72100 X-RAY EXAM L-S SPINE 2/3 VWS: CPT

## 2024-02-27 ENCOUNTER — HOSPITAL ENCOUNTER (OUTPATIENT)
Dept: RADIOLOGY | Facility: MEDICAL CENTER | Age: 69
End: 2024-02-27
Attending: STUDENT IN AN ORGANIZED HEALTH CARE EDUCATION/TRAINING PROGRAM
Payer: COMMERCIAL

## 2024-02-27 DIAGNOSIS — G89.29 CHRONIC BILATERAL LOW BACK PAIN WITHOUT SCIATICA: ICD-10-CM

## 2024-02-27 DIAGNOSIS — Z98.890 HISTORY OF LUMBAR SURGERY: ICD-10-CM

## 2024-02-27 DIAGNOSIS — M54.50 CHRONIC BILATERAL LOW BACK PAIN WITHOUT SCIATICA: ICD-10-CM

## 2024-02-27 PROCEDURE — 72148 MRI LUMBAR SPINE W/O DYE: CPT

## 2024-02-28 ENCOUNTER — PATIENT MESSAGE (OUTPATIENT)
Dept: PHYSICAL MEDICINE AND REHAB | Facility: MEDICAL CENTER | Age: 69
End: 2024-02-28
Payer: COMMERCIAL

## 2024-02-28 NOTE — TELEPHONE ENCOUNTER
----- Message from Missy Rhoades M.D. sent at 2/27/2024  8:47 PM PST -----  Please call pt to schedule appt for MRI review. I saw that his occipital nerve blocks are not covered by insurance so at this time I don't think we can perform them unless he would like to pay cash.

## 2024-03-06 ENCOUNTER — OFFICE VISIT (OUTPATIENT)
Dept: PHYSICAL MEDICINE AND REHAB | Facility: MEDICAL CENTER | Age: 69
End: 2024-03-06

## 2024-03-06 VITALS
SYSTOLIC BLOOD PRESSURE: 160 MMHG | DIASTOLIC BLOOD PRESSURE: 70 MMHG | BODY MASS INDEX: 25.41 KG/M2 | HEART RATE: 59 BPM | TEMPERATURE: 97 F | WEIGHT: 187.39 LBS | OXYGEN SATURATION: 97 %

## 2024-03-06 DIAGNOSIS — M48.061 SPINAL STENOSIS OF LUMBAR REGION WITHOUT NEUROGENIC CLAUDICATION: ICD-10-CM

## 2024-03-06 DIAGNOSIS — G89.29 CHRONIC BILATERAL LOW BACK PAIN WITHOUT SCIATICA: ICD-10-CM

## 2024-03-06 DIAGNOSIS — M54.50 CHRONIC BILATERAL LOW BACK PAIN WITHOUT SCIATICA: ICD-10-CM

## 2024-03-06 DIAGNOSIS — R07.81 RIB PAIN: ICD-10-CM

## 2024-03-06 DIAGNOSIS — G58.8 INTERCOSTAL NEURALGIA: ICD-10-CM

## 2024-03-06 DIAGNOSIS — G89.29 OTHER CHRONIC PAIN: ICD-10-CM

## 2024-03-06 DIAGNOSIS — M54.81 OCCIPITAL NEURALGIA OF LEFT SIDE: ICD-10-CM

## 2024-03-06 DIAGNOSIS — Z91.81 RISK FOR FALLS: ICD-10-CM

## 2024-03-06 DIAGNOSIS — Z98.890 HISTORY OF LUMBAR SURGERY: ICD-10-CM

## 2024-03-06 PROCEDURE — 3078F DIAST BP <80 MM HG: CPT | Performed by: STUDENT IN AN ORGANIZED HEALTH CARE EDUCATION/TRAINING PROGRAM

## 2024-03-06 PROCEDURE — 76942 ECHO GUIDE FOR BIOPSY: CPT | Mod: 26,59 | Performed by: STUDENT IN AN ORGANIZED HEALTH CARE EDUCATION/TRAINING PROGRAM

## 2024-03-06 PROCEDURE — 99214 OFFICE O/P EST MOD 30 MIN: CPT | Mod: 25 | Performed by: STUDENT IN AN ORGANIZED HEALTH CARE EDUCATION/TRAINING PROGRAM

## 2024-03-06 PROCEDURE — 64405 NJX AA&/STRD GR OCPL NRV: CPT | Mod: LT | Performed by: STUDENT IN AN ORGANIZED HEALTH CARE EDUCATION/TRAINING PROGRAM

## 2024-03-06 PROCEDURE — 3077F SYST BP >= 140 MM HG: CPT | Performed by: STUDENT IN AN ORGANIZED HEALTH CARE EDUCATION/TRAINING PROGRAM

## 2024-03-06 PROCEDURE — 1125F AMNT PAIN NOTED PAIN PRSNT: CPT | Performed by: STUDENT IN AN ORGANIZED HEALTH CARE EDUCATION/TRAINING PROGRAM

## 2024-03-06 RX ORDER — DEXAMETHASONE SODIUM PHOSPHATE 4 MG/ML
4 INJECTION, SOLUTION INTRA-ARTICULAR; INTRALESIONAL; INTRAMUSCULAR; INTRAVENOUS; SOFT TISSUE ONCE
Status: COMPLETED | OUTPATIENT
Start: 2024-03-06 | End: 2024-03-06

## 2024-03-06 RX ADMIN — DEXAMETHASONE SODIUM PHOSPHATE 4 MG: 4 INJECTION, SOLUTION INTRA-ARTICULAR; INTRALESIONAL; INTRAMUSCULAR; INTRAVENOUS; SOFT TISSUE at 09:11

## 2024-03-06 ASSESSMENT — FIBROSIS 4 INDEX: FIB4 SCORE: 1.45

## 2024-03-06 ASSESSMENT — PAIN SCALES - GENERAL: PAINLEVEL: 6=MODERATE PAIN

## 2024-03-06 ASSESSMENT — PATIENT HEALTH QUESTIONNAIRE - PHQ9: CLINICAL INTERPRETATION OF PHQ2 SCORE: 0

## 2024-03-06 NOTE — PROGRESS NOTES
Follow-up patient Note    Interventional Pain and Spine  Physiatry (Physical Medicine and Rehabilitation)     Patient Name: Keaton Cervantes  : 1955  Date of service: 3/6/2024    Chief Complaint:   Chief Complaint   Patient presents with    Follow-Up     Occipital neuralgia of left side         HISTORY  Please see new patient note by Dr. Rhoades for more details.     HPI  Today's visit   Keaton Cervantes ( 1955) is a male with Diagnoses of Occipital neuralgia of left side, Chronic bilateral low back pain without sciatica, Other chronic pain, Rib pain, History of lumbar surgery, right intercostal neuralgia, Spinal stenosis of lumbar region at L2-3 without neurogenic claudication, and Risk for falls were pertinent to this visit.    Today Carlos reports ongoing pain at his left neck radiating up his occiput over his head in the distribution of the left greater occipital nerve.  He would like to proceed with left greater occipital nerve block today.  He understands that this is not covered by his insurance and is amenable to cash pay.    The greater occipital nerve pain rates 4/10 on NRS today.      He also reports ongoing focal pain at his right ribs at site of previous rib fracture that can radiate around his chest when it is severe.  He reports that this rib pain rates 7/10 on NRS today. He reports ongoing low back pain that does not radiate down the legs. Notes occasional numbness and tingling in the legs that is not bothersome.    His pain medications have not changed since his last visit with me.    ROS:   Red Flags ROS:   Fever, Chills, Sweats: Denies  Involuntary Weight Loss: Denies  Bladder Incontinence: Denies  Bowel Incontinence: denies  Saddle Anesthesia: Denies    All other systems reviewed and negative.     PMHx:   Past Medical History:   Diagnosis Date    Allergy     ASTHMA     Back pain     Chronic    Bronchitis     CATARACT     Dental disorder     Diabetes     oral rx & insulin     Hypertension     MEDICAL HOME     Neck pain     Chronic    ELLEN (obstructive sleep apnea)     Bipap    ELLEN (obstructive sleep apnea) 08/12/2022    Pt. states no longer using machine since wt loss.    Sleep apnea        PSHx:   Past Surgical History:   Procedure Laterality Date    TENDON LENGHTENING Right 8/17/2022    Procedure: RIGHT GASTROCNEMIUS RECESSION,;  Surgeon: Parrish Hardy M.D.;  Location: [facility];  Service: Orthopedics    TENDON TRANSFER Right 8/17/2022    Procedure: TRANSFER, TENDON-PERONEUS LONGUS TO BREVIS TRANSFER;  Surgeon: Parrish Hardy M.D.;  Location: [facility];  Service: Orthopedics    IRRIGATION & DEBRIDEMENT GENERAL Right 8/17/2022    Procedure: IRRIGATION AND DEBRIDEMENT, WOUND- FOOT;  Surgeon: Parrish Hardy M.D.;  Location: [facility];  Service: Orthopedics    METATARSAL HEAD RESECTION Right 8/17/2022    Procedure: EXCISION, METATARSAL BONE, HEAD-PARTIAL FIRST METATARSAL EXCISION;  Surgeon: Parrish Hardy M.D.;  Location: [facility];  Service: Orthopedics    BONE BIOPSY Right 8/17/2022    Procedure: BIOPSY, BONE;  Surgeon: Parrish Hardy M.D.;  Location: [facility];  Service: Orthopedics    TOE AMPUTATION Right 06/23/2022    Procedure: AMPUTATION, TOE 2ND;  Surgeon: Wilman Madera M.D.;  Location: [facility];  Service: Orthopedics    ROTATOR CUFF REPAIR Left 04/2022    [facility]    TOE AMPUTATION Right 03/04/2022    Procedure: AMPUTATION, TOE 1ST RAY;  Surgeon: Wilman Madera M.D.;  Location: SURGERY [facility];  Service: Orthopedics    LUMBAR FUSION ANTERIOR  08/04/2009    Performed by JOSEE BLANTON at [facility] ORS    FUSION, SPINE, LUMBAR, PLIF  08/04/2009    Performed by JOSEE BLANTON at [facility] ORS    LUMBAR LAMINECTOMY DISKECTOMY  08/04/2009    Performed by JOSEE BLANTON at [facility] ORS    LAMINOTOMY  08/04/2009    Performed by JOSEE BLANTON  "G at SURGERY [facility] ORS    SOMNOPLASTY  06/10/2009    Performed by OSVALDO, ELÍAS SHARPE at SURGERY [facility] ORS    SEPTOPLASTY  06/10/2009    Performed by ELÍAS RILEY at SURGERY [facility] ORS    ANTROSTOMY  06/10/2009    Performed by ELÍAS RILEY at SURGERY [facility] ORS    ETHMOIDECTOMY  06/10/2009    Performed by ELÍAS RILEY at SURGERY [facility] ORS    CATARACT PHACO WITH IOL  2008    Performed by OCTAVIO HARDWICK at [facility]ERY [facility] ORS    CATARACT PHACO WITH IOL  2008    Performed by OCTAVIO HARDWICK at [facility]    APPENDECTOMY      ORIF, ANKLE      VASECTOMY         Family Hx:   Family History   Problem Relation Age of Onset    Hypertension Father     Diabetes Father        Social Hx:  Social History     Socioeconomic History    Marital status:      Spouse name: Not on file    Number of children: Not on file    Years of education: Not on file    Highest education level: Not on file   Occupational History    Not on file   Tobacco Use    Smoking status: Former     Current packs/day: 0.00     Types: Cigarettes     Quit date:      Years since quittin.    Smokeless tobacco: Current     Types: Chew   Vaping Use    Vaping Use: Never used   Substance and Sexual Activity    Alcohol use: No     Comment: Pt. states, \"H/O Alcoholism, quit .\"    Drug use: No    Sexual activity: Yes     Partners: Female   Other Topics Concern    Not on file   Social History Narrative    Not on file     Social Determinants of Health     Financial Resource Strain: Not on file   Food Insecurity: Not on file   Transportation Needs: Not on file   Physical Activity: Not on file   Stress: Not on file   Social Connections: Not on file   Intimate Partner Violence: Not on file   Housing Stability: Not on file       Allergies:  Allergies   Allergen Reactions    Sulfa Drugs Unspecified     Pt hasn't had meds in so long he doesn't remember what his reaction is, " he just remembers there is a reaction and that he shouldn't have them     Coconut (Cocos Nucifera) Allergy Skin Test     Coconut Oil     Flu Virus Vaccine Shortness of Breath    Other Food Anaphylaxis     coconut    Pneumococcal Vaccines Shortness of Breath    Sulfamethoxazole     Nutrasweet Aspartame [Aspartame]      Causes migraines       Medications: reviewed on epic.   No outpatient medications have been marked as taking for the 3/6/24 encounter (Office Visit) with Missy Rhoades M.D..        Current Outpatient Medications on File Prior to Visit   Medication Sig Dispense Refill    asa/apap/caffeine (EXCEDRIN) 250-250-65 MG Tab Take 1 Tablet by mouth every 6 hours as needed for Headache.      amitriptyline (ELAVIL) 10 MG Tab Take 2 Tablets by mouth every evening for 360 days. 60 Tablet 11    Insulin Glargine-yfgn 100 UNIT/ML Solution Pen-injector INJECT 8-10 UNITS SUBCUTANEOUSLY AT BEDTIME      LANTUS SOLOSTAR 100 UNIT/ML Solution Pen-injector injection       insulin glargine (LANTUS) 100 UNIT/ML Solution Inject 6-15 Units under the skin at bedtime as needed (Blood Sugar Levels).      HYDROcodone-acetaminophen (NORCO) 7.5-325 MG per tablet Take 1 Tablet by mouth 3 times a day as needed for Severe Pain.      gabapentin (NEURONTIN) 300 MG Cap Take 300 mg by mouth 3 times a day as needed.      glipiZIDE SR (GLUCOTROL) 10 MG TABLET SR 24 HR Take 10 mg by mouth 2 times a day.      metformin (GLUCOPHAGE) 1000 MG tablet Take 1,000 mg by mouth 2 times a day with meals.      cyclobenzaprine (FLEXERIL) 10 mg Tab Take 10 mg by mouth as needed.       No current facility-administered medications on file prior to visit.         EXAMINATION     Physical Exam:   BP (!) 160/70 (BP Location: Right arm, Patient Position: Sitting, BP Cuff Size: Adult)   Pulse (!) 59   Temp 36.1 °C (97 °F) (Temporal)   Wt 85 kg (187 lb 6.3 oz)   SpO2 97%     Constitutional:   Body Habitus: Body mass index is 25.41 kg/m².  Cooperation: Fully  cooperates with exam  Appearance: Well-groomed, well-nourished.    Eyes: No scleral icterus to suggest severe liver disease, no proptosis to suggest severe hyperthyroidism    ENT -no obvious auditory deficits, no noticeable facial droop     Skin -no rashes or lesions noted     Respiratory-  breathing comfortably on room air, no audible wheezing    Cardiovascular-distal extremities warm and well perfused.  No lower extremity edema is noted.     Gastrointestinal - no obvious abdominal masses, non-distended    Psychiatric- alert and oriented ×3. Normal affect.     Gait stable steady    Musculoskeletal  Tenderness to palpation at left occiput at area of left greater occipital nerve    At least antigravity strength in bilateral upper and lower extremities      MEDICAL DECISION MAKING    Medical records review: see under HPI section.     DATA    Labs: Personally reviewed at today's visit:     Lab Results   Component Value Date/Time    SODIUM 137 02/06/2024 08:40 AM    POTASSIUM 5.3 02/06/2024 08:40 AM    CHLORIDE 102 02/06/2024 08:40 AM    CO2 24 02/06/2024 08:40 AM    ANION 11.0 02/06/2024 08:40 AM    GLUCOSE 94 02/06/2024 08:40 AM    BUN 18 02/06/2024 08:40 AM    CREATININE 0.85 02/06/2024 08:40 AM    CREATININE 0.7 08/10/2006 01:05 PM    CALCIUM 10.0 02/06/2024 08:40 AM    ASTSGOT 21 02/06/2024 08:40 AM    ALTSGPT 8 02/06/2024 08:40 AM    TBILIRUBIN 0.3 02/06/2024 08:40 AM    ALBUMIN 4.5 02/06/2024 08:40 AM    TOTPROTEIN 8.0 02/06/2024 08:40 AM    GLOBULIN 3.5 02/06/2024 08:40 AM    AGRATIO 1.3 02/06/2024 08:40 AM       Lab Results   Component Value Date/Time    PROTHROMBTM 10.7 (L) 08/10/2006 01:05 PM    INR 0.85 (L) 08/10/2006 01:05 PM        Lab Results   Component Value Date/Time    WBC 8.7 02/06/2024 08:40 AM    RBC 5.21 02/06/2024 08:40 AM    HEMOGLOBIN 15.5 02/06/2024 08:40 AM    HEMATOCRIT 48.1 02/06/2024 08:40 AM    MCV 92.3 02/06/2024 08:40 AM    MCH 29.8 02/06/2024 08:40 AM    MCHC 32.2 (L) 02/06/2024 08:40  AM    MPV 9.5 02/06/2024 08:40 AM    NEUTSPOLYS 59.60 02/06/2024 08:40 AM    LYMPHOCYTES 30.90 02/06/2024 08:40 AM    MONOCYTES 4.90 02/06/2024 08:40 AM    EOSINOPHILS 2.60 02/06/2024 08:40 AM    BASOPHILS 1.80 02/06/2024 08:40 AM        Lab Results   Component Value Date/Time    HBA1C 6.4 (H) 02/06/2024 08:40 AM        Imaging:   I personally reviewed following images, these are my reads    Chest x-ray 6/21/2022  No evidence of acute rib fracture. See formal radiology report for further details.     MRI lumbar spine 2/27/2024  Postoperative changes with fusion in the midline laminectomy spanning L3-S1.  At L2-3 there is a broad-based disc bulge and bilateral facet arthropathy with ligamentum flavum hypertrophy contributing to moderate central canal stenosis at this level.  There is also bilateral moderate severe neuroforaminal stenosis at this level.  No significant neuroforaminal stenosis or central canal stenosis elsewhere. See formal radiology report for further details.    X-ray lumbar spine 2/7/2024  Fusion spanning L3-S1.  Mild retrolisthesis of L2 on L3. See formal radiology report for further details.    IMAGING radiology reads. I reviewed the following radiology reads   MRI lumbar spine 2/27/2024  FINDINGS:     Postoperative changes are noted in the lumbar spine with midline laminectomy at L3-4, L4-5 and L5-S1 and posterior transpedicular fusion between L3 and S1.  Intervertebral disc spacers are present at the L3-4, L4-5, L5-S1 levels.  A lipid rich hemangioma is noted within L1.  There is decreased disc height with type I marrow changes involving the adjacent endplates at the L2-L3 level. The conus terminates at  L1. Normal signal intensity is identified within the distal thoracic cord and conus medullaris.     Axial lumbar spine levels:     T12-L1: Normal.     L1-2: Mild facet degeneration without stenosis.     L2-3: Broad-based disc bulge and bilateral facet degeneration and ligamentum flavum  hypertrophy results in moderate canal stenosis with thecal sac compression. There is also bilateral moderate-to-severe foraminal narrowing.     L3-4: Widely decompressed spinal canal. No stenosis.     L4-5: Widely decompressed spinal canal. No stenosis.     L5-S1: Well decompressed spinal canal. No stenosis.     A small T2 hyperintense collection is identified in the laminectomy bed behind the L3-4 level, measuring 17 x 40 x 42 mm (CC, AP, ML). There is no significant mass effect upon the thecal sac. This may represent a chronic organized fluid   collection/granulation tissue.     Prevertebral soft tissues are within normal limits.     IMPRESSION:        Moderate canal stenosis at L2-3 due to encroachment by a broad-based disc bulge, advanced facet degeneration at the metaphyseal hypertrophy. There is also bilateral moderate-to-severe foraminal narrowing with likely impingement upon the exiting L2   nerves.     Postoperative changes at L3-4, L4-5, L5-S1 with a well decompressed canal. There is no foraminal narrowing.    Results for orders placed during the hospital encounter of 07/05/23    MR-BRAIN-W/O    Impression  No acute intracranial process.    Nonspecific T2 hyperintensities are noted in the periventricular and deep white matter, most likely related to chronic microvascular ischemia.             Results for orders placed during the hospital encounter of 02/03/09    MR-CERVICAL SPINE-W/O    Impression  IMPRESSION:    1. POSTERIOR SPONDYLOTIC DISC BULGING AT C5-6 WHICH EXTENDS TO THE  LEFT-SIDED NEURAL FORAMEN WITH MILD CANAL STENOSIS AND LEFT-SIDED NEURAL  FORAMINAL NARROWING PRESENT.  THERE IS ALSO ACCOMPANYING LEFT-SIDED  UNCINATE SPURRING.    2. MILD DISC DESICCATION AT C2-3, C3-4, C4-5, AND C6-7 AS DESCRIBED.      CBG/bht        Read By JOVANY FLORENCE MD on Feb 4 2009  1:23PM  : [facility] Transcription Date: Feb 4 2009  2:16PM  THIS DOCUMENT HAS BEEN ELECTRONICALLY SIGNED BY: JOVANY FLORENCE MD on  Feb 6 2009 12:16PM      Results for orders placed during the hospital encounter of 02/27/24    MR-LUMBAR SPINE-W/O    Impression  Moderate canal stenosis at L2-3 due to encroachment by a broad-based disc bulge, advanced facet degeneration at the metaphyseal hypertrophy. There is also bilateral moderate-to-severe foraminal narrowing with likely impingement upon the exiting L2  nerves.    Postoperative changes at L3-4, L4-5, L5-S1 with a well decompressed canal. There is no foraminal narrowing.        Results for orders placed during the hospital encounter of 02/27/24    MR-LUMBAR SPINE-W/O    Impression  Moderate canal stenosis at L2-3 due to encroachment by a broad-based disc bulge, advanced facet degeneration at the metaphyseal hypertrophy. There is also bilateral moderate-to-severe foraminal narrowing with likely impingement upon the exiting L2  nerves.    Postoperative changes at L3-4, L4-5, L5-S1 with a well decompressed canal. There is no foraminal narrowing.                                      Results for orders placed during the hospital encounter of 11/06/11    DX-ANKLE 3+ VIEWS    Impression  No acute bony abnormality.      Results for orders placed during the hospital encounter of 05/27/09    DX-CHEST-2 VIEWS    Impression  IMPRESSION:    NORMAL CHEST.      W:[facility]    Read By WILL THOMPSON MD on May 27 2009  3:24PM  : [facility] Transcription Date: May 28 2009 11:52AM  THIS DOCUMENT HAS BEEN ELECTRONICALLY SIGNED BY: WILL THOMPSON MD on May  29 2009 10:45PM       Results for orders placed in visit on 11/03/12    DX-FINGER(S) 2+    Impression  No evidence of fracture or dislocation.    Results for orders placed during the hospital encounter of 07/28/22    DX-FOOT-COMPLETE 3+ RIGHT    Impression  1.  Soft tissue gas is present overlying the first transmetatarsal amputation site and indistinctness of the cortex of the every dictation site site of the first metatarsal which raises suspicion for  infection. MRI of the foot with and without contrast  could be obtained for further evaluation if indicated.  2.  Subacute nonunited fracture of the second metatarsal base with new moderate lateral displacement and bone callus formation.  3.  Interval agitation of the second digit at the proximal phalanx               Results for orders placed during the hospital encounter of 24    DX-LUMBAR SPINE-2 OR 3 VIEWS    Impression  Mild retrolisthesis and severe degenerative disc disease at L2-3.    Postsurgical changes from posterior fixation and disc prosthesis placement at the L3-S1. Mature fusion of the disc space. The bilateral L3 pedicle screws are very close to the L2-3 disc space.      Results for orders placed during the hospital encounter of 21    [id number] (WITH 1-VIEW CXR) LEFT    Impression  Left lateral seventh and ninth acute rib fractures.    No pneumothorax or acute cardiopulmonary abnormality.         Results for orders placed during the hospital encounter of 21    DX-SHOULDER 2+ LEFT    Impression  No acute osseous abnormality.              Diagnosis  Visit Diagnoses     ICD-10-CM   1. Occipital neuralgia of left side  M54.81   2. Chronic bilateral low back pain without sciatica  M54.50    G89.29   3. Other chronic pain  G89.29   4. Rib pain  R07.81   5. History of lumbar surgery  Z98.890   6. right intercostal neuralgia  G58.8   7. Spinal stenosis of lumbar region at L2-3 without neurogenic claudication  M48.061   8. Risk for falls  Z91.81         ASSESSMENT AND PLAN:  Keaton Naylor Jillians (: 1955) is a male with      Carlos was seen today for follow-up.    Diagnoses and all orders for this visit:    Occipital neuralgia of left side  -     Consent for all Surgical, Special Diagnostic or Therapeutic Procedures    Chronic bilateral low back pain without sciatica    Other chronic pain    Rib pain    History of lumbar surgery    right intercostal neuralgia    Spinal  stenosis of lumbar region at L2-3 without neurogenic claudication    Risk for falls  -     Patient identified as fall risk.  Appropriate orders and counseling given.    Other orders  -     dexamethasone (Decadron) injection 4 mg          PLAN  Physical Therapy: I previously ordered physical therapy to focus on strengthening and stretching as well as a home exercise program.     Home Exercise Program: I previously provided the patient with a home exercise program focusing on strengthening and stretching at today's visit.      Diagnostic workup: as above and Personally reviewed at today's visit:   X-ray lumbar spine 2/7/2024, MRI lumbar spine 2/27/2024     Medications:   -Okay to continue Norco 7.5-325 mg twice daily as needed as prescribed by the patient's PCP as long as there is compliance with .  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  He is low risk for narcotic abuse with an opioid risk score of 0 at today's visit     Interventions:   -Discussed left greater occipital nerve block under ultrasound guidance.  The risks, benefits, and alternatives to this procedure were discussed and the patient wishes to proceed with the procedure. Risks include but are not limited to damage to surrounding structures, infection, bleeding, worsening of pain which can be permanent, and weakness which can be permanent. Benefits include pain relief and improved function. Alternatives include not doing the procedure.    -The patient denies significant radiating pain down the leg/groin or significant numbness or tingling in the leg/groin.  Discussed that if he has any of these symptoms in the future corresponding to the area of central canal stenosis at L2-3 or bilateral neuroforaminal stenosis at L2-3, could consider an intervention in the future such as epidural  - Discussed possible trial of intercostal nerve blocks for rib pain, the patient would like to defer at this  time      Follow-up: 1 month to assess progress with left-sided occipital nerve block    Orders Placed This Encounter    Patient identified as fall risk.  Appropriate orders and counseling given.    dexamethasone (Decadron) injection 4 mg    Consent for all Surgical, Special Diagnostic or Therapeutic Procedures       Missy Rhoades MD  Interventional Pain and Spine  Physical Medicine and Rehabilitation  [facility]      The above note documents my personal evaluation of this patient. In addition, I have reviewed and confirmed with the patient and MA the supportive information documented in today's Patient Health Questionnaire and Office Note.     Please note that this dictation was created using voice recognition software. I have made every reasonable attempt to correct obvious errors, but I expect that there are errors of grammar and possibly content that I did not discover before finalizing the note.

## 2024-03-06 NOTE — PROCEDURES
Patient Name: Keaton Cervantes  : 1955  Date of Service: 3/6/2024    Physician/s: Missy Rhoades MD    NAME OF PROCEDURE: LEFT Greater Occipital Nerve Block - Local Anesthetic and Steroid     PREPROCEDURAL DIAGNOSES:     - Other chronic pain; Greater Occipital Neuralgia     POSTPROCEDURAL DIAGNOSES:     -  Other chronic pain; Greater Occipital Neuralgia     INDICATION FOR PROCEDURE:   Pain along the distribution of the greater occipital nerves on the LEFT side.      DESCRIPTION OF PROCEDURE:   In the sitting position, the LEFT occipital area was palpated. The ultrasound probe was placed over the C2 spinous process and moved laterally and the left greater occipital vessels were identified. The target nerve/s for injection was marked.  The area for injection was identified and marked and cleaned using sterile technique. A solution containing 1% lidocaine 3ml, and 1 mL of 4mg/mL dexamethasone was created. Subsequently, a 27g 1.5 inch needle was placed into skin and advanced under ultrasound guidance in an in-plane approach to the GREATER occipital nerve/s. Following negative aspiration, the above solution injected to the above side(s) perineurally about each nerve.      The procedure was well tolerated.  There was rapid onset of loss of normal cutaneous sensation in the distribution of the LEFT occipital area consistent with greater occipital nerve block.      DISPOSITION:    The patient was discharged to home.    Missy Rhoades MD  Interventional Pain and Spine  Physical Medicine and Rehabilitation  [facility]

## 2024-04-05 ENCOUNTER — OFFICE VISIT (OUTPATIENT)
Dept: PHYSICAL MEDICINE AND REHAB | Facility: MEDICAL CENTER | Age: 69
End: 2024-04-05
Payer: COMMERCIAL

## 2024-04-05 VITALS
WEIGHT: 191.36 LBS | DIASTOLIC BLOOD PRESSURE: 70 MMHG | SYSTOLIC BLOOD PRESSURE: 138 MMHG | HEIGHT: 72 IN | OXYGEN SATURATION: 98 % | BODY MASS INDEX: 25.92 KG/M2 | HEART RATE: 79 BPM | TEMPERATURE: 96.8 F

## 2024-04-05 DIAGNOSIS — M79.10 MYALGIA: ICD-10-CM

## 2024-04-05 DIAGNOSIS — M54.81 OCCIPITAL NEURALGIA OF LEFT SIDE: ICD-10-CM

## 2024-04-05 DIAGNOSIS — Z98.890 HISTORY OF LUMBAR SURGERY: ICD-10-CM

## 2024-04-05 DIAGNOSIS — G58.8 INTERCOSTAL NEURALGIA: ICD-10-CM

## 2024-04-05 DIAGNOSIS — G89.29 CHRONIC BILATERAL LOW BACK PAIN WITHOUT SCIATICA: ICD-10-CM

## 2024-04-05 DIAGNOSIS — R07.81 RIB PAIN: ICD-10-CM

## 2024-04-05 DIAGNOSIS — M48.061 SPINAL STENOSIS OF LUMBAR REGION WITHOUT NEUROGENIC CLAUDICATION: ICD-10-CM

## 2024-04-05 DIAGNOSIS — M54.50 CHRONIC BILATERAL LOW BACK PAIN WITHOUT SCIATICA: ICD-10-CM

## 2024-04-05 DIAGNOSIS — G89.29 OTHER CHRONIC PAIN: ICD-10-CM

## 2024-04-05 DIAGNOSIS — Z91.81 RISK FOR FALLS: ICD-10-CM

## 2024-04-05 PROCEDURE — 3075F SYST BP GE 130 - 139MM HG: CPT | Performed by: STUDENT IN AN ORGANIZED HEALTH CARE EDUCATION/TRAINING PROGRAM

## 2024-04-05 PROCEDURE — 1125F AMNT PAIN NOTED PAIN PRSNT: CPT | Performed by: STUDENT IN AN ORGANIZED HEALTH CARE EDUCATION/TRAINING PROGRAM

## 2024-04-05 PROCEDURE — 99213 OFFICE O/P EST LOW 20 MIN: CPT | Performed by: STUDENT IN AN ORGANIZED HEALTH CARE EDUCATION/TRAINING PROGRAM

## 2024-04-05 PROCEDURE — 3078F DIAST BP <80 MM HG: CPT | Performed by: STUDENT IN AN ORGANIZED HEALTH CARE EDUCATION/TRAINING PROGRAM

## 2024-04-05 ASSESSMENT — PATIENT HEALTH QUESTIONNAIRE - PHQ9: CLINICAL INTERPRETATION OF PHQ2 SCORE: 0

## 2024-04-05 ASSESSMENT — FIBROSIS 4 INDEX: FIB4 SCORE: 1.45

## 2024-04-05 ASSESSMENT — PAIN SCALES - GENERAL: PAINLEVEL: 8=MODERATE-SEVERE PAIN

## 2024-04-05 NOTE — PROGRESS NOTES
Follow-up patient Note    Interventional Pain and Spine  Physiatry (Physical Medicine and Rehabilitation)     Patient Name: Keaton Cervantes  : 1955  Date of service: 2024    Chief Complaint:   Chief Complaint   Patient presents with    Follow-Up     Occipital neuralgia of left side       HISTORY  Please see new patient note by Dr. Rhoades for more details.     HPI  Today's visit   Keaton Cervantes ( 1955) is a male with Diagnoses of Occipital neuralgia of left side, Chronic bilateral low back pain without sciatica, Other chronic pain, Rib pain, History of lumbar surgery, right intercostal neuralgia, Spinal stenosis of lumbar region at L2-3 without neurogenic claudication, Risk for falls, and Myalgia were pertinent to this visit.    Today Carlos presents after 3/6/24 LEFT Greater Occipital Nerve Block - Local Anesthetic and Steroid with slight improvement in occipital pain.  Headaches are no longer constant.  He reports he is still having multiple headaches per day that can last 30 mins at a time.  Pain starts in the occiput and radiates over his left head to his forehead and the distribution of the left greater occipital nerve.  Sometimes his left eye hurts when the pain is really bad.     He also reports ongoing focal pain at his right ribs at site of previous rib fracture that can radiate in a circular pattern outwards when it is severe.  This is his worst area of pain overall. He reports ongoing low back pain that does not radiate down the legs. Notes occasional numbness and tingling in the legs that is not bothersome.    His pain medications have not changed since his last visit with me. Taking amitriptyline 20mg QHS as prescribed by neurology and has an appointment to see them on .     Procedure history:  - 3/6/24 LEFT Greater Occipital Nerve Block - Local Anesthetic and Steroid -some relief of occipital headaches.  Headaches no longer constant, now frequent     ROS:   Red Flags  ROS:   Fever, Chills, Sweats: Denies  Involuntary Weight Loss: Denies  Bladder Incontinence: Denies  Bowel Incontinence: denies  Saddle Anesthesia: Denies    All other systems reviewed and negative.     PMHx:   Past Medical History:   Diagnosis Date    Allergy     ASTHMA     Back pain     Chronic    Bronchitis     CATARACT     Dental disorder     Diabetes     oral rx & insulin    Hypertension     MEDICAL HOME     Neck pain     Chronic    ELLEN (obstructive sleep apnea)     Bipap    ELLEN (obstructive sleep apnea) 08/12/2022    Pt. states no longer using machine since wt loss.    Sleep apnea        PSHx:   Past Surgical History:   Procedure Laterality Date    TENDON LENGHTENING Right 8/17/2022    Procedure: RIGHT GASTROCNEMIUS RECESSION,;  Surgeon: Parrish Hardy M.D.;  Location: [facility];  Service: Orthopedics    TENDON TRANSFER Right 8/17/2022    Procedure: TRANSFER, TENDON-PERONEUS LONGUS TO BREVIS TRANSFER;  Surgeon: Parrish Hardy M.D.;  Location: [facility];  Service: Orthopedics    IRRIGATION & DEBRIDEMENT GENERAL Right 8/17/2022    Procedure: IRRIGATION AND DEBRIDEMENT, WOUND- FOOT;  Surgeon: Parrish Hardy M.D.;  Location: [facility];  Service: Orthopedics    METATARSAL HEAD RESECTION Right 8/17/2022    Procedure: EXCISION, METATARSAL BONE, HEAD-PARTIAL FIRST METATARSAL EXCISION;  Surgeon: Parrish Hardy M.D.;  Location: [facility];  Service: Orthopedics    BONE BIOPSY Right 8/17/2022    Procedure: BIOPSY, BONE;  Surgeon: Parrish Hardy M.D.;  Location: [facility];  Service: Orthopedics    TOE AMPUTATION Right 06/23/2022    Procedure: AMPUTATION, TOE 2ND;  Surgeon: Wilman Madera M.D.;  Location: [facility];  Service: Orthopedics    ROTATOR CUFF REPAIR Left 04/2022    [facility]    TOE AMPUTATION Right 03/04/2022    Procedure: AMPUTATION, TOE 1ST RAY;  Surgeon: Wilman Madera M.D.;  Location: [facility];   "Service: Orthopedics    LUMBAR FUSION ANTERIOR  2009    Performed by JOSEE BLANTON at SURGERY [facility] ORS    FUSION, SPINE, LUMBAR, PLIF  2009    Performed by JOSEE BLANTON at SURGERY [facility] ORS    LUMBAR LAMINECTOMY DISKECTOMY  2009    Performed by JOSEE BLANTON at SURGERY [facility] ORS    LAMINOTOMY  2009    Performed by JOSEE BLANTON at SURGERY [facility] ORS    SOMNOPLASTY  06/10/2009    Performed by ELÍAS RILEY at SURGERY [facility] ORS    SEPTOPLASTY  06/10/2009    Performed by OSVALDO, ELÍAS SHARPE at SURGERY [facility] ORS    ANTROSTOMY  06/10/2009    Performed by OSVALDO, ELÍAS SHARPE at SURGERY [facility] ORS    ETHMOIDECTOMY  06/10/2009    Performed by ELÍAS RILEY at SURGERY [facility] ORS    CATARACT PHACO WITH IOL  2008    Performed by OCTAVIO HARDWICK at SURGERY SAME DAY [facility] ORS    CATARACT PHACO WITH IOL  2008    Performed by OCTAVIO HARDWICK at SURGERY SAME DAY [facility] ORS    APPENDECTOMY      ORIF, ANKLE      VASECTOMY         Family Hx:   Family History   Problem Relation Age of Onset    Hypertension Father     Diabetes Father        Social Hx:  Social History     Socioeconomic History    Marital status:      Spouse name: Not on file    Number of children: Not on file    Years of education: Not on file    Highest education level: Not on file   Occupational History    Not on file   Tobacco Use    Smoking status: Former     Current packs/day: 0.00     Types: Cigarettes     Quit date:      Years since quittin.2    Smokeless tobacco: Current     Types: Chew   Vaping Use    Vaping Use: Never used   Substance and Sexual Activity    Alcohol use: No     Comment: Pt. states, \"H/O Alcoholism, quit .\"    Drug use: No    Sexual activity: Yes     Partners: Female   Other Topics Concern    Not on file   Social History Narrative    Not on file     Social Determinants of Health     Financial Resource Strain: Not on " file   Food Insecurity: Not on file   Transportation Needs: Not on file   Physical Activity: Not on file   Stress: Not on file   Social Connections: Not on file   Intimate Partner Violence: Not on file   Housing Stability: Not on file       Allergies:  Allergies   Allergen Reactions    Sulfa Drugs Unspecified     Pt hasn't had meds in so long he doesn't remember what his reaction is, he just remembers there is a reaction and that he shouldn't have them     Coconut (Cocos Nucifera) Allergy Skin Test     Coconut Oil     Flu Virus Vaccine Shortness of Breath    Other Food Anaphylaxis     coconut    Pneumococcal Vaccines Shortness of Breath    Sulfamethoxazole     Nutrasweet Aspartame [Aspartame]      Causes migraines       Medications: reviewed on epic.   Outpatient Medications Marked as Taking for the 4/5/24 encounter (Office Visit) with Missy Rhoades M.D.   Medication Sig Dispense Refill    asa/apap/caffeine (EXCEDRIN) 250-250-65 MG Tab Take 1 Tablet by mouth every 6 hours as needed for Headache.      amitriptyline (ELAVIL) 10 MG Tab Take 2 Tablets by mouth every evening for 360 days. 60 Tablet 11    Insulin Glargine-yfgn 100 UNIT/ML Solution Pen-injector INJECT 8-10 UNITS SUBCUTANEOUSLY AT BEDTIME      insulin glargine (LANTUS) 100 UNIT/ML Solution Inject 6-15 Units under the skin at bedtime as needed (Blood Sugar Levels).      HYDROcodone-acetaminophen (NORCO) 7.5-325 MG per tablet Take 1 Tablet by mouth 3 times a day as needed for Severe Pain.      gabapentin (NEURONTIN) 300 MG Cap Take 300 mg by mouth 3 times a day as needed.      glipiZIDE SR (GLUCOTROL) 10 MG TABLET SR 24 HR Take 10 mg by mouth 2 times a day.      metformin (GLUCOPHAGE) 1000 MG tablet Take 1,000 mg by mouth 2 times a day with meals.      cyclobenzaprine (FLEXERIL) 10 mg Tab Take 10 mg by mouth as needed.          Current Outpatient Medications on File Prior to Visit   Medication Sig Dispense Refill    asa/apap/caffeine (EXCEDRIN) 250-250-65  MG Tab Take 1 Tablet by mouth every 6 hours as needed for Headache.      amitriptyline (ELAVIL) 10 MG Tab Take 2 Tablets by mouth every evening for 360 days. 60 Tablet 11    Insulin Glargine-yfgn 100 UNIT/ML Solution Pen-injector INJECT 8-10 UNITS SUBCUTANEOUSLY AT BEDTIME      insulin glargine (LANTUS) 100 UNIT/ML Solution Inject 6-15 Units under the skin at bedtime as needed (Blood Sugar Levels).      HYDROcodone-acetaminophen (NORCO) 7.5-325 MG per tablet Take 1 Tablet by mouth 3 times a day as needed for Severe Pain.      gabapentin (NEURONTIN) 300 MG Cap Take 300 mg by mouth 3 times a day as needed.      glipiZIDE SR (GLUCOTROL) 10 MG TABLET SR 24 HR Take 10 mg by mouth 2 times a day.      metformin (GLUCOPHAGE) 1000 MG tablet Take 1,000 mg by mouth 2 times a day with meals.      cyclobenzaprine (FLEXERIL) 10 mg Tab Take 10 mg by mouth as needed.      LANTUS SOLOSTAR 100 UNIT/ML Solution Pen-injector injection        No current facility-administered medications on file prior to visit.         EXAMINATION     Physical Exam:   /70 (BP Location: Right arm, Patient Position: Sitting, BP Cuff Size: Adult)   Pulse 79   Temp 36 °C (96.8 °F) (Temporal)   Ht 1.829 m (6')   Wt 86.8 kg (191 lb 5.8 oz)   SpO2 98%     Constitutional:   Body Habitus: Body mass index is 25.95 kg/m².  Cooperation: Fully cooperates with exam  Appearance: Well-groomed, well-nourished.    Eyes: No scleral icterus to suggest severe liver disease, no proptosis to suggest severe hyperthyroidism    ENT -no obvious auditory deficits, no noticeable facial droop     Skin -no rashes or lesions noted     Respiratory-  breathing comfortably on room air, no audible wheezing    Cardiovascular-distal extremities warm and well perfused.  No lower extremity edema is noted.     Gastrointestinal - no obvious abdominal masses, non-distended    Psychiatric- alert and oriented ×3. Normal affect.     Gait stable steady    Musculoskeletal  Tenderness to  palpation at left occiput at area of left greater occipital nerve      Cervical spine   Inspection: No deformities of the skin over the cervical spine. No rashes or lesions.    Spurling's sign  negative bilaterally  Cervical facet loading maneuver  negative bilaterally    No signs of muscular atrophy in bilateral upper extremities     Tenderness to palpation at occiput overlying left greater occipital nerve, negative Tinel's at this area for radiating pain.  Tenderness to palpation at left paracervical muscle, left upper trapezius muscle with increased myofascial tension at these muscles.  Tenderness to palpation at right thoracic intercostal muscle.  No tenderness to palpation over midline cervical spine.    Key points for the international standards for neurological classification of spinal cord injury (ISNCSCI) to light touch.     Dermatome R L   C4 2 2   C5 2 2   C6 2 2   C7 1 1   C8 2 2   T1 2 2   T2 2 2       Motor Exam Upper Extremities   ? Myotome R L   Shoulder abduction C5 5 5   Elbow flexion C5 5 5   Wrist extension C6 5 5   Elbow extension C7 5 5   Finger flexion C8 5 5   Finger abduction T1 5 5     Bilateral hands:   Inspection: No swelling, deformities, or rashes. Symmetric appearing thenar and hyperthenar regions bilaterally.  Palpation: no significant tenderness to palpation throughout the bilateral hands  Range of motion is within normal limits throughout bilateral hands, fingers and wrist.    Special tests:  Tinel's at the wrist over the median nerve negative bilaterally  Carpal tunnel compression: negative bilaterally  Phalen's test: negative bilaterally  Tinel's at the cubital tunnel: negative bilaterally    MEDICAL DECISION MAKING    Medical records review: see under HPI section.     DATA    Labs: Personally reviewed at today's visit:     Lab Results   Component Value Date/Time    SODIUM 137 02/06/2024 08:40 AM    POTASSIUM 5.3 02/06/2024 08:40 AM    CHLORIDE 102 02/06/2024 08:40 AM    CO2 24  02/06/2024 08:40 AM    ANION 11.0 02/06/2024 08:40 AM    GLUCOSE 94 02/06/2024 08:40 AM    BUN 18 02/06/2024 08:40 AM    CREATININE 0.85 02/06/2024 08:40 AM    CREATININE 0.7 08/10/2006 01:05 PM    CALCIUM 10.0 02/06/2024 08:40 AM    ASTSGOT 21 02/06/2024 08:40 AM    ALTSGPT 8 02/06/2024 08:40 AM    TBILIRUBIN 0.3 02/06/2024 08:40 AM    ALBUMIN 4.5 02/06/2024 08:40 AM    TOTPROTEIN 8.0 02/06/2024 08:40 AM    GLOBULIN 3.5 02/06/2024 08:40 AM    AGRATIO 1.3 02/06/2024 08:40 AM       Lab Results   Component Value Date/Time    PROTHROMBTM 10.7 (L) 08/10/2006 01:05 PM    INR 0.85 (L) 08/10/2006 01:05 PM        Lab Results   Component Value Date/Time    WBC 8.7 02/06/2024 08:40 AM    RBC 5.21 02/06/2024 08:40 AM    HEMOGLOBIN 15.5 02/06/2024 08:40 AM    HEMATOCRIT 48.1 02/06/2024 08:40 AM    MCV 92.3 02/06/2024 08:40 AM    MCH 29.8 02/06/2024 08:40 AM    MCHC 32.2 (L) 02/06/2024 08:40 AM    MPV 9.5 02/06/2024 08:40 AM    NEUTSPOLYS 59.60 02/06/2024 08:40 AM    LYMPHOCYTES 30.90 02/06/2024 08:40 AM    MONOCYTES 4.90 02/06/2024 08:40 AM    EOSINOPHILS 2.60 02/06/2024 08:40 AM    BASOPHILS 1.80 02/06/2024 08:40 AM        Lab Results   Component Value Date/Time    HBA1C 6.4 (H) 02/06/2024 08:40 AM        Imaging:   I personally reviewed following images, these are my reads    Chest x-ray 6/21/2022  No evidence of acute rib fracture. See formal radiology report for further details.     MRI lumbar spine 2/27/2024  Postoperative changes with fusion in the midline laminectomy spanning L3-S1.  At L2-3 there is a broad-based disc bulge and bilateral facet arthropathy with ligamentum flavum hypertrophy contributing to moderate central canal stenosis at this level.  There is also bilateral moderate severe neuroforaminal stenosis at this level.  No significant neuroforaminal stenosis or central canal stenosis elsewhere. See formal radiology report for further details.    X-ray lumbar spine 2/7/2024  Fusion spanning L3-S1.  Mild  retrolisthesis of L2 on L3. See formal radiology report for further details.    IMAGING radiology reads. I reviewed the following radiology reads   MRI lumbar spine 2/27/2024  FINDINGS:     Postoperative changes are noted in the lumbar spine with midline laminectomy at L3-4, L4-5 and L5-S1 and posterior transpedicular fusion between L3 and S1.  Intervertebral disc spacers are present at the L3-4, L4-5, L5-S1 levels.  A lipid rich hemangioma is noted within L1.  There is decreased disc height with type I marrow changes involving the adjacent endplates at the L2-L3 level. The conus terminates at  L1. Normal signal intensity is identified within the distal thoracic cord and conus medullaris.     Axial lumbar spine levels:     T12-L1: Normal.     L1-2: Mild facet degeneration without stenosis.     L2-3: Broad-based disc bulge and bilateral facet degeneration and ligamentum flavum hypertrophy results in moderate canal stenosis with thecal sac compression. There is also bilateral moderate-to-severe foraminal narrowing.     L3-4: Widely decompressed spinal canal. No stenosis.     L4-5: Widely decompressed spinal canal. No stenosis.     L5-S1: Well decompressed spinal canal. No stenosis.     A small T2 hyperintense collection is identified in the laminectomy bed behind the L3-4 level, measuring 17 x 40 x 42 mm (CC, AP, ML). There is no significant mass effect upon the thecal sac. This may represent a chronic organized fluid   collection/granulation tissue.     Prevertebral soft tissues are within normal limits.     IMPRESSION:        Moderate canal stenosis at L2-3 due to encroachment by a broad-based disc bulge, advanced facet degeneration at the metaphyseal hypertrophy. There is also bilateral moderate-to-severe foraminal narrowing with likely impingement upon the exiting L2   nerves.     Postoperative changes at L3-4, L4-5, L5-S1 with a well decompressed canal. There is no foraminal narrowing.    Results for orders  placed during the hospital encounter of 07/05/23    MR-BRAIN-W/O    Impression  No acute intracranial process.    Nonspecific T2 hyperintensities are noted in the periventricular and deep white matter, most likely related to chronic microvascular ischemia.             Results for orders placed during the hospital encounter of 02/03/09    MR-CERVICAL SPINE-W/O    Impression  IMPRESSION:    1. POSTERIOR SPONDYLOTIC DISC BULGING AT C5-6 WHICH EXTENDS TO THE  LEFT-SIDED NEURAL FORAMEN WITH MILD CANAL STENOSIS AND LEFT-SIDED NEURAL  FORAMINAL NARROWING PRESENT.  THERE IS ALSO ACCOMPANYING LEFT-SIDED  UNCINATE SPURRING.    2. MILD DISC DESICCATION AT C2-3, C3-4, C4-5, AND C6-7 AS DESCRIBED.      CBG/bht        Read By JOVANY FLORENCE MD on Feb 4 2009  1:23PM  : TONI Transcription Date: Feb 4 2009  2:16PM  THIS DOCUMENT HAS BEEN ELECTRONICALLY SIGNED BY: JOVANY FLORENCE MD on Feb 6 2009 12:16PM      Results for orders placed during the hospital encounter of 02/27/24    MR-LUMBAR SPINE-W/O    Impression  Moderate canal stenosis at L2-3 due to encroachment by a broad-based disc bulge, advanced facet degeneration at the metaphyseal hypertrophy. There is also bilateral moderate-to-severe foraminal narrowing with likely impingement upon the exiting L2  nerves.    Postoperative changes at L3-4, L4-5, L5-S1 with a well decompressed canal. There is no foraminal narrowing.        Results for orders placed during the hospital encounter of 02/27/24    MR-LUMBAR SPINE-W/O    Impression  Moderate canal stenosis at L2-3 due to encroachment by a broad-based disc bulge, advanced facet degeneration at the metaphyseal hypertrophy. There is also bilateral moderate-to-severe foraminal narrowing with likely impingement upon the exiting L2  nerves.    Postoperative changes at L3-4, L4-5, L5-S1 with a well decompressed canal. There is no foraminal narrowing.                                      Results for orders placed during the  hospital encounter of 11/06/11    DX-ANKLE 3+ VIEWS    Impression  No acute bony abnormality.      Results for orders placed during the hospital encounter of 05/27/09    DX-CHEST-2 VIEWS    Impression  IMPRESSION:    NORMAL CHEST.      WILMERW:angeles    Read By WILL THOMPSON MD on May 27 2009  3:24PM  : ANGELES Transcription Date: May 28 2009 11:52AM  THIS DOCUMENT HAS BEEN ELECTRONICALLY SIGNED BY: WILL THOMPSON MD on May  29 2009 10:45PM       Results for orders placed in visit on 11/03/12    DX-FINGER(S) 2+    Impression  No evidence of fracture or dislocation.    Results for orders placed during the hospital encounter of 07/28/22    DX-FOOT-COMPLETE 3+ RIGHT    Impression  1.  Soft tissue gas is present overlying the first transmetatarsal amputation site and indistinctness of the cortex of the every dictation site site of the first metatarsal which raises suspicion for infection. MRI of the foot with and without contrast  could be obtained for further evaluation if indicated.  2.  Subacute nonunited fracture of the second metatarsal base with new moderate lateral displacement and bone callus formation.  3.  Interval agitation of the second digit at the proximal phalanx               Results for orders placed during the hospital encounter of 02/07/24    DX-LUMBAR SPINE-2 OR 3 VIEWS    Impression  Mild retrolisthesis and severe degenerative disc disease at L2-3.    Postsurgical changes from posterior fixation and disc prosthesis placement at the L3-S1. Mature fusion of the disc space. The bilateral L3 pedicle screws are very close to the L2-3 disc space.      Results for orders placed during the hospital encounter of 05/27/21    [id number] (WITH 1-VIEW CXR) LEFT    Impression  Left lateral seventh and ninth acute rib fractures.    No pneumothorax or acute cardiopulmonary abnormality.         Results for orders placed during the hospital encounter of 05/27/21    DX-SHOULDER 2+ LEFT    Impression  No  acute osseous abnormality.              Diagnosis  Visit Diagnoses     ICD-10-CM   1. Occipital neuralgia of left side  M54.81   2. Chronic bilateral low back pain without sciatica  M54.50    G89.29   3. Other chronic pain  G89.29   4. Rib pain  R07.81   5. History of lumbar surgery  Z98.890   6. right intercostal neuralgia  G58.8   7. Spinal stenosis of lumbar region at L2-3 without neurogenic claudication  M48.061   8. Risk for falls  Z91.81   9. Myalgia  M79.10           ASSESSMENT AND PLAN:  Keaton Cervantes (: 1955) is a male with      Carlos was seen today for follow-up.    Diagnoses and all orders for this visit:    Occipital neuralgia of left side    Chronic bilateral low back pain without sciatica    Other chronic pain    Rib pain    History of lumbar surgery    right intercostal neuralgia    Spinal stenosis of lumbar region at L2-3 without neurogenic claudication    Risk for falls    Myalgia  -     Referral to Pain Clinic            PLAN  Physical Therapy: I previously ordered physical therapy to focus on strengthening and stretching as well as a home exercise program.  He will be starting this next week     Home Exercise Program: I previously provided the patient with a home exercise program focusing on strengthening and stretching at today's visit.      Diagnostic workup: no new imaging needed at this time     Medications:   -Okay to continue Norco 7.5-325 mg twice daily as needed as prescribed by the patient's PCP as long as there is compliance with .  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  He is low risk for narcotic abuse with an opioid risk score of 0 at today's visit  -Continue amitriptyline as per neurology      Interventions:   - trigger point injections with steroid including left paracervical and upper trapezius area, and right intercostal muscle. The risks, benefits, and alternatives to this procedure were discussed and the  patient wishes to proceed with the procedure. Risks include but are not limited to damage to surrounding structures, infection, bleeding, worsening of pain which can be permanent, collapsed lung, and weakness which can be permanent. Benefits include pain relief and improved function. Alternatives include not doing the procedure.    - s/p left greater occipital nerve block under ultrasound guidance with some improvement in occipital headache pain. No longer constant, now occurring frequently per day  -The patient denies significant radiating pain down the leg/groin or significant numbness or tingling in the leg/groin.  Discussed that if he has any of these symptoms in the future corresponding to the area of central canal stenosis at L2-3 or bilateral neuroforaminal stenosis at L2-3, could consider an intervention in the future such as epidural  - Previously discussed possible trial of intercostal nerve blocks for rib pain    Follow-up: Next available for trigger point injections with steroid, after authorization received    Orders Placed This Encounter    Referral to Pain Clinic       Missy Rhoades MD  Interventional Pain and Spine  Physical Medicine and Rehabilitation  [facility]      The above note documents my personal evaluation of this patient. In addition, I have reviewed and confirmed with the patient and MA the supportive information documented in today's Patient Health Questionnaire and Office Note.     Please note that this dictation was created using voice recognition software. I have made every reasonable attempt to correct obvious errors, but I expect that there are errors of grammar and possibly content that I did not discover before finalizing the note.

## 2024-04-09 ENCOUNTER — OFFICE VISIT (OUTPATIENT)
Dept: NEUROLOGY | Facility: MEDICAL CENTER | Age: 69
End: 2024-04-09
Attending: PSYCHIATRY & NEUROLOGY
Payer: COMMERCIAL

## 2024-04-09 VITALS
RESPIRATION RATE: 16 BRPM | HEART RATE: 62 BPM | OXYGEN SATURATION: 98 % | HEIGHT: 72 IN | SYSTOLIC BLOOD PRESSURE: 130 MMHG | DIASTOLIC BLOOD PRESSURE: 72 MMHG | WEIGHT: 187.83 LBS | BODY MASS INDEX: 25.44 KG/M2 | TEMPERATURE: 97.6 F

## 2024-04-09 DIAGNOSIS — G44.52 NEW PERSISTENT DAILY HEADACHE: ICD-10-CM

## 2024-04-09 PROCEDURE — 3078F DIAST BP <80 MM HG: CPT | Performed by: PSYCHIATRY & NEUROLOGY

## 2024-04-09 PROCEDURE — 99212 OFFICE O/P EST SF 10 MIN: CPT | Performed by: PSYCHIATRY & NEUROLOGY

## 2024-04-09 PROCEDURE — 99213 OFFICE O/P EST LOW 20 MIN: CPT | Performed by: PSYCHIATRY & NEUROLOGY

## 2024-04-09 PROCEDURE — 3075F SYST BP GE 130 - 139MM HG: CPT | Performed by: PSYCHIATRY & NEUROLOGY

## 2024-04-09 ASSESSMENT — FIBROSIS 4 INDEX: FIB4 SCORE: 1.45

## 2024-04-09 NOTE — PROGRESS NOTES
"[facility] NEUROLOGY  GENERAL NEUROLOGY  FOLLOW-UP VISIT    CC: daily persistent headache    INTERVAL HISTORY:  Keaton Cervantes is a 68 y.o. man with daily persistent headache and a history otherwise notable for asthma, HTN, DM, and ELLEN.  I last saw him in the clinic on 1/4/2024.  At that time I recommended he stop topiramate and try amitriptyline.  I also referred him to Pain Management.  Today, he was unaccompanied, and he provided the following interval history:    The following is a summary of headache symptoms, presented in my standard format:    Family History: none  Age at onset: rare headaches earlier in life, current headaches started at age 67 (immediately after toe amputation)  Location: occipital (worse on the left than the right)  Radiation: left parietal, left supra-orbital  Frequency: constant  Duration: constant since 6/2023  Headache Days/Month: 30/30, worse for 15 minutes to 2 hours at a time  Quality: \"like someone hit him with something, stabbing\"  Intensity: with treatment: 2/10, can reach 9/10   Aura: none  Photophobia/Phonophobia/Nausea/Vomiting: no/yes (when pain is 8/10 intensity)/no/no  Provoked by Physical Activity?:   Triggers: bright flashing lights, stress can intensity an already-existing headache  Associated Symptoms: no vision changes  Autonomic Signs (such as ptosis, miosis, conjunctival injection, rhinorrhea, increased lacrimation): none  Head Trauma: 2003: fell 14' off a ladder, ruptured discs, s/p fusion  Association with Menses: n/a  ED Visits: none  Hospitalizations: none  Missed Work Days (wendy at [employer]): no  Sleep: baseline: 5-6 hours/night, lately: 5-7 hours/night (works [employer])  Caffeine Intake: 2 cups of coffee/day  Hydration: stays well-hydrated (1-1.5 gallons of water/day)  Nutrition: eats regularly  Exercise: nothing formal, active at work  Analgesic Overuse: uses pain medications daily on work days     Current Medication Regimen:  - amitriptyline: 20 mg at " night makes it easier for him to sleep at night  - Flexeril: helpful for back>head  - gabapentin: 300 mg TID (prescribed for foot and back)  - hydrocodone-acetaminophen: prescribed for back pain, helpful, wears off after ~2 hours  - ASA: helpful, wears off after ~2 hours  - lying down: helpful  - occipital nerve block (left): somewhat helpful, on average pain involves a smaller area (mainly over occipital region)     Medications Tried: Response  Preventive:  - baclofen: ineffective  - topiramate: 50 mg daily, associated with cognitive side effects     Rescue:  -      Medications Not Tried:  -     MEDICATIONS:  Current Outpatient Medications   Medication Sig    asa/apap/caffeine (EXCEDRIN) 250-250-65 MG Tab Take 1 Tablet by mouth every 6 hours as needed for Headache.    amitriptyline (ELAVIL) 10 MG Tab Take 2 Tablets by mouth every evening for 360 days.    Insulin Glargine-yfgn 100 UNIT/ML Solution Pen-injector INJECT 8-10 UNITS SUBCUTANEOUSLY AT BEDTIME    insulin glargine (LANTUS) 100 UNIT/ML Solution Inject 6-15 Units under the skin at bedtime as needed (Blood Sugar Levels).    HYDROcodone-acetaminophen (NORCO) 7.5-325 MG per tablet Take 1 Tablet by mouth 3 times a day as needed for Severe Pain.    gabapentin (NEURONTIN) 300 MG Cap Take 300 mg by mouth 3 times a day as needed.    glipiZIDE SR (GLUCOTROL) 10 MG TABLET SR 24 HR Take 10 mg by mouth 2 times a day.    metformin (GLUCOPHAGE) 1000 MG tablet Take 1,000 mg by mouth 2 times a day with meals.    cyclobenzaprine (FLEXERIL) 10 mg Tab Take 10 mg by mouth as needed.     MEDICAL, SOCIAL, AND FAMILY HISTORY:  There is no change in the patient's ROS or medical, social, or family histories since the previous visit on 9/18/2023.    REVIEW OF SYSTEMS:  A ROS was completed.  Pertinent positives and negatives were included in the HPI, above.  All other systems were reviewed and are negative.    PHYSICAL EXAM:  General/Medical:  - NAD    Neuro:  MENTAL STATUS: awake and  alert; no deficits of speech or language; oriented to conversation; affect was appropriate to situation; pleasant, cooperative    CRANIAL NERVES:    II: acuity: NT, fields: NT, pupils: NT, discs: NT    III/IV/VI: versions: grossly intact    V: facial sensation: NT    VII: facial expression: symmetric    VIII: hearing: intact to voice    IX/X: palate: NT    XI: shoulder shrug: NT    XII: tongue: NT    MOTOR:  - bulk: NT  - tone: NT  Upper Extremity Strength (R/L)    NT   Elbow flexion NT   Elbow extension NT   Shoulder abduction NT     Lower Extremity Strength  (R/L)   Hip flexion NT   Knee extension NT   Knee flexion NT   Ankle dorsiflexion NT   Ankle plantarflexion NT     - pronator drift: NT  - abnormal movements: none    SENSATION:  - light touch: NT  - vibration (R/L, seconds): NT at the great toes  - pinprick: NT  - proprioception: NT  - Romberg: NT    COORDINATION:  - finger to nose: NT  - finger tapping: NT    REFLEXES:  Reflex Right Left   BR NT NT   Biceps NT NT   Triceps NT NT   Patellae NT NT   Achilles NT NT   Toes NT NT     GAIT:  - NT    REVIEW OF IMAGING STUDIES:  No additional, pertinent data since the last visit.    REVIEW OF LABORATORY STUDIES:  No recent data available.    ASSESSMENT:  Keaton Cervantes is a 68 y.o. man with daily persistent headache, possible occipital neuralgia (left), and a history otherwise notable for asthma, HTN, DM, and ELLEN.  Plans/recommendations as follows:    PLAN:  Daily Persistent Headache:  Prevention:  - proceed with second occipital nerve block (scheduled for tomorrow morning)  - ok to continue amitriptyline 20 mg/night, though this seems to be more beneficial for sleep than for pain  - ok to continue Flexeril and gabapentin  - I wonder about the utility of carbamazepine (good for paroxysmal, neuropathic pain), though his medication is becoming a bit complicated    Follow-Up:  - Return in about 3 months (around 7/9/2024).    Signed: Kunal Baker M.D.

## 2024-04-10 ENCOUNTER — OFFICE VISIT (OUTPATIENT)
Dept: PHYSICAL MEDICINE AND REHAB | Facility: MEDICAL CENTER | Age: 69
End: 2024-04-10
Payer: COMMERCIAL

## 2024-04-10 VITALS
TEMPERATURE: 97.3 F | DIASTOLIC BLOOD PRESSURE: 70 MMHG | BODY MASS INDEX: 25.41 KG/M2 | HEART RATE: 69 BPM | SYSTOLIC BLOOD PRESSURE: 155 MMHG | WEIGHT: 187.39 LBS

## 2024-04-10 DIAGNOSIS — M79.10 MYALGIA: ICD-10-CM

## 2024-04-10 PROCEDURE — 1125F AMNT PAIN NOTED PAIN PRSNT: CPT | Performed by: STUDENT IN AN ORGANIZED HEALTH CARE EDUCATION/TRAINING PROGRAM

## 2024-04-10 PROCEDURE — 76942 ECHO GUIDE FOR BIOPSY: CPT | Performed by: STUDENT IN AN ORGANIZED HEALTH CARE EDUCATION/TRAINING PROGRAM

## 2024-04-10 PROCEDURE — 3077F SYST BP >= 140 MM HG: CPT | Performed by: STUDENT IN AN ORGANIZED HEALTH CARE EDUCATION/TRAINING PROGRAM

## 2024-04-10 PROCEDURE — 20553 NJX 1/MLT TRIGGER POINTS 3/>: CPT | Performed by: STUDENT IN AN ORGANIZED HEALTH CARE EDUCATION/TRAINING PROGRAM

## 2024-04-10 PROCEDURE — 3078F DIAST BP <80 MM HG: CPT | Performed by: STUDENT IN AN ORGANIZED HEALTH CARE EDUCATION/TRAINING PROGRAM

## 2024-04-10 RX ORDER — DEXAMETHASONE SODIUM PHOSPHATE 4 MG/ML
4 INJECTION, SOLUTION INTRA-ARTICULAR; INTRALESIONAL; INTRAMUSCULAR; INTRAVENOUS; SOFT TISSUE ONCE
Status: COMPLETED | OUTPATIENT
Start: 2024-04-10 | End: 2024-04-10

## 2024-04-10 RX ADMIN — DEXAMETHASONE SODIUM PHOSPHATE 4 MG: 4 INJECTION, SOLUTION INTRA-ARTICULAR; INTRALESIONAL; INTRAMUSCULAR; INTRAVENOUS; SOFT TISSUE at 09:30

## 2024-04-10 ASSESSMENT — PAIN SCALES - GENERAL: PAINLEVEL: 3=SLIGHT PAIN

## 2024-04-10 ASSESSMENT — FIBROSIS 4 INDEX: FIB4 SCORE: 1.45

## 2024-04-10 NOTE — PROCEDURES
Patient Name: Keaton Cervantes  : 1955  Date of Service: 4/10/2024    Physician/s: Missy Rhoades MD    Pre-operative Diagnosis: Myalgia (M79.1)    Post-operative Diagnosis: Myalgia (M79.1)    Procedure: trigger point injections of the following muscles:    Site R L   Splenius capitis  x   Semispinalis capitis  x   Splenius cervicis  x   Sternocleidomastoid     Rhomboids     Levator scapulae     Pectoralis minor     Pectoralis major     Serratus anterior     Supraspinatus     Infraspinatus     Teres minor     Teres major     Quadratus lumborum     Paravertebral, cervical     Paravertebral, thoracic     Paravertebral, lumbar     Intercostal muscle, thoracic x x   Gluteus medius     Gluteus minimus     Tensor fascia canelo     Vastus lateralis     Adductor sandra     Adductor longus     Occipitalis     Cervical paraspinal     Trapezius, upper     Trapezius, mid     Trapezius, lower     Latissimus dorsi       Description of procedure:    The risks, benefits, and alternatives of the procedure were reviewed and discussed with the patient.  Written informed consent was freely obtained. A pre-procedural time-out was conducted by the physician verifying patient’s identity, procedure to be performed, procedure site and side, and allergy verification. Appropriate equipment was determined to be in place for the procedure.     In the office suite exam room the patient was placed in a seated position and the skin areas for injection over the above muscles were marked. A total of 7 areas of pain were identified for injection. The areas of pain were then prepped and draped in the usual sterile fashion. A solution was prepared with 4.5 mL of 1% lidocaine and 4.5 mL of 0.5% bupivacaine and 1 mL of 4 mg/milliliter dexamethasone. Ultrasound was confirmed to view the adjacent structures for blood vessels and nerves and to confirm the needle path was not within the structures nor was it too deep to be within the lung. A 27g  needle was placed into each of the markings at the areas above under ultrasound guidance with an out of plane approach. After negative aspiration, approximately 0.8-1.5 mL of the above solution was injected. The needle was removed intact after each trigger point injection, and the patient's back was covered with a 4x4 gauze, the area was cleansed with sterile normal saline, and a dressing was applied. There were no complications noted. The images were uploaded to our media tab for permanent storage.    Missy Rhoades MD  Interventional Pain and Spine  Physical Medicine and Rehabilitation  [facility]

## 2024-04-11 ENCOUNTER — PHYSICAL THERAPY (OUTPATIENT)
Dept: PHYSICAL THERAPY | Facility: REHABILITATION | Age: 69
End: 2024-04-11
Attending: STUDENT IN AN ORGANIZED HEALTH CARE EDUCATION/TRAINING PROGRAM
Payer: COMMERCIAL

## 2024-04-11 DIAGNOSIS — R07.81 RIB PAIN: ICD-10-CM

## 2024-04-11 DIAGNOSIS — G89.29 OTHER CHRONIC PAIN: ICD-10-CM

## 2024-04-11 DIAGNOSIS — Z98.890 HISTORY OF LUMBAR SURGERY: ICD-10-CM

## 2024-04-11 DIAGNOSIS — G89.29 CHRONIC BILATERAL LOW BACK PAIN WITHOUT SCIATICA: ICD-10-CM

## 2024-04-11 DIAGNOSIS — M54.50 CHRONIC BILATERAL LOW BACK PAIN WITHOUT SCIATICA: ICD-10-CM

## 2024-04-11 PROCEDURE — 97110 THERAPEUTIC EXERCISES: CPT

## 2024-04-11 PROCEDURE — 97161 PT EVAL LOW COMPLEX 20 MIN: CPT

## 2024-04-11 ASSESSMENT — ENCOUNTER SYMPTOMS
PAIN SCALE AT LOWEST: 1
PAIN SCALE AT HIGHEST: 8
PAIN SCALE: 3

## 2024-04-11 NOTE — OP THERAPY EVALUATION
Outpatient Physical Therapy  INITIAL EVALUATION    [facility] [place]  [address]  [place] [address]  Phone:  [phone]  Fax:  [phone]    Date of Evaluation: 2024    Patient: Carlos Cervantes  YOB: 1955  MRN: [id number]     Referring Provider: Missy Rhoades M.D.  [address] [facility]  [address]  [place] [address]   Referring Diagnosis Chronic bilateral low back pain without sciatica [M54.50, G89.29];Other chronic pain [G89.29];Rib pain [R07.81];History of lumbar surgery [Z98.890]     Time Calculation  Start time: 810  Stop time: 08 Time Calculation (min): 45 minutes         Chief Complaint: Back Problem    Visit Diagnoses     ICD-10-CM   1. Chronic bilateral low back pain without sciatica  M54.50    G89.29   2. Other chronic pain  G89.29   3. Rib pain  R07.81   4. History of lumbar surgery  Z98.890       Date of onset of impairment: 2017    Subjective:   History of Present Illness:     Date of onset:  2017  Prior level of function:  Ongoing upper back pain  Pain:     Current pain rating:  3    At best pain ratin    At worst pain ratin  Diagnostic Tests:     X-ray: abnormal    Activities of Daily Living:     Patient reported ADL status: Limited with lifting a 50lbs box  Limited with job functions  Limited with household activities  Patient Goals:     Patient goals for therapy:  Decreased pain, increased motion and increased strength  Patient is a 68 y.o. male that presents to therapy with mid back pain and HA. States that symptoms were due to injury, had a box fall on his back. Reports the pain quality to be sharp/dull, constant and are primarily B upper back pain. Reports that symptoms now not changing. States that aggravating factors are lifting.  States that easng factors are massage, meds. Denies red flags.       Past Medical History:   Diagnosis Date    Allergy     ASTHMA     Back pain     Chronic    Bronchitis      CATARACT     Dental disorder     Diabetes     oral rx & insulin    Hypertension     MEDICAL HOME     Neck pain     Chronic    ELLEN (obstructive sleep apnea)     Bipap    ELLEN (obstructive sleep apnea) 08/12/2022    Pt. states no longer using machine since wt loss.    Sleep apnea      Past Surgical History:   Procedure Laterality Date    TENDON LENGHTENING Right 8/17/2022    Procedure: RIGHT GASTROCNEMIUS RECESSION,;  Surgeon: Parrish Hardy M.D.;  Location: [facility];  Service: Orthopedics    TENDON TRANSFER Right 8/17/2022    Procedure: TRANSFER, TENDON-PERONEUS LONGUS TO BREVIS TRANSFER;  Surgeon: Parrish Hardy M.D.;  Location: [facility];  Service: Orthopedics    IRRIGATION & DEBRIDEMENT GENERAL Right 8/17/2022    Procedure: IRRIGATION AND DEBRIDEMENT, WOUND- FOOT;  Surgeon: Parrish Hardy M.D.;  Location: [facility];  Service: Orthopedics    METATARSAL HEAD RESECTION Right 8/17/2022    Procedure: EXCISION, METATARSAL BONE, HEAD-PARTIAL FIRST METATARSAL EXCISION;  Surgeon: Parrish Hardy M.D.;  Location: [facility];  Service: Orthopedics    BONE BIOPSY Right 8/17/2022    Procedure: BIOPSY, BONE;  Surgeon: Parrish Hardy M.D.;  Location: [facility];  Service: Orthopedics    TOE AMPUTATION Right 06/23/2022    Procedure: AMPUTATION, TOE 2ND;  Surgeon: Wilman Madera M.D.;  Location: [facility];  Service: Orthopedics    ROTATOR CUFF REPAIR Left 04/2022    [facility]    TOE AMPUTATION Right 03/04/2022    Procedure: AMPUTATION, TOE 1ST RAY;  Surgeon: Wilman Madera M.D.;  Location: SURGERY [facility];  Service: Orthopedics    LUMBAR FUSION ANTERIOR  08/04/2009    Performed by JOSEE BLANTON at SURGERY [facility] ORS    FUSION, SPINE, LUMBAR, PLIF  08/04/2009    Performed by JOSEE BLANTON at SURGERY [facility] ORS    LUMBAR LAMINECTOMY DISKECTOMY  08/04/2009    Performed by JOSEE BLANTON at [facility] ORS  "   LAMINOTOMY  2009    Performed by JOSEE BLANTON at SURGERY [facility] ORS    SOMNOPLASTY  06/10/2009    Performed by ELÍAS RILEY at SURGERY [facility] ORS    SEPTOPLASTY  06/10/2009    Performed by ELÍAS RILEY at SURGERY [facility] ORS    ANTROSTOMY  06/10/2009    Performed by ELÍAS RILEY at SURGERY [facility] ORS    ETHMOIDECTOMY  06/10/2009    Performed by ELÍAS RILEY at SURGERY [facility] ORS    CATARACT PHACO WITH IOL  2008    Performed by OCTAVIO HARDWICK at SURGERY SAME DAY [facility] ORS    CATARACT PHACO WITH IOL  2008    Performed by OCTAVIO HARDWICK at SURGERY SAME DAY [facility] ORS    APPENDECTOMY      ORIF, ANKLE      VASECTOMY       Social History     Tobacco Use    Smoking status: Former     Current packs/day: 0.00     Types: Cigarettes     Quit date:      Years since quittin.2    Smokeless tobacco: Current     Types: Chew   Substance Use Topics    Alcohol use: No     Comment: Pt. states, \"H/O Alcoholism, quit .\"     Family and Occupational History     Socioeconomic History    Marital status:      Spouse name: Not on file    Number of children: Not on file    Years of education: Not on file    Highest education level: Not on file   Occupational History    Not on file       Objective     Postural Observations  Seated posture: poor  Standing posture: poor      Neurological Testing     Reflexes   Left   Biceps (C5/C6): trace (1+)  Triceps (C7): trace (1+)  Ankle clonus reflex: negative  Babinski sign: negative    Right   Biceps (C5/C6): trace (1+)  Triceps (C7): trace (1+)  Ankle clonus reflex: negative  Babinski sign: negative    Myotome testing   Cervical (left)   C5 (deltoid): 5  C6 (biceps): 4-  C7 (triceps): 4-  C8 (thumb extension): 5  T1 (intrinsics): 5    Cervical (right)   C5 (deltoid): 5  C6 (biceps): 4-  C7 (triceps): 4-  C8 (thumb extension): 5  T1 (intrinsics): 5    Dermatome testing   Cervical (left)   All left cervical " dermatomes intact    Cervical (right)   All right cervical dermatomes intact    Additional Neurological Details  Limited sensation on R  More sensation on L    Active Range of Motion     Lumbar   Flexion: Lumbar active flexion: 95deg.  Extension: Lumbar active extension: 25deg.    Additional Active Range of Motion Details  Will measure at next visit    Strength:      Left Hip   Planes of Motion   Abduction: 3  Adduction: 3+    Right Hip   Planes of Motion   Abduction: 3  Adduction: 3+    Tests     Left Hip   SLR: Negative.     Right Hip   SLR: Negative.         Therapeutic Exercises (CPT 62863):     1. Basic tra edu, x5min    2. Basic tra with LE lifits, x20    3. clams, xfatigue    4. OR    5. seated hip abd purp, xfatigue      Time-based treatments/modalities:    Physical Therapy Timed Treatment Charges  Therapeutic exercise minutes (CPT 93240): 10 minutes      Assessment, Response and Plan:   Impairments: abnormal or restricted ROM, activity intolerance and impaired physical strength    Assessment details:  Patient presents with signs and symptoms consistent with poor biomechanics and spinal arthritic change. Patient limitations include weakness, decreased ROM, and pain. Patient will benefit from skilled therapy to improve the aforementioned deficits and decrease further functional decline.   Prognosis comment:  Fair to poor  Goals:   Short Term Goals:   1) Patient's tricep strength will improve by a half muscle grade to facilitate improved lifting.  2) Patient's hip abd strength will improve by a half muscle grade to facilitate improved gait.   Short term goal time span:  2-4 weeks      Long Term Goals:    1) Patient's symptoms will improve to allow for lifting 30lbs from floor to hips.  2) Patient's LBDI will improve by 10 to demonstrate functional improvement   Long term goal time span:  6-8 weeks    Plan:   Therapy options:  Physical therapy treatment to continue  Planned therapy interventions:  E Stim  Unattended (CPT 87548), Manual Therapy (CPT 22380), Neuromuscular Re-education (CPT 40468), Therapeutic Exercise (CPT 66217) and Hot or Cold Pack Therapy (CPT 97063)  Frequency:  2x week  Duration in weeks:  8  Discussed with:  Patient      Functional Assessment Used  PT Functional Assessment Tool Used: LBDI  PT Functional Assessment Score: 26     Referring provider co-signature:  I have reviewed this plan of care and my co-signature certifies the need for services.    Certification Period: 04/11/2024 to  06/06/24    Physician Signature: ________________________________ Date: ______________

## 2024-04-16 ENCOUNTER — APPOINTMENT (OUTPATIENT)
Dept: PHYSICAL THERAPY | Facility: REHABILITATION | Age: 69
End: 2024-04-16
Attending: STUDENT IN AN ORGANIZED HEALTH CARE EDUCATION/TRAINING PROGRAM
Payer: COMMERCIAL

## 2024-04-18 ENCOUNTER — APPOINTMENT (OUTPATIENT)
Dept: PHYSICAL THERAPY | Facility: REHABILITATION | Age: 69
End: 2024-04-18
Attending: STUDENT IN AN ORGANIZED HEALTH CARE EDUCATION/TRAINING PROGRAM
Payer: COMMERCIAL

## 2024-04-23 ENCOUNTER — APPOINTMENT (OUTPATIENT)
Dept: PHYSICAL THERAPY | Facility: REHABILITATION | Age: 69
End: 2024-04-23
Attending: STUDENT IN AN ORGANIZED HEALTH CARE EDUCATION/TRAINING PROGRAM
Payer: COMMERCIAL

## 2024-04-25 ENCOUNTER — APPOINTMENT (OUTPATIENT)
Dept: PHYSICAL THERAPY | Facility: REHABILITATION | Age: 69
End: 2024-04-25
Attending: STUDENT IN AN ORGANIZED HEALTH CARE EDUCATION/TRAINING PROGRAM
Payer: COMMERCIAL

## 2024-04-30 ENCOUNTER — APPOINTMENT (OUTPATIENT)
Dept: PHYSICAL THERAPY | Facility: REHABILITATION | Age: 69
End: 2024-04-30
Attending: STUDENT IN AN ORGANIZED HEALTH CARE EDUCATION/TRAINING PROGRAM
Payer: COMMERCIAL

## 2024-05-02 ENCOUNTER — APPOINTMENT (OUTPATIENT)
Dept: PHYSICAL THERAPY | Facility: REHABILITATION | Age: 69
End: 2024-05-02
Attending: STUDENT IN AN ORGANIZED HEALTH CARE EDUCATION/TRAINING PROGRAM
Payer: COMMERCIAL

## 2024-05-07 ENCOUNTER — APPOINTMENT (OUTPATIENT)
Dept: PHYSICAL THERAPY | Facility: REHABILITATION | Age: 69
End: 2024-05-07
Attending: STUDENT IN AN ORGANIZED HEALTH CARE EDUCATION/TRAINING PROGRAM
Payer: COMMERCIAL

## 2024-05-09 ENCOUNTER — APPOINTMENT (OUTPATIENT)
Dept: PHYSICAL THERAPY | Facility: REHABILITATION | Age: 69
End: 2024-05-09
Attending: STUDENT IN AN ORGANIZED HEALTH CARE EDUCATION/TRAINING PROGRAM
Payer: COMMERCIAL

## 2024-05-22 ENCOUNTER — OFFICE VISIT (OUTPATIENT)
Dept: PHYSICAL MEDICINE AND REHAB | Facility: MEDICAL CENTER | Age: 69
End: 2024-05-22
Payer: COMMERCIAL

## 2024-05-22 VITALS
HEART RATE: 73 BPM | SYSTOLIC BLOOD PRESSURE: 137 MMHG | BODY MASS INDEX: 25.71 KG/M2 | DIASTOLIC BLOOD PRESSURE: 65 MMHG | OXYGEN SATURATION: 95 % | WEIGHT: 189.6 LBS | TEMPERATURE: 97.3 F

## 2024-05-22 DIAGNOSIS — M54.81 OCCIPITAL NEURALGIA OF LEFT SIDE: ICD-10-CM

## 2024-05-22 DIAGNOSIS — R07.81 RIB PAIN: ICD-10-CM

## 2024-05-22 DIAGNOSIS — M48.061 SPINAL STENOSIS OF LUMBAR REGION WITHOUT NEUROGENIC CLAUDICATION: ICD-10-CM

## 2024-05-22 DIAGNOSIS — Z98.890 HISTORY OF LUMBAR SURGERY: ICD-10-CM

## 2024-05-22 DIAGNOSIS — G89.29 OTHER CHRONIC PAIN: ICD-10-CM

## 2024-05-22 DIAGNOSIS — M54.50 CHRONIC BILATERAL LOW BACK PAIN WITHOUT SCIATICA: ICD-10-CM

## 2024-05-22 DIAGNOSIS — G58.8 INTERCOSTAL NEURALGIA: ICD-10-CM

## 2024-05-22 DIAGNOSIS — M79.10 MYALGIA: ICD-10-CM

## 2024-05-22 DIAGNOSIS — G89.29 CHRONIC BILATERAL LOW BACK PAIN WITHOUT SCIATICA: ICD-10-CM

## 2024-05-22 PROCEDURE — 64420 NJX AA&/STRD NTRCOST NRV 1: CPT | Mod: RT | Performed by: STUDENT IN AN ORGANIZED HEALTH CARE EDUCATION/TRAINING PROGRAM

## 2024-05-22 PROCEDURE — 3075F SYST BP GE 130 - 139MM HG: CPT | Performed by: STUDENT IN AN ORGANIZED HEALTH CARE EDUCATION/TRAINING PROGRAM

## 2024-05-22 PROCEDURE — 1125F AMNT PAIN NOTED PAIN PRSNT: CPT | Performed by: STUDENT IN AN ORGANIZED HEALTH CARE EDUCATION/TRAINING PROGRAM

## 2024-05-22 PROCEDURE — 76942 ECHO GUIDE FOR BIOPSY: CPT | Performed by: STUDENT IN AN ORGANIZED HEALTH CARE EDUCATION/TRAINING PROGRAM

## 2024-05-22 PROCEDURE — 64421 NJX AA&/STRD NTRCOST NRV EA: CPT | Mod: RT | Performed by: STUDENT IN AN ORGANIZED HEALTH CARE EDUCATION/TRAINING PROGRAM

## 2024-05-22 PROCEDURE — 99213 OFFICE O/P EST LOW 20 MIN: CPT | Mod: 25 | Performed by: STUDENT IN AN ORGANIZED HEALTH CARE EDUCATION/TRAINING PROGRAM

## 2024-05-22 PROCEDURE — 3078F DIAST BP <80 MM HG: CPT | Performed by: STUDENT IN AN ORGANIZED HEALTH CARE EDUCATION/TRAINING PROGRAM

## 2024-05-22 RX ORDER — DEXAMETHASONE SODIUM PHOSPHATE 4 MG/ML
4 INJECTION, SOLUTION INTRA-ARTICULAR; INTRALESIONAL; INTRAMUSCULAR; INTRAVENOUS; SOFT TISSUE ONCE
Status: COMPLETED | OUTPATIENT
Start: 2024-05-22 | End: 2024-05-22

## 2024-05-22 RX ORDER — ACETAMINOPHEN 500 MG
TABLET ORAL
COMMUNITY

## 2024-05-22 RX ORDER — ACYCLOVIR 800 MG/1
TABLET ORAL
COMMUNITY

## 2024-05-22 RX ADMIN — DEXAMETHASONE SODIUM PHOSPHATE 4 MG: 4 INJECTION, SOLUTION INTRA-ARTICULAR; INTRALESIONAL; INTRAMUSCULAR; INTRAVENOUS; SOFT TISSUE at 09:24

## 2024-05-22 RX ADMIN — DEXAMETHASONE SODIUM PHOSPHATE 4 MG: 4 INJECTION, SOLUTION INTRA-ARTICULAR; INTRALESIONAL; INTRAMUSCULAR; INTRAVENOUS; SOFT TISSUE at 09:23

## 2024-05-22 ASSESSMENT — FIBROSIS 4 INDEX: FIB4 SCORE: 1.47

## 2024-05-22 ASSESSMENT — PAIN SCALES - GENERAL: PAINLEVEL: 3=SLIGHT PAIN

## 2024-05-22 ASSESSMENT — PATIENT HEALTH QUESTIONNAIRE - PHQ9: CLINICAL INTERPRETATION OF PHQ2 SCORE: 0

## 2024-05-22 NOTE — PROCEDURES
Patient Name: Keaton Cervantes  : 1955  Date of Service: 2024    Physician/s: Missy Rhoades MD    NAME OF PROCEDURE: RIGHT T8 and T9 intercostal nerve block with ultrasound guidance     PREPROCEDURAL DIAGNOSES:  RIGHT rib pain, thoracic intercostal neuralgia     POSTPROCEDURAL DIAGNOSES: Same     Description of procedure:    The risks, benefits, and alternatives of the procedure were reviewed and discussed with the patient.  Written informed consent was freely obtained. A pre-procedural time-out was conducted by the physician verifying patient’s identity, procedure to be performed, procedure site and side, and allergy verification. Risks include but are not limited to damage surrounding structures, infection, bleeding, pneumothorax, injury to the lung itself. Written informed consent was freely obtained. A pre-procedural time-out was conducted by the physician verifying patient's identity, procedure to be performed, procedure site and side, and allergy verification. Appropriate equipment was determined to be in place for the procedure.     No sedation was used for this procedure.     2 separate solutions were prepared with usual sterile technique each with a total of 3 mL, with 2 mL of 1% lidocaine and 1 mL of 4mg/mL of dexamethasone.    In the prone position with the thoracic spine and ribs exposed, ultrasound was used to visualize the right T8 and T9 ribs. The areas of the lung, pleura, and ribs were clearly noted. The area was prepped widely with chlorhexidine solution and sterile technique was used throughout. At each of the above targeted levels, a 25-gauge 3.5 inch needle was placed into the skin and advanced under ultrasound guidance at the level of the rib on the inferior aspect of the rib while staying over bone the entire time. Following negative aspiration, 3 mL of the above solution was injected at each level. The needle was then removed. The patient's skin was wiped with a 4x4 gauze,  the area was cleansed with alcohol prep, and a bandaid was applied. There were no complications noted.     The procedure was well tolerated, and there were no apparent complications.     Missy Rhoades MD  Interventional Pain and Spine  Physical Medicine and Rehabilitation  [facility]

## 2024-05-22 NOTE — PROGRESS NOTES
Follow-up patient Note    Interventional Pain and Spine  Physiatry (Physical Medicine and Rehabilitation)     Patient Name: Keaton Cervantes  : 1955  Date of service: 2024    Chief Complaint:   Chief Complaint   Patient presents with    Follow-Up       HISTORY  Please see new patient note by Dr. Rhoades for more details.     HPI  Today's visit   Keaton Cervantes ( 1955) is a male with Diagnoses of Myalgia, Occipital neuralgia of left side, Chronic bilateral low back pain without sciatica, Other chronic pain, History of lumbar surgery, Spinal stenosis of lumbar region at L2-3 without neurogenic claudication, Rib pain, and right intercostal neuralgia were pertinent to this visit.    Today Carlos presents after 4/10/24 trigger point injections with steroid with 50% improvement.  He reports that his headaches are now intermittent and much better.  He went to 1 session of physical therapy which was cost prohibitive as accosted $400.  He reports that he is taking amitriptyline 50% of the time with relief.    He reports his worst pain is currently at his right thoracic back that can radiate towards his chest.  He hopes to completely resolve this pain if possible.  This pain started after fracturing his ribs.      Procedure history:  - 3/6/24 LEFT Greater Occipital Nerve Block - Local Anesthetic and Steroid -some relief of occipital headaches.  Headaches no longer constant, now frequent   - 4/10/24 trigger point injections with steroid - 50% improvement overall  - 24 right intercostal nerve blocks at T8 and T9    ROS:   Red Flags ROS:   Fever, Chills, Sweats: Denies  Involuntary Weight Loss: Denies  Bladder Incontinence: Denies  Bowel Incontinence: denies  Saddle Anesthesia: Denies    All other systems reviewed and negative.     PMHx:   Past Medical History:   Diagnosis Date    Allergy     ASTHMA     Back pain     Chronic    Bronchitis     CATARACT     Dental disorder     Diabetes     oral  rx & insulin    Hypertension     MEDICAL HOME     Neck pain     Chronic    ELLEN (obstructive sleep apnea)     Bipap    ELLEN (obstructive sleep apnea) 08/12/2022    Pt. states no longer using machine since wt loss.    Sleep apnea        PSHx:   Past Surgical History:   Procedure Laterality Date    TENDON LENGHTENING Right 8/17/2022    Procedure: RIGHT GASTROCNEMIUS RECESSION,;  Surgeon: Parrish Hardy M.D.;  Location: [facility];  Service: Orthopedics    TENDON TRANSFER Right 8/17/2022    Procedure: TRANSFER, TENDON-PERONEUS LONGUS TO BREVIS TRANSFER;  Surgeon: Parrish Hardy M.D.;  Location: [facility];  Service: Orthopedics    IRRIGATION & DEBRIDEMENT GENERAL Right 8/17/2022    Procedure: IRRIGATION AND DEBRIDEMENT, WOUND- FOOT;  Surgeon: Parrish Hardy M.D.;  Location: [facility];  Service: Orthopedics    METATARSAL HEAD RESECTION Right 8/17/2022    Procedure: EXCISION, METATARSAL BONE, HEAD-PARTIAL FIRST METATARSAL EXCISION;  Surgeon: Parrish Hardy M.D.;  Location: [facility];  Service: Orthopedics    BONE BIOPSY Right 8/17/2022    Procedure: BIOPSY, BONE;  Surgeon: Parrish Hardy M.D.;  Location: [facility];  Service: Orthopedics    TOE AMPUTATION Right 06/23/2022    Procedure: AMPUTATION, TOE 2ND;  Surgeon: Wilman Madera M.D.;  Location: [facility];  Service: Orthopedics    ROTATOR CUFF REPAIR Left 04/2022    [facility]    TOE AMPUTATION Right 03/04/2022    Procedure: AMPUTATION, TOE 1ST RAY;  Surgeon: Wilman Madera M.D.;  Location: [facility];  Service: Orthopedics    LUMBAR FUSION ANTERIOR  08/04/2009    Performed by JOSEE BLANTON at [facility] ORS    FUSION, SPINE, LUMBAR, PLIF  08/04/2009    Performed by JOSEE BLANTON at [facility] ORS    LUMBAR LAMINECTOMY DISKECTOMY  08/04/2009    Performed by JOSEE BLANTON at [facility] ORS    LAMINOTOMY  08/04/2009    Performed by  "JOSEE BLANTON at [facility] ORS    SOMNOPLASTY  06/10/2009    Performed by OSVALDO, ELÍAS SHARPE at [facility] ORS    SEPTOPLASTY  06/10/2009    Performed by ELÍAS RILEY at [facility]S    ANTROSTOMY  06/10/2009    Performed by ELÍAS RILEY at [facility] OR[facility]    ETHMOIDECTOMY  06/10/2009    Performed by ELÍAS RILEY at [facility] ORS    CATARACT PHACO WITH IOL  2008    Performed by OCTAVIO HARDIWCK at [facility]    CATARACT PHACO WITH IOL  2008    Performed by OCTAVIO HARDWICK at [facility]    APPENDECTOMY      ORIF, ANKLE      VASECTOMY         Family Hx:   Family History   Problem Relation Age of Onset    Hypertension Father     Diabetes Father        Social Hx:  Social History     Socioeconomic History    Marital status:      Spouse name: Not on file    Number of children: Not on file    Years of education: Not on file    Highest education level: Not on file   Occupational History    Not on file   Tobacco Use    Smoking status: Former     Current packs/day: 0.00     Types: Cigarettes     Quit date:      Years since quittin.4    Smokeless tobacco: Current     Types: Chew   Vaping Use    Vaping status: Never Used   Substance and Sexual Activity    Alcohol use: No     Comment: Pt. states, \"H/O Alcoholism, quit .\"    Drug use: No    Sexual activity: Yes     Partners: Female   Other Topics Concern    Not on file   Social History Narrative    Not on file     Social Determinants of Health     Financial Resource Strain: Not on file   Food Insecurity: Not on file   Transportation Needs: Not on file   Physical Activity: Not on file   Stress: Not on file   Social Connections: Not on file   Intimate Partner Violence: Not on file   Housing Stability: Not on file       Allergies:  Allergies   Allergen Reactions    Sulfa Drugs Unspecified     Pt hasn't had meds in so long he doesn't remember " what his reaction is, he just remembers there is a reaction and that he shouldn't have them     Coconut (Cocos Nucifera) Allergy Skin Test     Coconut Oil     Flu Virus Vaccine Shortness of Breath    Other Food Anaphylaxis     coconut    Pneumococcal Vaccines Shortness of Breath    Sulfamethoxazole     Nutrasweet Aspartame [Aspartame]      Causes migraines       Medications: reviewed on epic.   Outpatient Medications Marked as Taking for the 5/22/24 encounter (Office Visit) with Missy Rhoades M.D.   Medication Sig Dispense Refill    asa/apap/caffeine (EXCEDRIN) 250-250-65 MG Tab Take 1 Tablet by mouth every 6 hours as needed for Headache.      amitriptyline (ELAVIL) 10 MG Tab Take 2 Tablets by mouth every evening for 360 days. 60 Tablet 11    Insulin Glargine-yfgn 100 UNIT/ML Solution Pen-injector INJECT 8-10 UNITS SUBCUTANEOUSLY AT BEDTIME      insulin glargine (LANTUS) 100 UNIT/ML Solution Inject 6-15 Units under the skin at bedtime as needed (Blood Sugar Levels).      HYDROcodone-acetaminophen (NORCO) 7.5-325 MG per tablet Take 1 Tablet by mouth 3 times a day as needed for Severe Pain.      gabapentin (NEURONTIN) 300 MG Cap Take 300 mg by mouth 3 times a day as needed.      glipiZIDE SR (GLUCOTROL) 10 MG TABLET SR 24 HR Take 10 mg by mouth 2 times a day.      metformin (GLUCOPHAGE) 1000 MG tablet Take 1,000 mg by mouth 2 times a day with meals.      cyclobenzaprine (FLEXERIL) 10 mg Tab Take 10 mg by mouth as needed.          Current Outpatient Medications on File Prior to Visit   Medication Sig Dispense Refill    asa/apap/caffeine (EXCEDRIN) 250-250-65 MG Tab Take 1 Tablet by mouth every 6 hours as needed for Headache.      amitriptyline (ELAVIL) 10 MG Tab Take 2 Tablets by mouth every evening for 360 days. 60 Tablet 11    Insulin Glargine-yfgn 100 UNIT/ML Solution Pen-injector INJECT 8-10 UNITS SUBCUTANEOUSLY AT BEDTIME      insulin glargine (LANTUS) 100 UNIT/ML Solution Inject 6-15 Units under the skin at  bedtime as needed (Blood Sugar Levels).      HYDROcodone-acetaminophen (NORCO) 7.5-325 MG per tablet Take 1 Tablet by mouth 3 times a day as needed for Severe Pain.      gabapentin (NEURONTIN) 300 MG Cap Take 300 mg by mouth 3 times a day as needed.      glipiZIDE SR (GLUCOTROL) 10 MG TABLET SR 24 HR Take 10 mg by mouth 2 times a day.      metformin (GLUCOPHAGE) 1000 MG tablet Take 1,000 mg by mouth 2 times a day with meals.      cyclobenzaprine (FLEXERIL) 10 mg Tab Take 10 mg by mouth as needed.      acetaminophen (TYLENOL) 500 MG Tab TAKE ONE OR TWO TABLETS BY MOUTH THREE TIMES DAILY FOR 14 DAYS.      acyclovir (ZOVIRAX) 800 MG Tab        No current facility-administered medications on file prior to visit.         EXAMINATION     Physical Exam:   /65 (BP Location: Right arm, Patient Position: Sitting, BP Cuff Size: Adult)   Pulse 73   Temp 36.3 °C (97.3 °F) (Temporal)   Wt 86 kg (189 lb 9.5 oz)   SpO2 95%     Constitutional:   Body Habitus: Body mass index is 25.71 kg/m².  Cooperation: Fully cooperates with exam  Appearance: Well-groomed, well-nourished.    Eyes: No scleral icterus to suggest severe liver disease, no proptosis to suggest severe hyperthyroidism    ENT -no obvious auditory deficits, no noticeable facial droop     Skin -no rashes or lesions noted     Respiratory-  breathing comfortably on room air, no audible wheezing    Cardiovascular-distal extremities warm and well perfused.  No lower extremity edema is noted.     Gastrointestinal - no obvious abdominal masses, non-distended    Psychiatric- alert and oriented ×3. Normal affect.     Gait stable steady    Musculoskeletal    Tenderness to palpation at right intercostal muscles and ribs at T8 and T9 levels    Key points for the international standards for neurological classification of spinal cord injury (ISNCSCI) to light touch.     Dermatome R L   C4 2 2   C5 2 2   C6 2 2   C7 2 2   C8 2 2   T1 2 2   T2 2 2       Motor Exam Upper  Extremities   ? Myotome R L   Shoulder abduction C5 5 5   Elbow flexion C5 5 5   Wrist extension C6 5 5   Elbow extension C7 5 5   Finger flexion C8 5 5   Finger abduction T1 5 5     Previous exam  Bilateral hands:   Inspection: No swelling, deformities, or rashes. Symmetric appearing thenar and hyperthenar regions bilaterally.  Palpation: no significant tenderness to palpation throughout the bilateral hands  Range of motion is within normal limits throughout bilateral hands, fingers and wrist.    Special tests:  Tinel's at the wrist over the median nerve negative bilaterally  Carpal tunnel compression: negative bilaterally  Phalen's test: negative bilaterally  Tinel's at the cubital tunnel: negative bilaterally    MEDICAL DECISION MAKING    Medical records review: see under HPI section.     DATA    Labs: Personally reviewed at today's visit:     Lab Results   Component Value Date/Time    SODIUM 137 02/06/2024 08:40 AM    POTASSIUM 5.3 02/06/2024 08:40 AM    CHLORIDE 102 02/06/2024 08:40 AM    CO2 24 02/06/2024 08:40 AM    ANION 11.0 02/06/2024 08:40 AM    GLUCOSE 94 02/06/2024 08:40 AM    BUN 18 02/06/2024 08:40 AM    CREATININE 0.85 02/06/2024 08:40 AM    CREATININE 0.7 08/10/2006 01:05 PM    CALCIUM 10.0 02/06/2024 08:40 AM    ASTSGOT 21 02/06/2024 08:40 AM    ALTSGPT 8 02/06/2024 08:40 AM    TBILIRUBIN 0.3 02/06/2024 08:40 AM    ALBUMIN 4.5 02/06/2024 08:40 AM    TOTPROTEIN 8.0 02/06/2024 08:40 AM    GLOBULIN 3.5 02/06/2024 08:40 AM    AGRATIO 1.3 02/06/2024 08:40 AM       Lab Results   Component Value Date/Time    PROTHROMBTM 10.7 (L) 08/10/2006 01:05 PM    INR 0.85 (L) 08/10/2006 01:05 PM        Lab Results   Component Value Date/Time    WBC 8.7 02/06/2024 08:40 AM    RBC 5.21 02/06/2024 08:40 AM    HEMOGLOBIN 15.5 02/06/2024 08:40 AM    HEMATOCRIT 48.1 02/06/2024 08:40 AM    MCV 92.3 02/06/2024 08:40 AM    MCH 29.8 02/06/2024 08:40 AM    MCHC 32.2 (L) 02/06/2024 08:40 AM    MPV 9.5 02/06/2024 08:40 AM     NEUTSPOLYS 59.60 02/06/2024 08:40 AM    LYMPHOCYTES 30.90 02/06/2024 08:40 AM    MONOCYTES 4.90 02/06/2024 08:40 AM    EOSINOPHILS 2.60 02/06/2024 08:40 AM    BASOPHILS 1.80 02/06/2024 08:40 AM        Lab Results   Component Value Date/Time    HBA1C 6.4 (H) 02/06/2024 08:40 AM        Imaging:   I personally reviewed following images, these are my reads    Chest x-ray 6/21/2022  No evidence of acute rib fracture. See formal radiology report for further details.     MRI lumbar spine 2/27/2024  Postoperative changes with fusion in the midline laminectomy spanning L3-S1.  At L2-3 there is a broad-based disc bulge and bilateral facet arthropathy with ligamentum flavum hypertrophy contributing to moderate central canal stenosis at this level.  There is also bilateral moderate severe neuroforaminal stenosis at this level.  No significant neuroforaminal stenosis or central canal stenosis elsewhere. See formal radiology report for further details.    X-ray lumbar spine 2/7/2024  Fusion spanning L3-S1.  Mild retrolisthesis of L2 on L3. See formal radiology report for further details.    IMAGING radiology reads. I reviewed the following radiology reads   MRI lumbar spine 2/27/2024  FINDINGS:     Postoperative changes are noted in the lumbar spine with midline laminectomy at L3-4, L4-5 and L5-S1 and posterior transpedicular fusion between L3 and S1.  Intervertebral disc spacers are present at the L3-4, L4-5, L5-S1 levels.  A lipid rich hemangioma is noted within L1.  There is decreased disc height with type I marrow changes involving the adjacent endplates at the L2-L3 level. The conus terminates at  L1. Normal signal intensity is identified within the distal thoracic cord and conus medullaris.     Axial lumbar spine levels:     T12-L1: Normal.     L1-2: Mild facet degeneration without stenosis.     L2-3: Broad-based disc bulge and bilateral facet degeneration and ligamentum flavum hypertrophy results in moderate canal stenosis  with thecal sac compression. There is also bilateral moderate-to-severe foraminal narrowing.     L3-4: Widely decompressed spinal canal. No stenosis.     L4-5: Widely decompressed spinal canal. No stenosis.     L5-S1: Well decompressed spinal canal. No stenosis.     A small T2 hyperintense collection is identified in the laminectomy bed behind the L3-4 level, measuring 17 x 40 x 42 mm (CC, AP, ML). There is no significant mass effect upon the thecal sac. This may represent a chronic organized fluid   collection/granulation tissue.     Prevertebral soft tissues are within normal limits.     IMPRESSION:        Moderate canal stenosis at L2-3 due to encroachment by a broad-based disc bulge, advanced facet degeneration at the metaphyseal hypertrophy. There is also bilateral moderate-to-severe foraminal narrowing with likely impingement upon the exiting L2   nerves.     Postoperative changes at L3-4, L4-5, L5-S1 with a well decompressed canal. There is no foraminal narrowing.    Results for orders placed during the hospital encounter of 07/05/23    MR-BRAIN-W/O    Impression  No acute intracranial process.    Nonspecific T2 hyperintensities are noted in the periventricular and deep white matter, most likely related to chronic microvascular ischemia.             Results for orders placed during the hospital encounter of 02/03/09    MR-CERVICAL SPINE-W/O    Impression  IMPRESSION:    1. POSTERIOR SPONDYLOTIC DISC BULGING AT C5-6 WHICH EXTENDS TO THE  LEFT-SIDED NEURAL FORAMEN WITH MILD CANAL STENOSIS AND LEFT-SIDED NEURAL  FORAMINAL NARROWING PRESENT.  THERE IS ALSO ACCOMPANYING LEFT-SIDED  UNCINATE SPURRING.    2. MILD DISC DESICCATION AT C2-3, C3-4, C4-5, AND C6-7 AS DESCRIBED.      CBG/bht        Read By JOVANY FLORENCE MD on Feb 4 2009  1:23PM  : [facility] Transcription Date: Feb 4 2009  2:16PM  THIS DOCUMENT HAS BEEN ELECTRONICALLY SIGNED BY: JOVANY FLORENCE MD on Feb 6 2009 12:16PM      Results for orders  placed during the hospital encounter of 02/27/24    MR-LUMBAR SPINE-W/O    Impression  Moderate canal stenosis at L2-3 due to encroachment by a broad-based disc bulge, advanced facet degeneration at the metaphyseal hypertrophy. There is also bilateral moderate-to-severe foraminal narrowing with likely impingement upon the exiting L2  nerves.    Postoperative changes at L3-4, L4-5, L5-S1 with a well decompressed canal. There is no foraminal narrowing.        Results for orders placed during the hospital encounter of 02/27/24    MR-LUMBAR SPINE-W/O    Impression  Moderate canal stenosis at L2-3 due to encroachment by a broad-based disc bulge, advanced facet degeneration at the metaphyseal hypertrophy. There is also bilateral moderate-to-severe foraminal narrowing with likely impingement upon the exiting L2  nerves.    Postoperative changes at L3-4, L4-5, L5-S1 with a well decompressed canal. There is no foraminal narrowing.                                      Results for orders placed during the hospital encounter of 11/06/11    DX-ANKLE 3+ VIEWS    Impression  No acute bony abnormality.      Results for orders placed during the hospital encounter of 05/27/09    DX-CHEST-2 VIEWS    Impression  IMPRESSION:    NORMAL CHEST.      [facility]    Read By WILL THOMPSON MD on May 27 2009  3:24PM  : [facility] Transcription Date: May 28 2009 11:52AM  THIS DOCUMENT HAS BEEN ELECTRONICALLY SIGNED BY: WILL THOMPSON MD on May  29 2009 10:45PM       Results for orders placed in visit on 11/03/12    DX-FINGER(S) 2+    Impression  No evidence of fracture or dislocation.    Results for orders placed during the hospital encounter of 07/28/22    DX-FOOT-COMPLETE 3+ RIGHT    Impression  1.  Soft tissue gas is present overlying the first transmetatarsal amputation site and indistinctness of the cortex of the every dictation site site of the first metatarsal which raises suspicion for infection. MRI of the foot with and without  contrast  could be obtained for further evaluation if indicated.  2.  Subacute nonunited fracture of the second metatarsal base with new moderate lateral displacement and bone callus formation.  3.  Interval agitation of the second digit at the proximal phalanx               Results for orders placed during the hospital encounter of 24    DX-LUMBAR SPINE-2 OR 3 VIEWS    Impression  Mild retrolisthesis and severe degenerative disc disease at L2-3.    Postsurgical changes from posterior fixation and disc prosthesis placement at the L3-S1. Mature fusion of the disc space. The bilateral L3 pedicle screws are very close to the L2-3 disc space.      Results for orders placed during the hospital encounter of 21    [id number] (WITH 1-VIEW CXR) LEFT    Impression  Left lateral seventh and ninth acute rib fractures.    No pneumothorax or acute cardiopulmonary abnormality.         Results for orders placed during the hospital encounter of 21    DX-SHOULDER 2+ LEFT    Impression  No acute osseous abnormality.              Diagnosis  Visit Diagnoses     ICD-10-CM   1. Myalgia  M79.10   2. Occipital neuralgia of left side  M54.81   3. Chronic bilateral low back pain without sciatica  M54.50    G89.29   4. Other chronic pain  G89.29   5. History of lumbar surgery  Z98.890   6. Spinal stenosis of lumbar region at L2-3 without neurogenic claudication  M48.061   7. Rib pain  R07.81   8. right intercostal neuralgia  G58.8             ASSESSMENT AND PLAN:  Keaton Naylor Jillians (: 1955) is a male with      Carlos was seen today for follow-up.    Diagnoses and all orders for this visit:    Myalgia    Occipital neuralgia of left side    Chronic bilateral low back pain without sciatica    Other chronic pain    History of lumbar surgery    Spinal stenosis of lumbar region at L2-3 without neurogenic claudication    Rib pain    right intercostal neuralgia  -     Referral to Pain Clinic  -     Consent for all  Surgical, Special Diagnostic or Therapeutic Procedures    Other orders  -     dexamethasone (Decadron) injection 4 mg  -     dexamethasone (Decadron) injection 4 mg              PLAN  Physical Therapy: I previously ordered physical therapy to focus on strengthening and stretching as well as a home exercise program.  This is cost prohibitive for him to continue.  Advised him to continue with his home exercise program as he is doing     Home Exercise Program: I previously provided the patient with a home exercise program focusing on strengthening and stretching at today's visit.      Diagnostic workup: no new imaging needed at this time     Medications:   -Okay to continue Norco 7.5-325 mg twice daily as needed as prescribed by the patient's PCP as long as there is compliance with .  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  He is low risk for narcotic abuse with an opioid risk score of 0 at today's visit  -Continue amitriptyline as per neurology      Interventions:   -Right T8 and T9 intercostal nerve blocks under ultrasound guidance today. The risks, benefits, and alternatives to this procedure were discussed and the patient wishes to proceed with the procedure. Risks include but are not limited to damage to surrounding structures, infection, bleeding, worsening of pain which can be permanent, and weakness which can be permanent. Benefits include pain relief and improved function. Alternatives include not doing the procedure.    - trigger point injections with steroid including left paracervical and upper trapezius area, and right intercostal muscle as needed  - s/p left greater occipital nerve block under ultrasound guidance with some improvement in occipital headache pain. No longer constant, now occurring frequently per day  -The patient denies significant radiating pain down the leg/groin or significant numbness or tingling in the leg/groin.  Discussed that if  he has any of these symptoms in the future corresponding to the area of central canal stenosis at L2-3 or bilateral neuroforaminal stenosis at L2-3, could consider an intervention in the future such as epidural    Follow-up: 1 month to assess progress with injections    Orders Placed This Encounter    Referral to Pain Clinic    acetaminophen (TYLENOL) 500 MG Tab    acyclovir (ZOVIRAX) 800 MG Tab    dexamethasone (Decadron) injection 4 mg    dexamethasone (Decadron) injection 4 mg    Consent for all Surgical, Special Diagnostic or Therapeutic Procedures       Missy Rhoades MD  Interventional Pain and Spine  Physical Medicine and Rehabilitation  [facility]      The above note documents my personal evaluation of this patient. In addition, I have reviewed and confirmed with the patient and MA the supportive information documented in today's Patient Health Questionnaire and Office Note.     Please note that this dictation was created using voice recognition software. I have made every reasonable attempt to correct obvious errors, but I expect that there are errors of grammar and possibly content that I did not discover before finalizing the note.

## 2024-06-19 ENCOUNTER — OFFICE VISIT (OUTPATIENT)
Dept: PHYSICAL MEDICINE AND REHAB | Facility: MEDICAL CENTER | Age: 69
End: 2024-06-19
Payer: COMMERCIAL

## 2024-06-19 VITALS
SYSTOLIC BLOOD PRESSURE: 126 MMHG | BODY MASS INDEX: 25.74 KG/M2 | HEART RATE: 73 BPM | OXYGEN SATURATION: 96 % | WEIGHT: 189.82 LBS | TEMPERATURE: 97 F | DIASTOLIC BLOOD PRESSURE: 58 MMHG

## 2024-06-19 DIAGNOSIS — M54.81 OCCIPITAL NEURALGIA OF LEFT SIDE: ICD-10-CM

## 2024-06-19 DIAGNOSIS — G89.29 CHRONIC BILATERAL LOW BACK PAIN WITHOUT SCIATICA: ICD-10-CM

## 2024-06-19 DIAGNOSIS — G89.29 OTHER CHRONIC PAIN: ICD-10-CM

## 2024-06-19 DIAGNOSIS — M79.10 MYALGIA: ICD-10-CM

## 2024-06-19 DIAGNOSIS — M54.50 CHRONIC BILATERAL LOW BACK PAIN WITHOUT SCIATICA: ICD-10-CM

## 2024-06-19 DIAGNOSIS — G58.8 INTERCOSTAL NEURALGIA: ICD-10-CM

## 2024-06-19 DIAGNOSIS — R07.81 RIB PAIN: ICD-10-CM

## 2024-06-19 DIAGNOSIS — M48.061 SPINAL STENOSIS OF LUMBAR REGION WITHOUT NEUROGENIC CLAUDICATION: ICD-10-CM

## 2024-06-19 DIAGNOSIS — Z98.890 HISTORY OF LUMBAR SURGERY: ICD-10-CM

## 2024-06-19 PROCEDURE — 1125F AMNT PAIN NOTED PAIN PRSNT: CPT | Performed by: STUDENT IN AN ORGANIZED HEALTH CARE EDUCATION/TRAINING PROGRAM

## 2024-06-19 PROCEDURE — 76942 ECHO GUIDE FOR BIOPSY: CPT | Performed by: STUDENT IN AN ORGANIZED HEALTH CARE EDUCATION/TRAINING PROGRAM

## 2024-06-19 PROCEDURE — 64420 NJX AA&/STRD NTRCOST NRV 1: CPT | Mod: RT | Performed by: STUDENT IN AN ORGANIZED HEALTH CARE EDUCATION/TRAINING PROGRAM

## 2024-06-19 PROCEDURE — 64421 NJX AA&/STRD NTRCOST NRV EA: CPT | Mod: RT | Performed by: STUDENT IN AN ORGANIZED HEALTH CARE EDUCATION/TRAINING PROGRAM

## 2024-06-19 PROCEDURE — 3078F DIAST BP <80 MM HG: CPT | Performed by: STUDENT IN AN ORGANIZED HEALTH CARE EDUCATION/TRAINING PROGRAM

## 2024-06-19 PROCEDURE — 3074F SYST BP LT 130 MM HG: CPT | Performed by: STUDENT IN AN ORGANIZED HEALTH CARE EDUCATION/TRAINING PROGRAM

## 2024-06-19 RX ORDER — DEXAMETHASONE SODIUM PHOSPHATE 4 MG/ML
4 INJECTION, SOLUTION INTRA-ARTICULAR; INTRALESIONAL; INTRAMUSCULAR; INTRAVENOUS; SOFT TISSUE ONCE
Status: DISCONTINUED | OUTPATIENT
Start: 2024-06-19 | End: 2024-06-19

## 2024-06-19 RX ORDER — DEXAMETHASONE SODIUM PHOSPHATE 4 MG/ML
4 INJECTION, SOLUTION INTRA-ARTICULAR; INTRALESIONAL; INTRAMUSCULAR; INTRAVENOUS; SOFT TISSUE ONCE
Status: COMPLETED | OUTPATIENT
Start: 2024-06-19 | End: 2024-06-19

## 2024-06-19 RX ADMIN — DEXAMETHASONE SODIUM PHOSPHATE 4 MG: 4 INJECTION, SOLUTION INTRA-ARTICULAR; INTRALESIONAL; INTRAMUSCULAR; INTRAVENOUS; SOFT TISSUE at 09:25

## 2024-06-19 ASSESSMENT — PATIENT HEALTH QUESTIONNAIRE - PHQ9: CLINICAL INTERPRETATION OF PHQ2 SCORE: 0

## 2024-06-19 ASSESSMENT — FIBROSIS 4 INDEX: FIB4 SCORE: 1.47

## 2024-06-19 ASSESSMENT — PAIN SCALES - GENERAL: PAINLEVEL: 4=SLIGHT-MODERATE PAIN

## 2024-06-19 NOTE — PROCEDURES
Patient Name: Keaton Cervantes  : 1955  Date of Service: 2024    Physician/s: Missy Rhoades MD    NAME OF PROCEDURE: RIGHT T8 and T9 intercostal nerve block with ultrasound guidance     PREPROCEDURAL DIAGNOSES:  RIGHT rib pain, thoracic intercostal neuralgia     POSTPROCEDURAL DIAGNOSES: Same     Description of procedure:    The risks, benefits, and alternatives of the procedure were reviewed and discussed with the patient.  Written informed consent was freely obtained. A pre-procedural time-out was conducted by the physician verifying patient’s identity, procedure to be performed, procedure site and side, and allergy verification. Risks include but are not limited to damage surrounding structures, infection, bleeding, pneumothorax, injury to the lung itself. Written informed consent was freely obtained. A pre-procedural time-out was conducted by the physician verifying patient's identity, procedure to be performed, procedure site and side, and allergy verification. Appropriate equipment was determined to be in place for the procedure.     No sedation was used for this procedure.     In the prone position with the thoracic spine and ribs exposed, ultrasound was used to visualize the right T8 and T9 ribs. The areas of the lung, pleura, and ribs were clearly noted. The area was prepped widely with chlorhexidine solution and sterile technique was used throughout. At the right T8 level, a 25-gauge 3.5 inch needle was placed into the skin and advanced under ultrasound guidance at the level of the rib on the inferior aspect of the rib while staying over bone the entire time. Following negative aspiration, 3 mL of 1% lidocaine was injected at the right T8 level. The needle was then removed.    At the right T9 level, a 25-gauge 3.5 inch needle was placed into the skin and advanced under ultrasound guidance at the level of the rib on the inferior aspect of the rib while staying over bone the entire time.  Following negative aspiration, a solution mixture containing 2 mL of 1% lidocaine and 1 mL of 4mg/mL of dexamethasone was injected at the right T8 level. The needle was then removed.    The patient's skin was wiped with a 4x4 gauze, the area was cleansed with alcohol prep, and a bandaid was applied. There were no complications noted.     The procedure was well tolerated, and there were no apparent complications.     Missy Rhoades MD  Interventional Pain and Spine  Physical Medicine and Rehabilitation  [facility]

## 2024-06-19 NOTE — PROGRESS NOTES
Follow-up patient Note    Interventional Pain and Spine  Physiatry (Physical Medicine and Rehabilitation)     Patient Name: Keaton Cervantes  : 1955  Date of service: 2024    Chief Complaint:   Chief Complaint   Patient presents with    Follow-Up       HISTORY  Please see new patient note by Dr. Rhoades for more details.     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    Today Carlos presents after 24 right intercostal nerve blocks at T8 and T9 - helped for 1 week.  He would like to repeat injections today.  He reports his worst pain is still at his right mid back, occasionally radiating towards his chest.  He has pain at his left low back as well that he would also like to target with injections.  He reports that his headaches have improved significantly since last visit, not as frequent.  He has had 2 headaches in the last month, unfortunately still debilitating when they occur.      History:  - He went to 1 session of physical therapy which was cost prohibitive as accosted $400.  He reports that he is taking amitriptyline 50% of the time with relief.  - right thoracic back pain radiating towards the chest started after fracturing his ribs.      Procedure history:  - 3/6/24 LEFT Greater Occipital Nerve Block - Local Anesthetic and Steroid -some relief of occipital headaches.  Headaches no longer constant, now frequent   - 4/10/24 trigger point injections with steroid - 50% improvement overall  - 24 right intercostal nerve blocks at T8 and T9 -1 week of significant relief  - 24 right intercostal nerve blocks at T8 and T9    ROS:   Red Flags ROS:   Fever, Chills, Sweats: Denies  Involuntary Weight Loss: Denies  Bladder Incontinence: Denies  Bowel Incontinence: denies  Saddle Anesthesia: Denies    All other systems reviewed and negative.     PMHx:   Past Medical History:   Diagnosis Date    Allergy      ASTHMA     Back pain     Chronic    Bronchitis     CATARACT     Dental disorder     Diabetes     oral rx & insulin    Hypertension     MEDICAL HOME     Neck pain     Chronic    ELLEN (obstructive sleep apnea)     Bipap    ELLEN (obstructive sleep apnea) 08/12/2022    Pt. states no longer using machine since wt loss.    Sleep apnea        PSHx:   Past Surgical History:   Procedure Laterality Date    TENDON LENGHTENING Right 8/17/2022    Procedure: RIGHT GASTROCNEMIUS RECESSION,;  Surgeon: Parrish Hardy M.D.;  Location: [facility];  Service: Orthopedics    TENDON TRANSFER Right 8/17/2022    Procedure: TRANSFER, TENDON-PERONEUS LONGUS TO BREVIS TRANSFER;  Surgeon: Parrish Hardy M.D.;  Location: [facility];  Service: Orthopedics    IRRIGATION & DEBRIDEMENT GENERAL Right 8/17/2022    Procedure: IRRIGATION AND DEBRIDEMENT, WOUND- FOOT;  Surgeon: Parrish Hardy M.D.;  Location: [facility];  Service: Orthopedics    METATARSAL HEAD RESECTION Right 8/17/2022    Procedure: EXCISION, METATARSAL BONE, HEAD-PARTIAL FIRST METATARSAL EXCISION;  Surgeon: Parrish Hardy M.D.;  Location: [facility];  Service: Orthopedics    BONE BIOPSY Right 8/17/2022    Procedure: BIOPSY, BONE;  Surgeon: Parrish Hardy M.D.;  Location: [facility];  Service: Orthopedics    TOE AMPUTATION Right 06/23/2022    Procedure: AMPUTATION, TOE 2ND;  Surgeon: Wilman Madera M.D.;  Location: [facility];  Service: Orthopedics    ROTATOR CUFF REPAIR Left 04/2022    [facility]    TOE AMPUTATION Right 03/04/2022    Procedure: AMPUTATION, TOE 1ST RAY;  Surgeon: Wilman Madera M.D.;  Location: [facility];  Service: Orthopedics    LUMBAR FUSION ANTERIOR  08/04/2009    Performed by JOSEE BLANTON at [facility] ORS    FUSION, SPINE, LUMBAR, PLIF  08/04/2009    Performed by JOSEE BLANTON at [facility] ORS    LUMBAR LAMINECTOMY DISKECTOMY  08/04/2009  "   Performed by JOSEE BLANTON at SURGERY [facility] ORS    LAMINOTOMY  2009    Performed by JOSEE BLANTON at SURGERY [facility] ORS    SOMNOPLASTY  06/10/2009    Performed by ELÍAS RILEY at SURGERY [facility] ORS    SEPTOPLASTY  06/10/2009    Performed by ELÍAS RILEY at SURGERY [facility] ORS    ANTROSTOMY  06/10/2009    Performed by ELÍAS RILEY at SURGERY [facility] ORS    ETHMOIDECTOMY  06/10/2009    Performed by ELÍAS RILEY at SURGERY [facility] ORS    CATARACT PHACO WITH IOL  2008    Performed [facility] OCTAVIO HARDWICK [facility]    CATARACT PHACO WITH IOL  2008    Performed by OCTAVIO HARDWICK at SURGERY SAME DAY [facility] ORS    APPENDECTOMY      ORIF, ANKLE      VASECTOMY         Family Hx:   Family History   Problem Relation Age of Onset    Hypertension Father     Diabetes Father        Social Hx:  Social History     Socioeconomic History    Marital status:      Spouse name: Not on file    Number of children: Not on file    Years of education: Not on file    Highest education level: Not on file   Occupational History    Not on file   Tobacco Use    Smoking status: Former     Current packs/day: 0.00     Types: Cigarettes     Quit date:      Years since quittin.    Smokeless tobacco: Current     Types: Chew   Vaping Use    Vaping status: Never Used   Substance and Sexual Activity    Alcohol use: No     Comment: Pt. states, \"H/O Alcoholism, quit .\"    Drug use: No    Sexual activity: Yes     Partners: Female   Other Topics Concern    Not on file   Social History Narrative    Not on file     Social Determinants of Health     Financial Resource Strain: Not on file   Food Insecurity: Not on file   Transportation Needs: Not on file   Physical Activity: Not on file   Stress: Not on file   Social Connections: Not on file   Intimate Partner Violence: Not on file   Housing Stability: Not on file       Allergies:  Allergies "   Allergen Reactions    Sulfa Drugs Unspecified     Pt hasn't had meds in so long he doesn't remember what his reaction is, he just remembers there is a reaction and that he shouldn't have them     Coconut (Cocos Nucifera) Allergy Skin Test     Coconut Oil     Flu Virus Vaccine Shortness of Breath    Other Food Anaphylaxis     coconut    Pneumococcal Vaccines Shortness of Breath    Sulfamethoxazole     Nutrasweet Aspartame [Aspartame]      Causes migraines       Medications: reviewed on epic.   Outpatient Medications Marked as Taking for the 6/19/24 encounter (Office Visit) with Missy Rhoades M.D.   Medication Sig Dispense Refill    acetaminophen (TYLENOL) 500 MG Tab TAKE ONE OR TWO TABLETS BY MOUTH THREE TIMES DAILY FOR 14 DAYS.      acyclovir (ZOVIRAX) 800 MG Tab       asa/apap/caffeine (EXCEDRIN) 250-250-65 MG Tab Take 1 Tablet by mouth every 6 hours as needed for Headache.      amitriptyline (ELAVIL) 10 MG Tab Take 2 Tablets by mouth every evening for 360 days. 60 Tablet 11    Insulin Glargine-yfgn 100 UNIT/ML Solution Pen-injector INJECT 8-10 UNITS SUBCUTANEOUSLY AT BEDTIME      insulin glargine (LANTUS) 100 UNIT/ML Solution Inject 6-15 Units under the skin at bedtime as needed (Blood Sugar Levels).      HYDROcodone-acetaminophen (NORCO) 7.5-325 MG per tablet Take 1 Tablet by mouth 3 times a day as needed for Severe Pain.      gabapentin (NEURONTIN) 300 MG Cap Take 300 mg by mouth 3 times a day as needed.      glipiZIDE SR (GLUCOTROL) 10 MG TABLET SR 24 HR Take 10 mg by mouth 2 times a day.      metformin (GLUCOPHAGE) 1000 MG tablet Take 1,000 mg by mouth 2 times a day with meals.      cyclobenzaprine (FLEXERIL) 10 mg Tab Take 10 mg by mouth as needed.          Current Outpatient Medications on File Prior to Visit   Medication Sig Dispense Refill    acetaminophen (TYLENOL) 500 MG Tab TAKE ONE OR TWO TABLETS BY MOUTH THREE TIMES DAILY FOR 14 DAYS.      acyclovir (ZOVIRAX) 800 MG Tab       asa/apap/caffeine  (EXCEDRIN) 250-250-65 MG Tab Take 1 Tablet by mouth every 6 hours as needed for Headache.      amitriptyline (ELAVIL) 10 MG Tab Take 2 Tablets by mouth every evening for 360 days. 60 Tablet 11    Insulin Glargine-yfgn 100 UNIT/ML Solution Pen-injector INJECT 8-10 UNITS SUBCUTANEOUSLY AT BEDTIME      insulin glargine (LANTUS) 100 UNIT/ML Solution Inject 6-15 Units under the skin at bedtime as needed (Blood Sugar Levels).      HYDROcodone-acetaminophen (NORCO) 7.5-325 MG per tablet Take 1 Tablet by mouth 3 times a day as needed for Severe Pain.      gabapentin (NEURONTIN) 300 MG Cap Take 300 mg by mouth 3 times a day as needed.      glipiZIDE SR (GLUCOTROL) 10 MG TABLET SR 24 HR Take 10 mg by mouth 2 times a day.      metformin (GLUCOPHAGE) 1000 MG tablet Take 1,000 mg by mouth 2 times a day with meals.      cyclobenzaprine (FLEXERIL) 10 mg Tab Take 10 mg by mouth as needed.       No current facility-administered medications on file prior to visit.         EXAMINATION     Physical Exam:   /58 (BP Location: Right arm, Patient Position: Sitting, BP Cuff Size: Adult)   Pulse 73   Temp 36.1 °C (97 °F) (Temporal)   Wt 86.1 kg (189 lb 13.1 oz)   SpO2 96%     Constitutional:   Body Habitus: Body mass index is 25.74 kg/m².  Cooperation: Fully cooperates with exam  Appearance: Well-groomed, well-nourished.    Eyes: No scleral icterus to suggest severe liver disease, no proptosis to suggest severe hyperthyroidism    ENT -no obvious auditory deficits, no noticeable facial droop     Skin -no rashes or lesions noted     Respiratory-  breathing comfortably on room air, no audible wheezing    Cardiovascular-distal extremities warm and well perfused.  No lower extremity edema is noted.     Gastrointestinal - no obvious abdominal masses, non-distended    Psychiatric- alert and oriented ×3. Normal affect.     Gait stable steady    Musculoskeletal    Tenderness to palpation at right intercostal muscles and ribs at T8 and T9  levels    Previous exam  Key points for the international standards for neurological classification of spinal cord injury (ISNCSCI) to light touch.     Dermatome R L   C4 2 2   C5 2 2   C6 2 2   C7 2 2   C8 2 2   T1 2 2   T2 2 2       Motor Exam Upper Extremities   ? Myotome R L   Shoulder abduction C5 5 5   Elbow flexion C5 5 5   Wrist extension C6 5 5   Elbow extension C7 5 5   Finger flexion C8 5 5   Finger abduction T1 5 5     Previous exam  Bilateral hands:   Inspection: No swelling, deformities, or rashes. Symmetric appearing thenar and hyperthenar regions bilaterally.  Palpation: no significant tenderness to palpation throughout the bilateral hands  Range of motion is within normal limits throughout bilateral hands, fingers and wrist.    Special tests:  Tinel's at the wrist over the median nerve negative bilaterally  Carpal tunnel compression: negative bilaterally  Phalen's test: negative bilaterally  Tinel's at the cubital tunnel: negative bilaterally    MEDICAL DECISION MAKING    Medical records review: see under HPI section.     DATA    Labs: Personally reviewed at today's visit:     Lab Results   Component Value Date/Time    SODIUM 137 02/06/2024 08:40 AM    POTASSIUM 5.3 02/06/2024 08:40 AM    CHLORIDE 102 02/06/2024 08:40 AM    CO2 24 02/06/2024 08:40 AM    ANION 11.0 02/06/2024 08:40 AM    GLUCOSE 94 02/06/2024 08:40 AM    BUN 18 02/06/2024 08:40 AM    CREATININE 0.85 02/06/2024 08:40 AM    CREATININE 0.7 08/10/2006 01:05 PM    CALCIUM 10.0 02/06/2024 08:40 AM    ASTSGOT 21 02/06/2024 08:40 AM    ALTSGPT 8 02/06/2024 08:40 AM    TBILIRUBIN 0.3 02/06/2024 08:40 AM    ALBUMIN 4.5 02/06/2024 08:40 AM    TOTPROTEIN 8.0 02/06/2024 08:40 AM    GLOBULIN 3.5 02/06/2024 08:40 AM    AGRATIO 1.3 02/06/2024 08:40 AM       Lab Results   Component Value Date/Time    PROTHROMBTM 10.7 (L) 08/10/2006 01:05 PM    INR 0.85 (L) 08/10/2006 01:05 PM        Lab Results   Component Value Date/Time    WBC 8.7 02/06/2024 08:40  AM    RBC 5.21 02/06/2024 08:40 AM    HEMOGLOBIN 15.5 02/06/2024 08:40 AM    HEMATOCRIT 48.1 02/06/2024 08:40 AM    MCV 92.3 02/06/2024 08:40 AM    MCH 29.8 02/06/2024 08:40 AM    MCHC 32.2 (L) 02/06/2024 08:40 AM    MPV 9.5 02/06/2024 08:40 AM    NEUTSPOLYS 59.60 02/06/2024 08:40 AM    LYMPHOCYTES 30.90 02/06/2024 08:40 AM    MONOCYTES 4.90 02/06/2024 08:40 AM    EOSINOPHILS 2.60 02/06/2024 08:40 AM    BASOPHILS 1.80 02/06/2024 08:40 AM        Lab Results   Component Value Date/Time    HBA1C 6.4 (H) 02/06/2024 08:40 AM        Imaging:   I personally reviewed following images, these are my reads    Chest x-ray 6/21/2022  No evidence of acute rib fracture. See formal radiology report for further details.     MRI lumbar spine 2/27/2024  Postoperative changes with fusion in the midline laminectomy spanning L3-S1.  At L2-3 there is a broad-based disc bulge and bilateral facet arthropathy with ligamentum flavum hypertrophy contributing to moderate central canal stenosis at this level.  There is also bilateral moderate severe neuroforaminal stenosis at this level.  No significant neuroforaminal stenosis or central canal stenosis elsewhere. See formal radiology report for further details.    X-ray lumbar spine 2/7/2024  Fusion spanning L3-S1.  Mild retrolisthesis of L2 on L3. See formal radiology report for further details.    IMAGING radiology reads. I reviewed the following radiology reads   MRI lumbar spine 2/27/2024  FINDINGS:     Postoperative changes are noted in the lumbar spine with midline laminectomy at L3-4, L4-5 and L5-S1 and posterior transpedicular fusion between L3 and S1.  Intervertebral disc spacers are present at the L3-4, L4-5, L5-S1 levels.  A lipid rich hemangioma is noted within L1.  There is decreased disc height with type I marrow changes involving the adjacent endplates at the L2-L3 level. The conus terminates at  L1. Normal signal intensity is identified within the distal thoracic cord and conus  medullaris.     Axial lumbar spine levels:     T12-L1: Normal.     L1-2: Mild facet degeneration without stenosis.     L2-3: Broad-based disc bulge and bilateral facet degeneration and ligamentum flavum hypertrophy results in moderate canal stenosis with thecal sac compression. There is also bilateral moderate-to-severe foraminal narrowing.     L3-4: Widely decompressed spinal canal. No stenosis.     L4-5: Widely decompressed spinal canal. No stenosis.     L5-S1: Well decompressed spinal canal. No stenosis.     A small T2 hyperintense collection is identified in the laminectomy bed behind the L3-4 level, measuring 17 x 40 x 42 mm (CC, AP, ML). There is no significant mass effect upon the thecal sac. This may represent a chronic organized fluid   collection/granulation tissue.     Prevertebral soft tissues are within normal limits.     IMPRESSION:        Moderate canal stenosis at L2-3 due to encroachment by a broad-based disc bulge, advanced facet degeneration at the metaphyseal hypertrophy. There is also bilateral moderate-to-severe foraminal narrowing with likely impingement upon the exiting L2   nerves.     Postoperative changes at L3-4, L4-5, L5-S1 with a well decompressed canal. There is no foraminal narrowing.    Results for orders placed during the hospital encounter of 07/05/23    MR-BRAIN-W/O    Impression  No acute intracranial process.    Nonspecific T2 hyperintensities are noted in the periventricular and deep white matter, most likely related to chronic microvascular ischemia.             Results for orders placed during the hospital encounter of 02/03/09    MR-CERVICAL SPINE-W/O    Impression  IMPRESSION:    1. POSTERIOR SPONDYLOTIC DISC BULGING AT C5-6 WHICH EXTENDS TO THE  LEFT-SIDED NEURAL FORAMEN WITH MILD CANAL STENOSIS AND LEFT-SIDED NEURAL  FORAMINAL NARROWING PRESENT.  THERE IS ALSO ACCOMPANYING LEFT-SIDED  UNCINATE SPURRING.    2. MILD DISC DESICCATION AT C2-3, C3-4, C4-5, AND C6-7 AS  DESCRIBED.      CBG/bht        Read By JOVANY FLORENCE MD on Feb 4 2009  1:23PM  : TONI Transcription Date: Feb 4 2009  2:16PM  THIS DOCUMENT HAS BEEN ELECTRONICALLY SIGNED BY: JOVANY FLORENCE MD on Feb 6 2009 12:16PM      Results for orders placed during the hospital encounter of 02/27/24    MR-LUMBAR SPINE-W/O    Impression  Moderate canal stenosis at L2-3 due to encroachment by a broad-based disc bulge, advanced facet degeneration at the metaphyseal hypertrophy. There is also bilateral moderate-to-severe foraminal narrowing with likely impingement upon the exiting L2  nerves.    Postoperative changes at L3-4, L4-5, L5-S1 with a well decompressed canal. There is no foraminal narrowing.        Results for orders placed during the hospital encounter of 02/27/24    MR-LUMBAR SPINE-W/O    Impression  Moderate canal stenosis at L2-3 due to encroachment by a broad-based disc bulge, advanced facet degeneration at the metaphyseal hypertrophy. There is also bilateral moderate-to-severe foraminal narrowing with likely impingement upon the exiting L2  nerves.    Postoperative changes at L3-4, L4-5, L5-S1 with a well decompressed canal. There is no foraminal narrowing.                                      Results for orders placed during the hospital encounter of 11/06/11    DX-ANKLE 3+ VIEWS    Impression  No acute bony abnormality.      Results for orders placed during the hospital encounter of 05/27/09    DX-CHEST-2 VIEWS    Impression  IMPRESSION:    NORMAL CHEST.      W:[facility]    Read By WILL THOMPSON MD on May 27 2009  3:24PM  : EMERITA Transcription Date: May 28 2009 11:52AM  THIS DOCUMENT HAS BEEN ELECTRONICALLY SIGNED BY: WILL THOMPSON MD on May  29 2009 10:45PM       Results for orders placed in visit on 11/03/12    DX-FINGER(S) 2+    Impression  No evidence of fracture or dislocation.    Results for orders placed during the hospital encounter of 07/28/22    DX-FOOT-COMPLETE 3+  RIGHT    Impression  1.  Soft tissue gas is present overlying the first transmetatarsal amputation site and indistinctness of the cortex of the every dictation site site of the first metatarsal which raises suspicion for infection. MRI of the foot with and without contrast  could be obtained for further evaluation if indicated.  2.  Subacute nonunited fracture of the second metatarsal base with new moderate lateral displacement and bone callus formation.  3.  Interval agitation of the second digit at the proximal phalanx               Results for orders placed during the hospital encounter of 24    DX-LUMBAR SPINE-2 OR 3 VIEWS    Impression  Mild retrolisthesis and severe degenerative disc disease at L2-3.    Postsurgical changes from posterior fixation and disc prosthesis placement at the L3-S1. Mature fusion of the disc space. The bilateral L3 pedicle screws are very close to the L2-3 disc space.      Results for orders placed during the hospital encounter of 21    [id number] (WITH 1-VIEW CXR) LEFT    Impression  Left lateral seventh and ninth acute rib fractures.    No pneumothorax or acute cardiopulmonary abnormality.         Results for orders placed during the hospital encounter of 21    DX-SHOULDER 2+ LEFT    Impression  No acute osseous abnormality.              Diagnosis  Visit Diagnoses     ICD-10-CM   1. Intercostal neuralgia  G58.8               ASSESSMENT AND PLAN:  Keaton Naylor Ashutoshobiecarloss (: 1955) is a male with      Carlos was seen today for follow-up.    Diagnoses and all orders for this visit:    Intercostal neuralgia  -     Consent for all Surgical, Special Diagnostic or Therapeutic Procedures                PLAN  Physical Therapy: I previously ordered physical therapy to focus on strengthening and stretching as well as a home exercise program.  This is cost prohibitive for him to continue.  Advised him to continue with his home exercise program as he is doing      Home Exercise Program: I previously provided the patient with a home exercise program focusing on strengthening and stretching at today's visit.      Diagnostic workup: no new imaging needed at this time     Medications:   -Okay to continue Norco 7.5-325 mg twice daily as needed as prescribed by the patient's PCP as long as there is compliance with .  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  He is low risk for narcotic abuse with an opioid risk score of 0 at today's visit  -Continue amitriptyline as per neurology      Interventions:   -Repeat right T8 and T9 intercostal nerve blocks under ultrasound guidance today given significant but partial improvement with this previously. The risks, benefits, and alternatives to this procedure were discussed and the patient wishes to proceed with the procedure. Risks include but are not limited to damage to surrounding structures, infection, bleeding, worsening of pain which can be permanent, and weakness which can be permanent. Benefits include pain relief and improved function. Alternatives include not doing the procedure.    - trigger point injections with steroid including left paracervical and upper trapezius area, and right intercostal muscle as needed  - s/p left greater occipital nerve block under ultrasound guidance with some improvement in occipital headache pain. No longer constant, now occurring frequently per day  -The patient denies significant radiating pain down the leg/groin or significant numbness or tingling in the leg/groin.  Discussed that if he has any of these symptoms in the future corresponding to the area of central canal stenosis at L2-3 or bilateral neuroforaminal stenosis at L2-3, could consider an intervention in the future such as epidural    Follow-up: Next available for trigger point injections with steroid    Orders Placed This Encounter    Consent for all Surgical, Special Diagnostic or  Therapeutic Procedures       Missy Rhoades MD  Interventional Pain and Spine  Physical Medicine and Rehabilitation  [facility]      The above note documents my personal evaluation of this patient. In addition, I have reviewed and confirmed with the patient and MA the supportive information documented in today's Patient Health Questionnaire and Office Note.     Please note that this dictation was created using voice recognition software. I have made every reasonable attempt to correct obvious errors, but I expect that there are errors of grammar and possibly content that I did not discover before finalizing the note.

## 2024-06-20 DIAGNOSIS — M79.10 MYALGIA: ICD-10-CM

## 2024-06-21 ENCOUNTER — OFFICE VISIT (OUTPATIENT)
Dept: PHYSICAL MEDICINE AND REHAB | Facility: MEDICAL CENTER | Age: 69
End: 2024-06-21
Payer: COMMERCIAL

## 2024-06-21 VITALS
SYSTOLIC BLOOD PRESSURE: 118 MMHG | HEIGHT: 72 IN | OXYGEN SATURATION: 96 % | DIASTOLIC BLOOD PRESSURE: 58 MMHG | TEMPERATURE: 97.4 F | BODY MASS INDEX: 25.89 KG/M2 | HEART RATE: 75 BPM | WEIGHT: 191.14 LBS

## 2024-06-21 DIAGNOSIS — M79.10 MYALGIA: ICD-10-CM

## 2024-06-21 PROCEDURE — 76942 ECHO GUIDE FOR BIOPSY: CPT | Performed by: STUDENT IN AN ORGANIZED HEALTH CARE EDUCATION/TRAINING PROGRAM

## 2024-06-21 PROCEDURE — 20553 NJX 1/MLT TRIGGER POINTS 3/>: CPT | Performed by: STUDENT IN AN ORGANIZED HEALTH CARE EDUCATION/TRAINING PROGRAM

## 2024-06-21 PROCEDURE — 3074F SYST BP LT 130 MM HG: CPT | Performed by: STUDENT IN AN ORGANIZED HEALTH CARE EDUCATION/TRAINING PROGRAM

## 2024-06-21 PROCEDURE — 1125F AMNT PAIN NOTED PAIN PRSNT: CPT | Performed by: STUDENT IN AN ORGANIZED HEALTH CARE EDUCATION/TRAINING PROGRAM

## 2024-06-21 PROCEDURE — 3078F DIAST BP <80 MM HG: CPT | Performed by: STUDENT IN AN ORGANIZED HEALTH CARE EDUCATION/TRAINING PROGRAM

## 2024-06-21 ASSESSMENT — PAIN SCALES - GENERAL: PAINLEVEL: 5=MODERATE PAIN

## 2024-06-21 ASSESSMENT — FIBROSIS 4 INDEX: FIB4 SCORE: 1.47

## 2024-06-21 NOTE — PROCEDURES
Patient Name: Keaton Cervantes  : 1955  Date of Service: 2024    Physician/s: Missy Rhoades MD    Pre-operative Diagnosis: Myalgia (M79.1)    Post-operative Diagnosis: Myalgia (M79.1)    Procedure: trigger point injections of the following muscles:    Site R L   Splenius capitis     Splenius cervicis     Sternocleidomastoid     Rhomboids     Levator scapulae     Pectoralis minor     Pectoralis major     Serratus anterior     Teres major/minor     Quadratus lumborum     Paravertebral, cervical     Paravertebral, thoracic     Paravertebral, lumbar x x   Gluteus hawa  x   Gluteus medius     Gluteus minimus     Tensor fascia canelo     Vastus lateralis     Adductor sandra     Adductor longus     Occipitalis     Cervical paraspinal     Trapezius, upper     Trapezius, mid     Trapezius, lower     Latissimus dorsi       Description of procedure:    The risks, benefits, and alternatives of the procedure were reviewed and discussed with the patient.  Written informed consent was freely obtained. A pre-procedural time-out was conducted by the physician verifying patient’s identity, procedure to be performed, procedure site and side, and allergy verification. Appropriate equipment was determined to be in place for the procedure.     In the office suite exam room the patient was placed in a prone position and the skin areas for injection over the above muscles were marked. A total of 4 areas of pain were identified for injection. The areas of pain were then prepped and draped in the usual sterile fashion. A solution was prepared with 5 mL of 1% lidocaine and 5 mL of 0.5% bupivacaine. Ultrasound was confirmed to view the adjacent structures for blood vessels and nerves and to confirm the needle path was not within the structures. A 27g needle was placed into each of the markings at the areas above under ultrasound guidance with an out of plane approach. After negative aspiration, approximately 2-2.5 mL of the  above solution was injected. The needle was removed intact after each trigger point injection, and the patient's back was covered with a 4x4 gauze, the area was cleansed with sterile normal saline, and a dressing was applied. There were no complications noted. The images were uploaded to our media tab for permanent storage.    Missy Rhoades MD  Interventional Pain and Spine  Physical Medicine and Rehabilitation  [facility]

## 2024-07-15 ENCOUNTER — APPOINTMENT (OUTPATIENT)
Dept: NEUROLOGY | Facility: MEDICAL CENTER | Age: 69
End: 2024-07-15
Attending: PSYCHIATRY & NEUROLOGY
Payer: COMMERCIAL

## 2024-07-19 ENCOUNTER — OFFICE VISIT (OUTPATIENT)
Dept: NEUROLOGY | Facility: MEDICAL CENTER | Age: 69
End: 2024-07-19
Attending: PSYCHIATRY & NEUROLOGY
Payer: COMMERCIAL

## 2024-07-19 VITALS
SYSTOLIC BLOOD PRESSURE: 120 MMHG | OXYGEN SATURATION: 99 % | TEMPERATURE: 97.5 F | BODY MASS INDEX: 26.01 KG/M2 | RESPIRATION RATE: 16 BRPM | HEART RATE: 70 BPM | DIASTOLIC BLOOD PRESSURE: 66 MMHG | HEIGHT: 72 IN | WEIGHT: 192.02 LBS

## 2024-07-19 DIAGNOSIS — M54.81 OCCIPITAL NEURALGIA OF LEFT SIDE: ICD-10-CM

## 2024-07-19 PROCEDURE — 3078F DIAST BP <80 MM HG: CPT | Performed by: PSYCHIATRY & NEUROLOGY

## 2024-07-19 PROCEDURE — 99213 OFFICE O/P EST LOW 20 MIN: CPT | Performed by: PSYCHIATRY & NEUROLOGY

## 2024-07-19 PROCEDURE — 3074F SYST BP LT 130 MM HG: CPT | Performed by: PSYCHIATRY & NEUROLOGY

## 2024-07-19 PROCEDURE — 99212 OFFICE O/P EST SF 10 MIN: CPT | Performed by: PSYCHIATRY & NEUROLOGY

## 2024-07-19 ASSESSMENT — FIBROSIS 4 INDEX: FIB4 SCORE: 1.47

## 2024-09-18 ENCOUNTER — HOSPITAL ENCOUNTER (OUTPATIENT)
Dept: LAB | Facility: MEDICAL CENTER | Age: 69
End: 2024-09-18
Attending: STUDENT IN AN ORGANIZED HEALTH CARE EDUCATION/TRAINING PROGRAM
Payer: COMMERCIAL

## 2024-09-18 LAB
25(OH)D3 SERPL-MCNC: 26 NG/ML (ref 30–100)
ALBUMIN SERPL BCP-MCNC: 3.9 G/DL (ref 3.2–4.9)
ALBUMIN/GLOB SERPL: 1.4 G/DL
ALP SERPL-CCNC: 46 U/L (ref 30–99)
ALT SERPL-CCNC: 20 U/L (ref 2–50)
ANION GAP SERPL CALC-SCNC: 11 MMOL/L (ref 7–16)
AST SERPL-CCNC: 32 U/L (ref 12–45)
BASOPHILS # BLD AUTO: 0.8 % (ref 0–1.8)
BASOPHILS # BLD: 0.05 K/UL (ref 0–0.12)
BILIRUB SERPL-MCNC: 0.2 MG/DL (ref 0.1–1.5)
BUN SERPL-MCNC: 24 MG/DL (ref 8–22)
CALCIUM ALBUM COR SERPL-MCNC: 8.7 MG/DL (ref 8.5–10.5)
CALCIUM SERPL-MCNC: 8.6 MG/DL (ref 8.5–10.5)
CHLORIDE SERPL-SCNC: 100 MMOL/L (ref 96–112)
CHOLEST SERPL-MCNC: 115 MG/DL (ref 100–199)
CO2 SERPL-SCNC: 25 MMOL/L (ref 20–33)
CREAT SERPL-MCNC: 0.88 MG/DL (ref 0.5–1.4)
EOSINOPHIL # BLD AUTO: 0.17 K/UL (ref 0–0.51)
EOSINOPHIL NFR BLD: 2.7 % (ref 0–6.9)
ERYTHROCYTE [DISTWIDTH] IN BLOOD BY AUTOMATED COUNT: 43.9 FL (ref 35.9–50)
EST. AVERAGE GLUCOSE BLD GHB EST-MCNC: 151 MG/DL
FASTING STATUS PATIENT QL REPORTED: NORMAL
GFR SERPLBLD CREATININE-BSD FMLA CKD-EPI: 93 ML/MIN/1.73 M 2
GLOBULIN SER CALC-MCNC: 2.8 G/DL (ref 1.9–3.5)
GLUCOSE SERPL-MCNC: 87 MG/DL (ref 65–99)
HBA1C MFR BLD: 6.9 % (ref 4–5.6)
HCT VFR BLD AUTO: 45.5 % (ref 42–52)
HDLC SERPL-MCNC: 50 MG/DL
HGB BLD-MCNC: 14.5 G/DL (ref 14–18)
IMM GRANULOCYTES # BLD AUTO: 0.01 K/UL (ref 0–0.11)
IMM GRANULOCYTES NFR BLD AUTO: 0.2 % (ref 0–0.9)
LDLC SERPL CALC-MCNC: 56 MG/DL
LYMPHOCYTES # BLD AUTO: 2.02 K/UL (ref 1–4.8)
LYMPHOCYTES NFR BLD: 32.5 % (ref 22–41)
MCH RBC QN AUTO: 30.3 PG (ref 27–33)
MCHC RBC AUTO-ENTMCNC: 31.9 G/DL (ref 32.3–36.5)
MCV RBC AUTO: 95.2 FL (ref 81.4–97.8)
MONOCYTES # BLD AUTO: 0.51 K/UL (ref 0–0.85)
MONOCYTES NFR BLD AUTO: 8.2 % (ref 0–13.4)
NEUTROPHILS # BLD AUTO: 3.45 K/UL (ref 1.82–7.42)
NEUTROPHILS NFR BLD: 55.6 % (ref 44–72)
NRBC # BLD AUTO: 0 K/UL
NRBC BLD-RTO: 0 /100 WBC (ref 0–0.2)
PLATELET # BLD AUTO: 269 K/UL (ref 164–446)
PMV BLD AUTO: 9.6 FL (ref 9–12.9)
POTASSIUM SERPL-SCNC: 5.1 MMOL/L (ref 3.6–5.5)
PROT SERPL-MCNC: 6.7 G/DL (ref 6–8.2)
RBC # BLD AUTO: 4.78 M/UL (ref 4.7–6.1)
SODIUM SERPL-SCNC: 136 MMOL/L (ref 135–145)
TRIGL SERPL-MCNC: 43 MG/DL (ref 0–149)
WBC # BLD AUTO: 6.2 K/UL (ref 4.8–10.8)

## 2024-09-18 PROCEDURE — 82570 ASSAY OF URINE CREATININE: CPT

## 2024-09-18 PROCEDURE — 80061 LIPID PANEL: CPT

## 2024-09-18 PROCEDURE — 83036 HEMOGLOBIN GLYCOSYLATED A1C: CPT

## 2024-09-18 PROCEDURE — 36415 COLL VENOUS BLD VENIPUNCTURE: CPT

## 2024-09-18 PROCEDURE — 82043 UR ALBUMIN QUANTITATIVE: CPT

## 2024-09-18 PROCEDURE — 85025 COMPLETE CBC W/AUTO DIFF WBC: CPT

## 2024-09-18 PROCEDURE — 80053 COMPREHEN METABOLIC PANEL: CPT

## 2024-09-18 PROCEDURE — 82306 VITAMIN D 25 HYDROXY: CPT

## 2024-09-19 LAB
CREAT UR-MCNC: 137 MG/DL
MICROALBUMIN UR-MCNC: 2.4 MG/DL
MICROALBUMIN/CREAT UR: 18 MG/G (ref 0–30)

## 2024-09-20 ENCOUNTER — OFFICE VISIT (OUTPATIENT)
Dept: PHYSICAL MEDICINE AND REHAB | Facility: MEDICAL CENTER | Age: 69
End: 2024-09-20
Payer: COMMERCIAL

## 2024-09-20 VITALS
HEART RATE: 68 BPM | HEIGHT: 73 IN | BODY MASS INDEX: 25.83 KG/M2 | SYSTOLIC BLOOD PRESSURE: 128 MMHG | WEIGHT: 194.89 LBS | TEMPERATURE: 98.1 F | OXYGEN SATURATION: 98 % | DIASTOLIC BLOOD PRESSURE: 66 MMHG

## 2024-09-20 DIAGNOSIS — G89.29 OTHER CHRONIC PAIN: ICD-10-CM

## 2024-09-20 DIAGNOSIS — Z98.890 HISTORY OF LUMBAR SURGERY: ICD-10-CM

## 2024-09-20 DIAGNOSIS — M48.061 SPINAL STENOSIS OF LUMBAR REGION WITHOUT NEUROGENIC CLAUDICATION: ICD-10-CM

## 2024-09-20 DIAGNOSIS — M54.81 OCCIPITAL NEURALGIA OF LEFT SIDE: ICD-10-CM

## 2024-09-20 DIAGNOSIS — G58.8 INTERCOSTAL NEURALGIA: ICD-10-CM

## 2024-09-20 DIAGNOSIS — G89.29 CHRONIC BILATERAL LOW BACK PAIN WITHOUT SCIATICA: ICD-10-CM

## 2024-09-20 DIAGNOSIS — M54.50 CHRONIC BILATERAL LOW BACK PAIN WITHOUT SCIATICA: ICD-10-CM

## 2024-09-20 DIAGNOSIS — R07.81 RIB PAIN: ICD-10-CM

## 2024-09-20 DIAGNOSIS — M79.10 MYALGIA: ICD-10-CM

## 2024-09-20 PROCEDURE — 3078F DIAST BP <80 MM HG: CPT | Performed by: STUDENT IN AN ORGANIZED HEALTH CARE EDUCATION/TRAINING PROGRAM

## 2024-09-20 PROCEDURE — 1125F AMNT PAIN NOTED PAIN PRSNT: CPT | Performed by: STUDENT IN AN ORGANIZED HEALTH CARE EDUCATION/TRAINING PROGRAM

## 2024-09-20 PROCEDURE — 3074F SYST BP LT 130 MM HG: CPT | Performed by: STUDENT IN AN ORGANIZED HEALTH CARE EDUCATION/TRAINING PROGRAM

## 2024-09-20 PROCEDURE — 76942 ECHO GUIDE FOR BIOPSY: CPT | Performed by: STUDENT IN AN ORGANIZED HEALTH CARE EDUCATION/TRAINING PROGRAM

## 2024-09-20 PROCEDURE — 99213 OFFICE O/P EST LOW 20 MIN: CPT | Mod: 25 | Performed by: STUDENT IN AN ORGANIZED HEALTH CARE EDUCATION/TRAINING PROGRAM

## 2024-09-20 PROCEDURE — 20553 NJX 1/MLT TRIGGER POINTS 3/>: CPT | Performed by: STUDENT IN AN ORGANIZED HEALTH CARE EDUCATION/TRAINING PROGRAM

## 2024-09-20 RX ORDER — DEXAMETHASONE SODIUM PHOSPHATE 4 MG/ML
4 INJECTION, SOLUTION INTRA-ARTICULAR; INTRALESIONAL; INTRAMUSCULAR; INTRAVENOUS; SOFT TISSUE ONCE
Status: COMPLETED | OUTPATIENT
Start: 2024-09-20 | End: 2024-09-20

## 2024-09-20 RX ADMIN — DEXAMETHASONE SODIUM PHOSPHATE 4 MG: 4 INJECTION, SOLUTION INTRA-ARTICULAR; INTRALESIONAL; INTRAMUSCULAR; INTRAVENOUS; SOFT TISSUE at 09:01

## 2024-09-20 ASSESSMENT — FIBROSIS 4 INDEX: FIB4 SCORE: 1.81

## 2024-09-20 ASSESSMENT — PAIN SCALES - GENERAL: PAINLEVEL: 3=SLIGHT PAIN

## 2024-09-20 ASSESSMENT — PATIENT HEALTH QUESTIONNAIRE - PHQ9: CLINICAL INTERPRETATION OF PHQ2 SCORE: 0

## 2024-09-20 NOTE — PROCEDURES
Patient Name: Keaton Cervantes  : 1955  Date of Service: 2024    Physician/s: Missy Rhoades MD    Pre-operative Diagnosis: Myalgia (M79.1)    Post-operative Diagnosis: Myalgia (M79.1)    Procedure: trigger point injections of the following muscles:    Site R L   Splenius capitis  x   Splenius cervicis  x   Sternocleidomastoid  x   Rhomboids     Levator scapulae  x   Pectoralis minor     Pectoralis major     Serratus anterior     Teres major/minor     Quadratus lumborum     Paravertebral, cervical     Paravertebral, thoracic x    Paravertebral, lumbar     Gluteus hawa     Gluteus medius     Gluteus minimus     Tensor fascia canelo     Vastus lateralis     Adductor sandra     Adductor longus     Occipitalis     Cervical paraspinal     Trapezius, upper  x   Trapezius, mid     Trapezius, lower     Latissimus dorsi       Description of procedure:    The risks, benefits, and alternatives of the procedure were reviewed and discussed with the patient.  Written informed consent was freely obtained. A pre-procedural time-out was conducted by the physician verifying patient’s identity, procedure to be performed, procedure site and side, and allergy verification. Appropriate equipment was determined to be in place for the procedure.     In the office suite exam room the patient was placed in a prone position and the skin areas for injection over the above muscles were marked. A total of 4 areas of pain were identified for injection. The areas of pain were then prepped and draped in the usual sterile fashion. A solution was prepared with 4.5 mL of 1% lidocaine and 4.5 mL of 0.5% bupivacaine and 1 mg/ml dexamethasone. Ultrasound was confirmed to view the adjacent structures for blood vessels and nerves and to confirm the needle path was not within the structures. A 27g needle was placed into each of the markings at the areas above under ultrasound guidance with an out of plane approach. After negative  aspiration, approximately 1-2 mL of the above solution was injected. The needle was removed intact after each trigger point injection, and the patient's back was covered with a 4x4 gauze, the area was cleansed with sterile normal saline, and a dressing was applied. There were no complications noted. The images were uploaded to our media tab for permanent storage.    Missy Rhoades MD  Interventional Pain and Spine  Physical Medicine and Rehabilitation  [facility]

## 2024-09-20 NOTE — PROGRESS NOTES
Follow-up patient Note    Interventional Pain and Spine  Physiatry (Physical Medicine and Rehabilitation)     Patient Name: Keaton Cervantes  : 1955  Date of service: 2024    Chief Complaint:   Chief Complaint   Patient presents with    Follow-Up     Myalgia       HISTORY  Please see new patient note by Dr. Rhoades for more details.     HPI  Today's visit   Keaton Cervantes ( 1955) is a male with The encounter diagnosis was Myalgia.    Today Carlos presents after - 2024 trigger point injections with significant improvement in pain.    History of Present Illness  The patient is a 69-year-old male who presents for follow-up after trigger point injections.    He reports that the previous injections provided some relief, but he continues to experience severe headaches. He describes the pain as originating from the left perioccipital area, spreading over his head, and then moving to his left forehead.  The pain is in the left greater occipital nerve distribution. He has not yet received approval from his insurance for occipital nerve blocks.    His mid-back and chest pain have improved over time, with the injections providing significant relief.     He also mentions constant lower back pain, for which he takes hydrocodone. This pain is due to three ruptured discs and a couple of fractured ribs, resulting from a fall from a 14-foot ladder. He is not currently interested in interventions for his low back pain and uses a back brace for support. He also reports numbness, tingling, and weakness in his right leg following his back injury.      Medical records reviewed by me at today's visit: Neuro note 2024 indicating recommendation to continue occipital nerve blocks as needed    History:  - He went to 1 session of physical therapy which was cost prohibitive as accosted $400.  He reports that he is taking amitriptyline 50% of the time with relief.  - right thoracic back pain radiating  towards the chest started after fracturing his ribs.  -Occipital nerve blocks are not currently covered by his insurance      Procedure history:  - 3/6/24 LEFT Greater Occipital Nerve Block - Local Anesthetic and Steroid -some relief of occipital headaches.  Headaches no longer constant, now frequent   - 4/10/24 trigger point injections with steroid - 50% improvement overall  - 05/22/24 right intercostal nerve blocks at T8 and T9 -1 week of significant relief  - 06/19/24 right intercostal nerve blocks at T8 and T9  - 6/21/2024 trigger point injections -significant relief  - 09/20/24 trigger point injections with steroid    ROS:   Red Flags ROS:   Fever, Chills, Sweats: Denies  Involuntary Weight Loss: Denies  Bladder Incontinence: Denies  Bowel Incontinence: denies  Saddle Anesthesia: Denies    All other systems reviewed and negative.     PMHx:   Past Medical History:   Diagnosis Date    Allergy     ASTHMA     Back pain     Chronic    Bronchitis     CATARACT     Dental disorder     Diabetes     oral rx & insulin    Hypertension     MEDICAL HOME     Neck pain     Chronic    ELLEN (obstructive sleep apnea)     Bipap    ELLEN (obstructive sleep apnea) 08/12/2022    Pt. states no longer using machine since wt loss.    Sleep apnea        PSHx:   Past Surgical History:   Procedure Laterality Date    TENDON LENGHTENING Right 8/17/2022    Procedure: RIGHT GASTROCNEMIUS RECESSION,;  Surgeon: Parrish Hardy M.D.;  Location: [facility];  Service: Orthopedics    TENDON TRANSFER Right 8/17/2022    Procedure: TRANSFER, TENDON-PERONEUS LONGUS TO BREVIS TRANSFER;  Surgeon: Parrish Hardy M.D.;  Location: [facility];  Service: Orthopedics    IRRIGATION & DEBRIDEMENT GENERAL Right 8/17/2022    Procedure: IRRIGATION AND DEBRIDEMENT, WOUND- FOOT;  Surgeon: Parrish Hardy M.D.;  Location: [facility];  Service: Orthopedics    METATARSAL HEAD RESECTION Right 8/17/2022    Procedure: EXCISION,  METATARSAL BONE, HEAD-PARTIAL FIRST METATARSAL EXCISION;  Surgeon: Parrish Hardy M.D.;  Location: SURGERY [facility];  Service: Orthopedics    BONE BIOPSY Right 8/17/2022    Procedure: BIOPSY, BONE;  Surgeon: Parrish Hardy M.D.;  Location: SURGERY [facility];  Service: Orthopedics    TOE AMPUTATION Right 06/23/2022    Procedure: AMPUTATION, TOE 2ND;  Surgeon: Wilman Madera M.D.;  Location: SURGERY [facility];  Service: Orthopedics    ROTATOR CUFF REPAIR Left 04/2022    [facility]    TOE AMPUTATION Right 03/04/2022    Procedure: AMPUTATION, TOE 1ST RAY;  Surgeon: Wilman Madera M.D.;  Location: SURGERY [facility];  Service: Orthopedics    LUMBAR FUSION ANTERIOR  08/04/2009    Performed by JOSEE BLANTON at SURGERY [facility] ORS    FUSION, SPINE, LUMBAR, PLIF  08/04/2009    Performed by JOSEE BLANTON at SURGERY [facility] ORS    LUMBAR LAMINECTOMY DISKECTOMY  08/04/2009    Performed by JOSEE BLANTON at SURGERY [facility] ORS    LAMINOTOMY  08/04/2009    Performed by JOSEE BLANTON at SURGERY [facility] ORS    SOMNOPLASTY  06/10/2009    Performed by ELÍAS RILEY at SURGERY [facility] ORS    SEPTOPLASTY  06/10/2009    Performed by ELÍAS RILEY at SURGERY [facility] ORS    ANTROSTOMY  06/10/2009    Performed by ELÍAS RILEY at SURGERY [facility] ORS    ETHMOIDECTOMY  06/10/2009    Performed by ELÍAS RILEY at SURGERY [facility] ORS    CATARACT PHACO WITH IOL  07/07/2008    Performed by OCTAVIO HARDWICK at SURGERY SAME DAY [facility] ORS    CATARACT PHACO WITH IOL  06/16/2008    Performed by OCTAVIO HARDWICK at SURGERY SAME DAY [facility] ORS    APPENDECTOMY      ORIF, ANKLE      VASECTOMY         Family Hx:   Family History   Problem Relation Age of Onset    Hypertension Father     Diabetes Father        Social Hx:  Social History     Socioeconomic History    Marital status:      Spouse name: Not on file    Number of children: Not on file    Years of  "education: Not on file    Highest education level: Not on file   Occupational History    Not on file   Tobacco Use    Smoking status: Former     Current packs/day: 0.00     Types: Cigarettes     Quit date:      Years since quittin.    Smokeless tobacco: Current     Types: Chew   Vaping Use    Vaping status: Never Used   Substance and Sexual Activity    Alcohol use: No     Comment: Pt. states, \"H/O Alcoholism, quit .\"    Drug use: No    Sexual activity: Yes     Partners: Female   Other Topics Concern    Not on file   Social History Narrative    Not on file     Social Determinants of Health     Financial Resource Strain: Not on file   Food Insecurity: Not on file   Transportation Needs: Not on file   Physical Activity: Not on file   Stress: Not on file   Social Connections: Not on file   Intimate Partner Violence: Not on file   Housing Stability: Not on file       Allergies:  Allergies   Allergen Reactions    Sulfa Drugs Unspecified     Pt hasn't had meds in so long he doesn't remember what his reaction is, he just remembers there is a reaction and that he shouldn't have them     Coconut (Cocos Nucifera) Allergy Skin Test     Coconut Oil     Flu Virus Vaccine Shortness of Breath    Other Food Anaphylaxis     coconut    Pneumococcal Vaccines Shortness of Breath    Sulfamethoxazole     Nutrasweet Aspartame [Aspartame]      Causes migraines       Medications: reviewed on epic.   Outpatient Medications Marked as Taking for the 24 encounter (Office Visit) with Missy Rhoades M.D.   Medication Sig Dispense Refill    acetaminophen (TYLENOL) 500 MG Tab TAKE ONE OR TWO TABLETS BY MOUTH THREE TIMES DAILY FOR 14 DAYS.      asa/apap/caffeine (EXCEDRIN) 250-250-65 MG Tab Take 1 Tablet by mouth every 6 hours as needed for Headache.      Insulin Glargine-yfgn 100 UNIT/ML Solution Pen-injector INJECT 8-10 UNITS SUBCUTANEOUSLY AT BEDTIME      insulin glargine (LANTUS) 100 UNIT/ML Solution Inject 6-15 Units under " the skin at bedtime as needed (Blood Sugar Levels).      HYDROcodone-acetaminophen (NORCO) 7.5-325 MG per tablet Take 1 Tablet by mouth 3 times a day as needed for Severe Pain.      gabapentin (NEURONTIN) 300 MG Cap Take 300 mg by mouth 3 times a day as needed.      glipiZIDE SR (GLUCOTROL) 10 MG TABLET SR 24 HR Take 10 mg by mouth 2 times a day.      metformin (GLUCOPHAGE) 1000 MG tablet Take 1,000 mg by mouth 2 times a day with meals.      cyclobenzaprine (FLEXERIL) 10 mg Tab Take 10 mg by mouth as needed.       Current Facility-Administered Medications for the 9/20/24 encounter (Office Visit) with Missy Rhoades M.D.   Medication Dose Route Frequency Provider Last Rate Last Admin    dexamethasone (Decadron) injection 4 mg  4 mg Other Once             Current Outpatient Medications on File Prior to Visit   Medication Sig Dispense Refill    acetaminophen (TYLENOL) 500 MG Tab TAKE ONE OR TWO TABLETS BY MOUTH THREE TIMES DAILY FOR 14 DAYS.      asa/apap/caffeine (EXCEDRIN) 250-250-65 MG Tab Take 1 Tablet by mouth every 6 hours as needed for Headache.      Insulin Glargine-yfgn 100 UNIT/ML Solution Pen-injector INJECT 8-10 UNITS SUBCUTANEOUSLY AT BEDTIME      insulin glargine (LANTUS) 100 UNIT/ML Solution Inject 6-15 Units under the skin at bedtime as needed (Blood Sugar Levels).      HYDROcodone-acetaminophen (NORCO) 7.5-325 MG per tablet Take 1 Tablet by mouth 3 times a day as needed for Severe Pain.      gabapentin (NEURONTIN) 300 MG Cap Take 300 mg by mouth 3 times a day as needed.      glipiZIDE SR (GLUCOTROL) 10 MG TABLET SR 24 HR Take 10 mg by mouth 2 times a day.      metformin (GLUCOPHAGE) 1000 MG tablet Take 1,000 mg by mouth 2 times a day with meals.      cyclobenzaprine (FLEXERIL) 10 mg Tab Take 10 mg by mouth as needed.      acyclovir (ZOVIRAX) 800 MG Tab  (Patient not taking: Reported on 7/19/2024)      amitriptyline (ELAVIL) 10 MG Tab Take 2 Tablets by mouth every evening for 360 days. (Patient not  "taking: Reported on 9/20/2024) 60 Tablet 11     No current facility-administered medications on file prior to visit.         EXAMINATION     Physical Exam:   /66 (BP Location: Right arm, Patient Position: Sitting, BP Cuff Size: Adult)   Pulse 68   Temp 36.7 °C (98.1 °F) (Temporal)   Ht 1.854 m (6' 1\")   Wt 88.4 kg (194 lb 14.2 oz)   SpO2 98%     Constitutional:   Body Habitus: Body mass index is 25.71 kg/m².  Cooperation: Fully cooperates with exam  Appearance: Well-groomed, well-nourished.    Eyes: No scleral icterus to suggest severe liver disease, no proptosis to suggest severe hyperthyroidism    ENT -no obvious auditory deficits, no noticeable facial droop     Skin -no rashes or lesions noted     Respiratory-  breathing comfortably on room air, no audible wheezing    Cardiovascular-distal extremities warm and well perfused.  No lower extremity edema is noted.     Gastrointestinal - no obvious abdominal masses, non-distended    Psychiatric- alert and oriented ×3. Normal affect.     Gait stable steady    Musculoskeletal    Tenderness to palpation at right intercostal muscles and ribs at T8 and T9 levels and left paracervical muscles, upper trapezius, levator scapulae.  Positive Tinel's at left greater occipital nerve.  No tenderness to palpation at left lesser occipital nerve    Previous exam  Key points for the international standards for neurological classification of spinal cord injury (ISNCSCI) to light touch.     Dermatome R L   C4 2 2   C5 2 2   C6 2 2   C7 2 2   C8 2 2   T1 2 2   T2 2 2       Motor Exam Upper Extremities   ? Myotome R L   Shoulder abduction C5 5 5   Elbow flexion C5 5 5   Wrist extension C6 5 5   Elbow extension C7 5 5   Finger flexion C8 5 5   Finger abduction T1 5 5     Previous exam  Bilateral hands:   Inspection: No swelling, deformities, or rashes. Symmetric appearing thenar and hyperthenar regions bilaterally.  Palpation: no significant tenderness to palpation throughout " the bilateral hands  Range of motion is within normal limits throughout bilateral hands, fingers and wrist.    Special tests:  Tinel's at the wrist over the median nerve negative bilaterally  Carpal tunnel compression: negative bilaterally  Phalen's test: negative bilaterally  Tinel's at the cubital tunnel: negative bilaterally    MEDICAL DECISION MAKING    Medical records review: see under HPI section.     DATA    Labs: Personally reviewed at today's visit:     Lab Results   Component Value Date/Time    SODIUM 136 09/18/2024 08:48 AM    POTASSIUM 5.1 09/18/2024 08:48 AM    CHLORIDE 100 09/18/2024 08:48 AM    CO2 25 09/18/2024 08:48 AM    ANION 11.0 09/18/2024 08:48 AM    GLUCOSE 87 09/18/2024 08:48 AM    BUN 24 (H) 09/18/2024 08:48 AM    CREATININE 0.88 09/18/2024 08:48 AM    CREATININE 0.7 08/10/2006 01:05 PM    CALCIUM 8.6 09/18/2024 08:48 AM    ASTSGOT 32 09/18/2024 08:48 AM    ALTSGPT 20 09/18/2024 08:48 AM    TBILIRUBIN 0.2 09/18/2024 08:48 AM    ALBUMIN 3.9 09/18/2024 08:48 AM    TOTPROTEIN 6.7 09/18/2024 08:48 AM    GLOBULIN 2.8 09/18/2024 08:48 AM    AGRATIO 1.4 09/18/2024 08:48 AM       Lab Results   Component Value Date/Time    PROTHROMBTM 10.7 (L) 08/10/2006 01:05 PM    INR 0.85 (L) 08/10/2006 01:05 PM        Lab Results   Component Value Date/Time    WBC 6.2 09/18/2024 08:48 AM    RBC 4.78 09/18/2024 08:48 AM    HEMOGLOBIN 14.5 09/18/2024 08:48 AM    HEMATOCRIT 45.5 09/18/2024 08:48 AM    MCV 95.2 09/18/2024 08:48 AM    MCH 30.3 09/18/2024 08:48 AM    MCHC 31.9 (L) 09/18/2024 08:48 AM    MPV 9.6 09/18/2024 08:48 AM    NEUTSPOLYS 55.60 09/18/2024 08:48 AM    LYMPHOCYTES 32.50 09/18/2024 08:48 AM    MONOCYTES 8.20 09/18/2024 08:48 AM    EOSINOPHILS 2.70 09/18/2024 08:48 AM    BASOPHILS 0.80 09/18/2024 08:48 AM        Lab Results   Component Value Date/Time    HBA1C 6.9 (H) 09/18/2024 08:48 AM        Imaging:   I personally reviewed following images, these are my reads    Chest x-ray 6/21/2022  No evidence  of acute rib fracture. See formal radiology report for further details.     MRI lumbar spine 2/27/2024  Postoperative changes with fusion in the midline laminectomy spanning L3-S1.  At L2-3 there is a broad-based disc bulge and bilateral facet arthropathy with ligamentum flavum hypertrophy contributing to moderate central canal stenosis at this level.  There is also bilateral moderate severe neuroforaminal stenosis at this level.  No significant neuroforaminal stenosis or central canal stenosis elsewhere. See formal radiology report for further details.    X-ray lumbar spine 2/7/2024  Fusion spanning L3-S1.  Mild retrolisthesis of L2 on L3. See formal radiology report for further details.    IMAGING radiology reads. I reviewed the following radiology reads   MRI lumbar spine 2/27/2024  FINDINGS:     Postoperative changes are noted in the lumbar spine with midline laminectomy at L3-4, L4-5 and L5-S1 and posterior transpedicular fusion between L3 and S1.  Intervertebral disc spacers are present at the L3-4, L4-5, L5-S1 levels.  A lipid rich hemangioma is noted within L1.  There is decreased disc height with type I marrow changes involving the adjacent endplates at the L2-L3 level. The conus terminates at  L1. Normal signal intensity is identified within the distal thoracic cord and conus medullaris.     Axial lumbar spine levels:     T12-L1: Normal.     L1-2: Mild facet degeneration without stenosis.     L2-3: Broad-based disc bulge and bilateral facet degeneration and ligamentum flavum hypertrophy results in moderate canal stenosis with thecal sac compression. There is also bilateral moderate-to-severe foraminal narrowing.     L3-4: Widely decompressed spinal canal. No stenosis.     L4-5: Widely decompressed spinal canal. No stenosis.     L5-S1: Well decompressed spinal canal. No stenosis.     A small T2 hyperintense collection is identified in the laminectomy bed behind the L3-4 level, measuring 17 x 40 x 42 mm (CC,  AP, ML). There is no significant mass effect upon the thecal sac. This may represent a chronic organized fluid   collection/granulation tissue.     Prevertebral soft tissues are within normal limits.     IMPRESSION:        Moderate canal stenosis at L2-3 due to encroachment by a broad-based disc bulge, advanced facet degeneration at the metaphyseal hypertrophy. There is also bilateral moderate-to-severe foraminal narrowing with likely impingement upon the exiting L2   nerves.     Postoperative changes at L3-4, L4-5, L5-S1 with a well decompressed canal. There is no foraminal narrowing.    Results for orders placed during the hospital encounter of 07/05/23    MR-BRAIN-W/O    Impression  No acute intracranial process.    Nonspecific T2 hyperintensities are noted in the periventricular and deep white matter, most likely related to chronic microvascular ischemia.             Results for orders placed during the hospital encounter of 02/03/09    MR-CERVICAL SPINE-W/O    Impression  IMPRESSION:    1. POSTERIOR SPONDYLOTIC DISC BULGING AT C5-6 WHICH EXTENDS TO THE  LEFT-SIDED NEURAL FORAMEN WITH MILD CANAL STENOSIS AND LEFT-SIDED NEURAL  FORAMINAL NARROWING PRESENT.  THERE IS ALSO ACCOMPANYING LEFT-SIDED  UNCINATE SPURRING.    2. MILD DISC DESICCATION AT C2-3, C3-4, C4-5, AND C6-7 AS DESCRIBED.      CBG/bht        Read By JOVANY FLORENCE MD on Feb 4 2009  1:23PM  : TONI Transcription Date: Feb 4 2009  2:16PM  THIS DOCUMENT HAS BEEN ELECTRONICALLY SIGNED BY: JOVANY FLORENCE MD on Feb 6 2009 12:16PM      Results for orders placed during the hospital encounter of 02/27/24    MR-LUMBAR SPINE-W/O    Impression  Moderate canal stenosis at L2-3 due to encroachment by a broad-based disc bulge, advanced facet degeneration at the metaphyseal hypertrophy. There is also bilateral moderate-to-severe foraminal narrowing with likely impingement upon the exiting L2  nerves.    Postoperative changes at L3-4, L4-5, L5-S1 with a  well decompressed canal. There is no foraminal narrowing.        Results for orders placed during the hospital encounter of 02/27/24    MR-LUMBAR SPINE-W/O    Impression  Moderate canal stenosis at L2-3 due to encroachment by a broad-based disc bulge, advanced facet degeneration at the metaphyseal hypertrophy. There is also bilateral moderate-to-severe foraminal narrowing with likely impingement upon the exiting L2  nerves.    Postoperative changes at L3-4, L4-5, L5-S1 with a well decompressed canal. There is no foraminal narrowing.                                      Results for orders placed during the hospital encounter of 11/06/11    DX-ANKLE 3+ VIEWS    Impression  No acute bony abnormality.      Results for orders placed during the hospital encounter of 05/27/09    DX-CHEST-2 VIEWS    Impression  IMPRESSION:    NORMAL CHEST.      JHW:rony    Read By WILL THOMPSON MD on May 27 2009  3:24PM  : IGNACIA Transcription Date: May 28 2009 11:52AM  THIS DOCUMENT HAS BEEN ELECTRONICALLY SIGNED BY: WILL THOMPSON MD on May  29 2009 10:45PM       Results for orders placed in visit on 11/03/12    DX-FINGER(S) 2+    Impression  No evidence of fracture or dislocation.    Results for orders placed during the hospital encounter of 07/28/22    DX-FOOT-COMPLETE 3+ RIGHT    Impression  1.  Soft tissue gas is present overlying the first transmetatarsal amputation site and indistinctness of the cortex of the every dictation site site of the first metatarsal which raises suspicion for infection. MRI of the foot with and without contrast  could be obtained for further evaluation if indicated.  2.  Subacute nonunited fracture of the second metatarsal base with new moderate lateral displacement and bone callus formation.  3.  Interval agitation of the second digit at the proximal phalanx               Results for orders placed during the hospital encounter of 02/07/24    DX-LUMBAR SPINE-2 OR 3 VIEWS    Impression  Mild  retrolisthesis and severe degenerative disc disease at L2-3.    Postsurgical changes from posterior fixation and disc prosthesis placement at the L3-S1. Mature fusion of the disc space. The bilateral L3 pedicle screws are very close to the L2-3 disc space.      Results for orders placed during the hospital encounter of 21    [id number] (WITH 1-VIEW CXR) LEFT    Impression  Left lateral seventh and ninth acute rib fractures.    No pneumothorax or acute cardiopulmonary abnormality.         Results for orders placed during the hospital encounter of 21    DX-SHOULDER 2+ LEFT    Impression  No acute osseous abnormality.              Diagnosis  Visit Diagnoses     ICD-10-CM   1. Myalgia  M79.10                 ASSESSMENT AND PLAN:  Keaton Cervantes (: 1955) is a male with worsening myalgia and occipital neuralgia pain     Carlos was seen today for follow-up.    Diagnoses and all orders for this visit:    Myalgia  -     Referral to Pain Clinic  -     Consent for all Surgical, Special Diagnostic or Therapeutic Procedures    Other orders  -     dexamethasone (Decadron) injection 4 mg          PLAN  Physical Therapy: I previously ordered physical therapy to focus on strengthening and stretching as well as a home exercise program.  This is cost prohibitive for him to continue.  Advised him to continue with his home exercise program as he is doing     Home Exercise Program: I previously provided the patient with a home exercise program focusing on strengthening and stretching at today's visit.      Diagnostic workup: no new imaging needed at this time     Medications:   -Okay to continue Norco 7.5-325 mg twice daily as needed as prescribed by the patient's PCP as long as there is compliance with .  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  He is low risk for narcotic abuse with an opioid risk score of 0 at today's visit  -Continue amitriptyline  as per neurology      Interventions:   -Repeat right T8 and T9 intercostal nerve blocks under ultrasound guidance as needed  - trigger point injections with steroid today including left paracervical and upper trapezius area, and right intercostal muscle. The risks, benefits, and alternatives to this procedure were discussed and the patient wishes to proceed with the procedure. Risks include but are not limited to damage to surrounding structures, infection, bleeding, worsening of pain which can be permanent, and collapsed lung. Benefits include pain relief and improved function. Alternatives include not doing the procedure.  - s/p left greater occipital nerve block under ultrasound guidance with some improvement in occipital headache pain. No longer constant, now occurring frequently per day  -The patient denies significant radiating pain down the leg/groin or significant numbness or tingling in the leg/groin.  Discussed that if he has any of these symptoms in the future corresponding to the area of central canal stenosis at L2-3 or bilateral neuroforaminal stenosis at L2-3, could consider an intervention in the future such as epidural    Follow-up: In 3 months, consider repeat trigger point injections with steroid at that visit    Orders Placed This Encounter    Referral to Pain Clinic    dexamethasone (Decadron) injection 4 mg    Consent for all Surgical, Special Diagnostic or Therapeutic Procedures       Missy Rhoades MD  Interventional Pain and Spine  Physical Medicine and Rehabilitation  [facility]      The above note documents my personal evaluation of this patient. In addition, I have reviewed and confirmed with the patient and MA the supportive information documented in today's Patient Health Questionnaire and Office Note.     Please note that this dictation was created using voice recognition software. I have made every reasonable attempt to correct obvious errors, but I expect that there are  errors of grammar and possibly content that I did not discover before finalizing the note.

## 2024-12-03 ENCOUNTER — HOSPITAL ENCOUNTER (OUTPATIENT)
Dept: LAB | Facility: MEDICAL CENTER | Age: 69
End: 2024-12-03
Attending: NURSE PRACTITIONER
Payer: COMMERCIAL

## 2024-12-03 LAB
ANION GAP SERPL CALC-SCNC: 10 MMOL/L (ref 7–16)
BUN SERPL-MCNC: 21 MG/DL (ref 8–22)
CALCIUM SERPL-MCNC: 9.4 MG/DL (ref 8.5–10.5)
CHLORIDE SERPL-SCNC: 102 MMOL/L (ref 96–112)
CHOLEST SERPL-MCNC: 101 MG/DL (ref 100–199)
CO2 SERPL-SCNC: 27 MMOL/L (ref 20–33)
CREAT SERPL-MCNC: 1.15 MG/DL (ref 0.5–1.4)
GFR SERPLBLD CREATININE-BSD FMLA CKD-EPI: 69 ML/MIN/1.73 M 2
GLUCOSE SERPL-MCNC: 122 MG/DL (ref 65–99)
HDLC SERPL-MCNC: 49 MG/DL
LDLC SERPL CALC-MCNC: 45 MG/DL
POTASSIUM SERPL-SCNC: 5.1 MMOL/L (ref 3.6–5.5)
SODIUM SERPL-SCNC: 139 MMOL/L (ref 135–145)
TRIGL SERPL-MCNC: 37 MG/DL (ref 0–149)

## 2024-12-03 PROCEDURE — 80048 BASIC METABOLIC PNL TOTAL CA: CPT

## 2024-12-03 PROCEDURE — 36415 COLL VENOUS BLD VENIPUNCTURE: CPT

## 2024-12-03 PROCEDURE — 80061 LIPID PANEL: CPT

## 2024-12-20 ENCOUNTER — OFFICE VISIT (OUTPATIENT)
Dept: PHYSICAL MEDICINE AND REHAB | Facility: MEDICAL CENTER | Age: 69
End: 2024-12-20
Payer: COMMERCIAL

## 2024-12-20 VITALS
OXYGEN SATURATION: 100 % | BODY MASS INDEX: 25.77 KG/M2 | TEMPERATURE: 98 F | WEIGHT: 190.26 LBS | HEIGHT: 72 IN | HEART RATE: 60 BPM | SYSTOLIC BLOOD PRESSURE: 102 MMHG | DIASTOLIC BLOOD PRESSURE: 58 MMHG

## 2024-12-20 DIAGNOSIS — R07.81 RIB PAIN: ICD-10-CM

## 2024-12-20 DIAGNOSIS — M54.81 OCCIPITAL NEURALGIA OF LEFT SIDE: ICD-10-CM

## 2024-12-20 DIAGNOSIS — G89.29 CHRONIC BILATERAL LOW BACK PAIN WITHOUT SCIATICA: ICD-10-CM

## 2024-12-20 DIAGNOSIS — M25.512 CHRONIC LEFT SHOULDER PAIN: ICD-10-CM

## 2024-12-20 DIAGNOSIS — M48.061 SPINAL STENOSIS OF LUMBAR REGION WITHOUT NEUROGENIC CLAUDICATION: ICD-10-CM

## 2024-12-20 DIAGNOSIS — G58.8 INTERCOSTAL NEURALGIA: ICD-10-CM

## 2024-12-20 DIAGNOSIS — Z98.890 HISTORY OF LUMBAR SURGERY: ICD-10-CM

## 2024-12-20 DIAGNOSIS — M54.12 CERVICAL RADICULITIS: ICD-10-CM

## 2024-12-20 DIAGNOSIS — M79.10 MYALGIA: ICD-10-CM

## 2024-12-20 DIAGNOSIS — G89.29 CHRONIC LEFT SHOULDER PAIN: ICD-10-CM

## 2024-12-20 DIAGNOSIS — G89.29 OTHER CHRONIC PAIN: ICD-10-CM

## 2024-12-20 DIAGNOSIS — M54.50 CHRONIC BILATERAL LOW BACK PAIN WITHOUT SCIATICA: ICD-10-CM

## 2024-12-20 DIAGNOSIS — M25.812 IMPINGEMENT OF LEFT SHOULDER: ICD-10-CM

## 2024-12-20 PROCEDURE — 3074F SYST BP LT 130 MM HG: CPT | Performed by: STUDENT IN AN ORGANIZED HEALTH CARE EDUCATION/TRAINING PROGRAM

## 2024-12-20 PROCEDURE — 3078F DIAST BP <80 MM HG: CPT | Performed by: STUDENT IN AN ORGANIZED HEALTH CARE EDUCATION/TRAINING PROGRAM

## 2024-12-20 PROCEDURE — G2211 COMPLEX E/M VISIT ADD ON: HCPCS | Performed by: STUDENT IN AN ORGANIZED HEALTH CARE EDUCATION/TRAINING PROGRAM

## 2024-12-20 PROCEDURE — 99214 OFFICE O/P EST MOD 30 MIN: CPT | Performed by: STUDENT IN AN ORGANIZED HEALTH CARE EDUCATION/TRAINING PROGRAM

## 2024-12-20 PROCEDURE — 1125F AMNT PAIN NOTED PAIN PRSNT: CPT | Performed by: STUDENT IN AN ORGANIZED HEALTH CARE EDUCATION/TRAINING PROGRAM

## 2024-12-20 PROCEDURE — 76882 US LMTD JT/FCL EVL NVASC XTR: CPT | Performed by: STUDENT IN AN ORGANIZED HEALTH CARE EDUCATION/TRAINING PROGRAM

## 2024-12-20 RX ORDER — LISINOPRIL 10 MG/1
TABLET ORAL
COMMUNITY
Start: 2024-10-29

## 2024-12-20 RX ORDER — ATORVASTATIN CALCIUM 20 MG/1
20 TABLET, FILM COATED ORAL NIGHTLY
COMMUNITY

## 2024-12-20 RX ORDER — CARVEDILOL 6.25 MG/1
TABLET ORAL
COMMUNITY
Start: 2024-10-29

## 2024-12-20 ASSESSMENT — FIBROSIS 4 INDEX: FIB4 SCORE: 1.81

## 2024-12-20 ASSESSMENT — PATIENT HEALTH QUESTIONNAIRE - PHQ9: CLINICAL INTERPRETATION OF PHQ2 SCORE: 0

## 2024-12-20 ASSESSMENT — PAIN SCALES - GENERAL: PAINLEVEL_OUTOF10: 4=SLIGHT-MODERATE PAIN

## 2024-12-20 NOTE — PROGRESS NOTES
Verbal consent was acquired by the patient to use [employer] ambient listening note generation during this visit Yes      follow-up patient Note    Interventional Pain and Spine  Physiatry (Physical Medicine and Rehabilitation)     Patient Name: Keaton Cervantes  : 1955  Date of service: 2024    Chief Complaint:   Chief Complaint   Patient presents with    Follow-Up     Myalgia       HISTORY  Please see new patient note by Dr. Rhoades for more details.     HPI  Today's visit   Keaton Cervantes ( 1955) is a male with Diagnoses of Myalgia, Intercostal neuralgia, Occipital neuralgia of left side, Chronic bilateral low back pain without sciatica, Other chronic pain, History of lumbar surgery, Spinal stenosis of lumbar region at L2-3 without neurogenic claudication, Rib pain, Impingement of left shoulder, Chronic left shoulder pain, and Cervical radiculitis were pertinent to this visit.  History of Present Illness  The patient is a 69-year-old male who presents for evaluation of left shoulder pain, pain along the of his left spine.      He reports experiencing intermittent pain, which he describes as aching and burning in nature. The pain radiates from his neck to his arm and chest. He attributes this pain to his cardiac medication. He has been on medical leave since he sustained a MI on 10/25/2024 but plans to return to work on 2025. He also reports a sensation of overexertion during certain movements. He is unable to switch insurances yet. He qualifies for Medicare. He is not interested in getting an MRI of the neck due to cost issues. He is not interested in going to formal physical therapy as it costs 300 dollars per visit. His current pain management regimen includes hydrocodone, aspirin, amitriptyline, and gabapentin 300 mg 3 times daily as needed. He finds relief from the combination of gabapentin and hydrocodone, as well as the combination of hydrocodone and aspirin for his  headaches.    He does not feel that he needs injections today.    He does not perceive the numbness and tingling in his left hand as bothersome and does not report any associated weakness.      History:  - He went to 1 session of physical therapy which was cost prohibitive as accosted $400.  He reports that he is taking amitriptyline 50% of the time with relief.  - right thoracic back pain radiating towards the chest started after fracturing his ribs.  -Occipital nerve blocks are not currently covered by his insurance      Procedure history:  - 3/6/24 LEFT Greater Occipital Nerve Block - Local Anesthetic and Steroid -some relief of occipital headaches.  Headaches no longer constant, now frequent   - 4/10/24 trigger point injections with steroid - 50% improvement overall  - 05/22/24 right intercostal nerve blocks at T8 and T9 -1 week of significant relief  - 06/19/24 right intercostal nerve blocks at T8 and T9  - 6/21/2024 trigger point injections -significant relief  - 09/20/24 trigger point injections with steroid    ROS:   Red Flags ROS:   Fever, Chills, Sweats: Denies  Involuntary Weight Loss: Denies  Bladder Incontinence: Denies  Bowel Incontinence: denies  Saddle Anesthesia: Denies    All other systems reviewed and negative.     PMHx:   Past Medical History:   Diagnosis Date    Allergy     ASTHMA     Back pain     Chronic    Bronchitis     CATARACT     Dental disorder     Diabetes     oral rx & insulin    Heart attack (HCC)     Hypertension     MEDICAL HOME     Neck pain     Chronic    ELLEN (obstructive sleep apnea)     Bipap    ELLEN (obstructive sleep apnea) 08/12/2022    Pt. states no longer using machine since wt loss.    Sleep apnea        PSHx:   Past Surgical History:   Procedure Laterality Date    TENDON LENGHTENING Right 8/17/2022    Procedure: RIGHT GASTROCNEMIUS RECESSION,;  Surgeon: Parrish Hardy M.D.;  Location: SURGERY [facility];  Service: Orthopedics    TENDON TRANSFER Right 8/17/2022     Procedure: TRANSFER, TENDON-PERONEUS LONGUS TO BREVIS TRANSFER;  Surgeon: Parrish Hardy M.D.;  Location: SURGERY [facility];  Service: Orthopedics    IRRIGATION & DEBRIDEMENT GENERAL Right 8/17/2022    Procedure: IRRIGATION AND DEBRIDEMENT, WOUND- FOOT;  Surgeon: Parrish Hardy M.D.;  Location: SURGERY [facility];  Service: Orthopedics    METATARSAL HEAD RESECTION Right 8/17/2022    Procedure: EXCISION, METATARSAL BONE, HEAD-PARTIAL FIRST METATARSAL EXCISION;  Surgeon: Parrish Hardy M.D.;  Location: SURGERY [facility];  Service: Orthopedics    BONE BIOPSY Right 8/17/2022    Procedure: BIOPSY, BONE;  Surgeon: Parrish Hardy M.D.;  Location: SURGERY [facility];  Service: Orthopedics    TOE AMPUTATION Right 06/23/2022    Procedure: AMPUTATION, TOE 2ND;  Surgeon: Wilman Madera M.D.;  Location: SURGERY [facility];  Service: Orthopedics    ROTATOR CUFF REPAIR Left 04/2022    [facility]    TOE AMPUTATION Right 03/04/2022    Procedure: AMPUTATION, TOE 1ST RAY;  Surgeon: Wilman Madera M.D.;  Location: [facility];  Service: Orthopedics    LUMBAR FUSION ANTERIOR  08/04/2009    Performed by JOSEE BLANTON at [facility] ORS    FUSION, SPINE, LUMBAR, PLIF  08/04/2009    Performed by JOSEE BLANTON at [facility] ORS    LUMBAR LAMINECTOMY DISKECTOMY  08/04/2009    Performed by JOSEE BLANTON at [facility] ORS    LAMINOTOMY  08/04/2009    Performed by JOSEE BLANTON at [facility] ORS    SOMNOPLASTY  06/10/2009    Performed by ELÍAS RILEY at [facility] ORS    SEPTOPLASTY  06/10/2009    Performed by ELÍAS RILEY at [facility] ORS    ANTROSTOMY  06/10/2009    Performed by ELÍAS RILEY at [facility] ORS    ETHMOIDECTOMY  06/10/2009    Performed by ELÍAS RILEY at [facility] ORS    CATARACT PHACO WITH IOL  07/07/2008    Performed by OCTAVIO HARDWICK at [facility]ERY [facility] ORS  "   CATARACT PHACO WITH IOL  2008    Performed by OCTAVIO HARDWICK at [facility]ERY [facility] ORS    APPENDECTOMY      ORIF, ANKLE      VASECTOMY         Family Hx:   Family History   Problem Relation Age of Onset    Hypertension Father     Diabetes Father        Social Hx:  Social History     Socioeconomic History    Marital status:      Spouse name: Not on file    Number of children: Not on file    Years of education: Not on file    Highest education level: Not on file   Occupational History    Not on file   Tobacco Use    Smoking status: Former     Current packs/day: 0.00     Types: Cigarettes     Quit date:      Years since quittin.9    Smokeless tobacco: Current     Types: Chew   Vaping Use    Vaping status: Never Used   Substance and Sexual Activity    Alcohol use: No     Comment: Pt. states, \"H/O Alcoholism, quit .\"    Drug use: No    Sexual activity: Yes     Partners: Female   Other Topics Concern    Not on file   Social History Narrative    Not on file     Social Drivers of Health     Financial Resource Strain: Not on file   Food Insecurity: Not on file   Transportation Needs: Not on file   Physical Activity: Not on file   Stress: Not on file   Social Connections: Not on file   Intimate Partner Violence: Not on file   Housing Stability: Not on file       Allergies:  Allergies   Allergen Reactions    Sulfa Drugs Unspecified     Pt hasn't had meds in so long he doesn't remember what his reaction is, he just remembers there is a reaction and that he shouldn't have them     Coconut (Cocos Nucifera) Allergy Skin Test     Coconut Oil     Flu Virus Vaccine Shortness of Breath    Other Food Anaphylaxis     coconut    Pneumococcal Vaccines Shortness of Breath    Sulfamethoxazole     Nutrasweet Aspartame [Aspartame]      Causes migraines       Medications: reviewed on epic.   Outpatient Medications Marked as Taking for the 24 encounter (Office Visit) with Missy Rhoades M.D. "   Medication Sig Dispense Refill    carvedilol (COREG) 6.25 MG Tab Carvedilol 6.25 MG Oral Tablet QTY: 60 tablet Days: 30 Refills: 0  Written: 10/29/24 Patient Instructions:      lisinopril (PRINIVIL) 10 MG Tab Lisinopril 10 MG Oral Tablet QTY: 30 tablet Days: 30 Refills: 0  Written: 10/29/24 Patient Instructions:      atorvastatin (LIPITOR) 20 MG Tab Take 20 mg by mouth every evening.      acetaminophen (TYLENOL) 500 MG Tab TAKE ONE OR TWO TABLETS BY MOUTH THREE TIMES DAILY FOR 14 DAYS.      asa/apap/caffeine (EXCEDRIN) 250-250-65 MG Tab Take 1 Tablet by mouth every 6 hours as needed for Headache.      Insulin Glargine-yfgn 100 UNIT/ML Solution Pen-injector INJECT 8-10 UNITS SUBCUTANEOUSLY AT BEDTIME      insulin glargine (LANTUS) 100 UNIT/ML Solution Inject 6-15 Units under the skin at bedtime as needed (Blood Sugar Levels).      HYDROcodone-acetaminophen (NORCO) 7.5-325 MG per tablet Take 1 Tablet by mouth 3 times a day as needed for Severe Pain.      gabapentin (NEURONTIN) 300 MG Cap Take 300 mg by mouth 3 times a day as needed.      glipiZIDE SR (GLUCOTROL) 10 MG TABLET SR 24 HR Take 10 mg by mouth 2 times a day.      metformin (GLUCOPHAGE) 1000 MG tablet Take 1,000 mg by mouth 2 times a day with meals.      cyclobenzaprine (FLEXERIL) 10 mg Tab Take 10 mg by mouth as needed.          Current Outpatient Medications on File Prior to Visit   Medication Sig Dispense Refill    carvedilol (COREG) 6.25 MG Tab Carvedilol 6.25 MG Oral Tablet QTY: 60 tablet Days: 30 Refills: 0  Written: 10/29/24 Patient Instructions:      lisinopril (PRINIVIL) 10 MG Tab Lisinopril 10 MG Oral Tablet QTY: 30 tablet Days: 30 Refills: 0  Written: 10/29/24 Patient Instructions:      atorvastatin (LIPITOR) 20 MG Tab Take 20 mg by mouth every evening.      acetaminophen (TYLENOL) 500 MG Tab TAKE ONE OR TWO TABLETS BY MOUTH THREE TIMES DAILY FOR 14 DAYS.      asa/apap/caffeine (EXCEDRIN) 250-250-65 MG Tab Take 1 Tablet by mouth every 6 hours as  needed for Headache.      Insulin Glargine-yfgn 100 UNIT/ML Solution Pen-injector INJECT 8-10 UNITS SUBCUTANEOUSLY AT BEDTIME      insulin glargine (LANTUS) 100 UNIT/ML Solution Inject 6-15 Units under the skin at bedtime as needed (Blood Sugar Levels).      HYDROcodone-acetaminophen (NORCO) 7.5-325 MG per tablet Take 1 Tablet by mouth 3 times a day as needed for Severe Pain.      gabapentin (NEURONTIN) 300 MG Cap Take 300 mg by mouth 3 times a day as needed.      glipiZIDE SR (GLUCOTROL) 10 MG TABLET SR 24 HR Take 10 mg by mouth 2 times a day.      metformin (GLUCOPHAGE) 1000 MG tablet Take 1,000 mg by mouth 2 times a day with meals.      cyclobenzaprine (FLEXERIL) 10 mg Tab Take 10 mg by mouth as needed.      acyclovir (ZOVIRAX) 800 MG Tab  (Patient not taking: Reported on 12/20/2024)      amitriptyline (ELAVIL) 10 MG Tab Take 2 Tablets by mouth every evening for 360 days. (Patient not taking: Reported on 12/20/2024) 60 Tablet 11     No current facility-administered medications on file prior to visit.         EXAMINATION     Physical Exam:   /58 (BP Location: Right arm, Patient Position: Sitting, BP Cuff Size: Adult)   Pulse 60   Temp 36.7 °C (98 °F) (Temporal)   Ht 1.829 m (6')   Wt 86.3 kg (190 lb 4.1 oz)   SpO2 100%     Constitutional:   Body Habitus: Body mass index is 25.8 kg/m².  Cooperation: Fully cooperates with exam  Appearance: Well-groomed, well-nourished.    Eyes: No scleral icterus to suggest severe liver disease, no proptosis to suggest severe hyperthyroidism    ENT -no obvious auditory deficits, no noticeable facial droop     Skin -no rashes or lesions noted     Respiratory-  breathing comfortably on room air, no audible wheezing    Cardiovascular-distal extremities warm and well perfused.  No lower extremity edema is noted.     Gastrointestinal - no obvious abdominal masses, non-distended    Psychiatric- alert and oriented ×3. Normal affect.     Gait stable  steady    Musculoskeletal    Negative Spurling's bilaterally  Tenderness to palpation at left upper trapezius  Positive Silverio and empty can reproduce the pain at left upper arm.        Key points for the international standards for neurological classification of spinal cord injury (ISNCSCI) to light touch.     Dermatome R L   C4 2 2   C5 2 2   C6 2 2   C7 2 2   C8 2 2   T1 2 2   T2 2 2       Motor Exam Upper Extremities   ? Myotome R L   Shoulder abduction C5 5 5   Elbow flexion C5 5 5   Wrist extension C6 5 5   Elbow extension C7 5 5   Finger flexion C8 5 5   Finger abduction T1 5 5     Special tests:  Tinel's at the wrist over the median nerve positive on left, negative on right  Carpal tunnel compression: negative bilaterally  Tinel's at the cubital tunnel: negative bilaterally    MEDICAL DECISION MAKING    Medical records review: see under HPI section.     DATA    Labs: Personally reviewed at today's visit:     Lab Results   Component Value Date/Time    SODIUM 139 12/03/2024 09:25 AM    POTASSIUM 5.1 12/03/2024 09:25 AM    CHLORIDE 102 12/03/2024 09:25 AM    CO2 27 12/03/2024 09:25 AM    ANION 10.0 12/03/2024 09:25 AM    GLUCOSE 122 (H) 12/03/2024 09:25 AM    BUN 21 12/03/2024 09:25 AM    CREATININE 1.15 12/03/2024 09:25 AM    CREATININE 0.7 08/10/2006 01:05 PM    CALCIUM 9.4 12/03/2024 09:25 AM    ASTSGOT 32 09/18/2024 08:48 AM    ALTSGPT 20 09/18/2024 08:48 AM    TBILIRUBIN 0.2 09/18/2024 08:48 AM    ALBUMIN 3.9 09/18/2024 08:48 AM    TOTPROTEIN 6.7 09/18/2024 08:48 AM    GLOBULIN 2.8 09/18/2024 08:48 AM    AGRATIO 1.4 09/18/2024 08:48 AM       Lab Results   Component Value Date/Time    PROTHROMBTM 10.7 (L) 08/10/2006 01:05 PM    INR 0.85 (L) 08/10/2006 01:05 PM        Lab Results   Component Value Date/Time    WBC 6.2 09/18/2024 08:48 AM    RBC 4.78 09/18/2024 08:48 AM    HEMOGLOBIN 14.5 09/18/2024 08:48 AM    HEMATOCRIT 45.5 09/18/2024 08:48 AM    MCV 95.2 09/18/2024 08:48 AM    MCH 30.3 09/18/2024 08:48  AM    MCHC 31.9 (L) 09/18/2024 08:48 AM    MPV 9.6 09/18/2024 08:48 AM    NEUTSPOLYS 55.60 09/18/2024 08:48 AM    LYMPHOCYTES 32.50 09/18/2024 08:48 AM    MONOCYTES 8.20 09/18/2024 08:48 AM    EOSINOPHILS 2.70 09/18/2024 08:48 AM    BASOPHILS 0.80 09/18/2024 08:48 AM        Lab Results   Component Value Date/Time    HBA1C 6.9 (H) 09/18/2024 08:48 AM        Limited diagnostic ultrasound of left median nerve performed 12/20/2024 by Dr. Rhoades  No significant increase in cross-sectional area of median nerve between left carpal tunnel outlet and left pronator quadratus    Imaging:   I personally reviewed following images, these are my reads    Chest x-ray 6/21/2022  No evidence of acute rib fracture. See formal radiology report for further details.     MRI lumbar spine 2/27/2024  Postoperative changes with fusion in the midline laminectomy spanning L3-S1.  At L2-3 there is a broad-based disc bulge and bilateral facet arthropathy with ligamentum flavum hypertrophy contributing to moderate central canal stenosis at this level.  There is also bilateral moderate severe neuroforaminal stenosis at this level.  No significant neuroforaminal stenosis or central canal stenosis elsewhere. See formal radiology report for further details.    X-ray lumbar spine 2/7/2024  Fusion spanning L3-S1.  Mild retrolisthesis of L2 on L3. See formal radiology report for further details.    IMAGING radiology reads. I reviewed the following radiology reads   MRI lumbar spine 2/27/2024  FINDINGS:     Postoperative changes are noted in the lumbar spine with midline laminectomy at L3-4, L4-5 and L5-S1 and posterior transpedicular fusion between L3 and S1.  Intervertebral disc spacers are present at the L3-4, L4-5, L5-S1 levels.  A lipid rich hemangioma is noted within L1.  There is decreased disc height with type I marrow changes involving the adjacent endplates at the L2-L3 level. The conus terminates at  L1. Normal signal intensity is identified  within the distal thoracic cord and conus medullaris.     Axial lumbar spine levels:     T12-L1: Normal.     L1-2: Mild facet degeneration without stenosis.     L2-3: Broad-based disc bulge and bilateral facet degeneration and ligamentum flavum hypertrophy results in moderate canal stenosis with thecal sac compression. There is also bilateral moderate-to-severe foraminal narrowing.     L3-4: Widely decompressed spinal canal. No stenosis.     L4-5: Widely decompressed spinal canal. No stenosis.     L5-S1: Well decompressed spinal canal. No stenosis.     A small T2 hyperintense collection is identified in the laminectomy bed behind the L3-4 level, measuring 17 x 40 x 42 mm (CC, AP, ML). There is no significant mass effect upon the thecal sac. This may represent a chronic organized fluid   collection/granulation tissue.     Prevertebral soft tissues are within normal limits.     IMPRESSION:        Moderate canal stenosis at L2-3 due to encroachment by a broad-based disc bulge, advanced facet degeneration at the metaphyseal hypertrophy. There is also bilateral moderate-to-severe foraminal narrowing with likely impingement upon the exiting L2   nerves.     Postoperative changes at L3-4, L4-5, L5-S1 with a well decompressed canal. There is no foraminal narrowing.    Results for orders placed during the hospital encounter of 07/05/23    MR-BRAIN-W/O    Impression  No acute intracranial process.    Nonspecific T2 hyperintensities are noted in the periventricular and deep white matter, most likely related to chronic microvascular ischemia.             Results for orders placed during the hospital encounter of 02/03/09    MR-CERVICAL SPINE-W/O    Impression  IMPRESSION:    1. POSTERIOR SPONDYLOTIC DISC BULGING AT C5-6 WHICH EXTENDS TO THE  LEFT-SIDED NEURAL FORAMEN WITH MILD CANAL STENOSIS AND LEFT-SIDED NEURAL  FORAMINAL NARROWING PRESENT.  THERE IS ALSO ACCOMPANYING LEFT-SIDED  UNCINATE SPURRING.    2. MILD DISC DESICCATION  AT C2-3, C3-4, C4-5, AND C6-7 AS DESCRIBED.      CBG/bht        Read By JOVANY FLORENCE MD on Feb 4 2009  1:23PM  : TONI Transcription Date: Feb 4 2009  2:16PM  THIS DOCUMENT HAS BEEN ELECTRONICALLY SIGNED BY: JOVANY FLORENCE MD on Feb 6 2009 12:16PM      Results for orders placed during the hospital encounter of 02/27/24    MR-LUMBAR SPINE-W/O    Impression  Moderate canal stenosis at L2-3 due to encroachment by a broad-based disc bulge, advanced facet degeneration at the metaphyseal hypertrophy. There is also bilateral moderate-to-severe foraminal narrowing with likely impingement upon the exiting L2  nerves.    Postoperative changes at L3-4, L4-5, L5-S1 with a well decompressed canal. There is no foraminal narrowing.        Results for orders placed during the hospital encounter of 02/27/24    MR-LUMBAR SPINE-W/O    Impression  Moderate canal stenosis at L2-3 due to encroachment by a broad-based disc bulge, advanced facet degeneration at the metaphyseal hypertrophy. There is also bilateral moderate-to-severe foraminal narrowing with likely impingement upon the exiting L2  nerves.    Postoperative changes at L3-4, L4-5, L5-S1 with a well decompressed canal. There is no foraminal narrowing.                                      Results for orders placed during the hospital encounter of 11/06/11    DX-ANKLE 3+ VIEWS    Impression  No acute bony abnormality.      Results for orders placed during the hospital encounter of 05/27/09    DX-CHEST-2 VIEWS    Impression  IMPRESSION:    NORMAL CHEST.      W:[facility]    Read By WILL THOMPSON MD on May 27 2009  3:24PM  : IGNACIA Transcription Date: May 28 2009 11:52AM  THIS DOCUMENT HAS BEEN ELECTRONICALLY SIGNED BY: WILL THOMPSON MD on May  29 2009 10:45PM       Results for orders placed in visit on 11/03/12    DX-FINGER(S) 2+    Impression  No evidence of fracture or dislocation.    Results for orders placed during the hospital encounter of  22    DX-FOOT-COMPLETE 3+ RIGHT    Impression  1.  Soft tissue gas is present overlying the first transmetatarsal amputation site and indistinctness of the cortex of the every dictation site site of the first metatarsal which raises suspicion for infection. MRI of the foot with and without contrast  could be obtained for further evaluation if indicated.  2.  Subacute nonunited fracture of the second metatarsal base with new moderate lateral displacement and bone callus formation.  3.  Interval agitation of the second digit at the proximal phalanx               Results for orders placed during the hospital encounter of 24    DX-LUMBAR SPINE-2 OR 3 VIEWS    Impression  Mild retrolisthesis and severe degenerative disc disease at L2-3.    Postsurgical changes from posterior fixation and disc prosthesis placement at the L3-S1. Mature fusion of the disc space. The bilateral L3 pedicle screws are very close to the L2-3 disc space.      Results for orders placed during the hospital encounter of 21    FL-SPFB-TNMMILTAYL (WITH 1-VIEW CXR) LEFT    Impression  Left lateral seventh and ninth acute rib fractures.    No pneumothorax or acute cardiopulmonary abnormality.         Results for orders placed during the hospital encounter of 21    DX-SHOULDER 2+ LEFT    Impression  No acute osseous abnormality.              Diagnosis  Visit Diagnoses     ICD-10-CM   1. Myalgia  M79.10   2. Intercostal neuralgia  G58.8   3. Occipital neuralgia of left side  M54.81   4. Chronic bilateral low back pain without sciatica  M54.50    G89.29   5. Other chronic pain  G89.29   6. History of lumbar surgery  Z98.890   7. Spinal stenosis of lumbar region at L2-3 without neurogenic claudication  M48.061   8. Rib pain  R07.81   9. Impingement of left shoulder  M25.812   10. Chronic left shoulder pain  M25.512    G89.29   11. Cervical radiculitis  M54.12                   ASSESSMENT AND PLAN:  Keaton Cervantes (:  1955) is a male with worsening myalgia and occipital neuralgia pain     Carlos was seen today for follow-up.    Diagnoses and all orders for this visit:    Myalgia    Intercostal neuralgia    Occipital neuralgia of left side    Chronic bilateral low back pain without sciatica    Other chronic pain    History of lumbar surgery    Spinal stenosis of lumbar region at L2-3 without neurogenic claudication    Rib pain    Impingement of left shoulder    Chronic left shoulder pain    Cervical radiculitis        PLAN  Physical Therapy: I previously ordered physical therapy to focus on strengthening and stretching as well as a home exercise program.  This is cost prohibitive for him to continue.  Advised him to continue with his home exercise program as he is doing.  Discussed that he appears to be experiencing symptoms of left shoulder impingement and I believe he would benefit from physical therapy exercises he deferred formal physical therapy today due to cost considerations.     Home Exercise Program: I provided the patient with a home exercise program focusing on strengthening and stretching at today's visit.  This is in addition to the home exercise program that I gave him at a previous visit.     Diagnostic workup: Limited diagnostic ultrasound of left median nerve performed 12/20/2024 by Dr. Rhoades, images scanned to media  -Discussed possible MRI cervical spine given his radicular symptoms on the left, defer at this time due to financial considerations     Medications:   -Okay to continue Norco 7.5-325 mg twice daily as needed as prescribed by the patient's PCP as long as there is compliance with .  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  He is low risk for narcotic abuse with an opioid risk score of 0 at today's visit  - also on ASA  -Continue amitriptyline as per neurology   -Continue gabapentin 300mg TID PRN which is helping     Interventions:   -Repeat right T8  and T9 intercostal nerve blocks under ultrasound guidance as needed  - trigger point injections with steroid as needed including left paracervical and upper trapezius area, and right intercostal muscle. The risks, benefits, and alternatives to this procedure were discussed and the patient wishes to proceed with the procedure. Risks include but are not limited to damage to surrounding structures, infection, bleeding, worsening of pain which can be permanent, and collapsed lung. Benefits include pain relief and improved function. Alternatives include not doing the procedure.  - s/p left greater occipital nerve block under ultrasound guidance with some improvement in occipital headache pain. No longer constant, now occurring frequently per day  -The patient denies significant radiating pain down the leg/groin or significant numbness or tingling in the leg/groin.  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  Discussed that if the patient is interested in an injection at that visit, he should contact us at least 1-2 weeks in advance of the visit to allow enough time for insurance authorization    Orders Placed This Encounter    carvedilol (COREG) 6.25 MG Tab    lisinopril (PRINIVIL) 10 MG Tab    atorvastatin (LIPITOR) 20 MG Tab       Missy Rhoades MD  Interventional Pain and Spine  Physical Medicine and Rehabilitation  [facility]      The above note documents my personal evaluation of this patient. In addition, I have reviewed and confirmed with the patient and MA the supportive information documented in today's Patient Health Questionnaire and Office Note.     Please note that this dictation was created using voice recognition software. I have made every reasonable attempt to correct obvious errors, but  I expect that there are errors of grammar and possibly content that I did not discover before finalizing the note.

## 2025-02-07 ENCOUNTER — HOSPITAL ENCOUNTER (OUTPATIENT)
Dept: LAB | Facility: MEDICAL CENTER | Age: 70
End: 2025-02-07
Attending: STUDENT IN AN ORGANIZED HEALTH CARE EDUCATION/TRAINING PROGRAM
Payer: COMMERCIAL

## 2025-02-07 LAB
25(OH)D3 SERPL-MCNC: 21 NG/ML (ref 30–100)
ALBUMIN SERPL BCP-MCNC: 4.5 G/DL (ref 3.2–4.9)
ALBUMIN/GLOB SERPL: 1.5 G/DL
ALP SERPL-CCNC: 54 U/L (ref 30–99)
ALT SERPL-CCNC: 33 U/L (ref 2–50)
ANION GAP SERPL CALC-SCNC: 9 MMOL/L (ref 7–16)
APPEARANCE UR: CLEAR
AST SERPL-CCNC: 26 U/L (ref 12–45)
BASOPHILS # BLD AUTO: 1.6 % (ref 0–1.8)
BASOPHILS # BLD: 0.15 K/UL (ref 0–0.12)
BILIRUB SERPL-MCNC: 0.3 MG/DL (ref 0.1–1.5)
BILIRUB UR QL STRIP.AUTO: NEGATIVE
BUN SERPL-MCNC: 37 MG/DL (ref 8–22)
CALCIUM ALBUM COR SERPL-MCNC: 9.2 MG/DL (ref 8.5–10.5)
CALCIUM SERPL-MCNC: 9.6 MG/DL (ref 8.5–10.5)
CHLORIDE SERPL-SCNC: 97 MMOL/L (ref 96–112)
CHOLEST SERPL-MCNC: 112 MG/DL (ref 100–199)
CO2 SERPL-SCNC: 25 MMOL/L (ref 20–33)
COLOR UR: YELLOW
CREAT SERPL-MCNC: 1.04 MG/DL (ref 0.5–1.4)
CREAT UR-MCNC: 27.4 MG/DL
EOSINOPHIL # BLD AUTO: 0.31 K/UL (ref 0–0.51)
EOSINOPHIL NFR BLD: 3.2 % (ref 0–6.9)
ERYTHROCYTE [DISTWIDTH] IN BLOOD BY AUTOMATED COUNT: 45.1 FL (ref 35.9–50)
EST. AVERAGE GLUCOSE BLD GHB EST-MCNC: 174 MG/DL
GFR SERPLBLD CREATININE-BSD FMLA CKD-EPI: 78 ML/MIN/1.73 M 2
GLOBULIN SER CALC-MCNC: 3 G/DL (ref 1.9–3.5)
GLUCOSE SERPL-MCNC: 114 MG/DL (ref 65–99)
GLUCOSE UR STRIP.AUTO-MCNC: NEGATIVE MG/DL
HBA1C MFR BLD: 7.7 % (ref 4–5.6)
HCT VFR BLD AUTO: 43.2 % (ref 42–52)
HDLC SERPL-MCNC: 60 MG/DL
HGB BLD-MCNC: 13.6 G/DL (ref 14–18)
IMM GRANULOCYTES # BLD AUTO: 0.02 K/UL (ref 0–0.11)
IMM GRANULOCYTES NFR BLD AUTO: 0.2 % (ref 0–0.9)
KETONES UR STRIP.AUTO-MCNC: NEGATIVE MG/DL
LDLC SERPL CALC-MCNC: 45 MG/DL
LEUKOCYTE ESTERASE UR QL STRIP.AUTO: NEGATIVE
LYMPHOCYTES # BLD AUTO: 3.65 K/UL (ref 1–4.8)
LYMPHOCYTES NFR BLD: 37.7 % (ref 22–41)
MCH RBC QN AUTO: 30.9 PG (ref 27–33)
MCHC RBC AUTO-ENTMCNC: 31.5 G/DL (ref 32.3–36.5)
MCV RBC AUTO: 98.2 FL (ref 81.4–97.8)
MICRO URNS: NORMAL
MICROALBUMIN UR-MCNC: <1.2 MG/DL
MICROALBUMIN/CREAT UR: NORMAL MG/G (ref 0–30)
MONOCYTES # BLD AUTO: 0.74 K/UL (ref 0–0.85)
MONOCYTES NFR BLD AUTO: 7.7 % (ref 0–13.4)
NEUTROPHILS # BLD AUTO: 4.8 K/UL (ref 1.82–7.42)
NEUTROPHILS NFR BLD: 49.6 % (ref 44–72)
NITRITE UR QL STRIP.AUTO: NEGATIVE
NRBC # BLD AUTO: 0 K/UL
NRBC BLD-RTO: 0 /100 WBC (ref 0–0.2)
PH UR STRIP.AUTO: 6.5 [PH] (ref 5–8)
PLATELET # BLD AUTO: 314 K/UL (ref 164–446)
PMV BLD AUTO: 9 FL (ref 9–12.9)
POTASSIUM SERPL-SCNC: 6.1 MMOL/L (ref 3.6–5.5)
PROT SERPL-MCNC: 7.5 G/DL (ref 6–8.2)
PROT UR QL STRIP: NEGATIVE MG/DL
PSA SERPL DL<=0.01 NG/ML-MCNC: 0.4 NG/ML (ref 0–4)
RBC # BLD AUTO: 4.4 M/UL (ref 4.7–6.1)
RBC UR QL AUTO: NEGATIVE
SODIUM SERPL-SCNC: 131 MMOL/L (ref 135–145)
SP GR UR STRIP.AUTO: 1.01
TRIGL SERPL-MCNC: 37 MG/DL (ref 0–149)
UROBILINOGEN UR STRIP.AUTO-MCNC: 0.2 EU/DL
WBC # BLD AUTO: 9.7 K/UL (ref 4.8–10.8)

## 2025-02-07 PROCEDURE — 85025 COMPLETE CBC W/AUTO DIFF WBC: CPT

## 2025-02-07 PROCEDURE — 84153 ASSAY OF PSA TOTAL: CPT

## 2025-02-07 PROCEDURE — 80053 COMPREHEN METABOLIC PANEL: CPT

## 2025-02-07 PROCEDURE — 82570 ASSAY OF URINE CREATININE: CPT

## 2025-02-07 PROCEDURE — 81003 URINALYSIS AUTO W/O SCOPE: CPT

## 2025-02-07 PROCEDURE — 82306 VITAMIN D 25 HYDROXY: CPT

## 2025-02-07 PROCEDURE — 82043 UR ALBUMIN QUANTITATIVE: CPT

## 2025-02-07 PROCEDURE — 83036 HEMOGLOBIN GLYCOSYLATED A1C: CPT

## 2025-02-07 PROCEDURE — 80061 LIPID PANEL: CPT

## 2025-02-07 PROCEDURE — 36415 COLL VENOUS BLD VENIPUNCTURE: CPT

## 2025-02-07 PROCEDURE — 87086 URINE CULTURE/COLONY COUNT: CPT

## 2025-02-09 LAB
BACTERIA UR CULT: NORMAL
SIGNIFICANT IND 70042: NORMAL
SITE SITE: NORMAL
SOURCE SOURCE: NORMAL

## 2025-03-18 ENCOUNTER — APPOINTMENT (OUTPATIENT)
Dept: PHYSICAL MEDICINE AND REHAB | Facility: MEDICAL CENTER | Age: 70
End: 2025-03-18
Payer: COMMERCIAL

## 2025-03-20 DIAGNOSIS — M79.10 MYALGIA: ICD-10-CM

## 2025-03-21 ENCOUNTER — OFFICE VISIT (OUTPATIENT)
Dept: PHYSICAL MEDICINE AND REHAB | Facility: MEDICAL CENTER | Age: 70
End: 2025-03-21
Payer: COMMERCIAL

## 2025-03-21 VITALS
DIASTOLIC BLOOD PRESSURE: 60 MMHG | HEIGHT: 72 IN | BODY MASS INDEX: 27.53 KG/M2 | WEIGHT: 203.26 LBS | HEART RATE: 69 BPM | TEMPERATURE: 97.5 F | SYSTOLIC BLOOD PRESSURE: 143 MMHG | OXYGEN SATURATION: 95 %

## 2025-03-21 DIAGNOSIS — G89.29 CHRONIC BILATERAL LOW BACK PAIN WITHOUT SCIATICA: ICD-10-CM

## 2025-03-21 DIAGNOSIS — G58.8 INTERCOSTAL NEURALGIA: ICD-10-CM

## 2025-03-21 DIAGNOSIS — M25.812 IMPINGEMENT OF LEFT SHOULDER: ICD-10-CM

## 2025-03-21 DIAGNOSIS — G89.29 OTHER CHRONIC PAIN: ICD-10-CM

## 2025-03-21 DIAGNOSIS — Z98.890 HISTORY OF LUMBAR SURGERY: ICD-10-CM

## 2025-03-21 DIAGNOSIS — M54.50 CHRONIC BILATERAL LOW BACK PAIN WITHOUT SCIATICA: ICD-10-CM

## 2025-03-21 DIAGNOSIS — M25.512 CHRONIC LEFT SHOULDER PAIN: ICD-10-CM

## 2025-03-21 DIAGNOSIS — R07.81 RIB PAIN: ICD-10-CM

## 2025-03-21 DIAGNOSIS — G89.29 CHRONIC LEFT SHOULDER PAIN: ICD-10-CM

## 2025-03-21 DIAGNOSIS — M79.10 MYALGIA: ICD-10-CM

## 2025-03-21 DIAGNOSIS — M48.061 SPINAL STENOSIS OF LUMBAR REGION WITHOUT NEUROGENIC CLAUDICATION: ICD-10-CM

## 2025-03-21 DIAGNOSIS — M54.81 OCCIPITAL NEURALGIA OF LEFT SIDE: ICD-10-CM

## 2025-03-21 PROCEDURE — 99214 OFFICE O/P EST MOD 30 MIN: CPT | Performed by: STUDENT IN AN ORGANIZED HEALTH CARE EDUCATION/TRAINING PROGRAM

## 2025-03-21 PROCEDURE — 1125F AMNT PAIN NOTED PAIN PRSNT: CPT | Performed by: STUDENT IN AN ORGANIZED HEALTH CARE EDUCATION/TRAINING PROGRAM

## 2025-03-21 PROCEDURE — 3077F SYST BP >= 140 MM HG: CPT | Performed by: STUDENT IN AN ORGANIZED HEALTH CARE EDUCATION/TRAINING PROGRAM

## 2025-03-21 PROCEDURE — 3078F DIAST BP <80 MM HG: CPT | Performed by: STUDENT IN AN ORGANIZED HEALTH CARE EDUCATION/TRAINING PROGRAM

## 2025-03-21 RX ORDER — ISOSORBIDE MONONITRATE 60 MG/1
60 TABLET, EXTENDED RELEASE ORAL EVERY MORNING
COMMUNITY
Start: 2025-02-26

## 2025-03-21 RX ORDER — AMITRIPTYLINE HYDROCHLORIDE 10 MG/1
10 TABLET ORAL NIGHTLY
Qty: 30 TABLET | Refills: 3 | Status: SHIPPED | OUTPATIENT
Start: 2025-03-21

## 2025-03-21 RX ORDER — GABAPENTIN 300 MG/1
600 CAPSULE ORAL 3 TIMES DAILY
Qty: 180 CAPSULE | Refills: 2 | Status: SHIPPED | OUTPATIENT
Start: 2025-03-21

## 2025-03-21 ASSESSMENT — FIBROSIS 4 INDEX: FIB4 SCORE: 0.98

## 2025-03-21 ASSESSMENT — PATIENT HEALTH QUESTIONNAIRE - PHQ9: CLINICAL INTERPRETATION OF PHQ2 SCORE: 0

## 2025-03-21 ASSESSMENT — PAIN SCALES - GENERAL: PAINLEVEL_OUTOF10: 6=MODERATE PAIN

## 2025-03-21 NOTE — PROGRESS NOTES
Verbal consent was acquired by the patient to use [employer] ambient listening note generation during this visit Yes      follow-up patient Note    Interventional Pain and Spine  Physiatry (Physical Medicine and Rehabilitation)     Patient Name: Keaton Cervantes  : 1955  Date of service: 3/21/2025    Chief Complaint:   Chief Complaint   Patient presents with    Follow-Up     3m fv       HISTORY  Please see new patient note by Dr. Rhoades for more details.     HPI  Today's visit   Keaton Cervantes ( 1955) is a male with Diagnoses of Occipital neuralgia of left side, Myalgia, Intercostal neuralgia, Chronic bilateral low back pain without sciatica, Other chronic pain, Spinal stenosis of lumbar region at L2-3 without neurogenic claudication, History of lumbar surgery, Rib pain, Impingement of left shoulder, and Chronic left shoulder pain were pertinent to this visit.  History of Present Illness  The patient is a 69-year-old male who presents for a follow-up of back pain, neck pain, and medication management.    He reports a slight exacerbation of his back pain, which is particularly severe at two specific levels, his upper and lower lumbar spine. The pain radiates down the posterior aspect of his legs, accompanied by tingling sensations from his hips to his ankles. He also experiences leg weakness, which he attributes to walking approximately 10 miles the previous night. This is the first instance in a long time that he has experienced radiating pain. He does not experience any anterior leg pain. He has some cyclobenzaprine but is not taking it. The pain is exacerbated by physical activities such as lifting and walking, necessitating the use of a back brace during work. Despite the persistent pain on non-working days, it is less intense than on working days. Resting and sleeping alleviates his symptoms. He underwent back surgery following a fall from a 14-foot ladder, which resulted in four  herniated discs. Prior to the fall, he did not experience any leg pain. Post-surgery, his leg pain improved but has recently recurred.  He has not received an epidural injection post-surgery.     He has been experiencing neck and occipital pain, which has remained consistent. He has also started to experience pain on the left side of his head. He has previously found relief from occipital nerve blocks and trigger point injections with steroids, which provided about a month of relief. He continues to take gabapentin and hydrocodone but uses amitriptyline 10 mg nightly sparingly, primarily for sleep aid. He is uncertain if amitriptyline provides any analgesic benefits.    He has some cyclobenzaprine but is not taking it. He manages his work-related pain with hydrocodone and aspirin. He has not tried increasing his gabapentin dosage to two capsules at a time but has taken one of his wife's 600 mg capsules, which he found beneficial without any adverse effects. He has requested a refill of his amitriptyline prescription.    Supplemental Information  He has a history of bilateral rib fractures.    MEDICATIONS  Current: Gabapentin, amitriptyline, hydrocodone, aspirin.  Discontinued: Cyclobenzaprine.      History:  - He went to 1 session of physical therapy which was cost prohibitive as accosted $400.  He reports that he is taking amitriptyline 50% of the time with relief.  - right thoracic back pain radiating towards the chest started after fracturing his ribs.  -Occipital nerve blocks are not currently covered by his insurance      Procedure history:  - 3/6/24 LEFT Greater Occipital Nerve Block - Local Anesthetic and Steroid -some relief of occipital headaches.  Headaches no longer constant, now frequent   - 4/10/24 trigger point injections with steroid - 50% improvement overall  - 05/22/24 right intercostal nerve blocks at T8 and T9 -1 week of significant relief  - 06/19/24 right intercostal nerve blocks at T8 and T9  -  6/21/2024 trigger point injections -significant relief  - 09/20/24 trigger point injections with steroid    ROS:   Red Flags ROS:   Fever, Chills, Sweats: Denies  Involuntary Weight Loss: Denies  Bladder Incontinence: Denies  Bowel Incontinence: denies  Saddle Anesthesia: Denies    All other systems reviewed and negative.     PMHx:   Past Medical History:   Diagnosis Date    Allergy     ASTHMA     Back pain     Chronic    Bronchitis     CATARACT     Dental disorder     Diabetes     oral rx & insulin    Heart attack (HCC)     Hypertension     MEDICAL HOME     Neck pain     Chronic    ELLEN (obstructive sleep apnea)     Bipap    ELLEN (obstructive sleep apnea) 08/12/2022    Pt. states no longer using machine since wt loss.    Sleep apnea        PSHx:   Past Surgical History:   Procedure Laterality Date    TENDON LENGHTENING Right 8/17/2022    Procedure: RIGHT GASTROCNEMIUS RECESSION,;  Surgeon: Parrish Hardy M.D.;  Location: [facility];  Service: Orthopedics    TENDON TRANSFER Right 8/17/2022    Procedure: TRANSFER, TENDON-PERONEUS LONGUS TO BREVIS TRANSFER;  Surgeon: Parrish Hardy M.D.;  Location: [facility];  Service: Orthopedics    IRRIGATION & DEBRIDEMENT GENERAL Right 8/17/2022    Procedure: IRRIGATION AND DEBRIDEMENT, WOUND- FOOT;  Surgeon: Parrish Hardy M.D.;  Location: [facility];  Service: Orthopedics    METATARSAL HEAD RESECTION Right 8/17/2022    Procedure: EXCISION, METATARSAL BONE, HEAD-PARTIAL FIRST METATARSAL EXCISION;  Surgeon: Parrish Hardy M.D.;  Location: [facility];  Service: Orthopedics    BONE BIOPSY Right 8/17/2022    Procedure: BIOPSY, BONE;  Surgeon: Parrish Hardy M.D.;  Location: [facility];  Service: Orthopedics    TOE AMPUTATION Right 06/23/2022    Procedure: AMPUTATION, TOE 2ND;  Surgeon: Wilman Madera M.D.;  Location: [facility];  Service: Orthopedics    ROTATOR CUFF REPAIR Left 04/2022     "[facility]    TOE AMPUTATION Right 2022    Procedure: AMPUTATION, TOE 1ST RAY;  Surgeon: Wilman Madera M.D.;  Location: [facility];  Service: Orthopedics    LUMBAR FUSION ANTERIOR  2009    Performed by JOSEE BLANTON at SURGERY [facility] ORS    FUSION, SPINE, LUMBAR, PLIF  2009    Performed by JOSEE BLANTON at SURGERY [facility] ORS    LUMBAR LAMINECTOMY DISKECTOMY  2009    Performed by JOSEE BLANTON at SURGERY [facility] ORS    LAMINOTOMY  2009    Performed by JOSEE BLANTON at SURGERY [facility] ORS    SOMNOPLASTY  06/10/2009    Performed by ELÍAS RILEY at SURGERY [facility] ORS    SEPTOPLASTY  06/10/2009    Performed by ELÍAS RILEY at SURGERY [facility] ORS    ANTROSTOMY  06/10/2009    Performed by ELÍAS RILEY at SURGERY [facility] ORS    ETHMOIDECTOMY  06/10/2009    Performed by ELÍAS RILEY at SURGERY [facility] ORS    CATARACT PHACO WITH IOL  2008    Performed by OCTAVIO HARDWICK at [facility]    CATARACT PHACO WITH IOL  2008    [facility]    APPENDECTOMY      ORIF, ANKLE      VASECTOMY         Family Hx:   Family History   Problem Relation Age of Onset    Hypertension Father     Diabetes Father        Social Hx:  Social History     Socioeconomic History    Marital status:      Spouse name: Not on file    Number of children: Not on file    Years of education: Not on file    Highest education level: Not on file   Occupational History    Not on file   Tobacco Use    Smoking status: Former     Current packs/day: 0.00     Types: Cigarettes     Quit date:      Years since quittin.2    Smokeless tobacco: Current     Types: Chew   Vaping Use    Vaping status: Never Used   Substance and Sexual Activity    Alcohol use: No     Comment: Pt. states, \"H/O Alcoholism, quit .\"    Drug use: No    Sexual activity: Yes     Partners: Female "   Other Topics Concern    Not on file   Social History Narrative    Not on file     Social Drivers of Health     Financial Resource Strain: Not on file   Food Insecurity: Not on file   Transportation Needs: Not on file   Physical Activity: Not on file   Stress: Not on file   Social Connections: Not on file   Intimate Partner Violence: Not on file   Housing Stability: Not on file       Allergies:  Allergies   Allergen Reactions    Sulfa Drugs Unspecified     Pt hasn't had meds in so long he doesn't remember what his reaction is, he just remembers there is a reaction and that he shouldn't have them     Coconut (Cocos Nucifera) Allergy Skin Test     Coconut Oil     Flu Virus Vaccine Shortness of Breath    Other Food Anaphylaxis     coconut    Pneumococcal Vaccines Shortness of Breath    Sulfamethoxazole     Nutrasweet Aspartame [Aspartame]      Causes migraines       Medications: reviewed on epic.   Outpatient Medications Marked as Taking for the 3/21/25 encounter (Office Visit) with Missy Rhoades M.D.   Medication Sig Dispense Refill    isosorbide mononitrate SR (IMDUR) 60 MG TABLET SR 24 HR Take 60 mg by mouth every morning.      gabapentin (NEURONTIN) 300 MG Cap Take 2 Capsules by mouth in the morning, at noon, and at bedtime. 180 Capsule 2    amitriptyline (ELAVIL) 10 MG Tab Take 1 Tablet by mouth every evening. 30 Tablet 3    carvedilol (COREG) 6.25 MG Tab Carvedilol 6.25 MG Oral Tablet QTY: 60 tablet Days: 30 Refills: 0  Written: 10/29/24 Patient Instructions:      lisinopril (PRINIVIL) 10 MG Tab Lisinopril 10 MG Oral Tablet QTY: 30 tablet Days: 30 Refills: 0  Written: 10/29/24 Patient Instructions:      atorvastatin (LIPITOR) 20 MG Tab Take 20 mg by mouth every evening.      acetaminophen (TYLENOL) 500 MG Tab TAKE ONE OR TWO TABLETS BY MOUTH THREE TIMES DAILY FOR 14 DAYS.      asa/apap/caffeine (EXCEDRIN) 250-250-65 MG Tab Take 1 Tablet by mouth every 6 hours as needed for Headache.      Insulin Glargine-yfgn  100 UNIT/ML Solution Pen-injector INJECT 8-10 UNITS SUBCUTANEOUSLY AT BEDTIME      insulin glargine (LANTUS) 100 UNIT/ML Solution Inject 6-15 Units under the skin at bedtime as needed (Blood Sugar Levels).      HYDROcodone-acetaminophen (NORCO) 7.5-325 MG per tablet Take 1 Tablet by mouth 3 times a day as needed for Severe Pain.      glipiZIDE SR (GLUCOTROL) 10 MG TABLET SR 24 HR Take 10 mg by mouth 2 times a day.      metformin (GLUCOPHAGE) 1000 MG tablet Take 1,000 mg by mouth 2 times a day with meals.          Current Outpatient Medications on File Prior to Visit   Medication Sig Dispense Refill    isosorbide mononitrate SR (IMDUR) 60 MG TABLET SR 24 HR Take 60 mg by mouth every morning.      carvedilol (COREG) 6.25 MG Tab Carvedilol 6.25 MG Oral Tablet QTY: 60 tablet Days: 30 Refills: 0  Written: 10/29/24 Patient Instructions:      lisinopril (PRINIVIL) 10 MG Tab Lisinopril 10 MG Oral Tablet QTY: 30 tablet Days: 30 Refills: 0  Written: 10/29/24 Patient Instructions:      atorvastatin (LIPITOR) 20 MG Tab Take 20 mg by mouth every evening.      acetaminophen (TYLENOL) 500 MG Tab TAKE ONE OR TWO TABLETS BY MOUTH THREE TIMES DAILY FOR 14 DAYS.      asa/apap/caffeine (EXCEDRIN) 250-250-65 MG Tab Take 1 Tablet by mouth every 6 hours as needed for Headache.      Insulin Glargine-yfgn 100 UNIT/ML Solution Pen-injector INJECT 8-10 UNITS SUBCUTANEOUSLY AT BEDTIME      insulin glargine (LANTUS) 100 UNIT/ML Solution Inject 6-15 Units under the skin at bedtime as needed (Blood Sugar Levels).      HYDROcodone-acetaminophen (NORCO) 7.5-325 MG per tablet Take 1 Tablet by mouth 3 times a day as needed for Severe Pain.      glipiZIDE SR (GLUCOTROL) 10 MG TABLET SR 24 HR Take 10 mg by mouth 2 times a day.      metformin (GLUCOPHAGE) 1000 MG tablet Take 1,000 mg by mouth 2 times a day with meals.      acyclovir (ZOVIRAX) 800 MG Tab  (Patient not taking: Reported on 7/19/2024)       No current facility-administered medications on  file prior to visit.         EXAMINATION     Physical Exam:   BP (!) 143/60 (BP Location: Right arm, Patient Position: Sitting, BP Cuff Size: Adult)   Pulse 69   Temp 36.4 °C (97.5 °F) (Temporal)   Ht 1.829 m (6')   Wt 92.2 kg (203 lb 4.2 oz)   SpO2 95%     Constitutional:   Body Habitus: Body mass index is 27.57 kg/m².  Cooperation: Fully cooperates with exam  Appearance: Well-groomed, well-nourished.    Eyes: No scleral icterus to suggest severe liver disease, no proptosis to suggest severe hyperthyroidism    ENT -no obvious auditory deficits, no noticeable facial droop     Skin -no rashes or lesions noted     Respiratory-  breathing comfortably on room air, no audible wheezing    Cardiovascular-distal extremities warm and well perfused.  No lower extremity edema is noted.     Gastrointestinal - no obvious abdominal masses, non-distended    Psychiatric- alert and oriented ×3. Normal affect.     Gait stable steady    Musculoskeletal    Negative Spurling's bilaterally  Tenderness to palpation at left upper trapezius    Positive Tinel's and tenderness to palpation over the left greater and lesser occipital nerves    Key points for the international standards for neurological classification of spinal cord injury (ISNCSCI) to light touch.     Dermatome R L   C4 2 2   C5 2 2   C6 2 2   C7 2 2   C8 2 2   T1 2 2   T2 2 2       Motor Exam Upper Extremities   ? Myotome R L   Shoulder abduction C5 5 5   Elbow flexion C5 5 5   Wrist extension C6 5 5   Elbow extension C7 5 5   Finger flexion C8 5 5   Finger abduction T1 5 5     Thoracic/Lumbar Spine/Sacral Spine/Hips   Inspection: No evidence of atrophy in bilateral lower extremities throughout       Facet loading maneuver negative bilaterally      Palpation:   No tenderness to palpation over the paraspinal muscles bilaterally.      Lumbar spine /hip provocative exam maneuvers  Slump test negative bilaterally  FADIR test negative bilaterally     SI joint tests  SAMANTHA test  negative bilaterally        Key points for the international standards for neurological classification of spinal cord injury (ISNCSCI) to light touch.   Dermatome R L   L2 2 2   L3 2 2   L4 2 2   L5 2 2   S1 2 2   S2 2 2         Motor Exam Lower Extremities  ? Myotome R L   Hip flexion L2 5 5   Knee extension L3 5 5   Ankle dorsiflexion L4 5 5   Toe extension L5 5 5   Ankle plantarflexion S1 5 5        Special tests:  Tinel's at the wrist over the median nerve positive on left, negative on right  Carpal tunnel compression: negative bilaterally  Tinel's at the cubital tunnel: negative bilaterally    MEDICAL DECISION MAKING    Medical records review: see under HPI section.     DATA    Labs: Personally reviewed at today's visit:     Lab Results   Component Value Date/Time    SODIUM 131 (L) 02/07/2025 08:40 AM    POTASSIUM 6.1 (H) 02/07/2025 08:40 AM    CHLORIDE 97 02/07/2025 08:40 AM    CO2 25 02/07/2025 08:40 AM    ANION 9.0 02/07/2025 08:40 AM    GLUCOSE 114 (H) 02/07/2025 08:40 AM    BUN 37 (H) 02/07/2025 08:40 AM    CREATININE 1.04 02/07/2025 08:40 AM    CREATININE 0.7 08/10/2006 01:05 PM    CALCIUM 9.6 02/07/2025 08:40 AM    ASTSGOT 26 02/07/2025 08:40 AM    ALTSGPT 33 02/07/2025 08:40 AM    TBILIRUBIN 0.3 02/07/2025 08:40 AM    ALBUMIN 4.5 02/07/2025 08:40 AM    TOTPROTEIN 7.5 02/07/2025 08:40 AM    GLOBULIN 3.0 02/07/2025 08:40 AM    AGRATIO 1.5 02/07/2025 08:40 AM       Lab Results   Component Value Date/Time    PROTHROMBTM 10.7 (L) 08/10/2006 01:05 PM    INR 0.85 (L) 08/10/2006 01:05 PM        Lab Results   Component Value Date/Time    WBC 9.7 02/07/2025 08:40 AM    RBC 4.40 (L) 02/07/2025 08:40 AM    HEMOGLOBIN 13.6 (L) 02/07/2025 08:40 AM    HEMATOCRIT 43.2 02/07/2025 08:40 AM    MCV 98.2 (H) 02/07/2025 08:40 AM    MCH 30.9 02/07/2025 08:40 AM    MCHC 31.5 (L) 02/07/2025 08:40 AM    MPV 9.0 02/07/2025 08:40 AM    NEUTSPOLYS 49.60 02/07/2025 08:40 AM    LYMPHOCYTES 37.70 02/07/2025 08:40 AM    MONOCYTES 7.70  02/07/2025 08:40 AM    EOSINOPHILS 3.20 02/07/2025 08:40 AM    BASOPHILS 1.60 02/07/2025 08:40 AM        Lab Results   Component Value Date/Time    HBA1C 7.7 (H) 02/07/2025 08:40 AM        Limited diagnostic ultrasound of left median nerve performed 12/20/2024 by Dr. Rhoades  No significant increase in cross-sectional area of median nerve between left carpal tunnel outlet and left pronator quadratus    Imaging:   I personally reviewed following images, these are my reads    Chest x-ray 6/21/2022  No evidence of acute rib fracture. See formal radiology report for further details.     MRI lumbar spine 2/27/2024  Postoperative changes with fusion in the midline laminectomy spanning L3-S1.  At L2-3 there is a broad-based disc bulge and bilateral facet arthropathy with ligamentum flavum hypertrophy contributing to moderate central canal stenosis at this level.  There is also bilateral moderate severe neuroforaminal stenosis at this level.  No significant neuroforaminal stenosis or central canal stenosis elsewhere. See formal radiology report for further details.    X-ray lumbar spine 2/7/2024  Fusion spanning L3-S1.  Mild retrolisthesis of L2 on L3. See formal radiology report for further details.    IMAGING radiology reads. I reviewed the following radiology reads   MRI lumbar spine 2/27/2024  FINDINGS:     Postoperative changes are noted in the lumbar spine with midline laminectomy at L3-4, L4-5 and L5-S1 and posterior transpedicular fusion between L3 and S1.  Intervertebral disc spacers are present at the L3-4, L4-5, L5-S1 levels.  A lipid rich hemangioma is noted within L1.  There is decreased disc height with type I marrow changes involving the adjacent endplates at the L2-L3 level. The conus terminates at  L1. Normal signal intensity is identified within the distal thoracic cord and conus medullaris.     Axial lumbar spine levels:     T12-L1: Normal.     L1-2: Mild facet degeneration without stenosis.     L2-3:  Broad-based disc bulge and bilateral facet degeneration and ligamentum flavum hypertrophy results in moderate canal stenosis with thecal sac compression. There is also bilateral moderate-to-severe foraminal narrowing.     L3-4: Widely decompressed spinal canal. No stenosis.     L4-5: Widely decompressed spinal canal. No stenosis.     L5-S1: Well decompressed spinal canal. No stenosis.     A small T2 hyperintense collection is identified in the laminectomy bed behind the L3-4 level, measuring 17 x 40 x 42 mm (CC, AP, ML). There is no significant mass effect upon the thecal sac. This may represent a chronic organized fluid   collection/granulation tissue.     Prevertebral soft tissues are within normal limits.     IMPRESSION:        Moderate canal stenosis at L2-3 due to encroachment by a broad-based disc bulge, advanced facet degeneration at the metaphyseal hypertrophy. There is also bilateral moderate-to-severe foraminal narrowing with likely impingement upon the exiting L2   nerves.     Postoperative changes at L3-4, L4-5, L5-S1 with a well decompressed canal. There is no foraminal narrowing.    Results for orders placed during the hospital encounter of 07/05/23    MR-BRAIN-W/O    Impression  No acute intracranial process.    Nonspecific T2 hyperintensities are noted in the periventricular and deep white matter, most likely related to chronic microvascular ischemia.             Results for orders placed during the hospital encounter of 02/03/09    MR-CERVICAL SPINE-W/O    Impression  IMPRESSION:    1. POSTERIOR SPONDYLOTIC DISC BULGING AT C5-6 WHICH EXTENDS TO THE  LEFT-SIDED NEURAL FORAMEN WITH MILD CANAL STENOSIS AND LEFT-SIDED NEURAL  FORAMINAL NARROWING PRESENT.  THERE IS ALSO ACCOMPANYING LEFT-SIDED  UNCINATE SPURRING.    2. MILD DISC DESICCATION AT C2-3, C3-4, C4-5, AND C6-7 AS DESCRIBED.      CBG/clare        Read By JOVANY FLORENCE MD on Feb 4 2009  1:23PM  : [facility] Transcription Date: Feb 4 2009   2:16PM  THIS DOCUMENT HAS BEEN ELECTRONICALLY SIGNED BY: JOVANY FLORENCE MD on Feb 6 2009 12:16PM      Results for orders placed during the hospital encounter of 02/27/24    MR-LUMBAR SPINE-W/O    Impression  Moderate canal stenosis at L2-3 due to encroachment by a broad-based disc bulge, advanced facet degeneration at the metaphyseal hypertrophy. There is also bilateral moderate-to-severe foraminal narrowing with likely impingement upon the exiting L2  nerves.    Postoperative changes at L3-4, L4-5, L5-S1 with a well decompressed canal. There is no foraminal narrowing.        Results for orders placed during the hospital encounter of 02/27/24    MR-LUMBAR SPINE-W/O    Impression  Moderate canal stenosis at L2-3 due to encroachment by a broad-based disc bulge, advanced facet degeneration at the metaphyseal hypertrophy. There is also bilateral moderate-to-severe foraminal narrowing with likely impingement upon the exiting L2  nerves.    Postoperative changes at L3-4, L4-5, L5-S1 with a well decompressed canal. There is no foraminal narrowing.                                      Results for orders placed during the hospital encounter of 11/06/11    DX-ANKLE 3+ VIEWS    Impression  No acute bony abnormality.      Results for orders placed during the hospital encounter of 05/27/09    DX-CHEST-2 VIEWS    Impression  IMPRESSION:    NORMAL CHEST.      W:[facility] [place]    Read By WILL THOMPSON MD on May 27 2009  3:24PM  : [facility] Transcription Date: May 28 2009 11:52AM  THIS DOCUMENT HAS BEEN ELECTRONICALLY SIGNED BY: WILL THOMPSON MD on May  29 2009 10:45PM       Results for orders placed in visit on 11/03/12    DX-FINGER(S) 2+    Impression  No evidence of fracture or dislocation.    Results for orders placed during the hospital encounter of 07/28/22    DX-FOOT-COMPLETE 3+ RIGHT    Impression  1.  Soft tissue gas is present overlying the first transmetatarsal amputation site and indistinctness of the cortex of the every  dictation site site of the first metatarsal which raises suspicion for infection. MRI of the foot with and without contrast  could be obtained for further evaluation if indicated.  2.  Subacute nonunited fracture of the second metatarsal base with new moderate lateral displacement and bone callus formation.  3.  Interval agitation of the second digit at the proximal phalanx               Results for orders placed during the hospital encounter of 24    DX-LUMBAR SPINE-2 OR 3 VIEWS    Impression  Mild retrolisthesis and severe degenerative disc disease at L2-3.    Postsurgical changes from posterior fixation and disc prosthesis placement at the L3-S1. Mature fusion of the disc space. The bilateral L3 pedicle screws are very close to the L2-3 disc space.      Results for orders placed during the hospital encounter of 21    SH-OSTH-YMUEJIPILW (WITH 1-VIEW CXR) LEFT    Impression  Left lateral seventh and ninth acute rib fractures.    No pneumothorax or acute cardiopulmonary abnormality.         Results for orders placed during the hospital encounter of 21    DX-SHOULDER 2+ LEFT    Impression  No acute osseous abnormality.              Diagnosis  Visit Diagnoses     ICD-10-CM   1. Occipital neuralgia of left side  M54.81   2. Myalgia  M79.10   3. Intercostal neuralgia  G58.8   4. Chronic bilateral low back pain without sciatica  M54.50    G89.29   5. Other chronic pain  G89.29   6. Spinal stenosis of lumbar region at L2-3 without neurogenic claudication  M48.061   7. History of lumbar surgery  Z98.890   8. Rib pain  R07.81   9. Impingement of left shoulder  M25.812   10. Chronic left shoulder pain  M25.512    G89.29                   ASSESSMENT AND PLAN:  Keaton Naylor Jillians (: 1955) is a male with worsening myalgia and occipital neuralgia pain and new worsening pain radiating down bilateral legs and S1 distribution     Carlos was seen today for follow-up.    Diagnoses and all orders for this  visit:    Occipital neuralgia of left side  -     Referral to Pain Clinic    Myalgia    Intercostal neuralgia    Chronic bilateral low back pain without sciatica    Other chronic pain    Spinal stenosis of lumbar region at L2-3 without neurogenic claudication    History of lumbar surgery    Rib pain    Impingement of left shoulder    Chronic left shoulder pain    Other orders  -     gabapentin (NEURONTIN) 300 MG Cap; Take 2 Capsules by mouth in the morning, at noon, and at bedtime.  -     amitriptyline (ELAVIL) 10 MG Tab; Take 1 Tablet by mouth every evening.        PLAN  Physical Therapy: I previously ordered physical therapy to focus on strengthening and stretching as well as a home exercise program.  This is cost prohibitive for him to continue.  Advised him to continue with his home exercise program as he is doing.  Discussed that he appears to be experiencing symptoms of left shoulder impingement and I believe he would benefit from physical therapy exercises he deferred formal physical therapy today due to cost considerations.     Home Exercise Program: I provided the patient with a home exercise program focusing on strengthening and stretching at today's visit.  This is in addition to the home exercise program that I gave him at a previous visit.     Diagnostic workup: Limited diagnostic ultrasound of left median nerve performed 12/20/2024 by Dr. Rhoades, images scanned to media  -Discussed possible MRI cervical spine given his radicular symptoms on the left, defer at this time due to financial considerations     Medications:   -Okay to continue Norco 7.5-325 mg twice daily as needed as prescribed by the patient's PCP as long as there is compliance with .  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  He is low risk for narcotic abuse with an opioid risk score of 0 at today's visit  - also on ASA  -Refill amitriptyline   -Discussed trial increase of gabapentin to 600mg  TID PRN, given improvement in pain when he takes 600 mg.  Increase in 300 mg increments weekly     Interventions:   -Repeat right T8 and T9 intercostal nerve blocks under ultrasound guidance as needed  - trigger point injections with steroid as needed including left paracervical and upper trapezius area, and right intercostal muscle as needed. The risks, benefits, and alternatives to this procedure were discussed and the patient wishes to proceed with the procedure. Risks include but are not limited to damage to surrounding structures, infection, bleeding, worsening of pain which can be permanent, and collapsed lung. Benefits include pain relief and improved function. Alternatives include not doing the procedure.  - left greater occipital nerve block under ultrasound guidance as needed  -Discussed consideration of epidural for radiating leg pain.  The patient like to monitor this while making the above medication changes at this time     Follow-up: Next available for trigger point injections with steroid versus left greater and lesser occipital nerve blocks      Orders Placed This Encounter    Referral to Pain Clinic    isosorbide mononitrate SR (IMDUR) 60 MG TABLET SR 24 HR    gabapentin (NEURONTIN) 300 MG Cap    amitriptyline (ELAVIL) 10 MG Tab       Missy Rhoades MD  Interventional Pain and Spine  Physical Medicine and Rehabilitation  [facility]      The above note documents my personal evaluation of this patient. In addition, I have reviewed and confirmed with the patient and MA the supportive information documented in today's Patient Health Questionnaire and Office Note.     Please note that this dictation was created using voice recognition software. I have made every reasonable attempt to correct obvious errors, but I expect that there are errors of grammar and possibly content that I did not discover before finalizing the note.

## 2025-03-26 NOTE — Clinical Note
REFERRAL APPROVAL NOTICE         Sent on March 26, 2025                   Carlos Cervantes  [address] [facility] [address]                   Dear Mr. Cervantes,    After a careful review of the medical information and benefit coverage, [facility] has processed your referral. See below for additional details.    If applicable, you must be actively enrolled with your insurance for coverage of the authorized service. If you have any questions regarding your coverage, please contact your insurance directly.    REFERRAL INFORMATION   Referral #:  [id number]  Referred-To Department    Referred-By Provider:  Physical Medicine and Rehab    Missy Rhoades M.D.   [facility]      [address]  [address]  [place] [id number] [phone] [address]  [place] [id number] [phone]    Referral Start Date:  03/26/2025  Referral End Date:   03/26/2026             SCHEDULING  If you do not already have an appointment, please call [phone] to make an appointment.     MORE INFORMATION  If you do not already have a [employer] account, sign up at: [email or web address]  You can access your medical information, make appointments, see lab results, billing information, and more.  If you have questions regarding this referral, please contact  the [facility] Referrals department at:             [phone]. Monday - Friday 8:00AM - 5:00PM.     Sincerely,    [facility]

## 2025-03-28 ENCOUNTER — OFFICE VISIT (OUTPATIENT)
Dept: PHYSICAL MEDICINE AND REHAB | Facility: MEDICAL CENTER | Age: 70
End: 2025-03-28
Payer: COMMERCIAL

## 2025-03-28 VITALS
OXYGEN SATURATION: 97 % | BODY MASS INDEX: 27.71 KG/M2 | SYSTOLIC BLOOD PRESSURE: 138 MMHG | DIASTOLIC BLOOD PRESSURE: 66 MMHG | HEART RATE: 61 BPM | TEMPERATURE: 97.8 F | WEIGHT: 204.59 LBS | HEIGHT: 72 IN

## 2025-03-28 DIAGNOSIS — M48.061 SPINAL STENOSIS OF LUMBAR REGION WITHOUT NEUROGENIC CLAUDICATION: ICD-10-CM

## 2025-03-28 DIAGNOSIS — G89.29 CHRONIC BILATERAL LOW BACK PAIN WITHOUT SCIATICA: ICD-10-CM

## 2025-03-28 DIAGNOSIS — R07.81 RIB PAIN: ICD-10-CM

## 2025-03-28 DIAGNOSIS — G58.8 INTERCOSTAL NEURALGIA: ICD-10-CM

## 2025-03-28 DIAGNOSIS — M54.50 CHRONIC BILATERAL LOW BACK PAIN WITHOUT SCIATICA: ICD-10-CM

## 2025-03-28 DIAGNOSIS — G89.29 OTHER CHRONIC PAIN: ICD-10-CM

## 2025-03-28 DIAGNOSIS — M54.12 CERVICAL RADICULITIS: ICD-10-CM

## 2025-03-28 DIAGNOSIS — Z98.890 HISTORY OF LUMBAR SURGERY: ICD-10-CM

## 2025-03-28 DIAGNOSIS — M79.10 MYALGIA: ICD-10-CM

## 2025-03-28 DIAGNOSIS — M54.81 OCCIPITAL NEURALGIA OF LEFT SIDE: ICD-10-CM

## 2025-03-28 DIAGNOSIS — Z91.81 RISK FOR FALLS: ICD-10-CM

## 2025-03-28 RX ORDER — BUPIVACAINE HYDROCHLORIDE 5 MG/ML
4.5 INJECTION, SOLUTION EPIDURAL; INTRACAUDAL; PERINEURAL ONCE
Status: COMPLETED | OUTPATIENT
Start: 2025-03-28 | End: 2025-03-28

## 2025-03-28 RX ORDER — DEXAMETHASONE SODIUM PHOSPHATE 4 MG/ML
4 INJECTION, SOLUTION INTRA-ARTICULAR; INTRALESIONAL; INTRAMUSCULAR; INTRAVENOUS; SOFT TISSUE ONCE
Status: COMPLETED | OUTPATIENT
Start: 2025-03-28 | End: 2025-03-28

## 2025-03-28 RX ADMIN — Medication 4.5 ML: at 09:22

## 2025-03-28 RX ADMIN — DEXAMETHASONE SODIUM PHOSPHATE 4 MG: 4 INJECTION, SOLUTION INTRA-ARTICULAR; INTRALESIONAL; INTRAMUSCULAR; INTRAVENOUS; SOFT TISSUE at 09:23

## 2025-03-28 RX ADMIN — BUPIVACAINE HYDROCHLORIDE 4.5 ML: 5 INJECTION, SOLUTION EPIDURAL; INTRACAUDAL; PERINEURAL at 09:23

## 2025-03-28 ASSESSMENT — FIBROSIS 4 INDEX: FIB4 SCORE: 0.98

## 2025-03-28 ASSESSMENT — PAIN SCALES - GENERAL: PAINLEVEL_OUTOF10: 7=MODERATE-SEVERE PAIN

## 2025-03-28 ASSESSMENT — PATIENT HEALTH QUESTIONNAIRE - PHQ9: CLINICAL INTERPRETATION OF PHQ2 SCORE: 0

## 2025-03-28 NOTE — PROCEDURES
Patient Name: Keaton Cervantes  : 1955  Date of Service: 3/28/2025    Physician/s: Missy Rhoades MD    Pre-operative Diagnosis: Myalgia (M79.1)    Post-operative Diagnosis: Myalgia (M79.1)    Procedure: trigger point injections of the following muscles:      Site R L   Splenius capitis     Semispinalis capitis     Splenius cervicis     Sternocleidomastoid     Upper trapezius x x   Levator scapulae     Rhomboids     Pectoralis minor     Pectoralis major     Serratus anterior     Supraspinatus     Infraspinatus     Teres minor     Teres major     Quadratus lumborum     Latissimus dorsi          Paravertebral, cervical     Paravertebral, thoracic x x   Paravertebral, lumbar x    Gluteus hawa     Gluteus medius     Gluteus minimus     Tensor fascia canelo     Vastus lateralis     Adductor sandra     Adductor longus     Occipitalis     Cervical paraspinal     Trapezius, mid     Trapezius, lower       Description of procedure:    The risks, benefits, and alternatives of the procedure were reviewed and discussed with the patient.  Written informed consent was freely obtained. A pre-procedural time-out was conducted by the physician verifying patient’s identity, procedure to be performed, procedure site and side, and allergy verification. Appropriate equipment was determined to be in place for the procedure.     In the office exam room then the patient was placed in the seated position. The areas of pain at the upper body were then prepped and draped in the usual sterile fashion.  Ultrasound was confirmed to view the adjacent structures for blood vessels and nerves and to confirm the needle path was not within the structures. A 27g needle was placed into each of the markings at the areas above under ultrasound guidance with an out of plane approach. After negative aspiration, approximately 0.8-1.5 mL of the above solution was injected. The needle was removed intact after each trigger point injection, and the  patient's back was covered with a 4x4 gauze, the area was cleansed with sterile normal saline, and a dressing was applied. There were no complications noted. The images were uploaded to our media tab for permanent storage.    Then In the office suite exam room the patient was placed in a prone position and the skin areas for injection over the above muscles were marked. The areas of pain at the lower body were then prepped and draped in the usual sterile fashion. A solution was prepared with 5 mL of 1% lidocaine and 5 mL of 0.5% bupivacaine. Ultrasound was confirmed to view the adjacent structures for blood vessels and nerves and to confirm the needle path was not within the structures. A 27g needle was placed into each of the markings at the areas above under ultrasound guidance with an out of plane approach. After negative aspiration, approximately 0.8-1.5 mL of the above solution was injected. The needle was removed intact after each trigger point injection, and the patient's back was covered with a 4x4 gauze, the area was cleansed with sterile normal saline, and a dressing was applied. There were no complications noted. The images were uploaded to our media tab for permanent storage.    Missy Rhoades MD  Interventional Pain and Spine  Physical Medicine and Rehabilitation  [facility]

## 2025-03-31 NOTE — Clinical Note
REFERRAL APPROVAL NOTICE         Sent on March 31, 2025                   Carlos Cervantes  [address] [facility] [address]                   Dear Mr. Cervantes,    After a careful review of the medical information and benefit coverage, [facility] has processed your referral. See below for additional details.    If applicable, you must be actively enrolled with your insurance for coverage of the authorized service. If you have any questions regarding your coverage, please contact your insurance directly.    REFERRAL INFORMATION   Referral #:  [id number]  Referred-To Department    Referred-By Provider:  Physical Medicine and Rehab    Missy Rhoades M.D.   [facility]      [address]  [address]  [place] [id number] [phone] [address]  [place] [id number] [phone]    Referral Start Date:  03/31/2025  Referral End Date:   03/31/2026             SCHEDULING  If you do not already have an appointment, please call [phone] to make an appointment.     MORE INFORMATION  If you do not already have a [employer] account, sign up at: [email or web address]  You can access your medical information, make appointments, see lab results, billing information, and more.  If you have questions regarding this referral, please contact  the [facility] Referrals department at:             [phone]. Monday - Friday 8:00AM - 5:00PM.     Sincerely,    [facility]

## 2025-06-11 RX ORDER — GABAPENTIN 300 MG/1
600 CAPSULE ORAL 3 TIMES DAILY
Qty: 180 CAPSULE | Refills: 0 | Status: SHIPPED | OUTPATIENT
Start: 2025-06-11

## 2025-06-11 NOTE — TELEPHONE ENCOUNTER
Received request via: Pharmacy    Was the patient seen in the last year in this department? Yes    Does the patient have an active prescription (recently filled or refills available) for medication(s) requested? yes    Pharmacy Name: [facility]     Does the patient have nursing home Plus and need 100-day supply? (This applies to ALL medications) Patient does not have SCP

## 2025-06-27 ENCOUNTER — APPOINTMENT (OUTPATIENT)
Dept: PHYSICAL MEDICINE AND REHAB | Facility: MEDICAL CENTER | Age: 70
End: 2025-06-27
Payer: COMMERCIAL

## 2025-06-27 VITALS
HEART RATE: 62 BPM | WEIGHT: 210.98 LBS | TEMPERATURE: 97.8 F | BODY MASS INDEX: 28.58 KG/M2 | HEIGHT: 72 IN | SYSTOLIC BLOOD PRESSURE: 165 MMHG | OXYGEN SATURATION: 98 % | DIASTOLIC BLOOD PRESSURE: 82 MMHG

## 2025-06-27 DIAGNOSIS — M54.81 OCCIPITAL NEURALGIA OF LEFT SIDE: ICD-10-CM

## 2025-06-27 DIAGNOSIS — M79.10 MYALGIA: ICD-10-CM

## 2025-06-27 DIAGNOSIS — R20.2 NUMBNESS AND TINGLING OF BOTH LEGS: Primary | ICD-10-CM

## 2025-06-27 DIAGNOSIS — G89.29 OTHER CHRONIC PAIN: ICD-10-CM

## 2025-06-27 DIAGNOSIS — G89.29 CHRONIC BILATERAL LOW BACK PAIN WITHOUT SCIATICA: ICD-10-CM

## 2025-06-27 DIAGNOSIS — M48.061 SPINAL STENOSIS OF LUMBAR REGION WITHOUT NEUROGENIC CLAUDICATION: ICD-10-CM

## 2025-06-27 DIAGNOSIS — G58.8 INTERCOSTAL NEURALGIA: ICD-10-CM

## 2025-06-27 DIAGNOSIS — M54.12 CERVICAL RADICULITIS: ICD-10-CM

## 2025-06-27 DIAGNOSIS — Z98.890 HISTORY OF LUMBAR SURGERY: ICD-10-CM

## 2025-06-27 DIAGNOSIS — R07.81 RIB PAIN: ICD-10-CM

## 2025-06-27 DIAGNOSIS — R20.0 NUMBNESS AND TINGLING OF BOTH LEGS: Primary | ICD-10-CM

## 2025-06-27 DIAGNOSIS — M54.50 CHRONIC BILATERAL LOW BACK PAIN WITHOUT SCIATICA: ICD-10-CM

## 2025-06-27 PROCEDURE — 3079F DIAST BP 80-89 MM HG: CPT | Performed by: STUDENT IN AN ORGANIZED HEALTH CARE EDUCATION/TRAINING PROGRAM

## 2025-06-27 PROCEDURE — 1125F AMNT PAIN NOTED PAIN PRSNT: CPT | Performed by: STUDENT IN AN ORGANIZED HEALTH CARE EDUCATION/TRAINING PROGRAM

## 2025-06-27 PROCEDURE — 3077F SYST BP >= 140 MM HG: CPT | Performed by: STUDENT IN AN ORGANIZED HEALTH CARE EDUCATION/TRAINING PROGRAM

## 2025-06-27 PROCEDURE — 99214 OFFICE O/P EST MOD 30 MIN: CPT | Mod: 25 | Performed by: STUDENT IN AN ORGANIZED HEALTH CARE EDUCATION/TRAINING PROGRAM

## 2025-06-27 ASSESSMENT — PATIENT HEALTH QUESTIONNAIRE - PHQ9: CLINICAL INTERPRETATION OF PHQ2 SCORE: 0

## 2025-06-27 ASSESSMENT — FIBROSIS 4 INDEX: FIB4 SCORE: 0.99

## 2025-06-27 ASSESSMENT — PAIN SCALES - GENERAL: PAINLEVEL_OUTOF10: 4=SLIGHT-MODERATE PAIN

## 2025-06-27 NOTE — Clinical Note
Sending this marybeth to you for an EMG. Numbness and tingling in his bilateral legs in multiple dermatomal distributions.  Appears to be length-dependent.  History of spine surgery.  Discussed that the areas of nerve impingement on his recent MRI do not fully explain his symptoms of numbness and tingling in his legs.  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

## 2025-06-27 NOTE — PROGRESS NOTES
Verbal consent was acquired by the patient to use [employer] ambient listening note generation during this visit Yes      follow-up patient Note    Interventional Pain and Spine  Physiatry (Physical Medicine and Rehabilitation)     Patient Name: Keaton Cervantes  : 1955  Date of service: 2025    Chief Complaint:   Chief Complaint   Patient presents with    Follow-Up     Back pain       HISTORY  Please see new patient note by Dr. Rhoades for more details.     HPI  Today's visit   Keaton Cervantes ( 1955) is a male with The primary encounter diagnosis was Numbness and tingling of both legs. Diagnoses of Spinal stenosis of lumbar region at L2-3 without neurogenic claudication, Myalgia, Intercostal neuralgia, Occipital neuralgia of left side, Chronic bilateral low back pain without sciatica, Other chronic pain, History of lumbar surgery, Rib pain, and Cervical radiculitis were also pertinent to this visit.  History of Present Illness  The patient presents for evaluation of lower back pain.    Lower Back Pain  - Intermittent, severe lower back pain localized to the hip area  - Radiating pain down the leg  - Numbness and tingling in the feet  - Numbness in the lower legs from the knee down  - Numbness improves slightly in the supine position  - No specific exacerbating or alleviating factors identified  - Pain management includes gabapentin 600 mg, amitriptyline, and hydrocodone twice daily, with increased gabapentin providing some relief  - Occasional dizziness reported, not attributed to medication    Headaches  - Significantly improved  - Minimal residual discomfort.  Does not feel that he needs injections today.    Chest Pain  - Typically bilateral and subcardiac  - Relieved by nitroglycerin used once or twice weekly.  Followed by cardiology      History:  - He went to 1 session of physical therapy which was cost prohibitive as it costed $400.  He reports that he is taking amitriptyline  50% of the time with relief.  - right thoracic back pain radiating towards the chest started after fracturing his ribs.  -Occipital nerve blocks are not currently covered by his insurance      Procedure history:  - 3/6/24 LEFT Greater Occipital Nerve Block - Local Anesthetic and Steroid -some relief of occipital headaches.  Headaches no longer constant, now frequent   - 4/10/24 trigger point injections with steroid - 50% improvement overall  - 05/22/24 right intercostal nerve blocks at T8 and T9 -1 week of significant relief  - 06/19/24 right intercostal nerve blocks at T8 and T9  - 6/21/2024 trigger point injections -significant relief  - 09/20/24 trigger point injections with steroid    ROS:   Red Flags ROS:   Fever, Chills, Sweats: Denies  Involuntary Weight Loss: Denies  Bladder Incontinence: Denies  Bowel Incontinence: denies  Saddle Anesthesia: Denies    All other systems reviewed and negative.     PMHx:   Past Medical History:   Diagnosis Date    Allergy     ASTHMA     Back pain     Chronic    Bronchitis     CATARACT     Dental disorder     Diabetes     oral rx & insulin    Heart attack (HCC)     Hypertension     MEDICAL HOME     Neck pain     Chronic    ELLEN (obstructive sleep apnea)     Bipap    ELLEN (obstructive sleep apnea) 08/12/2022    Pt. states no longer using machine since wt loss.    Sleep apnea        PSHx:   Past Surgical History:   Procedure Laterality Date    TENDON LENGHTENING Right 8/17/2022    Procedure: RIGHT GASTROCNEMIUS RECESSION,;  Surgeon: Parrish Hardy M.D.;  Location: [facility];  Service: Orthopedics    TENDON TRANSFER Right 8/17/2022    Procedure: TRANSFER, TENDON-PERONEUS LONGUS TO BREVIS TRANSFER;  Surgeon: Parrish Hardy M.D.;  Location: [facility];  Service: Orthopedics    WOUND IRRIGATION & DEBRIDEMENT Right 8/17/2022    Procedure: IRRIGATION AND DEBRIDEMENT, WOUND- FOOT;  Surgeon: Parrish Hardy M.D.;  Location: [facility];   Service: Orthopedics    METATARSAL HEAD RESECTION Right 8/17/2022    Procedure: EXCISION, METATARSAL BONE, HEAD-PARTIAL FIRST METATARSAL EXCISION;  Surgeon: Parrish Hardy M.D.;  Location: SURGERY [facility];  Service: Orthopedics    BONE BIOPSY Right 8/17/2022    Procedure: BIOPSY, BONE;  Surgeon: Parrish Hardy M.D.;  Location: SURGERY [facility];  Service: Orthopedics    TOE AMPUTATION Right 06/23/2022    Procedure: AMPUTATION, TOE 2ND;  Surgeon: Wilman Madera M.D.;  Location: SURGERY [facility];  Service: Orthopedics    ROTATOR CUFF REPAIR Left 04/2022    [facility]    TOE AMPUTATION Right 03/04/2022    Procedure: AMPUTATION, TOE 1ST RAY;  Surgeon: Wilman Madera M.D.;  Location: SURGERY [facility];  Service: Orthopedics    LUMBAR FUSION ANTERIOR  08/04/2009    Performed by JOSEE BLANTON at SURGERY [facility] ORS    FUSION, SPINE, LUMBAR, PLIF  08/04/2009    Performed by JOSEE BLANTON at SURGERY [facility] ORS    LUMBAR LAMINECTOMY DISKECTOMY  08/04/2009    Performed by JOSEE BLANTON at SURGERY [facility] ORS    LAMINOTOMY  08/04/2009    Performed by JOSEE BLANTON at SURGERY [facility] ORS    SOMNOPLASTY  06/10/2009    Performed by ELÍAS RILEY at SURGERY [facility] ORS    SEPTOPLASTY  06/10/2009    Performed by ELÍAS RILEY at SURGERY [facility] ORS    ANTROSTOMY  06/10/2009    Performed by ELÍAS RILEY at SURGERY [facility] ORS    ETHMOIDECTOMY  06/10/2009    Performed by ELAÍS RILEY at SURGERY [facility] ORS    CATARACT PHACO WITH IOL  07/07/2008    Performed by OCTAVIO HARDWICK at SURGERY SAME DAY [facility] ORS    CATARACT PHACO WITH IOL  06/16/2008    Performed by OCTAVIO HARDWICK at SURGERY SAME DAY [facility] ORS    APPENDECTOMY      ORIF, ANKLE      VASECTOMY         Family Hx:   Family History   Problem Relation Age of Onset    Hypertension Father     Diabetes Father        Social Hx:  Social History     Socioeconomic History    Marital  "status:      Spouse name: Not on file    Number of children: Not on file    Years of education: Not on file    Highest education level: Not on file   Occupational History    Not on file   Tobacco Use    Smoking status: Former     Current packs/day: 0.00     Types: Cigarettes     Quit date:      Years since quittin.5    Smokeless tobacco: Current     Types: Chew   Vaping Use    Vaping status: Never Used   Substance and Sexual Activity    Alcohol use: No     Comment: Pt. states, \"H/O Alcoholism, quit .\"    Drug use: No    Sexual activity: Yes     Partners: Female   Other Topics Concern    Not on file   Social History Narrative    Not on file     Social Drivers of Health     Financial Resource Strain: Not on file   Food Insecurity: Not on file   Transportation Needs: Not on file   Physical Activity: Not on file   Stress: Not on file   Social Connections: Not on file   Intimate Partner Violence: Not on file   Housing Stability: Not on file       Allergies:  Allergies   Allergen Reactions    Sulfa Drugs Unspecified     Pt hasn't had meds in so long he doesn't remember what his reaction is, he just remembers there is a reaction and that he shouldn't have them     Coconut (Cocos Nucifera) Allergy Skin Test     Coconut Oil     Flu Virus Vaccine Shortness of Breath    Other Food Anaphylaxis     coconut    Pneumococcal Vaccines Shortness of Breath    Sulfamethoxazole     Nutrasweet Aspartame [Aspartame]      Causes migraines       Medications: reviewed on epic.   Outpatient Medications Marked as Taking for the 25 encounter (Office Visit) with Missy Rhoades M.D.   Medication Sig Dispense Refill    gabapentin (NEURONTIN) 300 MG Cap Take 2 Capsules by mouth in the morning, at noon, and at bedtime. 180 Capsule 0    isosorbide mononitrate SR (IMDUR) 60 MG TABLET SR 24 HR Take 60 mg by mouth every morning.      amitriptyline (ELAVIL) 10 MG Tab Take 1 Tablet by mouth every evening. 30 Tablet 3    " carvedilol (COREG) 6.25 MG Tab Carvedilol 6.25 MG Oral Tablet QTY: 60 tablet Days: 30 Refills: 0  Written: 10/29/24 Patient Instructions:      lisinopril (PRINIVIL) 10 MG Tab Lisinopril 10 MG Oral Tablet QTY: 30 tablet Days: 30 Refills: 0  Written: 10/29/24 Patient Instructions:      atorvastatin (LIPITOR) 20 MG Tab Take 20 mg by mouth every evening.      acetaminophen (TYLENOL) 500 MG Tab TAKE ONE OR TWO TABLETS BY MOUTH THREE TIMES DAILY FOR 14 DAYS.      asa/apap/caffeine (EXCEDRIN) 250-250-65 MG Tab Take 1 Tablet by mouth every 6 hours as needed for Headache.      Insulin Glargine-yfgn 100 UNIT/ML Solution Pen-injector INJECT 8-10 UNITS SUBCUTANEOUSLY AT BEDTIME      insulin glargine (LANTUS) 100 UNIT/ML Solution Inject 6-15 Units under the skin at bedtime as needed (Blood Sugar Levels).      HYDROcodone-acetaminophen (NORCO) 7.5-325 MG per tablet Take 1 Tablet by mouth 3 times a day as needed for Severe Pain.      glipiZIDE SR (GLUCOTROL) 10 MG TABLET SR 24 HR Take 10 mg by mouth 2 times a day.      metformin (GLUCOPHAGE) 1000 MG tablet Take 1,000 mg by mouth 2 times a day with meals.          Current Outpatient Medications on File Prior to Visit   Medication Sig Dispense Refill    gabapentin (NEURONTIN) 300 MG Cap Take 2 Capsules by mouth in the morning, at noon, and at bedtime. 180 Capsule 0    isosorbide mononitrate SR (IMDUR) 60 MG TABLET SR 24 HR Take 60 mg by mouth every morning.      amitriptyline (ELAVIL) 10 MG Tab Take 1 Tablet by mouth every evening. 30 Tablet 3    carvedilol (COREG) 6.25 MG Tab Carvedilol 6.25 MG Oral Tablet QTY: 60 tablet Days: 30 Refills: 0  Written: 10/29/24 Patient Instructions:      lisinopril (PRINIVIL) 10 MG Tab Lisinopril 10 MG Oral Tablet QTY: 30 tablet Days: 30 Refills: 0  Written: 10/29/24 Patient Instructions:      atorvastatin (LIPITOR) 20 MG Tab Take 20 mg by mouth every evening.      acetaminophen (TYLENOL) 500 MG Tab TAKE ONE OR TWO TABLETS BY MOUTH THREE TIMES DAILY  FOR 14 DAYS.      asa/apap/caffeine (EXCEDRIN) 250-250-65 MG Tab Take 1 Tablet by mouth every 6 hours as needed for Headache.      Insulin Glargine-yfgn 100 UNIT/ML Solution Pen-injector INJECT 8-10 UNITS SUBCUTANEOUSLY AT BEDTIME      insulin glargine (LANTUS) 100 UNIT/ML Solution Inject 6-15 Units under the skin at bedtime as needed (Blood Sugar Levels).      HYDROcodone-acetaminophen (NORCO) 7.5-325 MG per tablet Take 1 Tablet by mouth 3 times a day as needed for Severe Pain.      glipiZIDE SR (GLUCOTROL) 10 MG TABLET SR 24 HR Take 10 mg by mouth 2 times a day.      metformin (GLUCOPHAGE) 1000 MG tablet Take 1,000 mg by mouth 2 times a day with meals.      acyclovir (ZOVIRAX) 800 MG Tab  (Patient not taking: Reported on 3/28/2025)       No current facility-administered medications on file prior to visit.         EXAMINATION     Physical Exam:   BP (!) 165/82   Pulse 62   Temp 36.6 °C (97.8 °F) (Temporal)   Ht 1.829 m (6')   Wt 95.7 kg (210 lb 15.7 oz)   SpO2 98%     Constitutional:   Body Habitus: Body mass index is 28.61 kg/m².  Cooperation: Fully cooperates with exam  Appearance: Well-groomed, well-nourished.    Eyes: No scleral icterus to suggest severe liver disease, no proptosis to suggest severe hyperthyroidism    ENT -no obvious auditory deficits, no noticeable facial droop     Skin -no rashes or lesions noted     Respiratory-  breathing comfortably on room air, no audible wheezing    Cardiovascular-distal extremities warm and well perfused.  No lower extremity edema is noted.     Gastrointestinal - no obvious abdominal masses, non-distended    Psychiatric- alert and oriented ×3. Normal affect.     Gait stable steady    Musculoskeletal    Thoracic/Lumbar Spine/Sacral Spine/Hips   Inspection: No evidence of atrophy in bilateral lower extremities throughout       Facet loading maneuver negative bilaterally      Palpation:   No tenderness to palpation over the paraspinal muscles bilaterally.      Lumbar  spine /hip provocative exam maneuvers  Slump test negative bilaterally  FADIR test negative bilaterally     SI joint tests  SAMANTHA test negative bilaterally        Key points for the international standards for neurological classification of spinal cord injury (ISNCSCI) to light touch.   Dermatome R L   L2 2 2   L3 2 2   L4 1 1   L5 1 1   S1 1 1   S2 2 2         Motor Exam Lower Extremities  ? Myotome R L   Hip flexion L2 5 5   Knee extension L3 5 5   Ankle dorsiflexion L4 5 5   Toe extension L5 5 5   Ankle plantarflexion S1 5 5      Previous exam    Negative Spurling's bilaterally  Tenderness to palpation at left upper trapezius    Positive Tinel's and tenderness to palpation over the left greater and lesser occipital nerves    Key points for the international standards for neurological classification of spinal cord injury (ISNCSCI) to light touch.     Dermatome R L   C4 2 2   C5 2 2   C6 2 2   C7 2 2   C8 2 2   T1 2 2   T2 2 2       Motor Exam Upper Extremities   ? Myotome R L   Shoulder abduction C5 5 5   Elbow flexion C5 5 5   Wrist extension C6 5 5   Elbow extension C7 5 5   Finger flexion C8 5 5   Finger abduction T1 5 5       Special tests:  Tinel's at the wrist over the median nerve positive on left, negative on right  Carpal tunnel compression: negative bilaterally  Tinel's at the cubital tunnel: negative bilaterally    MEDICAL DECISION MAKING    Medical records review: see under HPI section.     DATA    Labs: No new labs available for review since last visit    Lab Results   Component Value Date/Time    SODIUM 131 (L) 02/07/2025 08:40 AM    POTASSIUM 6.1 (H) 02/07/2025 08:40 AM    CHLORIDE 97 02/07/2025 08:40 AM    CO2 25 02/07/2025 08:40 AM    ANION 9.0 02/07/2025 08:40 AM    GLUCOSE 114 (H) 02/07/2025 08:40 AM    BUN 37 (H) 02/07/2025 08:40 AM    CREATININE 1.04 02/07/2025 08:40 AM    CREATININE 0.7 08/10/2006 01:05 PM    CALCIUM 9.6 02/07/2025 08:40 AM    ASTSGOT 26 02/07/2025 08:40 AM    ALTSGPT 33  02/07/2025 08:40 AM    TBILIRUBIN 0.3 02/07/2025 08:40 AM    ALBUMIN 4.5 02/07/2025 08:40 AM    TOTPROTEIN 7.5 02/07/2025 08:40 AM    GLOBULIN 3.0 02/07/2025 08:40 AM    AGRATIO 1.5 02/07/2025 08:40 AM       Lab Results   Component Value Date/Time    PROTHROMBTM 10.7 (L) 08/10/2006 01:05 PM    INR 0.85 (L) 08/10/2006 01:05 PM        Lab Results   Component Value Date/Time    WBC 9.7 02/07/2025 08:40 AM    RBC 4.40 (L) 02/07/2025 08:40 AM    HEMOGLOBIN 13.6 (L) 02/07/2025 08:40 AM    HEMATOCRIT 43.2 02/07/2025 08:40 AM    MCV 98.2 (H) 02/07/2025 08:40 AM    MCH 30.9 02/07/2025 08:40 AM    MCHC 31.5 (L) 02/07/2025 08:40 AM    MPV 9.0 02/07/2025 08:40 AM    NEUTSPOLYS 49.60 02/07/2025 08:40 AM    LYMPHOCYTES 37.70 02/07/2025 08:40 AM    MONOCYTES 7.70 02/07/2025 08:40 AM    EOSINOPHILS 3.20 02/07/2025 08:40 AM    BASOPHILS 1.60 02/07/2025 08:40 AM        Lab Results   Component Value Date/Time    HBA1C 7.7 (H) 02/07/2025 08:40 AM        Limited diagnostic ultrasound of left median nerve performed 12/20/2024 by Dr. Rhoades  No significant increase in cross-sectional area of median nerve between left carpal tunnel outlet and left pronator quadratus    Imaging:   I personally reviewed following images, these are my reads    Chest x-ray 6/21/2022  No evidence of acute rib fracture. See formal radiology report for further details.     MRI lumbar spine 2/27/2024  Postoperative changes with fusion in the midline laminectomy spanning L3-S1.  At L2-3 there is a broad-based disc bulge and bilateral facet arthropathy with ligamentum flavum hypertrophy contributing to moderate central canal stenosis at this level.  There is also bilateral moderate severe neuroforaminal stenosis at this level.  No significant neuroforaminal stenosis or central canal stenosis elsewhere. See formal radiology report for further details.    X-ray lumbar spine 2/7/2024  Fusion spanning L3-S1.  Mild retrolisthesis of L2 on L3. See formal radiology report for  further details.    IMAGING radiology reads. I reviewed the following radiology reads   MRI lumbar spine 2/27/2024  FINDINGS:     Postoperative changes are noted in the lumbar spine with midline laminectomy at L3-4, L4-5 and L5-S1 and posterior transpedicular fusion between L3 and S1.  Intervertebral disc spacers are present at the L3-4, L4-5, L5-S1 levels.  A lipid rich hemangioma is noted within L1.  There is decreased disc height with type I marrow changes involving the adjacent endplates at the L2-L3 level. The conus terminates at  L1. Normal signal intensity is identified within the distal thoracic cord and conus medullaris.     Axial lumbar spine levels:     T12-L1: Normal.     L1-2: Mild facet degeneration without stenosis.     L2-3: Broad-based disc bulge and bilateral facet degeneration and ligamentum flavum hypertrophy results in moderate canal stenosis with thecal sac compression. There is also bilateral moderate-to-severe foraminal narrowing.     L3-4: Widely decompressed spinal canal. No stenosis.     L4-5: Widely decompressed spinal canal. No stenosis.     L5-S1: Well decompressed spinal canal. No stenosis.     A small T2 hyperintense collection is identified in the laminectomy bed behind the L3-4 level, measuring 17 x 40 x 42 mm (CC, AP, ML). There is no significant mass effect upon the thecal sac. This may represent a chronic organized fluid   collection/granulation tissue.     Prevertebral soft tissues are within normal limits.     IMPRESSION:        Moderate canal stenosis at L2-3 due to encroachment by a broad-based disc bulge, advanced facet degeneration at the metaphyseal hypertrophy. There is also bilateral moderate-to-severe foraminal narrowing with likely impingement upon the exiting L2   nerves.     Postoperative changes at L3-4, L4-5, L5-S1 with a well decompressed canal. There is no foraminal narrowing.    Results for orders placed during the hospital encounter of  07/05/23    MR-BRAIN-W/O    Impression  No acute intracranial process.    Nonspecific T2 hyperintensities are noted in the periventricular and deep white matter, most likely related to chronic microvascular ischemia.             Results for orders placed during the hospital encounter of 02/03/09    MR-CERVICAL SPINE-W/O    Impression  IMPRESSION:    1. POSTERIOR SPONDYLOTIC DISC BULGING AT C5-6 WHICH EXTENDS TO THE  LEFT-SIDED NEURAL FORAMEN WITH MILD CANAL STENOSIS AND LEFT-SIDED NEURAL  FORAMINAL NARROWING PRESENT.  THERE IS ALSO ACCOMPANYING LEFT-SIDED  UNCINATE SPURRING.    2. MILD DISC DESICCATION AT C2-3, C3-4, C4-5, AND C6-7 AS DESCRIBED.      CBG/bht        Read By JOVANY FLORENCE MD on Feb 4 2009  1:23PM  : TONI Transcription Date: Feb 4 2009  2:16PM  THIS DOCUMENT HAS BEEN ELECTRONICALLY SIGNED BY: JOVANY FLORENCE MD on Feb 6 2009 12:16PM      Results for orders placed during the hospital encounter of 02/27/24    MR-LUMBAR SPINE-W/O    Impression  Moderate canal stenosis at L2-3 due to encroachment by a broad-based disc bulge, advanced facet degeneration at the metaphyseal hypertrophy. There is also bilateral moderate-to-severe foraminal narrowing with likely impingement upon the exiting L2  nerves.    Postoperative changes at L3-4, L4-5, L5-S1 with a well decompressed canal. There is no foraminal narrowing.        Results for orders placed during the hospital encounter of 02/27/24    MR-LUMBAR SPINE-W/O    Impression  Moderate canal stenosis at L2-3 due to encroachment by a broad-based disc bulge, advanced facet degeneration at the metaphyseal hypertrophy. There is also bilateral moderate-to-severe foraminal narrowing with likely impingement upon the exiting L2  nerves.    Postoperative changes at L3-4, L4-5, L5-S1 with a well decompressed canal. There is no foraminal narrowing.                                      Results for orders placed during the hospital encounter of 11/06/11    DX-ANKLE  3+ VIEWS    Impression  No acute bony abnormality.      Results for orders placed during the hospital encounter of 05/27/09    DX-CHEST-2 VIEWS    Impression  IMPRESSION:    NORMAL CHEST.      J[facility]:[place]    Read By WILL THOMPSON MD on May 27 2009  3:24PM  : EMERITA Transcription Date: May 28 2009 11:52AM  THIS DOCUMENT HAS BEEN ELECTRONICALLY SIGNED BY: WILL THOMPSON MD on May  29 2009 10:45PM       Results for orders placed in visit on 11/03/12    DX-FINGER(S) 2+    Impression  No evidence of fracture or dislocation.    Results for orders placed during the hospital encounter of 07/28/22    DX-FOOT-COMPLETE 3+ RIGHT    Impression  1.  Soft tissue gas is present overlying the first transmetatarsal amputation site and indistinctness of the cortex of the every dictation site site of the first metatarsal which raises suspicion for infection. MRI of the foot with and without contrast  could be obtained for further evaluation if indicated.  2.  Subacute nonunited fracture of the second metatarsal base with new moderate lateral displacement and bone callus formation.  3.  Interval agitation of the second digit at the proximal phalanx               Results for orders placed during the hospital encounter of 02/07/24    DX-LUMBAR SPINE-2 OR 3 VIEWS    Impression  Mild retrolisthesis and severe degenerative disc disease at L2-3.    Postsurgical changes from posterior fixation and disc prosthesis placement at the L3-S1. Mature fusion of the disc space. The bilateral L3 pedicle screws are very close to the L2-3 disc space.      Results for orders placed during the hospital encounter of 05/27/21    [id number] (WITH 1-VIEW CXR) LEFT    Impression  Left lateral seventh and ninth acute rib fractures.    No pneumothorax or acute cardiopulmonary abnormality.         Results for orders placed during the hospital encounter of 05/27/21    DX-SHOULDER 2+ LEFT    Impression  No acute osseous abnormality.               Diagnosis  Visit Diagnoses     ICD-10-CM   1. Numbness and tingling of both legs  R20.0    R20.2   2. Spinal stenosis of lumbar region at L2-3 without neurogenic claudication  M48.061   3. Myalgia  M79.10   4. Intercostal neuralgia  G58.8   5. Occipital neuralgia of left side  M54.81   6. Chronic bilateral low back pain without sciatica  M54.50    G89.29   7. Other chronic pain  G89.29   8. History of lumbar surgery  Z98.890   9. Rib pain  R07.81   10. Cervical radiculitis  M54.12                     ASSESSMENT AND PLAN:  Keaton Cervantes (: 1955) is a male with worsening myalgia and occipital neuralgia pain and new worsening pain radiating down bilateral legs and S1 distribution     Carlos was seen today for follow-up.    Diagnoses and all orders for this visit:    Numbness and tingling of both legs  -     EMG/NCS; Future    Spinal stenosis of lumbar region at L2-3 without neurogenic claudication  -     EMG/NCS; Future    Myalgia    Intercostal neuralgia    Occipital neuralgia of left side    Chronic bilateral low back pain without sciatica    Other chronic pain    History of lumbar surgery    Rib pain    Cervical radiculitis          PLAN  Physical Therapy: I previously ordered physical therapy to focus on strengthening and stretching as well as a home exercise program.  This is cost prohibitive for him to continue.  Advised him to continue with his home exercise program as he is doing.  Discussed that he appears to be experiencing symptoms of left shoulder impingement and I believe he would benefit from physical therapy exercises he deferred formal physical therapy today due to cost considerations.     Home Exercise Program: I previously provided the patient with a home exercise program focusing on strengthening and stretching at today's visit.  This is in addition to the home exercise program that I gave him at a previous visit.     Diagnostic workup: Limited diagnostic ultrasound of left median  nerve performed 12/20/2024 by Dr. Rhoades, images scanned to media  - I have discussed possible MRI cervical spine given his radicular symptoms on the left, defer at this time due to financial considerations  -Referral to Dr. Contreras for EMG.  The patient reports numbness and tingling in his bilateral legs in multiple dermatomal distributions.  Appears to be length-dependent.  History of spine surgery.  Discussed that the areas of nerve impingement on his recent MRI do not fully explain his symptoms of numbness and tingling in his legs.     Medications:   -Okay to continue Norco 7.5-325 mg twice daily as needed as prescribed by the patient's PCP as long as there is compliance with .  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  He is low risk for narcotic abuse with an opioid risk score of 0 at today's visit  - also on ASA  - Continue amitriptyline   - Continue gabapentin 600mg TID PRN.  Discussed possible side effects of dizziness, monitor.  The patient is doing well with this medicine overall.     Interventions:   -Repeat right T8 and T9 intercostal nerve blocks under ultrasound guidance as needed  - trigger point injections with steroid as needed including left paracervical and upper trapezius area, and right intercostal muscle as needed.   - left greater occipital nerve block under ultrasound guidance as needed  -Discussed consideration of caudal epidural for radiating leg pain if his symptoms seem attributable to lumbar nerve impingement    Follow-up: After EMG with me    Orders Placed This Encounter    EMG/NCS       Missy Rhoades MD  Interventional Pain and Spine  Physical Medicine and Rehabilitation  [facility]      The above note documents my personal evaluation of this patient. In addition, I have reviewed and confirmed with the patient and MA the supportive information documented in today's Patient Health Questionnaire and Office Note.     Please note that  this dictation was created using voice recognition software. I have made every reasonable attempt to correct obvious errors, but I expect that there are errors of grammar and possibly content that I did not discover before finalizing the note.

## 2025-07-08 ENCOUNTER — OFFICE VISIT (OUTPATIENT)
Dept: PHYSICAL MEDICINE AND REHAB | Facility: MEDICAL CENTER | Age: 70
End: 2025-07-08
Attending: STUDENT IN AN ORGANIZED HEALTH CARE EDUCATION/TRAINING PROGRAM
Payer: COMMERCIAL

## 2025-07-08 VITALS
HEART RATE: 69 BPM | SYSTOLIC BLOOD PRESSURE: 142 MMHG | OXYGEN SATURATION: 95 % | HEIGHT: 72 IN | DIASTOLIC BLOOD PRESSURE: 66 MMHG | BODY MASS INDEX: 28.44 KG/M2 | TEMPERATURE: 97 F | WEIGHT: 210 LBS

## 2025-07-08 DIAGNOSIS — R20.2 NUMBNESS AND TINGLING OF BOTH LEGS: ICD-10-CM

## 2025-07-08 DIAGNOSIS — M48.061 SPINAL STENOSIS OF LUMBAR REGION WITHOUT NEUROGENIC CLAUDICATION: ICD-10-CM

## 2025-07-08 DIAGNOSIS — R20.0 NUMBNESS AND TINGLING OF BOTH LEGS: ICD-10-CM

## 2025-07-08 PROCEDURE — 1125F AMNT PAIN NOTED PAIN PRSNT: CPT | Performed by: GENERAL PRACTICE

## 2025-07-08 PROCEDURE — 95909 NRV CNDJ TST 5-6 STUDIES: CPT | Performed by: GENERAL PRACTICE

## 2025-07-08 PROCEDURE — 3078F DIAST BP <80 MM HG: CPT | Performed by: GENERAL PRACTICE

## 2025-07-08 PROCEDURE — 3077F SYST BP >= 140 MM HG: CPT | Performed by: GENERAL PRACTICE

## 2025-07-08 PROCEDURE — 95886 MUSC TEST DONE W/N TEST COMP: CPT | Mod: LT | Performed by: GENERAL PRACTICE

## 2025-07-08 ASSESSMENT — PATIENT HEALTH QUESTIONNAIRE - PHQ9: CLINICAL INTERPRETATION OF PHQ2 SCORE: 0

## 2025-07-08 ASSESSMENT — PAIN SCALES - GENERAL: PAINLEVEL_OUTOF10: 5=MODERATE PAIN

## 2025-07-08 ASSESSMENT — FIBROSIS 4 INDEX: FIB4 SCORE: 0.99

## 2025-07-08 NOTE — PROCEDURES
Nerve Conduction Study and Electromyography Report          Name: Keaton Cervantes    MRN: [id number]   YOB: 1955 (70 y.o.)   Sex: male   Vitals:  Vitals:    07/08/25 0748   BP: (!) 142/66   Weight: 95.3 kg (210 lb)   Height: 1.829 m (6')     Body mass index is 28.48 kg/m².    Examining Physician: Benja Contreras D.O.     Visit Diagnoses     ICD-10-CM   1. Numbness and tingling of both legs  R20.0    R20.2   2. Spinal stenosis of lumbar region at L2-3 without neurogenic claudication  M48.061       NCS/EMG Examination Date: 7/8/2025     Impression:      This is an normal study.   There is no electrodiagnostic evidence of BILATERAL lower extremity large fiber neuropathy.   There is no evidence of LEFT peroneal motor mononeuropathy, tibial mononeuropathy, lumbosacral plexopathy, or lumbarsacral radiculopathy.    Recommendations:   - Findings discussed with patient and shared with the PCP and/or referring clinician  - May consider repeat EMG/NCS testing in 3-6 months.     History:    History of Present Illness  The patient is a 70-year-old male who presents for an electrodiagnostic test due to bilateral lower extremity numbness and tingling in the L4-S1 distributions bilaterally.    Bilateral Lower Extremity Numbness and Tingling  He reports that both legs are equally affected. He experiences pain that extends down the posterior side of his leg. He has a history of diabetes and spinal fusion. 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 He has a history of toe amputation.  - Onset: Not specified.  - Location: Bilateral lower extremities, L4-S1 distributions.  - Character: Numbness, tingling, pain extending down the posterior side of the leg.  - Radiation: Pain extends down the posterior side of the leg.  - Severity: Both legs are equally affected.    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  - Onset: Not specified.  - Duration:  Ongoing due to night shift work schedule.  - Character: Fatigue.    SOCIAL HISTORY  He uses smokeless tobacco and is a former alcohol user.      Physical exam:    Physical Exam  - Manual motor testing: Score of 5 out of 5 from L2 to S1  - Straight leg raise test: Positive on the left side in the S1 dermatome pattern  - Right big first toe: Partial amputation  - Dry skin and onychomycosis present on the feet      Nerve Conduction Studies and Electromyography  Examination Findings:      Nerve Conduction Studies Examination Findings:       The LEFT sural Sensory and the right sural Sensory nerves showed no response.    LEFT radial sensory nerve action potential (SNAP) amplitude and peak latency are normal.   LEFT  fibular compound muscle action potential (CMAP) amplitude, distal latency, conduction velocity are normal.   LEFT  tibial compound muscle action potential (CMAP) amplitude, distal latency, conduction velocity  are normal.     Motor Nerve Results      Latency Amplitude F-Lat Segment Distance CV Comment   Site (ms) Norm (mV) Norm (ms)  (cm) (m/s) Norm    Left Fibular (TA) Motor   Fib head 3.1  < 6.7 2.4  > 1.5         Pop fossa 5.1  < 6.7 2.8  > 1.5  Pop fossa-Fib head 10 50  > 37    Left Tibial (AH) Motor   Ankle 5.7  < 5.8 1.59  > 1.1         Knee 16.3 - 1.23 -  Knee-Ankle 37.5 35  > 34      Sensory Sites      Neg Peak Lat Amplitude (O-P) Segment Distance Velocity Comment   Site (ms) Norm (µV) Norm  (cm) (ms)    Left Radial Sensory   Forearm-Wrist 2.5  < 2.9 10  > 7 Forearm-Wrist 10     Left Sural Sensory   Calf-Lat mall NR  < 4.4 NR  > 6 Calf-Lat mall 14     Right Sural Sensory   Calf-Lat mall NR  < 4.4 NR  > 6 Calf-Lat mall 14       EMG+     Side Muscle Nerve Root Ins.Act Fibs Psw Amp Dur Poly Recrt Int.Pat Comment   Left Tib ant Deep fib,  Fibular L4-L5 Nml Nml Nml Incr Nml 0 Nml Nml    Left Fib longus Fibular,  Superficial... L5-S1 Nml Nml Nml Nml Nml 0 Nml Nml    Left Gastroc Tibial S1-S2 Nml Nml Nml  Incr Nml 0 Nml Nml    Left Gluteus med Sup gluteal L5-S1 Nml Nml Nml Nml Nml 0 Nml Nml    Left Vastus med Femoral L2-L4 Nml Nml Nml Incr Nml 0 Nml Nml    Left Rectus fem Femoral L2-L4 Nml Nml Nml Nml Nml 0 Nml Nml            Waveforms:    Motor         Sensory           Nerve conduction studies (NCS) and electromyography (EMG) are utilized to evaluate direct or indirect damage to the peripheral nervous system. NCS are performed to measure the nerve(s) response(s) to electrostimulation across a given nerve segment. EMG evaluates the passive and active electrical activity of the muscle(s) in question.  Muscles are innervated by specific peripheral nerves and roots. Often times, several nerves the muscle to be examined in order to determine the presence or absence of the disease process. Furthermore, nerves and muscles may need to be tested in a [id number] comparison, as well as in additional extremities, as this may be crucial in characterizing the extent of the disease process, which may be diffuse or isolated and of varying degree of severity. The extent of the neurodiagnostic exam is justified as it may help arrive to a proper diagnosis, which ultimately may contribute to better management of the patient. Therefore, the nerves to muscles examined during the study were medically necessary.    The patient tolerated testing well, without any complications. The study was done with a concentric needle examination.   Reference values are from the [facility] normative data guidelines and [employer] related to age guidelines.     This information is more accurate than what was recorded on the EMG computer.     cpt 49261. Nerve conduction studies,  5-6 studies  CPT 66591. needle electromyography, complete, each extremity.       Benja Contreras D.O.

## 2025-07-08 NOTE — Clinical Note
This is an abnormal study.  1. There is no electrodiagnostic evidence of BILATERAL lower extremity large fiber neuropathy.  2. There is no evidence of LEFT peroneal motor mononeuropathy, tibial mononeuropathy, lumbosacral plexopathy, or lumbarsacral radiculopathy.

## 2025-07-10 RX ORDER — AMITRIPTYLINE HYDROCHLORIDE 10 MG/1
10 TABLET ORAL EVERY EVENING
Qty: 30 TABLET | Refills: 0 | Status: SHIPPED | OUTPATIENT
Start: 2025-07-10

## 2025-07-10 RX ORDER — GABAPENTIN 300 MG/1
600 CAPSULE ORAL 3 TIMES DAILY
Qty: 180 CAPSULE | Refills: 0 | Status: SHIPPED | OUTPATIENT
Start: 2025-07-10

## 2025-07-10 NOTE — TELEPHONE ENCOUNTER
Anesthesia Pre-Procedure Evaluation    Patient: Tori Steele   MRN: [id number] : 1976        Preoperative Diagnosis: Malignant neoplasm of central portion of right breast in female, estrogen receptor positive (H) [C50.111, Z17.0]    Procedure : Procedure(s):  INSERTION, VASCULAR ACCESS PORT          Past Medical History:   Diagnosis Date     Sinus tachycardia       Past Surgical History:   Procedure Laterality Date     DILATION AND CURETTAGE        No Known Allergies   Social History     Tobacco Use     Smoking status: Never Smoker     Smokeless tobacco: Never Used   Substance Use Topics     Alcohol use: Yes      Wt Readings from Last 1 Encounters:   22 68 kg (150 lb)        Anesthesia Evaluation            ROS/MED HX  ENT/Pulmonary:  - neg pulmonary ROS     Neurologic:  - neg neurologic ROS     Cardiovascular:  - neg cardiovascular ROS     METS/Exercise Tolerance:     Hematologic:  - neg hematologic  ROS     Musculoskeletal:  - neg musculoskeletal ROS     GI/Hepatic:  - neg GI/hepatic ROS     Renal/Genitourinary:  - neg Renal ROS     Endo:  - neg endo ROS     Psychiatric/Substance Use:  - neg psychiatric ROS     Infectious Disease:  - neg infectious disease ROS     Malignancy:   (+) Malignancy, History of Breast.Breast CA Active status post.        Other:  - neg other ROS          Physical Exam    Airway  airway exam normal           Respiratory Devices and Support         Dental  no notable dental history         Cardiovascular   cardiovascular exam normal          Pulmonary   pulmonary exam normal                OUTSIDE LABS:  CBC:   Lab Results   Component Value Date    WBC 5.4 2022    WBC 7.1 2019    HGB 12.5 2022    HGB 12.2 2019    HCT 39.2 2022    HCT 38.3 2019     2022     2019     BMP:   Lab Results   Component Value Date     2022     2019    POTASSIUM 3.6 2022    POTASSIUM 3.6 2019     Received request via: Pharmacy    Was the patient seen in the last year in this department? Yes    Does the patient have an active prescription (recently filled or refills available) for medication(s) requested? yes    Pharmacy Name: [facility]     Does the patient have USP Plus and need 100-day supply? (This applies to ALL medications) Patient does not have SCP   CHLORIDE 109 02/04/2022    CHLORIDE 106 04/08/2019    CO2 25 02/04/2022    CO2 26 04/08/2019    BUN 3 (L) 02/04/2022    BUN 10 04/08/2019    CR 0.70 02/04/2022    CR 0.74 04/08/2019     (H) 02/04/2022     (H) 04/08/2019     COAGS:   Lab Results   Component Value Date    PTT 31 02/04/2022    INR 1.02 02/04/2022     POC:   Lab Results   Component Value Date    HCG Negative 02/18/2022    HCGS Negative 02/04/2022     HEPATIC:   Lab Results   Component Value Date    ALBUMIN 3.3 (L) 02/04/2022    PROTTOTAL 7.5 02/04/2022    ALT 17 02/04/2022    AST 13 02/04/2022    GGT 12 02/04/2022    ALKPHOS 69 02/04/2022    BILITOTAL 0.6 02/04/2022     OTHER:   Lab Results   Component Value Date    A1C 5.6 02/04/2022    JENNIE 8.6 02/04/2022    MAG 1.7 (L) 02/07/2022    TSH 1.96 02/04/2022    T4 1.16 02/04/2022       Anesthesia Plan    ASA Status:  3   NPO Status:  NPO Appropriate    Anesthesia Type: MAC.     - Reason for MAC: straight local not clinically adequate   Induction: Intravenous.   Maintenance: TIVA.        Consents    Anesthesia Plan(s) and associated risks, benefits, and realistic alternatives discussed. Questions answered and patient/representative(s) expressed understanding.    - Discussed:     - Discussed with:  Patient      - Extended Intubation/Ventilatory Support Discussed: No.      - Patient is DNR/DNI Status: No    Use of blood products discussed: No .     Postoperative Care    Pain management: Multi-modal analgesia.   PONV prophylaxis: Background Propofol Infusion     Comments:           H&P reviewed: Unable to attach H&P to encounter due to EHR limitations. H&P Update: appropriate H&P reviewed, patient examined. No interval changes since H&P (within 30 days).         Jesus Ac MD

## 2025-07-22 ENCOUNTER — OFFICE VISIT (OUTPATIENT)
Dept: PHYSICAL MEDICINE AND REHAB | Facility: MEDICAL CENTER | Age: 70
End: 2025-07-22
Payer: COMMERCIAL

## 2025-07-22 VITALS
OXYGEN SATURATION: 96 % | WEIGHT: 210.1 LBS | DIASTOLIC BLOOD PRESSURE: 59 MMHG | SYSTOLIC BLOOD PRESSURE: 121 MMHG | BODY MASS INDEX: 28.46 KG/M2 | HEIGHT: 72 IN | HEART RATE: 70 BPM | TEMPERATURE: 97.6 F

## 2025-07-22 DIAGNOSIS — M54.81 OCCIPITAL NEURALGIA OF LEFT SIDE: ICD-10-CM

## 2025-07-22 DIAGNOSIS — M25.512 CHRONIC LEFT SHOULDER PAIN: ICD-10-CM

## 2025-07-22 DIAGNOSIS — G89.29 CHRONIC BILATERAL LOW BACK PAIN WITHOUT SCIATICA: ICD-10-CM

## 2025-07-22 DIAGNOSIS — M48.061 SPINAL STENOSIS OF LUMBAR REGION WITHOUT NEUROGENIC CLAUDICATION: ICD-10-CM

## 2025-07-22 DIAGNOSIS — G58.8 INTERCOSTAL NEURALGIA: ICD-10-CM

## 2025-07-22 DIAGNOSIS — M25.812 IMPINGEMENT OF LEFT SHOULDER: ICD-10-CM

## 2025-07-22 DIAGNOSIS — R07.81 RIB PAIN: ICD-10-CM

## 2025-07-22 DIAGNOSIS — M79.10 MYALGIA: ICD-10-CM

## 2025-07-22 DIAGNOSIS — R20.0 NUMBNESS AND TINGLING OF BOTH LEGS: Primary | ICD-10-CM

## 2025-07-22 DIAGNOSIS — G89.29 CHRONIC LEFT SHOULDER PAIN: ICD-10-CM

## 2025-07-22 DIAGNOSIS — R20.2 NUMBNESS AND TINGLING OF BOTH LEGS: Primary | ICD-10-CM

## 2025-07-22 DIAGNOSIS — Z98.890 HISTORY OF LUMBAR SURGERY: ICD-10-CM

## 2025-07-22 DIAGNOSIS — M54.12 CERVICAL RADICULITIS: ICD-10-CM

## 2025-07-22 DIAGNOSIS — M54.50 CHRONIC BILATERAL LOW BACK PAIN WITHOUT SCIATICA: ICD-10-CM

## 2025-07-22 DIAGNOSIS — G89.29 OTHER CHRONIC PAIN: ICD-10-CM

## 2025-07-22 PROCEDURE — 3074F SYST BP LT 130 MM HG: CPT | Performed by: STUDENT IN AN ORGANIZED HEALTH CARE EDUCATION/TRAINING PROGRAM

## 2025-07-22 PROCEDURE — 99214 OFFICE O/P EST MOD 30 MIN: CPT | Performed by: STUDENT IN AN ORGANIZED HEALTH CARE EDUCATION/TRAINING PROGRAM

## 2025-07-22 PROCEDURE — 1125F AMNT PAIN NOTED PAIN PRSNT: CPT | Performed by: STUDENT IN AN ORGANIZED HEALTH CARE EDUCATION/TRAINING PROGRAM

## 2025-07-22 PROCEDURE — 3078F DIAST BP <80 MM HG: CPT | Performed by: STUDENT IN AN ORGANIZED HEALTH CARE EDUCATION/TRAINING PROGRAM

## 2025-07-22 ASSESSMENT — FIBROSIS 4 INDEX: FIB4 SCORE: 0.99

## 2025-07-22 ASSESSMENT — PAIN SCALES - GENERAL: PAINLEVEL_OUTOF10: 3=SLIGHT PAIN

## 2025-07-22 NOTE — PROGRESS NOTES
Verbal consent was acquired by the patient to use [employer] ambient listening note generation during this visit Yes      follow-up patient Note    Interventional Pain and Spine  Physiatry (Physical Medicine and Rehabilitation)     Patient Name: Keaton Cervantes  : 1955  Date of service: 2025    Chief Complaint:   Chief Complaint   Patient presents with    Follow-Up     EMG fv       HISTORY  Please see new patient note by Dr. Rhoades for more details.     HPI  Today's visit   Keaton Cervantes ( 1955) is a male with The primary encounter diagnosis was Numbness and tingling of both legs. Diagnoses of Spinal stenosis of lumbar region at L2-3 without neurogenic claudication, Intercostal neuralgia, Myalgia, Occipital neuralgia of left side, Chronic bilateral low back pain without sciatica, History of lumbar surgery, Other chronic pain, Rib pain, Cervical radiculitis, Impingement of left shoulder, and Chronic left shoulder pain were also pertinent to this visit.  History of Present Illness    The patient presents for evaluation of back pain and EMG review    Back Pain  - No change in symptoms since the last visit  - Reports numbness and pain, more pronounced on the right side but present bilaterally  - Lower back pain at the site of previous fusion surgery  - Numbness started 6 months to a year ago; back surgery performed 8 years ago  - Describes pain as annoying  - Notes leg weakness when ascending/descending stairs  - Continues gabapentin, hydrocodone, and amitriptyline    Headache  - Headache pain is well controlled        History:  - He went to 1 session of physical therapy which was cost prohibitive as it costed $400.  He reports that he is taking amitriptyline 50% of the time with relief.  - right thoracic back pain radiating towards the chest started after fracturing his ribs.  -Occipital nerve blocks are not currently covered by his insurance      Procedure history:  - 3/6/24 LEFT  Greater Occipital Nerve Block - Local Anesthetic and Steroid -some relief of occipital headaches.  Headaches no longer constant, now frequent   - 4/10/24 trigger point injections with steroid - 50% improvement overall  - 05/22/24 right intercostal nerve blocks at T8 and T9 -1 week of significant relief  - 06/19/24 right intercostal nerve blocks at T8 and T9  - 6/21/2024 trigger point injections -significant relief  - 09/20/24 trigger point injections with steroid    ROS:   Red Flags ROS:   Fever, Chills, Sweats: Denies  Involuntary Weight Loss: Denies  Bladder Incontinence: Denies  Bowel Incontinence: denies  Saddle Anesthesia: Denies    All other systems reviewed and negative.     PMHx:   Past Medical History:   Diagnosis Date    Allergy     ASTHMA     Back pain     Chronic    Bronchitis     CATARACT     Dental disorder     Diabetes     oral rx & insulin    Heart attack (HCC)     Hypertension     MEDICAL HOME     Neck pain     Chronic    ELLEN (obstructive sleep apnea)     Bipap    ELLEN (obstructive sleep apnea) 08/12/2022    Pt. states no longer using machine since wt loss.    Sleep apnea        PSHx:   Past Surgical History:   Procedure Laterality Date    TENDON LENGHTENING Right 8/17/2022    Procedure: RIGHT GASTROCNEMIUS RECESSION,;  Surgeon: Parrish Hardy M.D.;  Location: [facility];  Service: Orthopedics    TENDON TRANSFER Right 8/17/2022    Procedure: TRANSFER, TENDON-PERONEUS LONGUS TO BREVIS TRANSFER;  Surgeon: Parrish Hardy M.D.;  Location: [facility];  Service: Orthopedics    WOUND IRRIGATION & DEBRIDEMENT Right 8/17/2022    Procedure: IRRIGATION AND DEBRIDEMENT, WOUND- FOOT;  Surgeon: Parrish Hardy M.D.;  Location: [facility];  Service: Orthopedics    METATARSAL HEAD RESECTION Right 8/17/2022    Procedure: EXCISION, METATARSAL BONE, HEAD-PARTIAL FIRST METATARSAL EXCISION;  Surgeon: Parrish Hardy M.D.;  Location: [facility];  Service:  Orthopedics    BONE BIOPSY Right 8/17/2022    Procedure: BIOPSY, BONE;  Surgeon: Parrish Hardy M.D.;  Location: SURGERY [facility];  Service: Orthopedics    TOE AMPUTATION Right 06/23/2022    Procedure: AMPUTATION, TOE 2ND;  Surgeon: Wilman Madera M.D.;  Location: SURGERY [facility];  Service: Orthopedics    ROTATOR CUFF REPAIR Left 04/2022    [facility]    TOE AMPUTATION Right 03/04/2022    Procedure: AMPUTATION, TOE 1ST RAY;  Surgeon: Wilman Madera M.D.;  Location: SURGERY [facility];  Service: Orthopedics    LUMBAR FUSION ANTERIOR  08/04/2009    Performed by JOSEE BLANTON at SURGERY [facility] ORS    FUSION, SPINE, LUMBAR, PLIF  08/04/2009    Performed by JOSEE BLANTON at SURGERY [facility] ORS    LUMBAR LAMINECTOMY DISKECTOMY  08/04/2009    Performed by JOSEE BLANTON at SURGERY [facility] ORS    LAMINOTOMY  08/04/2009    Performed by JOSEE BLANTON at SURGERY [facility] ORS    SOMNOPLASTY  06/10/2009    Performed by ELÍAS RILEY at SURGERY [facility] ORS    SEPTOPLASTY  06/10/2009    Performed by ELÍAS RILEY at SURGERY [facility] ORS    ANTROSTOMY  06/10/2009    Performed by ELÍAS RILEY at SURGERY [facility] ORS    ETHMOIDECTOMY  06/10/2009    Performed by ELÍAS RILEY at SURGERY [facility] ORS    CATARACT PHACO WITH IOL  07/07/2008    Performed by OCTAVIO HARDWICK at SURGERY [facility] ORS    CATARACT PHACO WITH IOL  06/16/2008    Performed by OCTAVIO HARDWICK at SURGERY SAME DAY [facility] ORS    APPENDECTOMY      ORIF, ANKLE      VASECTOMY         Family Hx:   Family History   Problem Relation Age of Onset    Hypertension Father     Diabetes Father        Social Hx:  Social History     Socioeconomic History    Marital status:      Spouse name: Not on file    Number of children: Not on file    Years of education: Not on file    Highest education level: Not on file   Occupational History    Not on file   Tobacco Use    Smoking status:  "Former     Current packs/day: 0.00     Types: Cigarettes     Quit date:      Years since quittin.5    Smokeless tobacco: Current     Types: Chew   Vaping Use    Vaping status: Never Used   Substance and Sexual Activity    Alcohol use: No     Comment: Pt. states, \"H/O Alcoholism, quit .\"    Drug use: No    Sexual activity: Yes     Partners: Female   Other Topics Concern    Not on file   Social History Narrative    Not on file     Social Drivers of Health     Financial Resource Strain: Not on file   Food Insecurity: Not on file   Transportation Needs: Not on file   Physical Activity: Not on file   Stress: Not on file   Social Connections: Not on file   Intimate Partner Violence: Not on file   Housing Stability: Not on file       Allergies:  Allergies   Allergen Reactions    Sulfa Drugs Unspecified     Pt hasn't had meds in so long he doesn't remember what his reaction is, he just remembers there is a reaction and that he shouldn't have them     Coconut (Cocos Nucifera) Allergy Skin Test     Coconut Oil     Flu Virus Vaccine Shortness of Breath    Other Food Anaphylaxis     coconut    Pneumococcal Vaccines Shortness of Breath    Sulfamethoxazole     Nutrasweet Aspartame [Aspartame]      Causes migraines       Medications: reviewed on epic.   Outpatient Medications Marked as Taking for the 25 encounter (Office Visit) with Missy Rhoades M.D.   Medication Sig Dispense Refill    amitriptyline (ELAVIL) 10 MG Tab TAKE ONE TABLET BY MOUTH IN THE EVENING 30 Tablet 0    gabapentin (NEURONTIN) 300 MG Cap take 2 capsules by mouth in the morning, at noon, and at bedtime 180 Capsule 0    isosorbide mononitrate SR (IMDUR) 60 MG TABLET SR 24 HR Take 60 mg by mouth every morning.      carvedilol (COREG) 6.25 MG Tab Carvedilol 6.25 MG Oral Tablet QTY: 60 tablet Days: 30 Refills: 0  Written: 10/29/24 Patient Instructions:      lisinopril (PRINIVIL) 10 MG Tab Lisinopril 10 MG Oral Tablet QTY: 30 tablet Days: 30 " Refills: 0  Written: 10/29/24 Patient Instructions:      atorvastatin (LIPITOR) 20 MG Tab Take 20 mg by mouth every evening.      acetaminophen (TYLENOL) 500 MG Tab TAKE ONE OR TWO TABLETS BY MOUTH THREE TIMES DAILY FOR 14 DAYS.      asa/apap/caffeine (EXCEDRIN) 250-250-65 MG Tab Take 1 Tablet by mouth every 6 hours as needed for Headache.      Insulin Glargine-yfgn 100 UNIT/ML Solution Pen-injector INJECT 8-10 UNITS SUBCUTANEOUSLY AT BEDTIME      insulin glargine (LANTUS) 100 UNIT/ML Solution Inject 6-15 Units under the skin at bedtime as needed (Blood Sugar Levels).      HYDROcodone-acetaminophen (NORCO) 7.5-325 MG per tablet Take 1 Tablet by mouth 3 times a day as needed for Severe Pain.      glipiZIDE SR (GLUCOTROL) 10 MG TABLET SR 24 HR Take 10 mg by mouth 2 times a day.      metformin (GLUCOPHAGE) 1000 MG tablet Take 1,000 mg by mouth 2 times a day with meals.          Current Outpatient Medications on File Prior to Visit   Medication Sig Dispense Refill    amitriptyline (ELAVIL) 10 MG Tab TAKE ONE TABLET BY MOUTH IN THE EVENING 30 Tablet 0    gabapentin (NEURONTIN) 300 MG Cap take 2 capsules by mouth in the morning, at noon, and at bedtime 180 Capsule 0    isosorbide mononitrate SR (IMDUR) 60 MG TABLET SR 24 HR Take 60 mg by mouth every morning.      carvedilol (COREG) 6.25 MG Tab Carvedilol 6.25 MG Oral Tablet QTY: 60 tablet Days: 30 Refills: 0  Written: 10/29/24 Patient Instructions:      lisinopril (PRINIVIL) 10 MG Tab Lisinopril 10 MG Oral Tablet QTY: 30 tablet Days: 30 Refills: 0  Written: 10/29/24 Patient Instructions:      atorvastatin (LIPITOR) 20 MG Tab Take 20 mg by mouth every evening.      acetaminophen (TYLENOL) 500 MG Tab TAKE ONE OR TWO TABLETS BY MOUTH THREE TIMES DAILY FOR 14 DAYS.      asa/apap/caffeine (EXCEDRIN) 250-250-65 MG Tab Take 1 Tablet by mouth every 6 hours as needed for Headache.      Insulin Glargine-yfgn 100 UNIT/ML Solution Pen-injector INJECT 8-10 UNITS SUBCUTANEOUSLY AT  BEDTIME      insulin glargine (LANTUS) 100 UNIT/ML Solution Inject 6-15 Units under the skin at bedtime as needed (Blood Sugar Levels).      HYDROcodone-acetaminophen (NORCO) 7.5-325 MG per tablet Take 1 Tablet by mouth 3 times a day as needed for Severe Pain.      glipiZIDE SR (GLUCOTROL) 10 MG TABLET SR 24 HR Take 10 mg by mouth 2 times a day.      metformin (GLUCOPHAGE) 1000 MG tablet Take 1,000 mg by mouth 2 times a day with meals.      acyclovir (ZOVIRAX) 800 MG Tab  (Patient not taking: Reported on 3/28/2025)       No current facility-administered medications on file prior to visit.         EXAMINATION     Physical Exam:   /59   Pulse 70   Temp 36.4 °C (97.6 °F) (Temporal)   Ht 1.829 m (6')   Wt 95.3 kg (210 lb 1.6 oz)   SpO2 96%     Constitutional:   Body Habitus: Body mass index is 28.49 kg/m².  Cooperation: Fully cooperates with exam  Appearance: Well-groomed, well-nourished.    Eyes: No scleral icterus to suggest severe liver disease, no proptosis to suggest severe hyperthyroidism    ENT -no obvious auditory deficits, no noticeable facial droop     Skin -no rashes or lesions noted     Respiratory-  breathing comfortably on room air, no audible wheezing    Cardiovascular-distal extremities warm and well perfused.  No lower extremity edema is noted.     Gastrointestinal - no obvious abdominal masses, non-distended    Psychiatric- alert and oriented ×3. Normal affect.     Gait stable steady    Musculoskeletal    Thoracic/Lumbar Spine/Sacral Spine/Hips   Inspection: No evidence of atrophy in bilateral lower extremities throughout       Facet loading maneuver negative bilaterally      Palpation:   No tenderness to palpation over the paraspinal muscles bilaterally.      Lumbar spine /hip provocative exam maneuvers  Slump test negative bilaterally  FADIR test negative bilaterally     SI joint tests  SAMANTHA test negative bilaterally        Key points for the international standards for neurological  classification of spinal cord injury (ISNCSCI) to light touch.   Dermatome R L   L2 2 2   L3 2 2   L4 1 1   L5 1 1   S1 1 1   S2 2 2         Motor Exam Lower Extremities  ? Myotome R L   Hip flexion L2 5 5   Knee extension L3 5 5   Ankle dorsiflexion L4 5 5   Toe extension L5 5 5   Ankle plantarflexion S1 5 5      Previous exam    Negative Spurling's bilaterally  Tenderness to palpation at left upper trapezius    Positive Tinel's and tenderness to palpation over the left greater and lesser occipital nerves    Key points for the international standards for neurological classification of spinal cord injury (ISNCSCI) to light touch.     Dermatome R L   C4 2 2   C5 2 2   C6 2 2   C7 2 2   C8 2 2   T1 2 2   T2 2 2       Motor Exam Upper Extremities   ? Myotome R L   Shoulder abduction C5 5 5   Elbow flexion C5 5 5   Wrist extension C6 5 5   Elbow extension C7 5 5   Finger flexion C8 5 5   Finger abduction T1 5 5       Special tests:  Tinel's at the wrist over the median nerve positive on left, negative on right  Carpal tunnel compression: negative bilaterally  Tinel's at the cubital tunnel: negative bilaterally    MEDICAL DECISION MAKING    Medical records review: see under HPI section.     DATA    Labs: No new labs available for review since last visit    Lab Results   Component Value Date/Time    SODIUM 131 (L) 02/07/2025 08:40 AM    POTASSIUM 6.1 (H) 02/07/2025 08:40 AM    CHLORIDE 97 02/07/2025 08:40 AM    CO2 25 02/07/2025 08:40 AM    ANION 9.0 02/07/2025 08:40 AM    GLUCOSE 114 (H) 02/07/2025 08:40 AM    BUN 37 (H) 02/07/2025 08:40 AM    CREATININE 1.04 02/07/2025 08:40 AM    CREATININE 0.7 08/10/2006 01:05 PM    CALCIUM 9.6 02/07/2025 08:40 AM    ASTSGOT 26 02/07/2025 08:40 AM    ALTSGPT 33 02/07/2025 08:40 AM    TBILIRUBIN 0.3 02/07/2025 08:40 AM    ALBUMIN 4.5 02/07/2025 08:40 AM    TOTPROTEIN 7.5 02/07/2025 08:40 AM    GLOBULIN 3.0 02/07/2025 08:40 AM    AGRATIO 1.5 02/07/2025 08:40 AM       Lab Results    Component Value Date/Time    PROTHROMBTM 10.7 (L) 08/10/2006 01:05 PM    INR 0.85 (L) 08/10/2006 01:05 PM        Lab Results   Component Value Date/Time    WBC 9.7 02/07/2025 08:40 AM    RBC 4.40 (L) 02/07/2025 08:40 AM    HEMOGLOBIN 13.6 (L) 02/07/2025 08:40 AM    HEMATOCRIT 43.2 02/07/2025 08:40 AM    MCV 98.2 (H) 02/07/2025 08:40 AM    MCH 30.9 02/07/2025 08:40 AM    MCHC 31.5 (L) 02/07/2025 08:40 AM    MPV 9.0 02/07/2025 08:40 AM    NEUTSPOLYS 49.60 02/07/2025 08:40 AM    LYMPHOCYTES 37.70 02/07/2025 08:40 AM    MONOCYTES 7.70 02/07/2025 08:40 AM    EOSINOPHILS 3.20 02/07/2025 08:40 AM    BASOPHILS 1.60 02/07/2025 08:40 AM        Lab Results   Component Value Date/Time    HBA1C 7.7 (H) 02/07/2025 08:40 AM        Limited diagnostic ultrasound of left median nerve performed 12/20/2024 by Dr. Rhoades  No significant increase in cross-sectional area of median nerve between left carpal tunnel outlet and left pronator quadratus    NCS/EMG by Dr. Contreras examination Date: 7/8/2025      Impression:       This is an normal study.   There is no electrodiagnostic evidence of BILATERAL lower extremity large fiber neuropathy.   There is no evidence of LEFT peroneal motor mononeuropathy, tibial mononeuropathy, lumbosacral plexopathy, or lumbarsacral radiculopathy.         Imaging:   I personally reviewed following images, these are my reads    Chest x-ray 6/21/2022  No evidence of acute rib fracture. See formal radiology report for further details.     MRI lumbar spine 2/27/2024  Postoperative changes with fusion in the midline laminectomy spanning L3-S1.  At L2-3 there is a broad-based disc bulge and bilateral facet arthropathy with ligamentum flavum hypertrophy contributing to moderate central canal stenosis at this level.  There is also bilateral moderate severe neuroforaminal stenosis at this level.  No significant neuroforaminal stenosis or central canal stenosis elsewhere. See formal radiology report for further  details.    X-ray lumbar spine 2/7/2024  Fusion spanning L3-S1.  Mild retrolisthesis of L2 on L3. See formal radiology report for further details.    IMAGING radiology reads. I reviewed the following radiology reads   MRI lumbar spine 2/27/2024  FINDINGS:     Postoperative changes are noted in the lumbar spine with midline laminectomy at L3-4, L4-5 and L5-S1 and posterior transpedicular fusion between L3 and S1.  Intervertebral disc spacers are present at the L3-4, L4-5, L5-S1 levels.  A lipid rich hemangioma is noted within L1.  There is decreased disc height with type I marrow changes involving the adjacent endplates at the L2-L3 level. The conus terminates at  L1. Normal signal intensity is identified within the distal thoracic cord and conus medullaris.     Axial lumbar spine levels:     T12-L1: Normal.     L1-2: Mild facet degeneration without stenosis.     L2-3: Broad-based disc bulge and bilateral facet degeneration and ligamentum flavum hypertrophy results in moderate canal stenosis with thecal sac compression. There is also bilateral moderate-to-severe foraminal narrowing.     L3-4: Widely decompressed spinal canal. No stenosis.     L4-5: Widely decompressed spinal canal. No stenosis.     L5-S1: Well decompressed spinal canal. No stenosis.     A small T2 hyperintense collection is identified in the laminectomy bed behind the L3-4 level, measuring 17 x 40 x 42 mm (CC, AP, ML). There is no significant mass effect upon the thecal sac. This may represent a chronic organized fluid   collection/granulation tissue.     Prevertebral soft tissues are within normal limits.     IMPRESSION:        Moderate canal stenosis at L2-3 due to encroachment by a broad-based disc bulge, advanced facet degeneration at the metaphyseal hypertrophy. There is also bilateral moderate-to-severe foraminal narrowing with likely impingement upon the exiting L2   nerves.     Postoperative changes at L3-4, L4-5, L5-S1 with a well  decompressed canal. There is no foraminal narrowing.    Results for orders placed during the hospital encounter of 07/05/23    MR-BRAIN-W/O    Impression  No acute intracranial process.    Nonspecific T2 hyperintensities are noted in the periventricular and deep white matter, most likely related to chronic microvascular ischemia.             Results for orders placed during the hospital encounter of 02/03/09    MR-CERVICAL SPINE-W/O    Impression  IMPRESSION:    1. POSTERIOR SPONDYLOTIC DISC BULGING AT C5-6 WHICH EXTENDS TO THE  LEFT-SIDED NEURAL FORAMEN WITH MILD CANAL STENOSIS AND LEFT-SIDED NEURAL  FORAMINAL NARROWING PRESENT.  THERE IS ALSO ACCOMPANYING LEFT-SIDED  UNCINATE SPURRING.    2. MILD DISC DESICCATION AT C2-3, C3-4, C4-5, AND C6-7 AS DESCRIBED.      CBG/bht        Read By JOVANY FLORENCE MD on Feb 4 2009  1:23PM  : TONI Transcription Date: Feb 4 2009  2:16PM  THIS DOCUMENT HAS BEEN ELECTRONICALLY SIGNED BY: JOVANY FLORENCE MD on Feb 6 2009 12:16PM      Results for orders placed during the hospital encounter of 02/27/24    MR-LUMBAR SPINE-W/O    Impression  Moderate canal stenosis at L2-3 due to encroachment by a broad-based disc bulge, advanced facet degeneration at the metaphyseal hypertrophy. There is also bilateral moderate-to-severe foraminal narrowing with likely impingement upon the exiting L2  nerves.    Postoperative changes at L3-4, L4-5, L5-S1 with a well decompressed canal. There is no foraminal narrowing.        Results for orders placed during the hospital encounter of 02/27/24    MR-LUMBAR SPINE-W/O    Impression  Moderate canal stenosis at L2-3 due to encroachment by a broad-based disc bulge, advanced facet degeneration at the metaphyseal hypertrophy. There is also bilateral moderate-to-severe foraminal narrowing with likely impingement upon the exiting L2  nerves.    Postoperative changes at L3-4, L4-5, L5-S1 with a well decompressed canal. There is no foraminal narrowing.                                       Results for orders placed during the hospital encounter of 11/06/11    DX-ANKLE 3+ VIEWS    Impression  No acute bony abnormality.      Results for orders placed during the hospital encounter of 05/27/09    DX-CHEST-2 VIEWS    Impression  IMPRESSION:    NORMAL CHEST.      WILMERW:angeles    Read By WILL THOMPSON MD on May 27 2009  3:24PM  : ANGELES Transcription Date: May 28 2009 11:52AM  THIS DOCUMENT HAS BEEN ELECTRONICALLY SIGNED BY: WILL THOMPSON MD on May  29 2009 10:45PM       Results for orders placed in visit on 11/03/12    DX-FINGER(S) 2+    Impression  No evidence of fracture or dislocation.    Results for orders placed during the hospital encounter of 07/28/22    DX-FOOT-COMPLETE 3+ RIGHT    Impression  1.  Soft tissue gas is present overlying the first transmetatarsal amputation site and indistinctness of the cortex of the every dictation site site of the first metatarsal which raises suspicion for infection. MRI of the foot with and without contrast  could be obtained for further evaluation if indicated.  2.  Subacute nonunited fracture of the second metatarsal base with new moderate lateral displacement and bone callus formation.  3.  Interval agitation of the second digit at the proximal phalanx               Results for orders placed during the hospital encounter of 02/07/24    DX-LUMBAR SPINE-2 OR 3 VIEWS    Impression  Mild retrolisthesis and severe degenerative disc disease at L2-3.    Postsurgical changes from posterior fixation and disc prosthesis placement at the L3-S1. Mature fusion of the disc space. The bilateral L3 pedicle screws are very close to the L2-3 disc space.      Results for orders placed during the hospital encounter of 05/27/21    [id number] (WITH 1-VIEW CXR) LEFT    Impression  Left lateral seventh and ninth acute rib fractures.    No pneumothorax or acute cardiopulmonary abnormality.         Results for orders placed during the  hospital encounter of 21    DX-SHOULDER 2+ LEFT    Impression  No acute osseous abnormality.              Diagnosis  Visit Diagnoses     ICD-10-CM   1. Numbness and tingling of both legs  R20.0    R20.2   2. Spinal stenosis of lumbar region at L2-3 without neurogenic claudication  M48.061   3. Intercostal neuralgia  G58.8   4. Myalgia  M79.10   5. Occipital neuralgia of left side  M54.81   6. Chronic bilateral low back pain without sciatica  M54.50    G89.29   7. History of lumbar surgery  Z98.890   8. Other chronic pain  G89.29   9. Rib pain  R07.81   10. Cervical radiculitis  M54.12   11. Impingement of left shoulder  M25.812   12. Chronic left shoulder pain  M25.512    G89.29                       ASSESSMENT AND PLAN:  Keaton Cervantes (: 1955) is a male with improved myalgia and occipital neuralgia pain and new worsening pain radiating down bilateral legs in L5/ S1 distribution     Carlos was seen today for follow-up.    Diagnoses and all orders for this visit:    Numbness and tingling of both legs    Spinal stenosis of lumbar region at L2-3 without neurogenic claudication    Intercostal neuralgia    Myalgia    Occipital neuralgia of left side    Chronic bilateral low back pain without sciatica    History of lumbar surgery    Other chronic pain    Rib pain    Cervical radiculitis    Impingement of left shoulder    Chronic left shoulder pain          PLAN  Physical Therapy: I previously ordered physical therapy to focus on strengthening and stretching as well as a home exercise program.  This is cost prohibitive for him to continue.  Advised him to continue with his home exercise program as he is doing.  Discussed that he appears to be experiencing symptoms of left shoulder impingement and I believe he would benefit from physical therapy exercises.  He deferred formal physical therapy due to cost considerations.     Home Exercise Program: I previously provided the patient with a home exercise  program focusing on strengthening and stretching at today's visit.  This is in addition to the home exercise program that I gave him at a previous visit.     Diagnostic workup: Limited diagnostic ultrasound of left median nerve performed 12/20/2024 by Dr. Rhoades, images scanned to media  - I have discussed possible MRI cervical spine given his radicular symptoms on the left, defer at this time due to financial considerations  -Personally reviewed at today's visit:   Raw data fromNCS/EMG by Dr. Contreras examination Date: 7/8/2025      Medications:   -Okay to continue Norco 7.5-325 mg twice daily as needed as prescribed by the patient's PCP as long as there is compliance with .  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  He is low risk for narcotic abuse with an opioid risk score of 0 at today's visit  - also on ASA  - Continue amitriptyline   - Continue gabapentin 600mg TID PRN.  Discussed possible side effects of dizziness, monitor.  The patient is doing well with this medicine overall.     Interventions:   -Repeat right T8 and T9 intercostal nerve blocks under ultrasound guidance as needed  - trigger point injections with steroid as needed including left paracervical and upper trapezius area, and right intercostal muscle as needed.   - left greater occipital nerve block under ultrasound guidance as needed  -Discussed consideration of caudal epidural versus bilateral L5-S1 transforaminal epidural steroid injection for radiating leg pain if his symptoms worsen.  He would like to defer an injection at this time    Follow-up: 6 months or sooner as needed    No orders of the defined types were placed in this encounter.      Missy Rhoades MD  Interventional Pain and Spine  Physical Medicine and Rehabilitation  [facility]      The above note documents my personal evaluation of this patient. In addition, I have reviewed and confirmed with the patient and MA the  supportive information documented in today's Patient Health Questionnaire and Office Note.     Please note that this dictation was created using voice recognition software. I have made every reasonable attempt to correct obvious errors, but I expect that there are errors of grammar and possibly content that I did not discover before finalizing the note.

## 2025-08-12 RX ORDER — AMITRIPTYLINE HYDROCHLORIDE 10 MG/1
10 TABLET ORAL EVERY EVENING
Qty: 30 TABLET | Refills: 0 | Status: SHIPPED | OUTPATIENT
Start: 2025-08-12

## 2025-08-12 RX ORDER — GABAPENTIN 300 MG/1
600 CAPSULE ORAL 3 TIMES DAILY
Qty: 180 CAPSULE | Refills: 0 | Status: SHIPPED | OUTPATIENT
Start: 2025-08-12

## (undated) DEVICE — BANDAGE ELASTIC LATEX STERILE VELCRO 4 X 5 YDS (25EA/CA)

## (undated) DEVICE — KIT ANESTHESIA W/CIRCUIT & 3/LT BAG W/FILTER (20EA/CA)

## (undated) DEVICE — GOWN WARMING STANDARD FLEX - (30/CA)

## (undated) DEVICE — PACK LOWER EXTREMITY - (2/CA)

## (undated) DEVICE — BANDAGE ELASTIC 3 X 5 YDS - STERILE VELCRO (25/CA)LATEX

## (undated) DEVICE — GLOVE SZ 7 BIOGEL PI MICRO - PF LF (50PR/BX 4BX/CA)

## (undated) DEVICE — SUTURE GENERAL

## (undated) DEVICE — SUTURE ETHILON 2-0 FSLX 30 (36PK/BX)"

## (undated) DEVICE — BANDAGE ELASTIC STERILE VELCRO 6 X 5 YDS (25EA/CA)

## (undated) DEVICE — GLOVE BIOGEL SZ 8 SURGICAL PF LTX - (50PR/BX 4BX/CA)

## (undated) DEVICE — GLOVE BIOGEL INDICATOR SZ 8 SURGICAL PF LTX - (50/BX 4BX/CA)

## (undated) DEVICE — ELECTRODE 850 FOAM ADHESIVE - HYDROGEL RADIOTRNSPRNT (50/PK)

## (undated) DEVICE — HEAD HOLDER JUNIOR/ADULT

## (undated) DEVICE — SCRUB SOLUTION EXIDINE 4% 40Z - 48/CS CHLORAHEXADINE GLUCONATE

## (undated) DEVICE — SET EXTENSION WITH 2 PORTS (48EA/CA) ***PART #2C8610 IS A SUBSTITUTE*****

## (undated) DEVICE — CHLORAPREP 26 ML APPLICATOR - ORANGE TINT(25/CA)

## (undated) DEVICE — GLOVE BIOGEL SZ 7.5 SURGICAL PF LTX - (50PR/BX 4BX/CA)

## (undated) DEVICE — GLOVE BIOGEL PI INDICATOR SZ 8.0 SURGICAL PF LF -(50/BX 4BX/CA)

## (undated) DEVICE — SODIUM CHL IRRIGATION 0.9% 1000ML (12EA/CA)

## (undated) DEVICE — TUBING CLEARLINK DUO-VENT - C-FLO (48EA/CA)

## (undated) DEVICE — SUCTION INSTRUMENT YANKAUER BULBOUS TIP W/O VENT (50EA/CA)

## (undated) DEVICE — SLEEVE VASO CALF MED - (10PR/CA)

## (undated) DEVICE — BANDAGE ELASTIC 6 HONEYCOMB - 6X5YD LF (20/CA)"

## (undated) DEVICE — GLOVE BIOGEL SZ 6 PF LATEX - (50EA/BX 4BX/CA)

## (undated) DEVICE — GLOVE BIOGEL PI INDICATOR SZ 7.0 SURGICAL PF LF - (50/BX 4BX/CA)

## (undated) DEVICE — LACTATED RINGERS INJ 1000 ML - (14EA/CA 60CA/PF)

## (undated) DEVICE — SET LEADWIRE 5 LEAD BEDSIDE DISPOSABLE ECG (1SET OF 5/EA)

## (undated) DEVICE — TOWELS CLOTH SURGICAL - (4/PK 20PK/CA)

## (undated) DEVICE — PADDING CAST 4 IN STERILE - 4 X 4 YDS (24/CA)

## (undated) DEVICE — PROTECTOR ULNA NERVE - (36PR/CA)

## (undated) DEVICE — SENSOR SPO2 NEO LNCS ADHESIVE (20/BX) SEE USER NOTES

## (undated) DEVICE — MASK, LARYNGEAL AIRWAY #4

## (undated) DEVICE — SLEEVE, VASO, THIGH, MED

## (undated) DEVICE — CONTAINER, SPECIMEN, STERILE

## (undated) DEVICE — FIBERWIRE 2-0 - 12/BX

## (undated) DEVICE — SWAB CULTURE AMIES ESWAB (50EA/PK)

## (undated) DEVICE — WRAP CO-FLEX 4IN X 5YD STERIL - SELF-ADHERENT (18/CA)

## (undated) DEVICE — SUTURE 2-0 VICRYL PLUS CT-1 36 (36PK/BX)"

## (undated) DEVICE — BLADE SURGICAL #15 - (50/BX 3BX/CA)

## (undated) DEVICE — DRESSING ABDOMINAL PAD STERILE 8 X 10" (360EA/CA)"

## (undated) DEVICE — SUTURE 3-0 ETHILON PS-1 (36PK/BX)

## (undated) DEVICE — TOWEL STOP TIMEOUT SAFETY FLAG (40EA/CA)

## (undated) DEVICE — PAD LAP STERILE 18 X 18 - (5/PK 40PK/CA)

## (undated) DEVICE — PADDING CAST 6 IN STERILE - 6 X 4 YDS (24/CA)

## (undated) DEVICE — SUTURE 2-0 VICRYL PLUS CT-1 - 8 X 18 INCH(12/BX)

## (undated) DEVICE — GLOVE SZ 8 BIOGEL PI MICRO - PF LF (50PR/BX)

## (undated) DEVICE — SUTURE 3-0 ETHILON FS-1 - (36/BX) 30 INCH

## (undated) DEVICE — TRAY SKIN SCRUB PVP WET (20EA/CA) PART #DYND70356 DISCONTINUED

## (undated) DEVICE — SUTURE 3-0 VICRYL PLUS SH - 27 INCH (36/BX)

## (undated) DEVICE — DRESSING 3X8 ADAPTIC GAUZE - NON-ADHERING (36/PK 6PK/BX)

## (undated) DEVICE — TUBE CONNECTING SUCTION - CLEAR PLASTIC STERILE 72 IN (50EA/CA)

## (undated) DEVICE — CANISTER SUCTION 3000ML MECHANICAL FILTER AUTO SHUTOFF MEDI-VAC NONSTERILE LF DISP  (40EA/CA)

## (undated) DEVICE — NEPTUNE 4 PORT MANIFOLD - (20/PK)

## (undated) DEVICE — SENSOR OXIMETER ADULT SPO2 RD SET (20EA/BX)

## (undated) DEVICE — ELECTRODE DUAL RETURN W/ CORD - (50/PK)

## (undated) DEVICE — Device

## (undated) DEVICE — GLOVE BIOGEL PI INDICATOR SZ 7.5 SURGICAL PF LF -(50/BX 4BX/CA)

## (undated) DEVICE — TOURNIQUET, STERILE 34 (BLUE)

## (undated) DEVICE — MASK ANESTHESIA ADULT  - (100/CA)

## (undated) DEVICE — GLOVE SZ 7.5 BIOGEL PI MICRO - PF LF (50PR/BX)

## (undated) DEVICE — GOWN SURGEONS X-LARGE - DISP. (30/CA)

## (undated) DEVICE — BLADE SAGITTAL SAW 9.4MM X 25.5MM X .4MM FINE TOOTH (1/EA)

## (undated) DEVICE — CANISTER SUCTION RIGID RED 1500CC (40EA/CA)

## (undated) DEVICE — DRAPE LARGE 3 QUARTER - (20/CA)